# Patient Record
Sex: FEMALE | Race: WHITE | NOT HISPANIC OR LATINO | Employment: OTHER | URBAN - METROPOLITAN AREA
[De-identification: names, ages, dates, MRNs, and addresses within clinical notes are randomized per-mention and may not be internally consistent; named-entity substitution may affect disease eponyms.]

---

## 2017-04-10 ENCOUNTER — OFFICE VISIT (OUTPATIENT)
Dept: INTERNAL MEDICINE | Facility: CLINIC | Age: 72
End: 2017-04-10

## 2017-04-10 VITALS
SYSTOLIC BLOOD PRESSURE: 136 MMHG | BODY MASS INDEX: 27.66 KG/M2 | HEIGHT: 65 IN | WEIGHT: 166 LBS | DIASTOLIC BLOOD PRESSURE: 76 MMHG

## 2017-04-10 DIAGNOSIS — R19.5 DARK STOOLS: ICD-10-CM

## 2017-04-10 DIAGNOSIS — R10.13 EPIGASTRIC PAIN: Primary | ICD-10-CM

## 2017-04-10 LAB
ALBUMIN SERPL-MCNC: 4 G/DL (ref 3.2–4.8)
ALBUMIN/GLOB SERPL: 1.4 G/DL (ref 1.5–2.5)
ALP SERPL-CCNC: 117 U/L (ref 25–100)
ALT SERPL W P-5'-P-CCNC: 20 U/L (ref 7–40)
ANION GAP SERPL CALCULATED.3IONS-SCNC: 11 MMOL/L (ref 3–11)
AST SERPL-CCNC: 29 U/L (ref 0–33)
BILIRUB SERPL-MCNC: 0.4 MG/DL (ref 0.3–1.2)
BUN BLD-MCNC: 19 MG/DL (ref 9–23)
BUN/CREAT SERPL: 23.8 (ref 7–25)
CALCIUM SPEC-SCNC: 10.3 MG/DL (ref 8.7–10.4)
CHLORIDE SERPL-SCNC: 108 MMOL/L (ref 99–109)
CO2 SERPL-SCNC: 19 MMOL/L (ref 20–31)
CREAT BLD-MCNC: 0.8 MG/DL (ref 0.6–1.3)
DEPRECATED RDW RBC AUTO: 51.5 FL (ref 37–54)
ERYTHROCYTE [DISTWIDTH] IN BLOOD BY AUTOMATED COUNT: 14.3 % (ref 11.3–14.5)
GFR SERPL CREATININE-BSD FRML MDRD: 71 ML/MIN/1.73
GLOBULIN UR ELPH-MCNC: 2.8 GM/DL
GLUCOSE BLD-MCNC: 107 MG/DL (ref 70–100)
HCT VFR BLD AUTO: 44.9 % (ref 34.5–44)
HGB BLD-MCNC: 14.1 G/DL (ref 11.5–15.5)
MCH RBC QN AUTO: 30.9 PG (ref 27–31)
MCHC RBC AUTO-ENTMCNC: 31.4 G/DL (ref 32–36)
MCV RBC AUTO: 98.5 FL (ref 80–99)
PLATELET # BLD AUTO: 288 10*3/MM3 (ref 150–450)
PMV BLD AUTO: 10.8 FL (ref 6–12)
POTASSIUM BLD-SCNC: 4.7 MMOL/L (ref 3.5–5.5)
PROT SERPL-MCNC: 6.8 G/DL (ref 5.7–8.2)
RBC # BLD AUTO: 4.56 10*6/MM3 (ref 3.89–5.14)
SODIUM BLD-SCNC: 138 MMOL/L (ref 132–146)
WBC NRBC COR # BLD: 8.51 10*3/MM3 (ref 3.5–10.8)

## 2017-04-10 PROCEDURE — 85027 COMPLETE CBC AUTOMATED: CPT | Performed by: PHYSICIAN ASSISTANT

## 2017-04-10 PROCEDURE — 99213 OFFICE O/P EST LOW 20 MIN: CPT | Performed by: PHYSICIAN ASSISTANT

## 2017-04-10 PROCEDURE — 80053 COMPREHEN METABOLIC PANEL: CPT | Performed by: PHYSICIAN ASSISTANT

## 2017-04-10 NOTE — PROGRESS NOTES
Chief Complaint   Patient presents with   • Abdominal pain, nausea, dark stool x3 days       Subjective   Rebecca Sharma is a 71 y.o. female.       History of Present Illness     Pt has been having intermittent nausea and epigastric pain for the past 3 days. Initially had 2 completely dark stools, not tarry or black- dark brown. Has had 2 normal stools with a few darks spots since then. Notes that she had been taking her medication on an empty stomach prior to onset of symptoms, switched to taking with food. Since her symptoms started, she increased nexium to BID. Changed her diet to almond mild, cooked vegetables. Has had significant improvement in her symptoms. No history of EGD that she is aware of. Last colonoscopy was summer 2016, small polyp removed. Sees Dr Gold.       Current Outpatient Prescriptions:   •  celecoxib (CeleBREX) 200 MG capsule, Take 1 capsule by mouth daily as needed., Disp: , Rfl:   •  esomeprazole (NEXIUM 24HR) 20 MG capsule, Take 2 capsules by mouth daily., Disp: 180 capsule, Rfl: 1  •  fluticasone-salmeterol (ADVAIR DISKUS) 100-50 MCG/DOSE DISKUS, Inhale 1 puff 2 (two) times a day., Disp: 180 each, Rfl: 3  •  levothyroxine (SYNTHROID, LEVOTHROID) 50 MCG tablet, Take 1 tablet by mouth every morning., Disp: 90 tablet, Rfl: 3  •  solifenacin (VESICARE) 5 MG tablet, Take 1 tablet by mouth daily., Disp: 90 tablet, Rfl: 3  •  traMADol (ULTRAM) 50 MG tablet, 50 mg daily as needed., Disp: , Rfl:   •  Triamcinolone Acetonide (NASACORT ALLERGY 24HR) 55 MCG/ACT nasal inhaler, 2 sprays into each nostril every morning., Disp: 16.5 g, Rfl: 1     PMFSH  The following portions of the patient's history were reviewed and updated as appropriate: allergies, current medications, past family history, past medical history, past social history, past surgical history and problem list.    Review of Systems   Constitutional: Negative for activity change, appetite change, diaphoresis and fatigue.   HENT: Negative.  "   Respiratory: Negative for chest tightness, shortness of breath and wheezing.    Cardiovascular: Negative for chest pain.   Gastrointestinal: Negative.    Genitourinary: Negative.    Musculoskeletal: Negative for arthralgias and myalgias.   Skin: Negative for pallor.   Neurological: Negative for dizziness, syncope, weakness and light-headedness.   Psychiatric/Behavioral: Negative for sleep disturbance. The patient is not nervous/anxious.        Objective   /76  Ht 65\" (165.1 cm)  Wt 166 lb (75.3 kg)  BMI 27.62 kg/m2    Physical Exam   Constitutional: She appears well-developed and well-nourished.   HENT:   Head: Normocephalic.   Right Ear: Hearing, tympanic membrane, external ear and ear canal normal.   Left Ear: Hearing, tympanic membrane, external ear and ear canal normal.   Nose: Nose normal.   Mouth/Throat: Oropharynx is clear and moist.   Eyes: Conjunctivae are normal. Pupils are equal, round, and reactive to light.   Neck: Normal range of motion.   Cardiovascular: Normal rate, regular rhythm and normal heart sounds.    Pulmonary/Chest: Effort normal and breath sounds normal. She has no decreased breath sounds. She has no wheezes. She has no rhonchi. She has no rales.   Abdominal: Soft. Bowel sounds are normal. There is no tenderness.   Musculoskeletal: Normal range of motion.   Neurological: She is alert.   Skin: Skin is warm and dry.   Psychiatric: She has a normal mood and affect. Her behavior is normal.   Nursing note and vitals reviewed.      Results for orders placed or performed in visit on 04/10/17   CBC (No Diff)   Result Value Ref Range    WBC 8.51 3.50 - 10.80 10*3/mm3    RBC 4.56 3.89 - 5.14 10*6/mm3    Hemoglobin 14.1 11.5 - 15.5 g/dL    Hematocrit 44.9 (H) 34.5 - 44.0 %    MCV 98.5 80.0 - 99.0 fL    MCH 30.9 27.0 - 31.0 pg    MCHC 31.4 (L) 32.0 - 36.0 g/dL    RDW 14.3 11.3 - 14.5 %    RDW-SD 51.5 37.0 - 54.0 fl    MPV 10.8 6.0 - 12.0 fL    Platelets 288 150 - 450 10*3/mm3 "   Comprehensive Metabolic Panel   Result Value Ref Range    Glucose 107 (H) 70 - 100 mg/dL    BUN 19 9 - 23 mg/dL    Creatinine 0.80 0.60 - 1.30 mg/dL    Sodium 138 132 - 146 mmol/L    Potassium 4.7 3.5 - 5.5 mmol/L    Chloride 108 99 - 109 mmol/L    CO2 19.0 (L) 20.0 - 31.0 mmol/L    Calcium 10.3 8.7 - 10.4 mg/dL    Total Protein 6.8 5.7 - 8.2 g/dL    Albumin 4.00 3.20 - 4.80 g/dL    ALT (SGPT) 20 7 - 40 U/L    AST (SGOT) 29 0 - 33 U/L    Alkaline Phosphatase 117 (H) 25 - 100 U/L    Total Bilirubin 0.4 0.3 - 1.2 mg/dL    eGFR Non African Amer 71 >60 mL/min/1.73    Globulin 2.8 gm/dL    A/G Ratio 1.4 (L) 1.5 - 2.5 g/dL    BUN/Creatinine Ratio 23.8 7.0 - 25.0    Anion Gap 11.0 3.0 - 11.0 mmol/L        ASSESSMENT/PLAN    Problem List Items Addressed This Visit     None      Visit Diagnoses     Epigastric pain    -  Primary    Likely gastritis, now resolving with increase of nexium to 20 mg BID and taking meds with food. Continue lifestyle changes, rtc with worsening symptoms.    Relevant Orders    CBC (No Diff) (Completed)    Comprehensive Metabolic Panel (Completed)    Dark stools        Relevant Orders    CBC (No Diff) (Completed)    Comprehensive Metabolic Panel (Completed)               Return for Next scheduled follow up.

## 2017-04-12 NOTE — PROGRESS NOTES
Your labs are stable. I discussed further evaluation of your recent symptoms with Dr Isaac and we agreed that none is needed now that your symptoms have resolved. If you should have a recurrence of the epigastric pain, nausea and dark stools, please come back in to the office. Hope you are still feeling well!

## 2017-04-18 ENCOUNTER — TELEPHONE (OUTPATIENT)
Dept: INTERNAL MEDICINE | Facility: CLINIC | Age: 72
End: 2017-04-18

## 2017-04-18 DIAGNOSIS — R10.13 DYSPEPSIA: ICD-10-CM

## 2017-04-18 DIAGNOSIS — K21.9 GASTROESOPHAGEAL REFLUX DISEASE, ESOPHAGITIS PRESENCE NOT SPECIFIED: Primary | ICD-10-CM

## 2017-04-18 NOTE — TELEPHONE ENCOUNTER
I sent her a note last week with her labs on MyChart after I talked to Dr Isaac. It said that becuase her symptoms had resolved, there was no need for further testing. However, since she is having some esophageal discomfort, I will put in the referral for an EGD.

## 2017-04-18 NOTE — TELEPHONE ENCOUNTER
----- Message from Kisha De La Rosa MA sent at 4/18/2017  1:02 PM EDT -----  Contact: PATIENT  Please advise.   ----- Message -----     From: Rossy Cook     Sent: 4/18/2017  12:26 PM       To: Pete Rogers [facility]    PATIENT IS CALLING TO DISCUSS RECENT LAB RESULTS. JUAN WAS GOING TO TALK TO DR. MERDANO TO SEE IF PATIENT NEEDS TO BE REFERRED TO GASTROENTEROLOGY. PATIENT IS STILL EXPERIENCING  PAIN AROUND HER ESOPHAGUS. PATIENT HAS A CRUISE COMING UP AND WOULD LIKE TO GET THIS ISSUE RESOLVED. YOU CAN REACH PATIENT BACK [phone]

## 2017-05-08 ENCOUNTER — TRANSCRIBE ORDERS (OUTPATIENT)
Dept: INTERNAL MEDICINE | Facility: CLINIC | Age: 72
End: 2017-05-08

## 2017-05-08 ENCOUNTER — HOSPITAL ENCOUNTER (OUTPATIENT)
Dept: MAMMOGRAPHY | Facility: HOSPITAL | Age: 72
Discharge: HOME | End: 2017-05-08
Attending: INTERNAL MEDICINE | Admitting: INTERNAL MEDICINE

## 2017-05-08 DIAGNOSIS — R92.8 ABNORMAL MAMMOGRAM: ICD-10-CM

## 2017-05-08 DIAGNOSIS — R92.8 ABNORMAL MAMMOGRAM: Primary | ICD-10-CM

## 2017-05-08 PROCEDURE — G0206 DX MAMMO INCL CAD UNI: HCPCS

## 2017-05-08 PROCEDURE — G0206 DX MAMMO INCL CAD UNI: HCPCS | Performed by: RADIOLOGY

## 2017-07-07 ENCOUNTER — LAB (OUTPATIENT)
Dept: INTERNAL MEDICINE | Facility: CLINIC | Age: 72
End: 2017-07-07

## 2017-07-07 DIAGNOSIS — R03.0 ELEVATED BLOOD-PRESSURE READING WITHOUT DIAGNOSIS OF HYPERTENSION: ICD-10-CM

## 2017-07-07 DIAGNOSIS — E03.8 OTHER SPECIFIED HYPOTHYROIDISM: ICD-10-CM

## 2017-07-07 DIAGNOSIS — Z00.00 ANNUAL PHYSICAL EXAM: ICD-10-CM

## 2017-07-07 DIAGNOSIS — R10.13 DYSPEPSIA: ICD-10-CM

## 2017-07-07 DIAGNOSIS — E78.5 DYSLIPIDEMIA: Primary | ICD-10-CM

## 2017-07-07 LAB
ALBUMIN SERPL-MCNC: 4 G/DL (ref 3.2–4.8)
ALBUMIN/GLOB SERPL: 1.4 G/DL (ref 1.5–2.5)
ALP SERPL-CCNC: 141 U/L (ref 25–100)
ALT SERPL W P-5'-P-CCNC: 21 U/L (ref 7–40)
ANION GAP SERPL CALCULATED.3IONS-SCNC: 10 MMOL/L (ref 3–11)
ARTICHOKE IGE QN: 118 MG/DL (ref 0–130)
AST SERPL-CCNC: 29 U/L (ref 0–33)
BACTERIA UR QL AUTO: ABNORMAL /HPF
BASOPHILS # BLD AUTO: 0.09 10*3/MM3 (ref 0–0.2)
BASOPHILS NFR BLD AUTO: 1.3 % (ref 0–1)
BILIRUB SERPL-MCNC: 0.4 MG/DL (ref 0.3–1.2)
BILIRUB UR QL STRIP: NEGATIVE
BUN BLD-MCNC: 15 MG/DL (ref 9–23)
BUN/CREAT SERPL: 18.8 (ref 7–25)
CALCIUM SPEC-SCNC: 10.2 MG/DL (ref 8.7–10.4)
CHLORIDE SERPL-SCNC: 108 MMOL/L (ref 99–109)
CHOLEST SERPL-MCNC: 172 MG/DL (ref 0–200)
CLARITY UR: CLEAR
CO2 SERPL-SCNC: 21 MMOL/L (ref 20–31)
COLOR UR: YELLOW
CREAT BLD-MCNC: 0.8 MG/DL (ref 0.6–1.3)
DEPRECATED RDW RBC AUTO: 53.5 FL (ref 37–54)
EOSINOPHIL # BLD AUTO: 0.21 10*3/MM3 (ref 0–0.3)
EOSINOPHIL NFR BLD AUTO: 3 % (ref 0–3)
ERYTHROCYTE [DISTWIDTH] IN BLOOD BY AUTOMATED COUNT: 14.4 % (ref 11.3–14.5)
GFR SERPL CREATININE-BSD FRML MDRD: 70 ML/MIN/1.73
GLOBULIN UR ELPH-MCNC: 2.8 GM/DL
GLUCOSE BLD-MCNC: 79 MG/DL (ref 70–100)
GLUCOSE UR STRIP-MCNC: NEGATIVE MG/DL
HCT VFR BLD AUTO: 44.5 % (ref 34.5–44)
HDLC SERPL-MCNC: 43 MG/DL (ref 40–60)
HGB BLD-MCNC: 13.9 G/DL (ref 11.5–15.5)
HGB UR QL STRIP.AUTO: NEGATIVE
HYALINE CASTS UR QL AUTO: ABNORMAL /LPF
IMM GRANULOCYTES # BLD: 0.01 10*3/MM3 (ref 0–0.03)
IMM GRANULOCYTES NFR BLD: 0.1 % (ref 0–0.6)
KETONES UR QL STRIP: NEGATIVE
LEUKOCYTE ESTERASE UR QL STRIP.AUTO: ABNORMAL
LYMPHOCYTES # BLD AUTO: 1.37 10*3/MM3 (ref 0.6–4.8)
LYMPHOCYTES NFR BLD AUTO: 19.3 % (ref 24–44)
MCH RBC QN AUTO: 31.8 PG (ref 27–31)
MCHC RBC AUTO-ENTMCNC: 31.2 G/DL (ref 32–36)
MCV RBC AUTO: 101.8 FL (ref 80–99)
MONOCYTES # BLD AUTO: 0.65 10*3/MM3 (ref 0–1)
MONOCYTES NFR BLD AUTO: 9.1 % (ref 0–12)
NEUTROPHILS # BLD AUTO: 4.78 10*3/MM3 (ref 1.5–8.3)
NEUTROPHILS NFR BLD AUTO: 67.2 % (ref 41–71)
NITRITE UR QL STRIP: NEGATIVE
PH UR STRIP.AUTO: 6 [PH] (ref 5–8)
PLAT MORPH BLD: NORMAL
PLATELET # BLD AUTO: 277 10*3/MM3 (ref 150–450)
PMV BLD AUTO: 10.3 FL (ref 6–12)
POTASSIUM BLD-SCNC: 4.6 MMOL/L (ref 3.5–5.5)
PROT SERPL-MCNC: 6.8 G/DL (ref 5.7–8.2)
PROT UR QL STRIP: NEGATIVE
RBC # BLD AUTO: 4.37 10*6/MM3 (ref 3.89–5.14)
RBC # UR: ABNORMAL /HPF
RBC MORPH BLD: NORMAL
REF LAB TEST METHOD: ABNORMAL
SODIUM BLD-SCNC: 139 MMOL/L (ref 132–146)
SP GR UR STRIP: 1.01 (ref 1–1.03)
SQUAMOUS #/AREA URNS HPF: ABNORMAL /HPF
T4 FREE SERPL-MCNC: 1.24 NG/DL (ref 0.89–1.76)
TRIGL SERPL-MCNC: 90 MG/DL (ref 0–150)
TSH SERPL DL<=0.05 MIU/L-ACNC: 1.46 MIU/ML (ref 0.35–5.35)
UROBILINOGEN UR QL STRIP: ABNORMAL
WBC MORPH BLD: NORMAL
WBC NRBC COR # BLD: 7.11 10*3/MM3 (ref 3.5–10.8)
WBC UR QL AUTO: ABNORMAL /HPF

## 2017-07-07 PROCEDURE — 81001 URINALYSIS AUTO W/SCOPE: CPT | Performed by: INTERNAL MEDICINE

## 2017-07-07 PROCEDURE — 80053 COMPREHEN METABOLIC PANEL: CPT | Performed by: INTERNAL MEDICINE

## 2017-07-07 PROCEDURE — 84439 ASSAY OF FREE THYROXINE: CPT | Performed by: INTERNAL MEDICINE

## 2017-07-07 PROCEDURE — 85007 BL SMEAR W/DIFF WBC COUNT: CPT | Performed by: INTERNAL MEDICINE

## 2017-07-07 PROCEDURE — 80061 LIPID PANEL: CPT | Performed by: INTERNAL MEDICINE

## 2017-07-07 PROCEDURE — 85025 COMPLETE CBC W/AUTO DIFF WBC: CPT | Performed by: INTERNAL MEDICINE

## 2017-07-07 PROCEDURE — 84443 ASSAY THYROID STIM HORMONE: CPT | Performed by: INTERNAL MEDICINE

## 2017-07-14 ENCOUNTER — OFFICE VISIT (OUTPATIENT)
Dept: INTERNAL MEDICINE | Facility: CLINIC | Age: 72
End: 2017-07-14

## 2017-07-14 VITALS
HEIGHT: 65 IN | DIASTOLIC BLOOD PRESSURE: 78 MMHG | HEART RATE: 80 BPM | OXYGEN SATURATION: 99 % | WEIGHT: 167 LBS | BODY MASS INDEX: 27.82 KG/M2 | SYSTOLIC BLOOD PRESSURE: 124 MMHG

## 2017-07-14 DIAGNOSIS — K21.9 GASTROESOPHAGEAL REFLUX DISEASE, ESOPHAGITIS PRESENCE NOT SPECIFIED: ICD-10-CM

## 2017-07-14 DIAGNOSIS — Z00.00 MEDICARE ANNUAL WELLNESS VISIT, SUBSEQUENT: Primary | ICD-10-CM

## 2017-07-14 DIAGNOSIS — J45.20 MILD INTERMITTENT ASTHMA WITHOUT COMPLICATION: ICD-10-CM

## 2017-07-14 DIAGNOSIS — E78.5 DYSLIPIDEMIA: ICD-10-CM

## 2017-07-14 DIAGNOSIS — R49.0 HOARSENESS: ICD-10-CM

## 2017-07-14 DIAGNOSIS — R32 URINARY INCONTINENCE, UNSPECIFIED TYPE: ICD-10-CM

## 2017-07-14 DIAGNOSIS — E03.9 ACQUIRED HYPOTHYROIDISM: ICD-10-CM

## 2017-07-14 DIAGNOSIS — F09 MILD COGNITIVE DISORDER: ICD-10-CM

## 2017-07-14 PROCEDURE — G0444 DEPRESSION SCREEN ANNUAL: HCPCS | Performed by: INTERNAL MEDICINE

## 2017-07-14 PROCEDURE — 99214 OFFICE O/P EST MOD 30 MIN: CPT | Performed by: INTERNAL MEDICINE

## 2017-07-14 PROCEDURE — 93000 ELECTROCARDIOGRAM COMPLETE: CPT | Performed by: INTERNAL MEDICINE

## 2017-07-14 PROCEDURE — G0439 PPPS, SUBSEQ VISIT: HCPCS | Performed by: INTERNAL MEDICINE

## 2017-07-14 RX ORDER — MONTELUKAST SODIUM 10 MG/1
1 TABLET ORAL DAILY
COMMUNITY
Start: 2017-06-22 | End: 2018-07-16 | Stop reason: SDUPTHER

## 2017-07-14 RX ORDER — SOLIFENACIN SUCCINATE 5 MG/1
5 TABLET, FILM COATED ORAL DAILY
Qty: 90 TABLET | Refills: 3 | Status: SHIPPED | OUTPATIENT
Start: 2017-07-14 | End: 2018-07-16 | Stop reason: SDUPTHER

## 2017-07-14 RX ORDER — LEVOTHYROXINE SODIUM 0.05 MG/1
50 TABLET ORAL EVERY MORNING
Qty: 90 TABLET | Refills: 3 | Status: SHIPPED | OUTPATIENT
Start: 2017-07-14 | End: 2018-07-16 | Stop reason: SDUPTHER

## 2017-07-14 RX ORDER — GUAIFENESIN 600 MG/1
1200 TABLET, EXTENDED RELEASE ORAL NIGHTLY PRN
COMMUNITY
End: 2018-07-16

## 2017-07-14 NOTE — ASSESSMENT & PLAN NOTE
Health maintenance - flu vacc 10/16; Prevnar 5/15, PVX 4/11, Tdap/Zostavax 2/08; Td next year; mammo due after 11/19/19; no further Paps unless indicated; GYN care per Dr. Bauer s/p BSO 6/17; DEXA 7/16, repeat 2018-19; colonosc 12/15 with Dr. Kartik Gold, repeat 3 yrs; eye exam this week Dr. Hollis, will be transferring care to Dr. Tate; dental exam updated this week, done every 6 mos    Consultants:  Patient Care Team:  Lorene Isaac MD as PCP - General  Lorene Isaac MD as PCP - Internal Medicine  Lorene Isaac MD as PCP - Claims Attributed  Brittanie Bauer MD as Consulting Physician (Obstetrics and Gynecology)  Gigi Esparza MD as Consulting Physician (Gastroenterology)

## 2017-07-14 NOTE — ASSESSMENT & PLAN NOTE
"MMSE 30/30; note previous nightmares with donepezil 10mg dose but able to tolerate the 5mg dose; encouraged \"brain exercises\" with reading, puzzles, memorization, social interactions  "

## 2017-07-14 NOTE — ASSESSMENT & PLAN NOTE
Ongoing, worsened over the last 6-8 months; ddx includes PND, reflux, other; with worsening, referral to ENT for further eval

## 2017-07-14 NOTE — PROGRESS NOTES
ANNUAL WELLNESS VISIT    DRUG AND ALCOHOL USE      no tobacco use, alcohol intake:social drinker and caffeine intake: 2 cups of decaffeinated coffee per day    DIET AND PHYSICAL ACTIVITY     Diet: general    Exercise: frequently   Exercise Details: walking    MOOD DISORDER AND COGNITIVE SCREENING   Depression Screening Tool Used yes - see PHQ-9   Anxiety Screening Tool Used yes     Mini-Cog Performed   Yes    1. Tell Patient 3 Words table, chair, book    2. Administer Clock Test normal    3. Recall 3 words  table, chair, book    4. Number Correct Items 3    FUNCTIONAL ABILITY AND LEVEL OF SAFETY   Hearing no hearing loss     Wears Hearing Aids No       Current Activities Independent        - see Funct/Cog Status Intake     Fall Risk Assessment       Has difficulty with walking or balance  No         Timed Up and Go (TUG) Test  4 sec.       If >12 sec, normal    ADVANCED DIRECTIVE has an advance directive - a copy HAS NOT been provided. Have asked the patient to send this to us to add to record.    PAIN SCREENING Do you have pain right now? no     Do you have pain every day? No      Probable chronic pain: No     Recent Hospitalizations:  No hospitalization(s) within the last year..     MEDICATION REVIEW   - updated and reviewed (see Medication List).   - reviewed for potentially harmful drug-disease interactions in the elderly.   - reviewed for high risk medications in the elderly.   - aspirin use: No      __________________________________________  Chief Complaint   Patient presents with   • Annual Exam     AWV        History of Present Illness  72 y.o.  woman presents for updated wellness visit.  Feels well overall.  Reports back pain and needed to go on [place] cruise; reports successful epidural injection 5/17 per Dr. Law and had a good trip.  Dr. Law also prescribes the tramadol.    Review of Systems  Denies headaches, visual changes, CP, palpitations, SOB, cough, abd pain, n/v/d, vaginal  "discharge/bleeding, numbness/tingling, falls, mood changes, lightheadedness, hearing changes, rashes.    Complains of chronic hoarseness, jo-ann worsened over the last 6-8 months.  Does not feel any congestion in the head but has chest congestion and wonders if it is ok to take mucinex every night.  Denies pain or difficulty with swallowing.   Remains on advair and singulair.    ROS (+) for leg swelling bilaterally with plane flights, which used to not happen.  Notes both legs are swollen equally.     All other ROS reviewed and negative.    PMSFH  The following portions of the patient's history were reviewed and updated as appropriate: allergies, current medications, past family history, past medical history, past social history, past surgical history and problem list.      Current Outpatient Prescriptions:   •  celecoxib (CeleBREX) 200 MG capsule, QD  •  OTC esomeprazole (NEXIUM 24HR) 20 MG capsule, 2 QD  •  fluticasone-salmeterol (ADVAIR DISKUS) 100-50 MCG/DOSE DISKUS 1p BID  •  levothyroxine (SYNTHROID, LEVOTHROID) 50 MCG tablet, QD  •  montelukast (SINGULAIR) 10 MG tablet, QD  •  solifenacin (VESICARE) 5 MG tablet, qhs  •  traMADol (ULTRAM) 50 MG tablet, 50 mg daily as needed.    VITALS:  /78  Pulse 80  Ht 65\" (165.1 cm)  Wt 167 lb (75.8 kg)  SpO2 99%  BMI 27.79 kg/m2    Physical Exam   Constitutional: She is oriented to person, place, and time. She appears well-developed and well-nourished.   HENT:   Head: Normocephalic.   Right Ear: External ear normal.   Left Ear: External ear normal.   Nose: Nose normal.   Mouth/Throat: Oropharynx is clear and moist and mucous membranes are normal. No oropharyngeal exudate.   Eyes: Conjunctivae and EOM are normal. Pupils are equal, round, and reactive to light.   Neck: Normal range of motion. Neck supple. Carotid bruit is not present (bilaterally). No thyromegaly present.   Cardiovascular: Normal rate, regular rhythm and normal heart sounds.    Pulmonary/Chest: " Effort normal and breath sounds normal. No respiratory distress. She has no wheezes. She has no rales.   Abdominal: Soft. Bowel sounds are normal. She exhibits no distension and no mass. There is no hepatosplenomegaly. There is no tenderness.   Genitourinary:   Genitourinary Comments: Breast/pelvic exams deferred to GYN       Musculoskeletal: She exhibits edema (tr-1+ pitting ankle edema bilaterally; diffuse spider/varicose veins BLE, L>R).   Lymphadenopathy:     She has no cervical adenopathy.   Neurological: She is alert and oriented to person, place, and time. She has normal reflexes. She displays normal reflexes. No cranial nerve deficit.   Skin: Skin is warm and dry. No rash noted.   Psychiatric: She has a normal mood and affect. Her behavior is normal.   Nursing note and vitals reviewed.      LABS  Results for orders placed or performed in visit on 07/07/17   Comprehensive Metabolic Panel   Result Value Ref Range    Glucose 79 70 - 100 mg/dL    BUN 15 9 - 23 mg/dL    Creatinine 0.80 0.60 - 1.30 mg/dL    Sodium 139 132 - 146 mmol/L    Potassium 4.6 3.5 - 5.5 mmol/L    Chloride 108 99 - 109 mmol/L    CO2 21.0 20.0 - 31.0 mmol/L    Calcium 10.2 8.7 - 10.4 mg/dL    Total Protein 6.8 5.7 - 8.2 g/dL    Albumin 4.00 3.20 - 4.80 g/dL    ALT (SGPT) 21 7 - 40 U/L    AST (SGOT) 29 0 - 33 U/L    Alkaline Phosphatase 141 (H) 25 - 100 U/L    Total Bilirubin 0.4 0.3 - 1.2 mg/dL    eGFR Non African Amer 70 >60 mL/min/1.73    Globulin 2.8 gm/dL    A/G Ratio 1.4 (L) 1.5 - 2.5 g/dL    BUN/Creatinine Ratio 18.8 7.0 - 25.0    Anion Gap 10.0 3.0 - 11.0 mmol/L   TSH   Result Value Ref Range    TSH 1.458 0.350 - 5.350 mIU/mL   Lipid Panel   Result Value Ref Range    Total Cholesterol 172 0 - 200 mg/dL    Triglycerides 90 0 - 150 mg/dL    HDL Cholesterol 43 40 - 60 mg/dL    LDL Cholesterol  118 0 - 130 mg/dL   Urinalysis With / Microscopic If Indicated   Result Value Ref Range    Color, UA Yellow Yellow, Straw    Appearance, UA Clear  Clear    pH, UA 6.0 5.0 - 8.0    Specific Gravity, UA 1.010 1.001 - 1.030    Glucose, UA Negative Negative    Ketones, UA Negative Negative    Bilirubin, UA Negative Negative    Blood, UA Negative Negative    Protein, UA Negative Negative    Leuk Esterase, UA Small (1+) (A) Negative    Nitrite, UA Negative Negative    Urobilinogen, UA 0.2 E.U./dL 0.2 - 1.0 E.U./dL   T4, Free   Result Value Ref Range    Free T4 1.24 0.89 - 1.76 ng/dL   CBC Auto Differential   Result Value Ref Range    WBC 7.11 3.50 - 10.80 10*3/mm3    RBC 4.37 3.89 - 5.14 10*6/mm3    Hemoglobin 13.9 11.5 - 15.5 g/dL    Hematocrit 44.5 (H) 34.5 - 44.0 %    .8 (H) 80.0 - 99.0 fL    MCH 31.8 (H) 27.0 - 31.0 pg    MCHC 31.2 (L) 32.0 - 36.0 g/dL    RDW 14.4 11.3 - 14.5 %    RDW-SD 53.5 37.0 - 54.0 fl    MPV 10.3 6.0 - 12.0 fL    Platelets 277 150 - 450 10*3/mm3    Neutrophil % 67.2 41.0 - 71.0 %    Lymphocyte % 19.3 (L) 24.0 - 44.0 %    Monocyte % 9.1 0.0 - 12.0 %    Eosinophil % 3.0 0.0 - 3.0 %    Basophil % 1.3 (H) 0.0 - 1.0 %    Immature Grans % 0.1 0.0 - 0.6 %    Neutrophils, Absolute 4.78 1.50 - 8.30 10*3/mm3    Lymphocytes, Absolute 1.37 0.60 - 4.80 10*3/mm3    Monocytes, Absolute 0.65 0.00 - 1.00 10*3/mm3    Eosinophils, Absolute 0.21 0.00 - 0.30 10*3/mm3    Basophils, Absolute 0.09 0.00 - 0.20 10*3/mm3    Immature Grans, Absolute 0.01 0.00 - 0.03 10*3/mm3   Scan Slide   Result Value Ref Range    RBC Morphology Normal Normal    WBC Morphology Normal Normal    Platelet Morphology Normal Normal   Urinalysis, Microscopic Only   Result Value Ref Range    RBC, UA 0-2 None Seen, 0-2 /HPF    WBC, UA 6-12 (A) None Seen /HPF    Bacteria, UA Trace None Seen, Trace /HPF    Squamous Epithelial Cells, UA 3-6 (A) None Seen, 0-2 /HPF    Hyaline Casts, UA None Seen 0 - 6 /LPF    Methodology Manual Light Microscopy    6/16 normal B12 level    ECG 12 Lead  Date/Time: 7/14/2017 1:19 PM  Performed by: ELBERT MEDRANO  Authorized by: ELBERT MEDRANO   Comparison:  "compared with previous ECG from 6/14/2016  Similar to previous ECG  Rhythm: sinus rhythm  Rate: normal  BPM: 73  Conduction: conduction normal  ST Segments: ST segments normal  QRS axis: normal  Other findings comments: inverted T waves V1-2, unchanged  Clinical impression comment: stable EKG            ASSESSMENT/PLAN  Problem List Items Addressed This Visit     Asthma    Relevant Medications    montelukast (SINGULAIR) 10 MG tablet    fluticasone-salmeterol (ADVAIR DISKUS) 100-50 MCG/DOSE DISKUS    Dyslipidemia     Chol profile stable; no meds         Relevant Orders    ECG 12 Lead    Gastroesophageal reflux disease     Stable on OTC Nexium 2 QD but discussed hoarseness could be a sx from reflux; GI - Dr. Esparza         Hoarseness     Ongoing, worsened over the last 6-8 months; ddx includes PND, reflux, other; with worsening, referral to ENT for further eval         Relevant Orders    Ambulatory Referral to ENT (Otolaryngology)    Hypothyroidism     Euthyroid on levothyroxine 50mcg QAM #90, 3RF         Relevant Medications    levothyroxine (SYNTHROID, LEVOTHROID) 50 MCG tablet    Mild cognitive disorder     MMSE 30/30; note previous nightmares with donepezil 10mg dose but able to tolerate the 5mg dose; encouraged \"brain exercises\" with reading, puzzles, memorization, social interactions         Urinary incontinence    Relevant Medications    solifenacin (VESICARE) 5 MG tablet    Medicare annual wellness visit, subsequent - Primary     Health maintenance - flu vacc 10/16; Prevnar 5/15, PVX 4/11, Tdap/Zostavax 2/08; Td next year; mammo due after 11/19/19; no further Paps unless indicated; GYN care per Dr. Bauer s/p BSO 6/17; DEXA 7/16, repeat 2018-19; colonosc 12/15 with Dr. Kartik Gold, repeat 3 yrs; eye exam this week Dr. Hollis, will be transferring care to Dr. Tate; dental exam updated this week, done every 6 mos    Consultants:  Patient Care Team:  Lorene Isaac MD as PCP - General  Lorene Isaac MD as " PCP - Internal Medicine  Lorene Isaac MD as PCP - Claims Attributed  Brittanie Bauer MD as Consulting Physician (Obstetrics and Gynecology)  Gigi Esparza MD as Consulting Physician (Gastroenterology)                   FOLLOW-UP  RTC 1yr for annual wellness    Electronically signed by:    Lorene Isaac MD  07/14/2017

## 2017-09-11 PROBLEM — G47.33 OSA (OBSTRUCTIVE SLEEP APNEA): Status: ACTIVE | Noted: 2017-09-11

## 2017-11-09 ENCOUNTER — HOSPITAL ENCOUNTER (OUTPATIENT)
Dept: MAMMOGRAPHY | Facility: HOSPITAL | Age: 72
Discharge: HOME | End: 2017-11-09
Attending: INTERNAL MEDICINE | Admitting: INTERNAL MEDICINE

## 2017-11-09 DIAGNOSIS — R92.8 ABNORMAL MAMMOGRAM: ICD-10-CM

## 2017-11-09 PROCEDURE — G0204 DX MAMMO INCL CAD BI: HCPCS

## 2017-11-09 PROCEDURE — G0279 TOMOSYNTHESIS, MAMMO: HCPCS | Performed by: RADIOLOGY

## 2017-11-09 PROCEDURE — G0204 DX MAMMO INCL CAD BI: HCPCS | Performed by: RADIOLOGY

## 2017-11-09 PROCEDURE — G0279 TOMOSYNTHESIS, MAMMO: HCPCS

## 2017-11-13 ENCOUNTER — TRANSCRIBE ORDERS (OUTPATIENT)
Dept: INTERNAL MEDICINE | Facility: CLINIC | Age: 72
End: 2017-11-13

## 2017-11-13 DIAGNOSIS — Z12.31 VISIT FOR SCREENING MAMMOGRAM: Primary | ICD-10-CM

## 2018-04-03 ENCOUNTER — OFFICE VISIT (OUTPATIENT)
Dept: INTERNAL MEDICINE | Facility: CLINIC | Age: 73
End: 2018-04-03

## 2018-04-03 ENCOUNTER — HOSPITAL ENCOUNTER (OUTPATIENT)
Dept: GENERAL RADIOLOGY | Facility: HOSPITAL | Age: 73
Discharge: HOME | End: 2018-04-03
Admitting: NURSE PRACTITIONER

## 2018-04-03 VITALS
HEIGHT: 65 IN | SYSTOLIC BLOOD PRESSURE: 128 MMHG | WEIGHT: 176 LBS | DIASTOLIC BLOOD PRESSURE: 74 MMHG | HEART RATE: 77 BPM | BODY MASS INDEX: 29.32 KG/M2 | RESPIRATION RATE: 16 BRPM | OXYGEN SATURATION: 98 %

## 2018-04-03 DIAGNOSIS — M25.561 ACUTE PAIN OF RIGHT KNEE: Primary | ICD-10-CM

## 2018-04-03 PROCEDURE — 73560 X-RAY EXAM OF KNEE 1 OR 2: CPT

## 2018-04-03 PROCEDURE — 99213 OFFICE O/P EST LOW 20 MIN: CPT | Performed by: NURSE PRACTITIONER

## 2018-04-03 RX ORDER — CELECOXIB 200 MG/1
200 CAPSULE ORAL DAILY PRN
Qty: 30 CAPSULE | Refills: 1 | Status: SHIPPED | OUTPATIENT
Start: 2018-04-03 | End: 2018-06-29 | Stop reason: SDUPTHER

## 2018-04-03 NOTE — PATIENT INSTRUCTIONS
Knee Pain, Adult  Knee pain in adults is common. It can be caused by many things, including:  · Arthritis.  · A fluid-filled sac (cyst) or growth in your knee.  · An infection in your knee.  · An injury that will not heal.  · Damage, swelling, or irritation of the tissues that support your knee.  Knee pain is usually not a sign of a serious problem. The pain may go away on its own with time and rest. If it does not, a health care provider may order tests to find the cause of the pain. These may include:  · Imaging tests, such as an X-ray, MRI, or ultrasound.  · Joint aspiration. In this test, fluid is removed from the knee.  · Arthroscopy. In this test, a lighted tube is inserted into knee and an image is projected onto a TV screen.  · A biopsy. In this test, a sample of tissue is removed from the body and studied under a microscope.  Follow these instructions at home:  Pay attention to any changes in your symptoms. Take these actions to relieve your pain.  Activity   · Rest your knee.  · Do not do things that cause pain or make pain worse.  · Avoid high-impact activities or exercises, such as running, jumping rope, or doing jumping jacks.  General instructions   · Take over-the-counter and prescription medicines only as told by your health care provider.  · Raise (elevate) your knee above the level of your heart when you are sitting or lying down.  · Sleep with a pillow under your knee.  · If directed, apply ice to the knee:  ¨ Put ice in a plastic bag.  ¨ Place a towel between your skin and the bag.  ¨ Leave the ice on for 20 minutes, 2-3 times a day.  · Ask your health care provider if you should wear an elastic knee support.  · Lose weight if you are overweight. Extra weight can put pressure on your knee.  · Do not use any products that contain nicotine or tobacco, such as cigarettes and e-cigarettes. Smoking may slow the healing of any bone and joint problems that you may have. If you need help quitting, ask  your health care provider.  Contact a health care provider if:  · Your knee pain continues, changes, or gets worse.  · You have a fever along with knee pain.  · Your knee adalberto or locks up.  · Your knee swells, and the swelling becomes worse.  Get help right away if:  · Your knee feels warm to the touch.  · You cannot move your knee.  · You have severe pain in your knee.  · You have chest pain.  · You have trouble breathing.  Summary  · Knee pain in adults is common. It can be caused by many things, including, arthritis, infection, cysts, or injury.  · Knee pain is usually not a sign of a serious problem, but if it does not go away, a health care provider may perform tests to know the cause of the pain.  · Pay attention to any changes in your symptoms. Relieve your pain with rest, medicines, light activity, and use of ice.  · Get help if your pain continues or becomes very severe, or if your knee adalberto or locks up, or if you have chest pain or trouble breathing.  This information is not intended to replace advice given to you by your health care provider. Make sure you discuss any questions you have with your health care provider.  Document Released: 10/14/2008 Document Revised: 12/08/2017 Document Reviewed: 12/08/2017  [employer] Patient Education © 2017 [employer]      X-ray right knee.  Referral to orthopedics.  I have refilled her Celebrex.  Have fun on your cruise

## 2018-04-10 ENCOUNTER — OFFICE VISIT (OUTPATIENT)
Dept: ORTHOPEDIC SURGERY | Facility: CLINIC | Age: 73
End: 2018-04-10

## 2018-04-10 VITALS
DIASTOLIC BLOOD PRESSURE: 93 MMHG | HEIGHT: 65 IN | SYSTOLIC BLOOD PRESSURE: 160 MMHG | WEIGHT: 169.97 LBS | BODY MASS INDEX: 28.32 KG/M2 | HEART RATE: 93 BPM

## 2018-04-10 DIAGNOSIS — M25.561 MEDIAL KNEE PAIN, RIGHT: ICD-10-CM

## 2018-04-10 DIAGNOSIS — M25.561 RIGHT KNEE PAIN, UNSPECIFIED CHRONICITY: Primary | ICD-10-CM

## 2018-04-10 PROCEDURE — 99204 OFFICE O/P NEW MOD 45 MIN: CPT | Performed by: ORTHOPAEDIC SURGERY

## 2018-04-10 NOTE — PROGRESS NOTES
Orthopaedic Clinic Note: Knee New Patient    Chief Complaint   Patient presents with   • Right Knee - Pain        HPI    Rebecca Sharma is a 72 y.o. female who presents with right knee pain for 4 week(s). Onset Began when she was sitting in a restaurant and slid down the boot seat and felt a pop in her knee.  She had a sharp pain which immediately resolved.  When she went to get up from the boot, she had significant pain with weightbearing.  She localizes the pain to the medial aspect of her knee as well as the patellofemoral compartment and occasionally on the lateral aspect of the knee.  She rates it a 4/10 on the pain scale.  She states her pain is worse with standing, walking.  Sitting and resting improve her pain.  Previous treatments have included Celebrex, Tylenol, tramadol, ice, and a compression knee sleeve.  Despite these interventions, her pain is persisting and limiting her daily activities.  She is also used a cane to assist with ambulation.  She is here today to discuss treatment options for her ongoing knee pain as she is scheduled to undergo a cruise here in 3 weeks and wishes to get her symptoms better before the cruise.  Past Medical History:   Diagnosis Date   • Fasciculations    • Hx of bone density study 07/2013    DEXA (7/13) H -1.3   • Hx of bone density study 07/14/2016    DEXA (7/14/16) L 0.4, H -1.6 repeat 2-3 yrs   • Hx of esophagogastroduodenoscopy 05/05/2017    EGD (5/5/17): mod chronic gastritis, (+)H.pylori, no hiatal hernia; GI - Dr. Esparza   • Pap smear for cervical cancer screening     Pap done 8/16/16 per Dr. Brittanie Bauer      Past Surgical History:   Procedure Laterality Date   • BILATERAL SALPINGO OOPHORECTOMY Bilateral 06/14/2017    GYN - Dr. Bauer; for benign L. ovarian cyst   • BREAST BIOPSY      RIGHT BREAST EXCISION-WITHIN NIPPLE   • CATARACT EXTRACTION, BILATERAL      L. 4/09 (complicated by nerve injury; R. 6/10; L. corrected 6/15; ophtho - Dr. Hollis, Dr. Lange;  "neuro-ophtho Dr. Ferris   • CHOLECYSTECTOMY  2002    secondary to \"crystalized GB\" (''02); surg - Dr. Kartik Gold      Family History   Problem Relation Age of Onset   • Heart failure Mother    • Lung cancer Mother    • Breast cancer Mother 86   • Cancer Mother    • Lung cancer Father      tob use   • Cancer Father    • Depression Sister    • OCD Sister    • Stroke Maternal Grandmother    • Heart disease Maternal Grandfather    • Diabetes Paternal Grandmother    • Heart attack Paternal Grandfather    • Heart attack Paternal Uncle      2 pat uncles     Social History     Social History   • Marital status: Single     Spouse name: N/A   • Number of children: N/A   • Years of education: N/A     Occupational History   • retired [employer] manager     Social History Main Topics   • Smoking status: Never Smoker   • Smokeless tobacco: Never Used   • Alcohol use Yes      Comment: social   • Drug use: Unknown   • Sexual activity: Not on file     Other Topics Concern   • Not on file     Social History Narrative     sister's  (after sister passed away)    Lives in [facility]      Current Outpatient Prescriptions on File Prior to Visit   Medication Sig Dispense Refill   • celecoxib (CeleBREX) 200 MG capsule Take 1 capsule by mouth Daily As Needed for Moderate Pain . 30 capsule 1   • esomeprazole (NEXIUM 24HR) 20 MG capsule Take 2 capsules by mouth daily. 180 capsule 1   • fluticasone-salmeterol (ADVAIR DISKUS) 100-50 MCG/DOSE DISKUS Inhale 1 puff 2 (Two) Times a Day. Gargle and spit after puffs 3 each 3   • guaiFENesin (MUCINEX) 600 MG 12 hr tablet Take 1,200 mg by mouth At Night As Needed.     • levothyroxine (SYNTHROID, LEVOTHROID) 50 MCG tablet Take 1 tablet by mouth Every Morning. 90 tablet 3   • montelukast (SINGULAIR) 10 MG tablet Take 1 tablet by mouth Daily.     • solifenacin (VESICARE) 5 MG tablet Take 1 tablet by mouth Daily. 90 tablet 3   • traMADol (ULTRAM) 50 MG tablet 50 mg daily as needed.   " "    No current facility-administered medications on file prior to visit.       Allergies   Allergen Reactions   • Aspirin Swelling     Lip and hand swelling   • Donepezil      Nightmares at the 10mg dose   • Fluticasone      epistaxis   • Naproxen      ulcer   • Codeine Rash        Review of Systems   Constitutional: Positive for activity change.   HENT: Positive for hearing loss.    Eyes: Negative.    Respiratory: Positive for apnea, shortness of breath and wheezing.    Cardiovascular: Positive for leg swelling.   Gastrointestinal: Negative.    Endocrine: Negative.    Genitourinary: Positive for frequency and urgency.   Musculoskeletal: Positive for arthralgias (knee pain) and back pain.   Skin: Negative.    Allergic/Immunologic: Positive for environmental allergies.   Neurological: Negative.    Hematological: Negative.    Psychiatric/Behavioral: Negative.         The following portions of the patient's history were reviewed and updated as appropriate: allergies, current medications, past family history, past medical history, past social history, past surgical history and problem list.    Physical Exam  Blood pressure 160/93, pulse 93, height 165.1 cm (65\"), weight 77.1 kg (169 lb 15.6 oz).    Body mass index is 28.29 kg/m².    GENERAL APPEARANCE: awake, alert & oriented x 3, in no acute distress and well developed, well nourished  PSYCH: normal affect  LUNGS:  breathing nonlabored  EYES: sclera anicteric  CARDIOVASCULAR: palpable dorsalis pedis, palpable posterior tibial bilaterally. Capillary refill less than 2 seconds  EXTREMITIES: no clubbing, cyanosis  GAIT:  Antalgic            Right Lower Extremity Exam:   ----------  Hip Exam  ----------  FLEXION CONTRACTURE: None  FLEXION: 110 degrees  INTERNAL ROTATION: 20 degrees at 90 degrees of flexion   EXTERNAL ROTATION: 40 degrees at 90 degrees of flexion    PAIN WITH HIP MOTION: no  ----------  Knee Exam  ----------  ALIGNMENT: neutral    RANGE OF MOTION:  Normal " (0-130 degrees) with no extensor lag or flexion contracture  LIGAMENTOUS STABILITY:   stable to varus and valgus stress at terminal extension and 30 degrees without any evidence of laxity     STRENGTH:  4/5 knee flexion, extension. 5/5 ankle dorsiflexion and plantarflexion.     PAIN WITH PALPATION: medial joint line, pes bursa and anterior knee  KNEE EFFUSION: yes, mild effusion  PAIN WITH KNEE ROM: yes, medially and anteriorly   PATELLAR CREPITUS: yes, painful and symptomatic  SPECIAL EXAM FINDINGS:  Painful Sarthak's medially, negative Sarthak's laterally    REFLEXES:  PATELLAR 2+/4  ACHILLES 2+/4    CLONUS: no  STRAIGHT LEG TEST:   negative    SENSATION TO LIGHT TOUCH:  DEEP PERONEAL/SUPERFICIAL PERONEAL/SURAL/SAPHENOUS/TIBIAL:   intact    EDEMA:  no  ERYTHEMA:  no  WOUNDS/INCISIONS: no        Left Lower Extremity Exam:   ----------  Hip Exam  ----------  FLEXION CONTRACTURE: None  FLEXION: 110 degrees  INTERNAL ROTATION: 20 degrees at 90 degrees of flexion   EXTERNAL ROTATION: 40 degrees at 90 degrees of flexion    PAIN WITH HIP MOTION: no  ----------  Knee Exam  ----------  ALIGNMENT: neutral, no varus or valgus deformity     RANGE OF MOTION:  Normal (0-130 degrees) with no extensor lag or flexion contracture  LIGAMENTOUS STABILITY:   stable to varus and valgus stress at 0 and 30 degrees without any evidence of laxity     STRENGTH:  5/5 knee flexion, extension. 5/5 ankle dorsiflexion and plantarflexion.     PAIN WITH PALPATION: denies tenderness to palpation about the knee, denies medial or lateral joint line pain  KNEE EFFUSION: no  PAIN WITH KNEE ROM: no  PATELLAR CREPITUS: yes, asymptomatic  SPECIAL EXAM FINDINGS:  Negative patellar compression    REFLEXES:  PATELLAR 2+/4  ACHILLES 2+/4    CLONUS: negative  STRAIGHT LEG TEST:   negative    SENSATION TO LIGHT TOUCH:  DEEP PERONEAL/SUPERFICIAL PERONEAL/SURAL/SAPHENOUS/TIBIAL:   intact    EDEMA:  no  ERYTHEMA:  no  WOUNDS/INCISIONS: none, no overlying skin  problems.      ______________________________________________________________________  ______________________________________________________________________    RADIOGRAPHIC FINDINGS:   Indication: Right knee pain    Comparison: Todays xrays were compared to previous xrays from 4/3/18     Right knee(s) 4 views: Radiographs [facility] [place] moderate tricompartment osteoarthritic changes and no significant joint space narrowing or osteophyte formation.  There is no evidence of acute bony injury, fracture, or osseous lesion.      Assessment/Plan:   Diagnosis Plan   1. Right knee pain, unspecified chronicity  XR Knee 4+ View Right     Patient's symptoms appear to be consistent with potential medial meniscal tear.  She is having ongoing medial-based knee pain as well as anterior knee pain.  I discussed potential cortisone injection versus obtaining an MRI to evaluate for internal derangement of the knee.  Patient elects to proceed with the MRI.  I'll see her back in 1 week to discuss the results of the MRI and how to proceed.    Melody Diaz V  04/10/18  11:25 AM

## 2018-04-16 ENCOUNTER — HOSPITAL ENCOUNTER (OUTPATIENT)
Dept: MRI IMAGING | Facility: HOSPITAL | Age: 73
Discharge: HOME | End: 2018-04-16
Attending: ORTHOPAEDIC SURGERY | Admitting: ORTHOPAEDIC SURGERY

## 2018-04-16 DIAGNOSIS — M25.561 MEDIAL KNEE PAIN, RIGHT: ICD-10-CM

## 2018-04-16 PROCEDURE — 73721 MRI JNT OF LWR EXTRE W/O DYE: CPT

## 2018-04-19 ENCOUNTER — OFFICE VISIT (OUTPATIENT)
Dept: ORTHOPEDIC SURGERY | Facility: CLINIC | Age: 73
End: 2018-04-19

## 2018-04-19 VITALS
WEIGHT: 169.97 LBS | DIASTOLIC BLOOD PRESSURE: 87 MMHG | HEIGHT: 65 IN | HEART RATE: 86 BPM | BODY MASS INDEX: 28.32 KG/M2 | SYSTOLIC BLOOD PRESSURE: 158 MMHG

## 2018-04-19 DIAGNOSIS — M25.561 RIGHT KNEE PAIN, UNSPECIFIED CHRONICITY: ICD-10-CM

## 2018-04-19 DIAGNOSIS — M17.11 PRIMARY OSTEOARTHRITIS OF RIGHT KNEE: Primary | ICD-10-CM

## 2018-04-19 DIAGNOSIS — M23.261 OLD BUCKET HANDLE TEAR OF LATERAL MENISCUS OF RIGHT KNEE: ICD-10-CM

## 2018-04-19 PROCEDURE — 99213 OFFICE O/P EST LOW 20 MIN: CPT | Performed by: ORTHOPAEDIC SURGERY

## 2018-04-19 NOTE — PROGRESS NOTES
"Orthopaedic Clinic Note: Knee Established Patient    Chief Complaint   Patient presents with   • Right Knee - Follow-up     1 WEEK POST MRI        HPI    It has been 1  week(s) since Ms. Sharma's last visit. She returns to clinic today for Follow-up of MRI of right knee to evaluate for intra-articular pathology.  In the interval since her last visit, her pain has significantly improved.  She only rates it a 3/10 on the pain scale today.  She continues localize her pain along the medial joint line and occasional patellofemoral region.  She denies any lateral joint line pain.  She denies any mechanical symptoms of clicking, catching, or locking.  She is ambulating with the assistance of a cane but states she does not need this regularly.    Past Medical History:   Diagnosis Date   • Fasciculations    • Hx of bone density study 07/2013    DEXA (7/13) H -1.3   • Hx of bone density study 07/14/2016    DEXA (7/14/16) L 0.4, H -1.6 repeat 2-3 yrs   • Hx of esophagogastroduodenoscopy 05/05/2017    EGD (5/5/17): mod chronic gastritis, (+)H.pylori, no hiatal hernia; GI - Dr. Esparza   • Pap smear for cervical cancer screening     Pap done 8/16/16 per Dr. Brittanie Bauer      Past Surgical History:   Procedure Laterality Date   • BILATERAL SALPINGO OOPHORECTOMY Bilateral 06/14/2017    GYN - Dr. Bauer; for benign L. ovarian cyst   • BREAST BIOPSY      RIGHT BREAST EXCISION-WITHIN NIPPLE   • CATARACT EXTRACTION, BILATERAL      L. 4/09 (complicated by nerve injury; R. 6/10; L. corrected 6/15; ophtho - Dr. Hollis, Dr. Lange; neuro-ophtho Dr. Ferris   • CHOLECYSTECTOMY  2002    secondary to \"crystalized GB\" (''02); surg - Dr. Kartik Gold      Family History   Problem Relation Age of Onset   • Heart failure Mother    • Lung cancer Mother    • Breast cancer Mother 86   • Cancer Mother    • Lung cancer Father      tob use   • Cancer Father    • Depression Sister    • OCD Sister    • Stroke Maternal Grandmother    • Heart disease " Maternal Grandfather    • Diabetes Paternal Grandmother    • Heart attack Paternal Grandfather    • Heart attack Paternal Uncle      2 pat uncles     Social History     Social History   • Marital status: Single     Spouse name: N/A   • Number of children: N/A   • Years of education: N/A     Occupational History   • retired [employer] manager     Social History Main Topics   • Smoking status: Never Smoker   • Smokeless tobacco: Never Used   • Alcohol use Yes      Comment: social   • Drug use: No   • Sexual activity: Defer     Other Topics Concern   • Not on file     Social History Narrative     sister's  (after sister passed away)    Lives in [facility]      Current Outpatient Prescriptions on File Prior to Visit   Medication Sig Dispense Refill   • celecoxib (CeleBREX) 200 MG capsule Take 1 capsule by mouth Daily As Needed for Moderate Pain . 30 capsule 1   • esomeprazole (NEXIUM 24HR) 20 MG capsule Take 2 capsules by mouth daily. 180 capsule 1   • fluticasone-salmeterol (ADVAIR DISKUS) 100-50 MCG/DOSE DISKUS Inhale 1 puff 2 (Two) Times a Day. Gargle and spit after puffs 3 each 3   • guaiFENesin (MUCINEX) 600 MG 12 hr tablet Take 1,200 mg by mouth At Night As Needed.     • levothyroxine (SYNTHROID, LEVOTHROID) 50 MCG tablet Take 1 tablet by mouth Every Morning. 90 tablet 3   • montelukast (SINGULAIR) 10 MG tablet Take 1 tablet by mouth Daily.     • solifenacin (VESICARE) 5 MG tablet Take 1 tablet by mouth Daily. 90 tablet 3   • traMADol (ULTRAM) 50 MG tablet 50 mg daily as needed.       No current facility-administered medications on file prior to visit.       Allergies   Allergen Reactions   • Aspirin Swelling     Lip and hand swelling   • Donepezil      Nightmares at the 10mg dose   • Fluticasone      epistaxis   • Naproxen      ulcer   • Codeine Rash        Review of Systems   Constitutional: Negative.    HENT: Negative.    Eyes: Negative.    Respiratory: Negative.    Cardiovascular:  "Negative.    Gastrointestinal: Negative.    Endocrine: Negative.    Genitourinary: Negative.    Musculoskeletal: Positive for arthralgias.   Skin: Negative.    Allergic/Immunologic: Negative.    Neurological: Negative.    Hematological: Negative.    Psychiatric/Behavioral: Negative.         Physical Exam  Blood pressure 158/87, pulse 86, height 165.1 cm (65\"), weight 77.1 kg (169 lb 15.6 oz).    Body mass index is 28.29 kg/m².    GENERAL APPEARANCE: awake, alert, oriented, in no acute distress  LUNGS:  breathing nonlabored  EXTREMITIES: no clubbing, cyanosis  PERIPHERAL PULSES: palpable dorsalis pedis and posterior tibial pulses bilaterally.    GAIT:  Normal        ----------  Right Knee Exam:  ----------  ALIGNMENT: neutral  ----------  RANGE OF MOTION:  Normal (0-130 degrees) with no extensor lag or flexion contracture  LIGAMENTOUS STABILITY:   stable to varus and valgus stress at terminal extension and 30 degrees without any evidence of laxity  ----------  STRENGTH:  KNEE FLEXION 5/5  KNEE EXTENSION  5/5  ANKLE DORSIFLEXION  5/5  ANKLE PLANTARFLEXION  5/5  ----------  PAIN WITH PALPATION:medial joint line  KNEE EFFUSION: yes, trace effusion  PAIN WITH KNEE ROM: no  PATELLAR CREPITUS:  no  ----------  SENSATION TO LIGHT TOUCH:  DEEP PERONEAL/SUPERFICIAL PERONEAL/SURAL/SAPHENOUS/TIBIAL:    intact  ----------  EDEMA:  no  ERYTHEMA:    no  WOUNDS/INCISIONS:  no  _____________________________________________________________________  _____________________________________________________________________    RADIOGRAPHIC FINDINGS:   MRI from 4/16/18 was personally reviewed.  MRI shows evidence of medial compartment arthritis with slight meniscal extrusion.  No evidence of definitive medial meniscal tear.  Lateral compartment demonstrates a lateral meniscal tear with a pattern consistent with likely bucket-handle tear of lateral meniscus.  Patellofemoral compartment demonstrates cystic changes within the patella consistent " with arthritis.    Assessment/Plan:   Diagnosis Plan   1. Primary osteoarthritis of right knee     2. Right knee pain, unspecified chronicity     3. Old bucket handle tear of lateral meniscus of right knee       I discussed with the patient extensively her MRI findings.  While she does have findings of arthritic changes in both the medial patellofemoral compartment, her only pathology amenable to arthroscopic treatment is localized in the lateral compartment of the knee.  The meniscal tear appears to be asymptomatic at this time however.  She has full knee range of motion with no mechanical symptoms or locking.  I explained that while it is not likely, this meniscal pathology could be contributing to her overall knee pain.  I discussed conservative versus surgical treatment options.  Surgical treatment would entail a partial lateral meniscectomy.  I explained that this may alleviate some of her symptoms but cannot reliably alleviate her medial-based knee pain or patellofemoral pain which is likely due to arthritis.  Alternatively, we could perform a cortisone injection to see if this helps alleviate her symptoms.  Patient is scheduled to go on a cruise in 2 weeks and therefore does not wish to pursue surgical intervention at this time.  She is agreeable to the cortisone injection.  However she wants the injection performed just prior to the cruise.  She will schedule follow-up in 1 week for the cortisone injection.      Mario Valderrama MD  04/19/18  1:49 PM

## 2018-05-01 ENCOUNTER — OFFICE VISIT (OUTPATIENT)
Dept: ORTHOPEDIC SURGERY | Facility: CLINIC | Age: 73
End: 2018-05-01

## 2018-05-01 VITALS
BODY MASS INDEX: 28.8 KG/M2 | WEIGHT: 172.84 LBS | HEART RATE: 90 BPM | HEIGHT: 65 IN | SYSTOLIC BLOOD PRESSURE: 150 MMHG | DIASTOLIC BLOOD PRESSURE: 80 MMHG

## 2018-05-01 DIAGNOSIS — M17.11 PRIMARY OSTEOARTHRITIS OF RIGHT KNEE: Primary | ICD-10-CM

## 2018-05-01 DIAGNOSIS — M70.50 PES ANSERINE BURSITIS: ICD-10-CM

## 2018-05-01 PROCEDURE — 20610 DRAIN/INJ JOINT/BURSA W/O US: CPT | Performed by: ORTHOPAEDIC SURGERY

## 2018-05-01 RX ORDER — LIDOCAINE HYDROCHLORIDE 10 MG/ML
3 INJECTION, SOLUTION INFILTRATION; PERINEURAL
Status: COMPLETED | OUTPATIENT
Start: 2018-05-01 | End: 2018-05-01

## 2018-05-01 RX ORDER — BUPIVACAINE HYDROCHLORIDE 2.5 MG/ML
3 INJECTION, SOLUTION INFILTRATION; PERINEURAL
Status: COMPLETED | OUTPATIENT
Start: 2018-05-01 | End: 2018-05-01

## 2018-05-01 RX ORDER — TRIAMCINOLONE ACETONIDE 40 MG/ML
80 INJECTION, SUSPENSION INTRA-ARTICULAR; INTRAMUSCULAR
Status: COMPLETED | OUTPATIENT
Start: 2018-05-01 | End: 2018-05-01

## 2018-05-01 RX ADMIN — BUPIVACAINE HYDROCHLORIDE 3 ML: 2.5 INJECTION, SOLUTION INFILTRATION; PERINEURAL at 10:18

## 2018-05-01 RX ADMIN — LIDOCAINE HYDROCHLORIDE 3 ML: 10 INJECTION, SOLUTION INFILTRATION; PERINEURAL at 10:18

## 2018-05-01 RX ADMIN — TRIAMCINOLONE ACETONIDE 80 MG: 40 INJECTION, SUSPENSION INTRA-ARTICULAR; INTRAMUSCULAR at 10:18

## 2018-05-01 NOTE — PROGRESS NOTES
"Orthopaedic Clinic Note: Knee Established Patient    Chief Complaint   Patient presents with   • Right Knee - Follow-up     1 week        HPI    It has been 1  week(s) since Ms. Sharma's last visit. She returns to clinic today for Follow-up of right knee pain.  She rates her pain a 2/10 on the pain scale.  Standing walking increases her pain.  Sitting and resting improve her pain.  She is here today for follow-up evaluation as she is scheduled to go on a cruise next week and his wishing to have her knee issues improved symptomatically so that she can enjoy her cruise.    Past Medical History:   Diagnosis Date   • Fasciculations    • Hx of bone density study 07/2013    DEXA (7/13) H -1.3   • Hx of bone density study 07/14/2016    DEXA (7/14/16) L 0.4, H -1.6 repeat 2-3 yrs   • Hx of esophagogastroduodenoscopy 05/05/2017    EGD (5/5/17): mod chronic gastritis, (+)H.pylori, no hiatal hernia; GI - Dr. Esparza   • Pap smear for cervical cancer screening     Pap done 8/16/16 per Dr. Brittanie Bauer      Past Surgical History:   Procedure Laterality Date   • BILATERAL SALPINGO OOPHORECTOMY Bilateral 06/14/2017    GYN - Dr. Bauer; for benign L. ovarian cyst   • BREAST BIOPSY      RIGHT BREAST EXCISION-WITHIN NIPPLE   • CATARACT EXTRACTION, BILATERAL      L. 4/09 (complicated by nerve injury; R. 6/10; L. corrected 6/15; ophtho - Dr. Hollis, Dr. Lange; neuro-ophtho Dr. Ferris   • CHOLECYSTECTOMY  2002    secondary to \"crystalized GB\" (''02); surg - Dr. Kartik Gold      Family History   Problem Relation Age of Onset   • Heart failure Mother    • Lung cancer Mother    • Breast cancer Mother 86   • Cancer Mother    • Lung cancer Father      tob use   • Cancer Father    • Depression Sister    • OCD Sister    • Stroke Maternal Grandmother    • Heart disease Maternal Grandfather    • Diabetes Paternal Grandmother    • Heart attack Paternal Grandfather    • Heart attack Paternal Uncle      2 pat uncles     Social History "     Social History   • Marital status: Single     Spouse name: N/A   • Number of children: N/A   • Years of education: N/A     Occupational History   • retired [employer] manager     Social History Main Topics   • Smoking status: Never Smoker   • Smokeless tobacco: Never Used   • Alcohol use Yes      Comment: social   • Drug use: No   • Sexual activity: Defer     Other Topics Concern   • Not on file     Social History Narrative     sister's  (after sister passed away)    Lives in [facility]      Current Outpatient Prescriptions on File Prior to Visit   Medication Sig Dispense Refill   • celecoxib (CeleBREX) 200 MG capsule Take 1 capsule by mouth Daily As Needed for Moderate Pain . 30 capsule 1   • esomeprazole (NEXIUM 24HR) 20 MG capsule Take 2 capsules by mouth daily. 180 capsule 1   • fluticasone-salmeterol (ADVAIR DISKUS) 100-50 MCG/DOSE DISKUS Inhale 1 puff 2 (Two) Times a Day. Gargle and spit after puffs 3 each 3   • guaiFENesin (MUCINEX) 600 MG 12 hr tablet Take 1,200 mg by mouth At Night As Needed.     • levothyroxine (SYNTHROID, LEVOTHROID) 50 MCG tablet Take 1 tablet by mouth Every Morning. 90 tablet 3   • montelukast (SINGULAIR) 10 MG tablet Take 1 tablet by mouth Daily.     • solifenacin (VESICARE) 5 MG tablet Take 1 tablet by mouth Daily. 90 tablet 3   • traMADol (ULTRAM) 50 MG tablet 50 mg daily as needed.       No current facility-administered medications on file prior to visit.       Allergies   Allergen Reactions   • Aspirin Swelling     Lip and hand swelling   • Donepezil      Nightmares at the 10mg dose   • Fluticasone      epistaxis   • Naproxen      ulcer   • Codeine Rash        Review of Systems   Constitutional: Negative.    HENT: Negative.    Eyes: Negative.    Respiratory: Negative.    Cardiovascular: Negative.    Gastrointestinal: Negative.    Endocrine: Negative.    Genitourinary: Negative.    Musculoskeletal: Positive for arthralgias.   Skin: Negative.   "  Allergic/Immunologic: Negative.    Neurological: Negative.    Hematological: Negative.    Psychiatric/Behavioral: Negative.         Physical Exam  Blood pressure 150/80, pulse 90, height 165.1 cm (65\"), weight 78.4 kg (172 lb 13.5 oz).    Body mass index is 28.76 kg/m².    GENERAL APPEARANCE: awake, alert, oriented, in no acute distress  LUNGS:  breathing nonlabored  EXTREMITIES: no clubbing, cyanosis  PERIPHERAL PULSES: palpable dorsalis pedis and posterior tibial pulses bilaterally.    GAIT:  Antalgic        ----------  Right Knee Exam:  ----------  ALIGNMENT: neutral  ----------  RANGE OF MOTION:  Normal (0-130 degrees) with no extensor lag or flexion contracture  LIGAMENTOUS STABILITY:   stable to varus and valgus stress at terminal extension and 30 degrees without any evidence of laxity  ----------  STRENGTH:  KNEE FLEXION 5/5  KNEE EXTENSION  5/5  ANKLE DORSIFLEXION  5/5  ANKLE PLANTARFLEXION  5/5  ----------  PAIN WITH PALPATION:medial joint line, pes bursa  KNEE EFFUSION: yes, trace effusion  PAIN WITH KNEE ROM: no  PATELLAR CREPITUS:  no  ----------  SENSATION TO LIGHT TOUCH:  DEEP PERONEAL/SUPERFICIAL PERONEAL/SURAL/SAPHENOUS/TIBIAL:    intact  ----------  EDEMA:  no  ERYTHEMA:    no  WOUNDS/INCISIONS:  no  _____________________________________________________________________  _____________________________________________________________________    RADIOGRAPHIC FINDINGS:   No new imaging today    Assessment/Plan:   Diagnosis Plan   1. Primary osteoarthritis of right knee     2. Pes anserine bursitis       I discussed with patient that her knee pain appears to be arthritic in nature along with has bursitis.  I offered her cortisone injection today and recommended oral anti-inflammatories as tolerated.  She is in agreement with this.  We will proceed with injection today and have her follow up on an as-needed basis.    Procedure Note:  I discussed with the patient the potential benefits of performing a " therapeutic injection of the right knee as well as potential risks including but not limited to infection, swelling, pain, bleeding, bruising, nerve/vessel damage, skin color changes, transient elevation in blood glucose levels, and fat atrophy. After informed consent and after the area was prepped with alcohol, ethyl chloride was used to numb the skin. Via the superolateral approach, 3cc of 1% lidocaine, 3cc of 0.25% marcaine and 2 cc of 40mg/ml of Kenalog were injected into the right knee. The patient tolerated the procedure well. There were no complications. A sterile dressing was placed over the injection site.        Mario Valderrama MD  05/01/18  10:51 AM

## 2018-05-01 NOTE — PROGRESS NOTES
Procedure   Large Joint Arthrocentesis  Date/Time: 5/1/2018 10:18 AM  Consent given by: patient  Site marked: site marked  Timeout: Immediately prior to procedure a time out was called to verify the correct patient, procedure, equipment, support staff and site/side marked as required   Supporting Documentation  Indications: pain   Procedure Details  Location: knee - R knee  Preparation: Patient was prepped and draped in the usual sterile fashion  Needle size: 22 G  Approach: anterolateral  Medications administered: 3 mL lidocaine 1 %; 80 mg triamcinolone acetonide 40 MG/ML; 3 mL bupivacaine 0.25 %  Patient tolerance: patient tolerated the procedure well with no immediate complications

## 2018-06-25 ENCOUNTER — TELEPHONE (OUTPATIENT)
Dept: INTERNAL MEDICINE | Facility: CLINIC | Age: 73
End: 2018-06-25

## 2018-06-25 DIAGNOSIS — E03.9 ACQUIRED HYPOTHYROIDISM: Primary | ICD-10-CM

## 2018-06-25 DIAGNOSIS — I77.9 PERIPHERAL ARTERIAL OCCLUSIVE DISEASE (HCC): ICD-10-CM

## 2018-06-25 DIAGNOSIS — R03.0 ELEVATED BLOOD-PRESSURE READING WITHOUT DIAGNOSIS OF HYPERTENSION: ICD-10-CM

## 2018-06-29 ENCOUNTER — OFFICE VISIT (OUTPATIENT)
Dept: ORTHOPEDIC SURGERY | Facility: CLINIC | Age: 73
End: 2018-06-29

## 2018-06-29 VITALS — HEART RATE: 92 BPM | OXYGEN SATURATION: 97 % | BODY MASS INDEX: 28.58 KG/M2 | WEIGHT: 171.52 LBS | HEIGHT: 65 IN

## 2018-06-29 DIAGNOSIS — M70.50 PES ANSERINE BURSITIS: ICD-10-CM

## 2018-06-29 DIAGNOSIS — M17.11 PRIMARY OSTEOARTHRITIS OF RIGHT KNEE: Primary | ICD-10-CM

## 2018-06-29 PROCEDURE — 99213 OFFICE O/P EST LOW 20 MIN: CPT | Performed by: ORTHOPAEDIC SURGERY

## 2018-06-29 RX ORDER — CELECOXIB 200 MG/1
200 CAPSULE ORAL DAILY PRN
Qty: 30 CAPSULE | Refills: 1 | Status: SHIPPED | OUTPATIENT
Start: 2018-06-29 | End: 2018-08-28 | Stop reason: SDUPTHER

## 2018-06-29 NOTE — PROGRESS NOTES
"Orthopaedic Clinic Note: Knee Established Patient    Chief Complaint   Patient presents with   • Right Knee - Follow-up     Primary Osteoarthritis of Right Knee; Cortisone Injection given 5/1/18         HPI    It has been 2  month(s) since Ms. Sharma's last visit. She returns to clinic today for follow-up of right knee pain.  She received cortisone injection 2 months ago which provided approximately 6-7 weeks of relief.  Her pain has gradually returned.  She rates it a 4/10 on the pain scale.  She is still is doing better than she was prior to the injection but her pain is starting to come back and limiting her daily activities.  She is ambulate with no assistive device today.  She is here today to discuss alternative treatment options for her ongoing knee pain.    Past Medical History:   Diagnosis Date   • Fasciculations    • Hx of bone density study 07/2013    DEXA (7/13) H -1.3   • Hx of bone density study 07/14/2016    DEXA (7/14/16) L 0.4, H -1.6 repeat 2-3 yrs   • Hx of esophagogastroduodenoscopy 05/05/2017    EGD (5/5/17): mod chronic gastritis, (+)H.pylori, no hiatal hernia; GI - Dr. Esparza   • Pap smear for cervical cancer screening     Pap done 8/16/16 per Dr. Brittanie Bauer      Past Surgical History:   Procedure Laterality Date   • BILATERAL SALPINGO OOPHORECTOMY Bilateral 06/14/2017    GYN - Dr. Bauer; for benign L. ovarian cyst   • BREAST BIOPSY      RIGHT BREAST EXCISION-WITHIN NIPPLE   • CATARACT EXTRACTION, BILATERAL      L. 4/09 (complicated by nerve injury; R. 6/10; L. corrected 6/15; ophtho - Dr. Hollis, Dr. Lange; neuro-ophtho Dr. Ferris   • CHOLECYSTECTOMY  2002    secondary to \"crystalized GB\" (''02); surg - Dr. Kartik Gold      Family History   Problem Relation Age of Onset   • Heart failure Mother    • Lung cancer Mother    • Breast cancer Mother 86   • Cancer Mother    • Lung cancer Father         tob use   • Cancer Father    • Depression Sister    • OCD Sister    • Stroke Maternal " Grandmother    • Heart disease Maternal Grandfather    • Diabetes Paternal Grandmother    • Heart attack Paternal Grandfather    • Heart attack Paternal Uncle         2 pat uncles     Social History     Social History   • Marital status: Single     Spouse name: N/A   • Number of children: N/A   • Years of education: N/A     Occupational History   • retired [employer] manager     Social History Main Topics   • Smoking status: Never Smoker   • Smokeless tobacco: Never Used   • Alcohol use Yes      Comment: social   • Drug use: No   • Sexual activity: Defer     Other Topics Concern   • Not on file     Social History Narrative     sister's  (after sister passed away)    Lives in [facility]      Current Outpatient Prescriptions on File Prior to Visit   Medication Sig Dispense Refill   • esomeprazole (NEXIUM 24HR) 20 MG capsule Take 2 capsules by mouth daily. 180 capsule 1   • fluticasone-salmeterol (ADVAIR DISKUS) 100-50 MCG/DOSE DISKUS Inhale 1 puff 2 (Two) Times a Day. Gargle and spit after puffs 3 each 3   • guaiFENesin (MUCINEX) 600 MG 12 hr tablet Take 1,200 mg by mouth At Night As Needed.     • levothyroxine (SYNTHROID, LEVOTHROID) 50 MCG tablet Take 1 tablet by mouth Every Morning. 90 tablet 3   • montelukast (SINGULAIR) 10 MG tablet Take 1 tablet by mouth Daily.     • solifenacin (VESICARE) 5 MG tablet Take 1 tablet by mouth Daily. 90 tablet 3   • traMADol (ULTRAM) 50 MG tablet 50 mg daily as needed.     • [DISCONTINUED] celecoxib (CeleBREX) 200 MG capsule Take 1 capsule by mouth Daily As Needed for Moderate Pain . 30 capsule 1     No current facility-administered medications on file prior to visit.       Allergies   Allergen Reactions   • Aspirin Swelling     Lip and hand swelling   • Donepezil      Nightmares at the 10mg dose   • Fluticasone      epistaxis   • Naproxen      ulcer   • Codeine Rash   • Hydrocodone Rash        Review of Systems   Constitutional: Negative.    HENT: Negative.   "  Eyes: Negative.    Respiratory: Negative.    Cardiovascular: Negative.    Gastrointestinal: Negative.    Endocrine: Negative.    Genitourinary: Negative.    Musculoskeletal: Positive for joint swelling.        JOINT PAIN   Skin: Negative.    Allergic/Immunologic: Negative.    Neurological: Negative.    Hematological: Negative.    Psychiatric/Behavioral: Negative.         Physical Exam  Pulse 92, height 165.1 cm (65\"), weight 77.8 kg (171 lb 8.3 oz), SpO2 97 %.    Body mass index is 28.54 kg/m².    GENERAL APPEARANCE: awake, alert, oriented, in no acute distress  LUNGS:  breathing nonlabored  EXTREMITIES: no clubbing, cyanosis  PERIPHERAL PULSES: palpable dorsalis pedis and posterior tibial pulses bilaterally.    GAIT:  Normal        ----------  Right Knee Exam:  ----------  ALIGNMENT: neutral  ----------  RANGE OF MOTION:  Normal (0-130 degrees) with no extensor lag or flexion contracture  LIGAMENTOUS STABILITY:   stable to varus and valgus stress at terminal extension and 30 degrees without any evidence of laxity  ----------  STRENGTH:  KNEE FLEXION 5/5  KNEE EXTENSION  5/5  ANKLE DORSIFLEXION  5/5  ANKLE PLANTARFLEXION  5/5  ----------  PAIN WITH PALPATION:medial joint line, pes bursa  KNEE EFFUSION: no  PAIN WITH KNEE ROM: no  PATELLAR CREPITUS:  no  ----------  SENSATION TO LIGHT TOUCH:  DEEP PERONEAL/SUPERFICIAL PERONEAL/SURAL/SAPHENOUS/TIBIAL:    intact  ----------  EDEMA:  no  ERYTHEMA:    no  WOUNDS/INCISIONS:  no  _____________________________________________________________________  _____________________________________________________________________    RADIOGRAPHIC FINDINGS:   No new imaging today    Assessment/Plan:   Diagnosis Plan   1. Primary osteoarthritis of right knee  celecoxib (CeleBREX) 200 MG capsule    Large Joint Arthrocentesis   2. Pes anserine bursitis       Patient is failed conservative treatment and is candidate for total joint arthroplasty.  She is not interested in proceeding with " surgical intervention at this time.  Alternative treatment options including physical therapy, anti-inflammatories, bracing, and Visco injections were discussed.  I explained to her the AAOS clinical practice guidelines regarding Visco supplementation.  She expressed understanding but wishes to proceed with these injections.  We will obtain insurance preauthorization and see her back after authorization has been approved to initiate the right knee Visco injection series.  In addition this I'll prescribe her Celebrex 200 mg daily as needed.       Mario Valderrama MD  06/29/18  11:42 AM

## 2018-07-09 ENCOUNTER — LAB (OUTPATIENT)
Dept: INTERNAL MEDICINE | Facility: CLINIC | Age: 73
End: 2018-07-09

## 2018-07-09 DIAGNOSIS — E03.9 ACQUIRED HYPOTHYROIDISM: ICD-10-CM

## 2018-07-09 DIAGNOSIS — I77.9 PERIPHERAL ARTERIAL OCCLUSIVE DISEASE (HCC): ICD-10-CM

## 2018-07-09 DIAGNOSIS — R03.0 ELEVATED BLOOD-PRESSURE READING WITHOUT DIAGNOSIS OF HYPERTENSION: ICD-10-CM

## 2018-07-09 LAB
ALBUMIN SERPL-MCNC: 3.92 G/DL (ref 3.2–4.8)
ALBUMIN/GLOB SERPL: 1.6 G/DL (ref 1.5–2.5)
ALP SERPL-CCNC: 139 U/L (ref 25–100)
ALT SERPL W P-5'-P-CCNC: 24 U/L (ref 7–40)
ANION GAP SERPL CALCULATED.3IONS-SCNC: 8 MMOL/L (ref 3–11)
ARTICHOKE IGE QN: 112 MG/DL (ref 0–130)
AST SERPL-CCNC: 29 U/L (ref 0–33)
BACTERIA UR QL AUTO: ABNORMAL /HPF
BASOPHILS # BLD AUTO: 0.05 10*3/MM3 (ref 0–0.2)
BASOPHILS NFR BLD AUTO: 0.8 % (ref 0–1)
BILIRUB SERPL-MCNC: 0.5 MG/DL (ref 0.3–1.2)
BILIRUB UR QL STRIP: NEGATIVE
BUN BLD-MCNC: 21 MG/DL (ref 9–23)
BUN/CREAT SERPL: 25.6 (ref 7–25)
CALCIUM SPEC-SCNC: 9.4 MG/DL (ref 8.7–10.4)
CHLORIDE SERPL-SCNC: 107 MMOL/L (ref 99–109)
CHOLEST SERPL-MCNC: 167 MG/DL (ref 0–200)
CLARITY UR: CLEAR
CO2 SERPL-SCNC: 24 MMOL/L (ref 20–31)
COLOR UR: YELLOW
CREAT BLD-MCNC: 0.82 MG/DL (ref 0.6–1.3)
DEPRECATED RDW RBC AUTO: 53.1 FL (ref 37–54)
EOSINOPHIL # BLD AUTO: 0.14 10*3/MM3 (ref 0–0.3)
EOSINOPHIL NFR BLD AUTO: 2.2 % (ref 0–3)
ERYTHROCYTE [DISTWIDTH] IN BLOOD BY AUTOMATED COUNT: 15 % (ref 11.3–14.5)
GFR SERPL CREATININE-BSD FRML MDRD: 68 ML/MIN/1.73
GLOBULIN UR ELPH-MCNC: 2.5 GM/DL
GLUCOSE BLD-MCNC: 74 MG/DL (ref 70–100)
GLUCOSE UR STRIP-MCNC: NEGATIVE MG/DL
HCT VFR BLD AUTO: 43 % (ref 34.5–44)
HDLC SERPL-MCNC: 48 MG/DL (ref 40–60)
HGB BLD-MCNC: 13.6 G/DL (ref 11.5–15.5)
HGB UR QL STRIP.AUTO: NEGATIVE
HYALINE CASTS UR QL AUTO: ABNORMAL /LPF
IMM GRANULOCYTES # BLD: 0.01 10*3/MM3 (ref 0–0.03)
IMM GRANULOCYTES NFR BLD: 0.2 % (ref 0–0.6)
KETONES UR QL STRIP: NEGATIVE
LEUKOCYTE ESTERASE UR QL STRIP.AUTO: ABNORMAL
LYMPHOCYTES # BLD AUTO: 1.62 10*3/MM3 (ref 0.6–4.8)
LYMPHOCYTES NFR BLD AUTO: 25.6 % (ref 24–44)
MCH RBC QN AUTO: 30.7 PG (ref 27–31)
MCHC RBC AUTO-ENTMCNC: 31.6 G/DL (ref 32–36)
MCV RBC AUTO: 97.1 FL (ref 80–99)
MONOCYTES # BLD AUTO: 0.53 10*3/MM3 (ref 0–1)
MONOCYTES NFR BLD AUTO: 8.4 % (ref 0–12)
NEUTROPHILS # BLD AUTO: 4 10*3/MM3 (ref 1.5–8.3)
NEUTROPHILS NFR BLD AUTO: 63 % (ref 41–71)
NITRITE UR QL STRIP: NEGATIVE
PH UR STRIP.AUTO: 7 [PH] (ref 5–8)
PLATELET # BLD AUTO: 210 10*3/MM3 (ref 150–450)
PMV BLD AUTO: 11.1 FL (ref 6–12)
POTASSIUM BLD-SCNC: 4.7 MMOL/L (ref 3.5–5.5)
PROT SERPL-MCNC: 6.4 G/DL (ref 5.7–8.2)
PROT UR QL STRIP: NEGATIVE
RBC # BLD AUTO: 4.43 10*6/MM3 (ref 3.89–5.14)
RBC # UR: ABNORMAL /HPF
REF LAB TEST METHOD: ABNORMAL
SODIUM BLD-SCNC: 139 MMOL/L (ref 132–146)
SP GR UR STRIP: <=1.005 (ref 1–1.03)
SQUAMOUS #/AREA URNS HPF: ABNORMAL /HPF
TRIGL SERPL-MCNC: 73 MG/DL (ref 0–150)
TSH SERPL DL<=0.05 MIU/L-ACNC: 1.82 MIU/ML (ref 0.35–5.35)
UROBILINOGEN UR QL STRIP: ABNORMAL
WBC NRBC COR # BLD: 6.34 10*3/MM3 (ref 3.5–10.8)
WBC UR QL AUTO: ABNORMAL /HPF

## 2018-07-09 PROCEDURE — 81001 URINALYSIS AUTO W/SCOPE: CPT | Performed by: INTERNAL MEDICINE

## 2018-07-09 PROCEDURE — 80061 LIPID PANEL: CPT | Performed by: INTERNAL MEDICINE

## 2018-07-09 PROCEDURE — 85025 COMPLETE CBC W/AUTO DIFF WBC: CPT | Performed by: INTERNAL MEDICINE

## 2018-07-09 PROCEDURE — 80053 COMPREHEN METABOLIC PANEL: CPT | Performed by: INTERNAL MEDICINE

## 2018-07-09 PROCEDURE — 84443 ASSAY THYROID STIM HORMONE: CPT | Performed by: INTERNAL MEDICINE

## 2018-07-16 ENCOUNTER — OFFICE VISIT (OUTPATIENT)
Dept: INTERNAL MEDICINE | Facility: CLINIC | Age: 73
End: 2018-07-16

## 2018-07-16 VITALS
SYSTOLIC BLOOD PRESSURE: 130 MMHG | HEART RATE: 76 BPM | BODY MASS INDEX: 27.99 KG/M2 | WEIGHT: 168.2 LBS | OXYGEN SATURATION: 97 % | DIASTOLIC BLOOD PRESSURE: 80 MMHG

## 2018-07-16 DIAGNOSIS — R32 URINARY INCONTINENCE, UNSPECIFIED TYPE: ICD-10-CM

## 2018-07-16 DIAGNOSIS — F09 MILD COGNITIVE DISORDER: ICD-10-CM

## 2018-07-16 DIAGNOSIS — E03.9 ACQUIRED HYPOTHYROIDISM: ICD-10-CM

## 2018-07-16 DIAGNOSIS — J45.20 MILD INTERMITTENT ASTHMA WITHOUT COMPLICATION: ICD-10-CM

## 2018-07-16 DIAGNOSIS — E78.5 DYSLIPIDEMIA: ICD-10-CM

## 2018-07-16 DIAGNOSIS — M85.89 OSTEOPENIA OF MULTIPLE SITES: ICD-10-CM

## 2018-07-16 DIAGNOSIS — H90.3 BILATERAL SENSORINEURAL HEARING LOSS: ICD-10-CM

## 2018-07-16 DIAGNOSIS — Z00.00 MEDICARE ANNUAL WELLNESS VISIT, SUBSEQUENT: Primary | ICD-10-CM

## 2018-07-16 DIAGNOSIS — R03.0 ELEVATED BLOOD-PRESSURE READING WITHOUT DIAGNOSIS OF HYPERTENSION: ICD-10-CM

## 2018-07-16 PROCEDURE — 99214 OFFICE O/P EST MOD 30 MIN: CPT | Performed by: INTERNAL MEDICINE

## 2018-07-16 PROCEDURE — G0439 PPPS, SUBSEQ VISIT: HCPCS | Performed by: INTERNAL MEDICINE

## 2018-07-16 PROCEDURE — 93000 ELECTROCARDIOGRAM COMPLETE: CPT | Performed by: INTERNAL MEDICINE

## 2018-07-16 PROCEDURE — G0444 DEPRESSION SCREEN ANNUAL: HCPCS | Performed by: INTERNAL MEDICINE

## 2018-07-16 RX ORDER — MONTELUKAST SODIUM 10 MG/1
10 TABLET ORAL DAILY
Qty: 90 TABLET | Refills: 3 | Status: SHIPPED | OUTPATIENT
Start: 2018-07-16 | End: 2019-02-07 | Stop reason: SDUPTHER

## 2018-07-16 RX ORDER — SOLIFENACIN SUCCINATE 5 MG/1
5 TABLET, FILM COATED ORAL DAILY
Qty: 90 TABLET | Refills: 3 | Status: SHIPPED | OUTPATIENT
Start: 2018-07-16 | End: 2019-03-26 | Stop reason: ALTCHOICE

## 2018-07-16 RX ORDER — LEVOTHYROXINE SODIUM 0.05 MG/1
50 TABLET ORAL EVERY MORNING
Qty: 90 TABLET | Refills: 3 | Status: SHIPPED | OUTPATIENT
Start: 2018-07-16 | End: 2019-07-17 | Stop reason: SDUPTHER

## 2018-07-16 NOTE — PROGRESS NOTES
ANNUAL WELLNESS VISIT    DRUG AND ALCOHOL USE      no tobacco use and no caffeine use, 2 glasses of red wine a week    DIET AND PHYSICAL ACTIVITY     Diet: general    Exercise: never   Exercise Details: does not exercise due to bad back, knee    MOOD DISORDER AND COGNITIVE SCREENING   Depression Screening Tool Used yes - see PHQ-9   Anxiety Screening Tool Used no     Mini-Cog Performed   Yes    1. Tell Patient 3 Words apple table carmine    2. Administer Clock Test normal    3. Recall 3 words  apple table carmine    4. Number Correct Items 3    FUNCTIONAL ABILITY AND LEVEL OF SAFETY   Hearing mild hearing loss     Wears Hearing Aids No       Current Activities Independent      difficulty with walking  - see Funct/Cog Status Intake     Fall Risk Assessment       Has difficulty with walking or balance  Yes         Timed Up and Go (TUG) Test  12 sec.       If >12 sec, normal    ADVANCED DIRECTIVE has an advance directive - a copy has been provided and is in file    PAIN SCREENING Do you have pain right now? no    Recent Hospitalizations:  No hospitalization(s) within the last year..     MEDICATION REVIEW   - updated and reviewed (see Medication List).   - reviewed for potentially harmful drug-disease interactions in the elderly.   - reviewed for high risk medications in the elderly.   - aspirin use: No    BMI  Body mass index is 27.99 kg/m².    Patient's Body mass index is 27.99 kg/m². BMI is above normal parameters. Recommendations include: exercise counseling and nutrition counseling.    _________________________________________________________    Chief Complaint   Patient presents with   • Subsequent Medicare Wellness       History of Present Illness  73 y.o.  woman presents for updated wellness visit.    Notes GYN visit with Dr. Bauer last week; abd pain has resolved.    Notes previous celebrex helped with knee pain but will be getting weekly rooster comb injections x3 starting next week with   [facility].    Review of Systems  Denies headaches, visual changes, CP, palpitations, SOB, cough, abd pain, n/v/d, difficulty with urination (stable mild urin incontinence), numbness/tingling, falls, mood changes, lightheadedness, rashes.    Reports mild decreased hearing, jo-ann in crowds; is ok 1:1.  ROS (+) for dry mouth with CPAP, even with humidifier.  ROS (+) for knee arthritis pains, injections pending.       All other ROS reviewed and negative.    [facility]  The following portions of the patient's history were reviewed and updated as appropriate: allergies, current medications, past family history, past medical history, past social history, past surgical history and problem list.    Current Outpatient Prescriptions:   •  celecoxib (CeleBREX) 200 MG capsule, QD prn  •  esomeprazole (NEXIUM 24HR) 20 MG capsule, 2 QD  •  fluticasone-salmeterol (ADVAIR DISKUS) 100-50 MCG/DOSE 1 puff BID  •  levothyroxine (SYNTHROID, LEVOTHROID) 50 MCG tablet, QD  •  montelukast (SINGULAIR) 10 MG tablet, QD  •  solifenacin (VESICARE) 5 MG tablet, QD  •  traMADol (ULTRAM) 50 MG tablet, prn    VITALS:  /80   Pulse 76   Wt 76.3 kg (168 lb 3.2 oz)   SpO2 97%   BMI 27.99 kg/m²     Physical Exam   Constitutional: She is oriented to person, place, and time. She appears well-developed and well-nourished.   HENT:   Head: Normocephalic.   Right Ear: External ear normal.   Left Ear: External ear normal.   Nose: Nose normal.   Mouth/Throat: Oropharynx is clear and moist and mucous membranes are normal. No oropharyngeal exudate.   intact hearing to finger rubbing bilaterally   Eyes: Pupils are equal, round, and reactive to light. Conjunctivae and EOM are normal.   Neck: Normal range of motion. Neck supple. Carotid bruit is not present (bilaterally). No thyromegaly present.   Cardiovascular: Normal rate, regular rhythm and normal heart sounds.    Pulmonary/Chest: Effort normal and breath sounds normal. No respiratory distress.   Abdominal:  Soft. Bowel sounds are normal. She exhibits no distension and no mass. There is no hepatosplenomegaly. There is no tenderness.   Genitourinary:   Genitourinary Comments: Breast/pelvic exams deferred to GYN     Musculoskeletal: Normal range of motion. She exhibits no edema.   Lymphadenopathy:     She has no cervical adenopathy.   Neurological: She is alert and oriented to person, place, and time. She has normal reflexes. She displays normal reflexes. No cranial nerve deficit.   Skin: Skin is warm and dry. No rash noted.   Psychiatric: She has a normal mood and affect. Her behavior is normal.   Nursing note and vitals reviewed.      LABS  Results for orders placed or performed in visit on 07/09/18   Lipid Panel   Result Value Ref Range    Total Cholesterol 167 0 - 200 mg/dL    Triglycerides 73 0 - 150 mg/dL    HDL Cholesterol 48 40 - 60 mg/dL    LDL Cholesterol  112 0 - 130 mg/dL   Comprehensive Metabolic Panel   Result Value Ref Range    Glucose 74 70 - 100 mg/dL    BUN 21 9 - 23 mg/dL    Creatinine 0.82 0.60 - 1.30 mg/dL    Sodium 139 132 - 146 mmol/L    Potassium 4.7 3.5 - 5.5 mmol/L    Chloride 107 99 - 109 mmol/L    CO2 24.0 20.0 - 31.0 mmol/L    Calcium 9.4 8.7 - 10.4 mg/dL    Total Protein 6.4 5.7 - 8.2 g/dL    Albumin 3.92 3.20 - 4.80 g/dL    ALT (SGPT) 24 7 - 40 U/L    AST (SGOT) 29 0 - 33 U/L    Alkaline Phosphatase 139 (H) 25 - 100 U/L    Total Bilirubin 0.5 0.3 - 1.2 mg/dL    eGFR Non African Amer 68 >60 mL/min/1.73    Globulin 2.5 gm/dL    A/G Ratio 1.6 1.5 - 2.5 g/dL    BUN/Creatinine Ratio 25.6 (H) 7.0 - 25.0    Anion Gap 8.0 3.0 - 11.0 mmol/L   TSH   Result Value Ref Range    TSH 1.816 0.350 - 5.350 mIU/mL   CBC Auto Differential   Result Value Ref Range    WBC 6.34 3.50 - 10.80 10*3/mm3    RBC 4.43 3.89 - 5.14 10*6/mm3    Hemoglobin 13.6 11.5 - 15.5 g/dL    Hematocrit 43.0 34.5 - 44.0 %    MCV 97.1 80.0 - 99.0 fL    MCH 30.7 27.0 - 31.0 pg    MCHC 31.6 (L) 32.0 - 36.0 g/dL    RDW 15.0 (H) 11.3 - 14.5  %    RDW-SD 53.1 37.0 - 54.0 fl    MPV 11.1 6.0 - 12.0 fL    Platelets 210 150 - 450 10*3/mm3    Neutrophil % 63.0 41.0 - 71.0 %    Lymphocyte % 25.6 24.0 - 44.0 %    Monocyte % 8.4 0.0 - 12.0 %    Eosinophil % 2.2 0.0 - 3.0 %    Basophil % 0.8 0.0 - 1.0 %    Immature Grans % 0.2 0.0 - 0.6 %    Neutrophils, Absolute 4.00 1.50 - 8.30 10*3/mm3    Lymphocytes, Absolute 1.62 0.60 - 4.80 10*3/mm3    Monocytes, Absolute 0.53 0.00 - 1.00 10*3/mm3    Eosinophils, Absolute 0.14 0.00 - 0.30 10*3/mm3    Basophils, Absolute 0.05 0.00 - 0.20 10*3/mm3    Immature Grans, Absolute 0.01 0.00 - 0.03 10*3/mm3   Urinalysis without microscopic (no culture) - Urine, Clean Catch   Result Value Ref Range    Color, UA Yellow Yellow, Straw    Appearance, UA Clear Clear    pH, UA 7.0 5.0 - 8.0    Specific Gravity, UA <=1.005 1.001 - 1.030    Glucose, UA Negative Negative    Ketones, UA Negative Negative    Bilirubin, UA Negative Negative    Blood, UA Negative Negative    Protein, UA Negative Negative    Leuk Esterase, UA Moderate (2+) (A) Negative    Nitrite, UA Negative Negative    Urobilinogen, UA 0.2 E.U./dL 0.2 - 1.0 E.U./dL   Urinalysis, Microscopic Only - Urine, Clean Catch   Result Value Ref Range    RBC, UA None Seen None Seen, 0-2 /HPF    WBC, UA 6-12 (A) None Seen, 0-2 /HPF    Bacteria, UA None Seen None Seen, Trace /HPF    Squamous Epithelial Cells, UA 0-2 None Seen, 0-2 /HPF    Hyaline Casts, UA 0-6 0 - 6 /LPF    Methodology Manual Light Microscopy        ECG 12 Lead  Date/Time: 7/16/2018 11:35 AM  Performed by: ELBERT MEDRANO  Authorized by: ELBERT MEDRANO   Comparison: compared with previous ECG from 7/14/2017  Similar to previous ECG  Rhythm: sinus rhythm  Rate: normal  BPM: 69  Conduction: conduction normal  ST Segments: ST segments normal  QRS axis: normal  Other findings comments: inverted T wave III, unchanged  Clinical impression comment: stable EKG              ASSESSMENT/PLAN  Problem List Items Addressed This Visit      Asthma    Relevant Medications    fluticasone-salmeterol (ADVAIR DISKUS) 100-50 MCG/DOSE DISKUS    montelukast (SINGULAIR) 10 MG tablet    Dyslipidemia     Lipids remain stable; no meds         Relevant Orders    ECG 12 Lead    Elevated blood-pressure reading without diagnosis of hypertension     BP bord/stable; rec low Na diet, increased aerobic exercise; home BP monitoring with goal BP < 130/80           Hypothyroidism     Euthyroid on levothyroxine 50mcg QD #90, 3RF         Relevant Medications    levothyroxine (SYNTHROID, LEVOTHROID) 50 MCG tablet    Mild cognitive disorder     MMSE stable at 30/30         Osteopenia     Calcium and vitamin D supplementation with weight-bearing exercise; DEXA 7/16, repeat 2019            Bilateral sensorineural hearing loss     Intact finger rubbing hearing today; follow clinically but discussed re-eval with audiology if continues to worsen, jo-ann in smaller group or 1-on-1 conversations         Urinary incontinence    Relevant Medications    solifenacin (VESICARE) 5 MG tablet    Medicare annual wellness visit, subsequent - Primary     Health maintenance - flu vacc 11/17; Prevnar 5/15; PVX 4/14; due for Td (Tdap 2/08); Zostavax 2/08 - rec Shingrix; bilat dx mammo 11/9/17; GYN with Dr. Bauer 1/9/18; DEXA 7/16, repeat 2019; colonosc 12/15, repeat later this yr per Dr. Kartik Gold; eye exam with Dr. Tate pending 9/18; dental exam last wk, done q6 mos; (+) seat belt use               FOLLOW-UP  RTC 1yr for annual wellness visit; fasting labs the week prior to appt (CBC, CMP, TSH, lipids, UA/micro, FT4)      Electronically signed by:    Lorene Isaac MD  07/16/2018

## 2018-07-16 NOTE — ASSESSMENT & PLAN NOTE
BP bord/stable; rec low Na diet, increased aerobic exercise; home BP monitoring with goal BP < 130/80

## 2018-07-16 NOTE — ASSESSMENT & PLAN NOTE
Health maintenance - flu vacc 11/17; Prevnar 5/15; PVX 4/14; due for Td (Tdap 2/08); Zostavax 2/08 - rec Shingrix; bilat dx mammo 11/9/17; GYN with Dr. Bauer 1/9/18; DEXA 7/16, repeat 2019; colonosc 12/15, repeat later this yr per Dr. Kartik Gold; eye exam with Dr. Tate pending 9/18; dental exam last wk, done q6 mos; (+) seat belt use

## 2018-07-17 ENCOUNTER — CLINICAL SUPPORT (OUTPATIENT)
Dept: ORTHOPEDIC SURGERY | Facility: CLINIC | Age: 73
End: 2018-07-17

## 2018-07-17 DIAGNOSIS — M17.11 PRIMARY OSTEOARTHRITIS OF RIGHT KNEE: Primary | ICD-10-CM

## 2018-07-17 PROCEDURE — 20610 DRAIN/INJ JOINT/BURSA W/O US: CPT | Performed by: ORTHOPAEDIC SURGERY

## 2018-07-17 RX ORDER — HYALURONATE SODIUM 10 MG/ML
20 SYRINGE (ML) INTRAARTICULAR
Status: COMPLETED | OUTPATIENT
Start: 2018-07-17 | End: 2018-07-17

## 2018-07-17 RX ADMIN — Medication 20 MG: at 11:30

## 2018-07-17 NOTE — PROGRESS NOTES
Patient returns for initiation Euflexxa series to the right knee.  She denies any interval changes since her last visit.    Procedure Note:  I discussed with the patient the potential benefits of performing a therapeutic injection of the right knee as well as potential risks including but not limited to infection, swelling, pain, bleeding, bruising, nerve/vessel damage, pseudoseptic reaction, and worsening joint pain. After informed consent and after the area was prepped with alcohol, ethyl chloride was used to numb the skin. Via the superolateral approach, 2 cc of Euflexxa viscosupplementation were injected into the right knee. The patient tolerated the procedure well. There were no complications. A sterile dressing was placed over the injection site.

## 2018-07-17 NOTE — PROGRESS NOTES
Procedure   Large Joint Arthrocentesis  Date/Time: 7/17/2018 11:30 AM  Consent given by: patient  Site marked: site marked  Timeout: Immediately prior to procedure a time out was called to verify the correct patient, procedure, equipment, support staff and site/side marked as required   Supporting Documentation  Indications: pain   Procedure Details  Location: knee - R knee  Preparation: Patient was prepped and draped in the usual sterile fashion  Needle size: 22 G  Approach: anterolateral  Medications administered: 20 mg Sodium Hyaluronate 20 MG/2ML  Patient tolerance: patient tolerated the procedure well with no immediate complications

## 2018-07-19 NOTE — ASSESSMENT & PLAN NOTE
Intact finger rubbing hearing today; follow clinically but discussed re-eval with audiology if continues to worsen, jo-ann in smaller group or 1-on-1 conversations

## 2018-07-24 ENCOUNTER — CLINICAL SUPPORT (OUTPATIENT)
Dept: ORTHOPEDIC SURGERY | Facility: CLINIC | Age: 73
End: 2018-07-24

## 2018-07-24 DIAGNOSIS — M17.11 PRIMARY OSTEOARTHRITIS OF RIGHT KNEE: Primary | ICD-10-CM

## 2018-07-24 PROCEDURE — 20610 DRAIN/INJ JOINT/BURSA W/O US: CPT | Performed by: ORTHOPAEDIC SURGERY

## 2018-07-24 RX ORDER — HYALURONATE SODIUM 10 MG/ML
20 SYRINGE (ML) INTRAARTICULAR
Status: COMPLETED | OUTPATIENT
Start: 2018-07-24 | End: 2018-07-24

## 2018-07-24 RX ADMIN — Medication 20 MG: at 11:29

## 2018-07-24 NOTE — PROGRESS NOTES
Patient returns for second Euflexxa injection into the right knee.  She denies any complications with the initial injection.  Overall her pain remains the same.    Procedure Note:  I discussed with the patient the potential benefits of performing a therapeutic injection of the right knee as well as potential risks including but not limited to infection, swelling, pain, bleeding, bruising, nerve/vessel damage, pseudoseptic reaction, and worsening joint pain. After informed consent and after the area was prepped with alcohol, ethyl chloride was used to numb the skin. Via the superolateral approach, 2 cc of Euflexxa viscosupplementation were injected into the right knee. The patient tolerated the procedure well. There were no complications. A sterile dressing was placed over the injection site.

## 2018-07-24 NOTE — PROGRESS NOTES
Procedure   Large Joint Arthrocentesis  Date/Time: 7/24/2018 11:29 AM  Consent given by: patient  Site marked: site marked  Timeout: Immediately prior to procedure a time out was called to verify the correct patient, procedure, equipment, support staff and site/side marked as required   Supporting Documentation  Indications: pain   Procedure Details  Location: knee - R knee  Preparation: Patient was prepped and draped in the usual sterile fashion  Needle size: 22 G  Medications administered: 20 mg Sodium Hyaluronate 20 MG/2ML  Patient tolerance: patient tolerated the procedure well with no immediate complications

## 2018-07-31 ENCOUNTER — CLINICAL SUPPORT (OUTPATIENT)
Dept: ORTHOPEDIC SURGERY | Facility: CLINIC | Age: 73
End: 2018-07-31

## 2018-07-31 DIAGNOSIS — M17.11 PRIMARY OSTEOARTHRITIS OF RIGHT KNEE: Primary | ICD-10-CM

## 2018-07-31 PROCEDURE — 20610 DRAIN/INJ JOINT/BURSA W/O US: CPT | Performed by: ORTHOPAEDIC SURGERY

## 2018-07-31 RX ORDER — HYALURONATE SODIUM 10 MG/ML
20 SYRINGE (ML) INTRAARTICULAR
Status: COMPLETED | OUTPATIENT
Start: 2018-07-31 | End: 2018-07-31

## 2018-07-31 RX ADMIN — Medication 20 MG: at 10:58

## 2018-07-31 NOTE — PROGRESS NOTES
Procedure   Large Joint Arthrocentesis  Date/Time: 7/31/2018 10:58 AM  Consent given by: patient  Site marked: site marked  Timeout: Immediately prior to procedure a time out was called to verify the correct patient, procedure, equipment, support staff and site/side marked as required   Supporting Documentation  Indications: pain   Procedure Details  Location: knee - R knee  Preparation: Patient was prepped and draped in the usual sterile fashion  Needle size: 22 G  Approach: anterolateral  Medications administered: 20 mg Sodium Hyaluronate 20 MG/2ML  Patient tolerance: patient tolerated the procedure well with no immediate complications

## 2018-07-31 NOTE — PROGRESS NOTES
Patient returns for third Euflexxa injection in the right knee.  She denies any interval complications with last injection.  Overall her pain remains the same.    Procedure Note:  I discussed with the patient the potential benefits of performing a therapeutic injection of the right knee as well as potential risks including but not limited to infection, swelling, pain, bleeding, bruising, nerve/vessel damage, pseudoseptic reaction, and worsening joint pain. After informed consent and after the area was prepped with alcohol, ethyl chloride was used to numb the skin. Via the superolateral approach, 2 cc of Euflexxa viscosupplementation were injected into the right knee. The patient tolerated the procedure well. There were no complications. A sterile dressing was placed over the injection site.

## 2018-08-28 DIAGNOSIS — M17.11 PRIMARY OSTEOARTHRITIS OF RIGHT KNEE: ICD-10-CM

## 2018-08-29 RX ORDER — CELECOXIB 200 MG/1
200 CAPSULE ORAL DAILY PRN
Qty: 30 CAPSULE | Refills: 1 | Status: SHIPPED | OUTPATIENT
Start: 2018-08-29 | End: 2021-07-25

## 2018-11-01 ENCOUNTER — OFFICE VISIT (OUTPATIENT)
Dept: ORTHOPEDIC SURGERY | Facility: CLINIC | Age: 73
End: 2018-11-01

## 2018-11-01 VITALS — HEIGHT: 65 IN | BODY MASS INDEX: 27.92 KG/M2 | OXYGEN SATURATION: 98 % | HEART RATE: 94 BPM | WEIGHT: 167.55 LBS

## 2018-11-01 DIAGNOSIS — M48.062 LUMBAR STENOSIS WITH NEUROGENIC CLAUDICATION: Primary | ICD-10-CM

## 2018-11-01 DIAGNOSIS — M25.551 HIP PAIN, RIGHT: ICD-10-CM

## 2018-11-01 DIAGNOSIS — M23.261 OLD BUCKET HANDLE TEAR OF LATERAL MENISCUS OF RIGHT KNEE: ICD-10-CM

## 2018-11-01 DIAGNOSIS — M70.50 PES ANSERINE BURSITIS: ICD-10-CM

## 2018-11-01 DIAGNOSIS — M17.11 PRIMARY OSTEOARTHRITIS OF RIGHT KNEE: ICD-10-CM

## 2018-11-01 PROCEDURE — 99213 OFFICE O/P EST LOW 20 MIN: CPT | Performed by: ORTHOPAEDIC SURGERY

## 2018-11-01 NOTE — PROGRESS NOTES
Orthopaedic Clinic Note: Knee Established Patient    Chief Complaint   Patient presents with   • Right Knee - Follow-up     3 month   • Right Hip - Pain        HPI    It has been 3  month(s) since Ms. Sharma's last visit. She returns to clinic today for follow-up of right knee pain.  She has completed a Visco supplementation series 3 months ago for her right knee.  Overall her right knee pain is much improved.  She is now complaining of a new onset right hip pain.  This is a problem I have not seen her for in the past.  She is localizing her pain to the gluteal region and lateral aspect of the hip.  She states that when she walks for short periods of time, her leg feels heavy and she has difficulty ambulating.  This is improved when leaning over a shopping cart.  She currently rates the pain a 0/10 on the pain scale.she states her pain is not really a pain but more of a heaviness an achy sensation that worsens with ambulation and disease with leaning over a shopping cart or sitting.  Overall, her right knee is doing much better, her right hip symptoms are much worse.  She does have a known history of lumbar spine problems and has seen Dr. Mario Ding in the past.      Past Medical History:   Diagnosis Date   • Fasciculations    • Hx of bone density study 07/2013    DEXA (7/13) H -1.3   • Hx of bone density study 07/14/2016    DEXA (7/14/16) L 0.4, H -1.6 repeat 2-3 yrs   • Hx of esophagogastroduodenoscopy 05/05/2017    EGD (5/5/17): mod chronic gastritis, (+)H.pylori, no hiatal hernia; GI - Dr. Esparza   • Pap smear for cervical cancer screening     Pap done 8/16/16 per Dr. Brittanie Bauer      Past Surgical History:   Procedure Laterality Date   • BILATERAL SALPINGO OOPHORECTOMY Bilateral 06/14/2017    GYN - Dr. Bauer; for benign L. ovarian cyst   • BREAST BIOPSY      RIGHT BREAST EXCISION-WITHIN NIPPLE   • CATARACT EXTRACTION, BILATERAL      L. 4/09 (complicated by nerve injury; R. 6/10; L. corrected 6/15;  "ophtho - Dr. Hollis, Dr. Lange; neuro-ophtho Dr. Ferris   • CHOLECYSTECTOMY  2002    secondary to \"crystalized GB\" (''02); surg - Dr. Kartik Gold      Family History   Problem Relation Age of Onset   • Heart failure Mother    • Lung cancer Mother    • Breast cancer Mother 86   • Cancer Mother    • Lung cancer Father         tob use   • Cancer Father    • Depression Sister    • OCD Sister    • Hyperlipidemia Sister    • Stroke Maternal Grandmother    • Heart disease Maternal Grandfather    • Diabetes Paternal Grandmother    • Heart attack Paternal Grandfather    • Heart attack Paternal Uncle         2 pat uncles     Social History     Social History   • Marital status: Single     Spouse name: N/A   • Number of children: N/A   • Years of education: N/A     Occupational History   • retired "[employer] manager     Social History Main Topics   • Smoking status: Never Smoker   • Smokeless tobacco: Never Used   • Alcohol use Yes      Comment: social   • Drug use: No   • Sexual activity: Defer     Other Topics Concern   • Not on file     Social History Narrative     sister's  (after sister passed away)    Lives in [facility]      Current Outpatient Prescriptions on File Prior to Visit   Medication Sig Dispense Refill   • celecoxib (CeleBREX) 200 MG capsule TAKE 1 CAPSULE BY MOUTH DAILY AS NEEDED FOR MODERATE PAIN . 30 capsule 1   • esomeprazole (NEXIUM 24HR) 20 MG capsule Take 2 capsules by mouth daily. 180 capsule 1   • fluticasone-salmeterol (ADVAIR DISKUS) 100-50 MCG/DOSE DISKUS Inhale 1 puff 2 (Two) Times a Day. Gargle and spit after puffs 3 each 3   • levothyroxine (SYNTHROID, LEVOTHROID) 50 MCG tablet Take 1 tablet by mouth Every Morning. 90 tablet 3   • montelukast (SINGULAIR) 10 MG tablet Take 1 tablet by mouth Daily. 90 tablet 3   • solifenacin (VESICARE) 5 MG tablet Take 1 tablet by mouth Daily. 90 tablet 3   • traMADol (ULTRAM) 50 MG tablet 50 mg daily as needed.       No current " "facility-administered medications on file prior to visit.       Allergies   Allergen Reactions   • Aspirin Swelling     Lip and hand swelling   • Donepezil      Nightmares at the 10mg dose   • Fluticasone      epistaxis   • Naproxen      ulcer   • Codeine Rash   • Hydrocodone Rash        Review of Systems   Constitutional: Negative.    HENT: Positive for hearing loss.    Eyes: Negative.    Respiratory: Positive for apnea and wheezing.    Cardiovascular: Negative.    Gastrointestinal: Negative.    Endocrine: Negative.    Genitourinary: Negative.    Musculoskeletal: Positive for arthralgias, back pain and gait problem.   Skin: Negative.    Allergic/Immunologic: Negative.    Hematological: Negative.    Psychiatric/Behavioral: Positive for decreased concentration.        Physical Exam  Pulse 94, height 165.1 cm (65\"), weight 76 kg (167 lb 8.8 oz), SpO2 98 %.    Body mass index is 27.88 kg/m².    GENERAL APPEARANCE: awake, alert, oriented, in no acute distress  LUNGS:  breathing nonlabored  EXTREMITIES: no clubbing, cyanosis  PERIPHERAL PULSES: palpable dorsalis pedis and posterior tibial pulses bilaterally.    GAIT:  Normal        ----------  Right hip Exam:  RANGE OF MOTION:   FLEXION CONTRACTURE: None   FLEXION: 110 degrees   INTERNAL ROTATION: 20 degrees at 90 degrees of flexion   EXTERNAL ROTATION: 40 degrees at 90 degrees of flexion    PAIN WITH HIP MOTION: no  PAIN WITH LOGROLL: no  STINCHFIELD TEST: negative    STRENGTH:  5/5 hip adduction, abduction, flexion. 5/5 strength knee flexion, extension. 5/5 strength ankle dorsiflexion and plantarflexion.     GREATER TROCHANTER BURSAL PAIN:  no    Right Knee Exam:  ----------  ALIGNMENT: neutral  ----------  RANGE OF MOTION:  Normal (0-130 degrees) with no extensor lag or flexion contracture  LIGAMENTOUS STABILITY:   stable to varus and valgus stress at terminal extension and 30 degrees without any evidence of laxity  ----------  STRENGTH:  KNEE FLEXION 5/5  KNEE " EXTENSION  5/5  ANKLE DORSIFLEXION  5/5  ANKLE PLANTARFLEXION  5/5  ----------  PAIN WITH PALPATION: Denies significant tenderness palpation apart from some mild anterior pain with palpation just medial to the patella  KNEE EFFUSION: no  PAIN WITH KNEE ROM: no  PATELLAR CREPITUS:  no  ----------  SENSATION TO LIGHT TOUCH:  DEEP PERONEAL/SUPERFICIAL PERONEAL/SURAL/SAPHENOUS/TIBIAL:    intact  ----------  EDEMA:  no  ERYTHEMA:    no  WOUNDS/INCISIONS:  no  _____________________________________________________________________  _____________________________________________________________________    RADIOGRAPHIC FINDINGS:   Indication: Right hip pain     Comparison: No prior xrays are available for comparison    AP pelvis, right hip:  mild joint space narrowing, minimal osteophyte formation    Assessment/Plan:   Diagnosis Plan   1. Lumbar stenosis with neurogenic claudication     2. Hip pain, right  XR Hip With or Without Pelvis 1 View Right   3. Primary osteoarthritis of right knee  Ambulatory Referral to Spine Surgery   4. Pes anserine bursitis     5. Old bucket handle tear of lateral meniscus of right knee       Patient's symptoms appear to be related to lumbar stenosis with neurogenic claudication.  Based on the fact that she has no pain with passive range of motion of the hip joint and her hip motion is symmetric to contralateral side, I do not believe the source of her hip pain is related to the hip joint.  Furthermore, since her symptoms improved with flexion of the lumbar spine, I recommended the patient be seen by a spine surgeon for further evaluation potential discussion for surgery.  She is previously seen Dr. Mario Ding in the past and is requesting a referral there.  That referral will be placed.  In regards to her knee pain, her symptoms have significantly improved.  She is experiencing no mechanical symptoms from her lateral meniscus tear and her has bursitis remains minimally symptomatic at this time.   She will follow up with me on an as-needed basis.      Mario Valderrama MD  11/01/18  12:11 PM

## 2018-11-06 ENCOUNTER — TELEPHONE (OUTPATIENT)
Dept: INTERNAL MEDICINE | Facility: CLINIC | Age: 73
End: 2018-11-06

## 2018-11-06 NOTE — TELEPHONE ENCOUNTER
PT STATED THAT SHE RECEIVED A LETTER FROM INSURANCE COMPANY THAT STARTING IN 2019, VESICARE WILL NOT BE COVERED BY INSURANCE COMPANY; PT WANTS TO KNOW IF DR. MEDRANO CAN WRITE A LETTER TO INSURANCE COMPANY STATING THAT IT'S MEDICALLY NECESSARY FOR PT TO RECEIVE VESICARE; PLEASE CALL PT [phone]

## 2018-11-06 NOTE — TELEPHONE ENCOUNTER
Unfortunately, the insurance is not going to approve anything without her trying whatever medications they have on formulary.  She will need to find out what are the preferred medications in place of vesicare, and pick one of them to try.  They may work just as well as the vesicare.    I don't have anything in the records about failing previous alternative options to vesicare.    Other options are ditropan XL, detrol LA, sanctura, myrbetriq, just to name a few - she will need to find out which ones are covered/affordable

## 2018-11-07 NOTE — TELEPHONE ENCOUNTER
Does she want us to send new RX now or wait?    If she wants to wait, she needs to call back whenever she needs the RX.  Will plan on changing to myrbetriq 25mg QD    If she wants to proceed now, ok to change vesicare to myrbetriq 25mg QD #90, 0RF

## 2018-11-13 ENCOUNTER — HOSPITAL ENCOUNTER (OUTPATIENT)
Dept: MAMMOGRAPHY | Facility: HOSPITAL | Age: 73
Discharge: HOME | End: 2018-11-13
Attending: INTERNAL MEDICINE | Admitting: INTERNAL MEDICINE

## 2018-11-13 DIAGNOSIS — Z12.31 VISIT FOR SCREENING MAMMOGRAM: ICD-10-CM

## 2018-11-13 PROCEDURE — 77067 SCR MAMMO BI INCL CAD: CPT | Performed by: RADIOLOGY

## 2018-11-13 PROCEDURE — 77067 SCR MAMMO BI INCL CAD: CPT

## 2018-11-13 PROCEDURE — 77063 BREAST TOMOSYNTHESIS BI: CPT

## 2018-11-13 PROCEDURE — 77063 BREAST TOMOSYNTHESIS BI: CPT | Performed by: RADIOLOGY

## 2018-11-16 NOTE — PROGRESS NOTES
Just to confirm; recent mammogram overall normal but abnormality seen in the left breast and needs additional imaging.  You should be contacted directly by the breast center to have this setup.

## 2018-11-28 ENCOUNTER — HOSPITAL ENCOUNTER (OUTPATIENT)
Dept: ULTRASOUND IMAGING | Facility: HOSPITAL | Age: 73
Discharge: HOME | End: 2018-11-28

## 2018-11-28 ENCOUNTER — HOSPITAL ENCOUNTER (OUTPATIENT)
Dept: MAMMOGRAPHY | Facility: HOSPITAL | Age: 73
Discharge: HOME | End: 2018-11-28
Admitting: RADIOLOGY

## 2018-11-28 DIAGNOSIS — R92.8 ABNORMAL MAMMOGRAM: ICD-10-CM

## 2018-11-28 PROCEDURE — G0279 TOMOSYNTHESIS, MAMMO: HCPCS

## 2018-11-28 PROCEDURE — 76642 ULTRASOUND BREAST LIMITED: CPT | Performed by: RADIOLOGY

## 2018-11-28 PROCEDURE — 77065 DX MAMMO INCL CAD UNI: CPT

## 2018-11-28 PROCEDURE — G0279 TOMOSYNTHESIS, MAMMO: HCPCS | Performed by: RADIOLOGY

## 2018-11-28 PROCEDURE — 77065 DX MAMMO INCL CAD UNI: CPT | Performed by: RADIOLOGY

## 2018-11-28 PROCEDURE — 76642 ULTRASOUND BREAST LIMITED: CPT

## 2018-11-29 ENCOUNTER — TRANSCRIBE ORDERS (OUTPATIENT)
Dept: INTERNAL MEDICINE | Facility: CLINIC | Age: 73
End: 2018-11-29

## 2018-11-29 DIAGNOSIS — Z12.31 VISIT FOR SCREENING MAMMOGRAM: Primary | ICD-10-CM

## 2018-11-30 PROBLEM — N60.12 FIBROCYSTIC DISEASE OF LEFT BREAST: Status: ACTIVE | Noted: 2018-11-30

## 2019-01-25 ENCOUNTER — OFFICE VISIT (OUTPATIENT)
Dept: ORTHOPEDIC SURGERY | Facility: CLINIC | Age: 74
End: 2019-01-25

## 2019-01-25 VITALS — HEART RATE: 95 BPM | BODY MASS INDEX: 29.05 KG/M2 | WEIGHT: 174.38 LBS | OXYGEN SATURATION: 98 % | HEIGHT: 65 IN

## 2019-01-25 DIAGNOSIS — I89.0 LYMPHEDEMA OF BOTH LOWER EXTREMITIES: ICD-10-CM

## 2019-01-25 DIAGNOSIS — M17.11 PRIMARY OSTEOARTHRITIS OF RIGHT KNEE: Primary | ICD-10-CM

## 2019-01-25 PROCEDURE — 99213 OFFICE O/P EST LOW 20 MIN: CPT | Performed by: ORTHOPAEDIC SURGERY

## 2019-01-25 PROCEDURE — 20610 DRAIN/INJ JOINT/BURSA W/O US: CPT | Performed by: ORTHOPAEDIC SURGERY

## 2019-01-25 RX ORDER — LIDOCAINE HYDROCHLORIDE 10 MG/ML
4 INJECTION, SOLUTION INFILTRATION; PERINEURAL
Status: COMPLETED | OUTPATIENT
Start: 2019-01-25 | End: 2019-01-25

## 2019-01-25 RX ORDER — TRIAMCINOLONE ACETONIDE 40 MG/ML
40 INJECTION, SUSPENSION INTRA-ARTICULAR; INTRAMUSCULAR
Status: COMPLETED | OUTPATIENT
Start: 2019-01-25 | End: 2019-01-25

## 2019-01-25 RX ADMIN — TRIAMCINOLONE ACETONIDE 40 MG: 40 INJECTION, SUSPENSION INTRA-ARTICULAR; INTRAMUSCULAR at 10:46

## 2019-01-25 RX ADMIN — LIDOCAINE HYDROCHLORIDE 4 ML: 10 INJECTION, SOLUTION INFILTRATION; PERINEURAL at 10:46

## 2019-01-25 NOTE — PROGRESS NOTES
"Orthopaedic Clinic Note:  Knee Established Patient    Chief Complaint   Patient presents with   • Follow-up     6 months after injections in right knee - 07/31/18        HPI    It has been 6  month(s) since Ms. Sharma's last visit. She returns to clinic today for follow-up right knee pain.  She received Visco injections 6 months ago that provided excellent relief.  Her knee pain has gradually started to return.  She rates it a 3/10 on the pain scale with activity.  Currently is a 1/10 on the pain scale.  She is ambulate with no assistive device.  She is taken Celebrex for pain control.  She is here today due to recurrence of knee pain and she is scheduled to go on vacation to [place] next week but was concerned she would not be able to enjoy her vacation due to her pain.    Past Medical History:   Diagnosis Date   • Fasciculations    • Fibrocystic disease of left breast 11/30/2018   • Hx of bone density study 07/2013    DEXA (7/13) H -1.3   • Hx of bone density study 07/14/2016    DEXA (7/14/16) L 0.4, H -1.6 repeat 2-3 yrs   • Hx of esophagogastroduodenoscopy 05/05/2017    EGD (5/5/17): mod chronic gastritis, (+)H.pylori, no hiatal hernia; GI - Dr. Esparza   • Pap smear for cervical cancer screening     Pap done 8/16/16 per Dr. Brittanie Bauer      Past Surgical History:   Procedure Laterality Date   • BILATERAL SALPINGO OOPHORECTOMY Bilateral 06/14/2017    GYN - Dr. Bauer; for benign L. ovarian cyst   • BREAST BIOPSY      RIGHT BREAST EXCISION-WITHIN NIPPLE   • CATARACT EXTRACTION, BILATERAL      L. 4/09 (complicated by nerve injury; R. 6/10; L. corrected 6/15; ophtho - Dr. Hollis, Dr. Lange; neuro-ophtho Dr. Ferris   • CHOLECYSTECTOMY  2002    secondary to \"crystalized GB\" (''02); surg - Dr. Kartik Gold   • OOPHORECTOMY        Family History   Problem Relation Age of Onset   • Heart failure Mother    • Lung cancer Mother    • Breast cancer Mother 86   • Cancer Mother    • Lung cancer Father         tob use   • " Cancer Father    • Depression Sister    • OCD Sister    • Hyperlipidemia Sister    • Stroke Maternal Grandmother    • Heart disease Maternal Grandfather    • Diabetes Paternal Grandmother    • Heart attack Paternal Grandfather    • Heart attack Paternal Uncle         2 pat uncles     Social History     Socioeconomic History   • Marital status: Single     Spouse name: Not on file   • Number of children: Not on file   • Years of education: Not on file   • Highest education level: Not on file   Social Needs   • Financial resource strain: Not on file   • Food insecurity - worry: Not on file   • Food insecurity - inability: Not on file   • Transportation needs - medical: Not on file   • Transportation needs - non-medical: Not on file   Occupational History   • Occupation: retired     Employer: [employer]     Comment:    Tobacco Use   • Smoking status: Never Smoker   • Smokeless tobacco: Never Used   Substance and Sexual Activity   • Alcohol use: Yes     Comment: social   • Drug use: No   • Sexual activity: Defer   Other Topics Concern   • Not on file   Social History Narrative     sister's  (after sister passed away)    L[facility] in [facility]      Current Outpatient Medications on File Prior to Visit   Medication Sig Dispense Refill   • celecoxib (CeleBREX) 200 MG capsule TAKE 1 CAPSULE BY MOUTH DAILY AS NEEDED FOR MODERATE PAIN . 30 capsule 1   • esomeprazole (NEXIUM 24HR) 20 MG capsule Take 2 capsules by mouth daily. 180 capsule 1   • fluticasone-salmeterol (ADVAIR DISKUS) 100-50 MCG/DOSE DISKUS Inhale 1 puff 2 (Two) Times a Day. Gargle and spit after puffs 3 each 3   • levothyroxine (SYNTHROID, LEVOTHROID) 50 MCG tablet Take 1 tablet by mouth Every Morning. 90 tablet 3   • Mirabegron ER (MYRBETRIQ) 25 MG tablet sustained-release 24 hour 24 hr tablet Take 1 tablet by mouth Daily. 90 tablet 0   • montelukast (SINGULAIR) 10 MG tablet Take 1 tablet by mouth Daily. 90 tablet 3   • solifenacin  "(VESICARE) 5 MG tablet Take 1 tablet by mouth Daily. 90 tablet 3   • traMADol (ULTRAM) 50 MG tablet 50 mg daily as needed.       No current facility-administered medications on file prior to visit.       Allergies   Allergen Reactions   • Aspirin Swelling     Lip and hand swelling   • Donepezil      Nightmares at the 10mg dose   • Fluticasone      epistaxis   • Naproxen      ulcer   • Codeine Rash   • Hydrocodone Rash        Review of Systems   Constitutional: Negative.    HENT: Negative.    Eyes: Negative.    Respiratory: Negative.    Cardiovascular: Negative.    Gastrointestinal: Negative.    Endocrine: Negative.    Genitourinary: Negative.    Musculoskeletal: Positive for back pain.        Lumbar stenosis with neurogenic claudication    Skin: Negative.    Allergic/Immunologic: Negative.    Neurological: Negative.    Hematological: Negative.    Psychiatric/Behavioral: Negative.         Physical Exam  Pulse 95, height 165.1 cm (65\"), weight 79.1 kg (174 lb 6.1 oz), SpO2 98 %.    Body mass index is 29.02 kg/m².    GENERAL APPEARANCE: awake, alert, oriented, in no acute distress and well developed, well nourished  LUNGS:  breathing nonlabored  EXTREMITIES: no clubbing, cyanosis  PERIPHERAL PULSES: palpable dorsalis pedis and posterior tibial pulses bilaterally.    GAIT:  Antalgic            Right Knee Exam:  ----------  ALIGNMENT: neutral  ----------  RANGE OF MOTION:  Normal (0-120 degrees) with no extensor lag or flexion contracture  LIGAMENTOUS STABILITY:   stable to varus and valgus stress at terminal extension and 30 degrees without any evidence of laxity  ----------  STRENGTH:  KNEE FLEXION 5/5  KNEE EXTENSION  5/5  ANKLE DORSIFLEXION  5/5  ANKLE PLANTARFLEXION  5/5  ----------  PAIN WITH PALPATION:medial joint line, anterior knee  KNEE EFFUSION:  trace  PAIN WITH KNEE ROM: Yes   PATELLAR CREPITUS:  no  ----------  SENSATION TO LIGHT TOUCH:  DEEP PERONEAL/SUPERFICIAL PERONEAL/SURAL/SAPHENOUS/TIBIAL: "    intact  ----------  EDEMA:  no  ERYTHEMA:    no  WOUNDS/INCISIONS:  no  _________________________________________________________________  _________________________________________________________________    RADIOGRAPHIC FINDINGS:   Indication: Right knee pain    Comparison: Todays xrays were compared to previous xrays from 4/3/18      right knee 4 views: mild to moderate varus osteoarthritis with slight medial compartment joint space narrowing  and No significant changes compared to prior radiographs.    Assessment/Plan:   Diagnosis Plan   1. Primary osteoarthritis of right knee  XR Knee 4+ View Right    Large Joint Arthrocentesis: R knee   2. Lymphedema of both lower extremities       Patient is lymphedema of both legs which could be contributing to some of her pain.  I discussed compression socks which will be beneficial to decrease the swelling in her legs and hopefully help with her lower extremity pain.  A list of local retail stores where she can purchase these socks were provided.  In regards to her knees, I discussed treatment options for her knees including physical therapy, repeat Visco supplementation injections, and anti-inflammatories.  Patient declines these interventions today.  We'll proceed cortisone injection into the right knee with the goal of alleviating her pain prior to vacation.  She'll follow-up as needed.    Procedure Note:  I discussed with the patient the potential benefits of performing a therapeutic injection of the right knee as well as potential risks including but not limited to infection, swelling, pain, bleeding, bruising, nerve/vessel damage, skin color changes, transient elevation in blood glucose levels, and fat atrophy. After informed consent and after the area was prepped with alcohol, ethyl chloride was used to numb the skin. Via the superolateral approach, 3cc of 1% lidocaine, 3cc of 0.25% marcaine and 2 cc of 40mg/ml of Kenalog were injected into the right knee. The  patient tolerated the procedure well. There were no complications. A sterile dressing was placed over the injection site.      Mario Valderrama MD  01/25/19  10:49 AM

## 2019-01-25 NOTE — PROGRESS NOTES
Procedure   Large Joint Arthrocentesis: R knee  Date/Time: 1/25/2019 10:46 AM  Consent given by: patient  Site marked: site marked  Timeout: Immediately prior to procedure a time out was called to verify the correct patient, procedure, equipment, support staff and site/side marked as required   Supporting Documentation  Indications: pain   Procedure Details  Location: knee - R knee  Preparation: Patient was prepped and draped in the usual sterile fashion  Needle size: 22 G  Medications administered: 4 mL lidocaine 1 %; 40 mg triamcinolone acetonide 40 MG/ML (4cc 0.25% bupivacaine ndc: [id number])  Patient tolerance: patient tolerated the procedure well with no immediate complications

## 2019-02-07 ENCOUNTER — TELEPHONE (OUTPATIENT)
Dept: INTERNAL MEDICINE | Facility: CLINIC | Age: 74
End: 2019-02-07

## 2019-02-07 RX ORDER — MONTELUKAST SODIUM 10 MG/1
10 TABLET ORAL DAILY
Qty: 90 TABLET | Refills: 0 | Status: SHIPPED | OUTPATIENT
Start: 2019-02-07 | End: 2019-07-18 | Stop reason: SDUPTHER

## 2019-02-07 NOTE — TELEPHONE ENCOUNTER
SAM CALLED ON BEHALF OF THE PATIENT TO SEE IF A 6 WEEK SUPPLY OF MONTELUKAST CAN BE SENT TO A LOCAL PHARMACY IN [place]. [facility] [address] [facility] PHONE [phone]. THE PATIENT LEFT FOR [place] AND LEFT THIS MEDICATION BEHIND. SAM INFORMED THE PATIENT THAT THEY WOULD NOT BE ABLE TO ORDER THE MED FOR HER, THAT THIS REQUEST WOULD HAVE TO GO TO HER PCP.

## 2019-03-25 ENCOUNTER — TELEPHONE (OUTPATIENT)
Dept: INTERNAL MEDICINE | Facility: CLINIC | Age: 74
End: 2019-03-25

## 2019-03-25 DIAGNOSIS — R32 URINARY INCONTINENCE, UNSPECIFIED TYPE: Primary | ICD-10-CM

## 2019-03-25 NOTE — TELEPHONE ENCOUNTER
Pt calling to get a medication changed  Currently on myrbetriq 25 mg and she feels like it's just not working as well.  She would like to try something new that will keep the cost low but will have better results.    Please reach out to her at [phone]

## 2019-03-26 RX ORDER — TOLTERODINE 4 MG/1
4 CAPSULE, EXTENDED RELEASE ORAL DAILY
Qty: 30 CAPSULE | Refills: 1 | Status: SHIPPED | OUTPATIENT
Start: 2019-03-26 | End: 2019-03-28 | Stop reason: ALTCHOICE

## 2019-03-26 NOTE — TELEPHONE ENCOUNTER
Pt states that her insurance will cover anything except Vesicare and that she wants to change the myrbetriq as it just isn't working well.. She states that she heard there may be a Vesicare generic being approved? I told her I wasn't aware of that yet.Please advise.

## 2019-03-26 NOTE — TELEPHONE ENCOUNTER
I am unaware if Vesicare is going generic    D/C Myrbetriq per patient    Start tolterodine LA 4mg QD #30, 1RF (escribed) -recommend 30-day trial to determine efficacy    No pharmacy indicated in message; therefore, Rx sent to [facility] in [place]

## 2019-03-28 RX ORDER — FESOTERODINE FUMARATE 4 MG/1
4 TABLET, EXTENDED RELEASE ORAL
Qty: 30 TABLET | Refills: 1 | Status: SHIPPED | OUTPATIENT
Start: 2019-03-28 | End: 2019-05-23 | Stop reason: ALTCHOICE

## 2019-03-28 NOTE — TELEPHONE ENCOUNTER
Despite patient's claims that all other medications besides Vesicare are covered, [facility] fax indicates oxybutynin and Toviaz are preferred    D/c tolterodine ER 4 mg    RX Toviaz ER 4mg QD #30, 1RF   - again 30d trial to determine efficacy; same side effects of dry mouth and constipation

## 2019-05-17 ENCOUNTER — TELEPHONE (OUTPATIENT)
Dept: INTERNAL MEDICINE | Facility: CLINIC | Age: 74
End: 2019-05-17

## 2019-05-17 NOTE — TELEPHONE ENCOUNTER
Yes toviaz was prescribed 3/28/19 due to insurance formulary.  RX was for #30, 1RF and she was supposed to call back in 1 month (which was 1 month ago) to give feedback re: effectiveness.    Why is she asking about myrbetriq? Is this what she has been taking instead of the toviaz?

## 2019-05-17 NOTE — TELEPHONE ENCOUNTER
Please advise. You had actually changed this to Toviaz per her request that Myrbetriq was not wokring well enough and that was to only be for a 30 day trial.

## 2019-05-17 NOTE — TELEPHONE ENCOUNTER
PATIENT STATES THAT SHE IS NEEDING A REFILL ON HER MYRBETRIQ 25 OR SOMETHING IN IT'S PLACE TO GO TO THE [facility] AS SOON AS POSSIBLE. THE PATIENT CAN BE REACHED -[phone] IF THERE IS ANY QUESTION OR CONCERNS.

## 2019-05-20 NOTE — TELEPHONE ENCOUNTER
Pt states that she did not have any better results with the Toviaz. Her insurance no longer covers Vesicare. She would like to just go back to the mybetriq. since it is covered by her insurance and it worked better than toviaz, unless there is another medication you think she can try that may be better than the myrbetriq.

## 2019-05-22 NOTE — TELEPHONE ENCOUNTER
None of the bladder meds work great, they are used to improved sxs but not expected to resolve sxs.    The options she has not tried are enablex and sanctura.    Her other option is to increase the myrbetric to the 50mg dose; if she wants to proceed with that, ok for myrbetriq 50mg QD #30, 1RF    And again, needs to call back after 30d for feedback to get further refills

## 2019-05-23 NOTE — TELEPHONE ENCOUNTER
Pt would like to try Myrbetriq 50mg. Will send in # 30 x1 refill and she will call back in 1 month to let us know how that is working.

## 2019-06-10 ENCOUNTER — TELEPHONE (OUTPATIENT)
Dept: INTERNAL MEDICINE | Facility: CLINIC | Age: 74
End: 2019-06-10

## 2019-06-10 DIAGNOSIS — E78.5 DYSLIPIDEMIA: ICD-10-CM

## 2019-06-10 DIAGNOSIS — E03.9 ACQUIRED HYPOTHYROIDISM: ICD-10-CM

## 2019-06-10 DIAGNOSIS — Z00.00 MEDICARE ANNUAL WELLNESS VISIT, SUBSEQUENT: Primary | ICD-10-CM

## 2019-07-11 ENCOUNTER — LAB (OUTPATIENT)
Dept: INTERNAL MEDICINE | Facility: CLINIC | Age: 74
End: 2019-07-11

## 2019-07-11 DIAGNOSIS — E78.5 DYSLIPIDEMIA: ICD-10-CM

## 2019-07-11 DIAGNOSIS — E03.9 ACQUIRED HYPOTHYROIDISM: ICD-10-CM

## 2019-07-11 DIAGNOSIS — Z00.00 MEDICARE ANNUAL WELLNESS VISIT, SUBSEQUENT: ICD-10-CM

## 2019-07-11 LAB
ALBUMIN SERPL-MCNC: 3.9 G/DL (ref 3.5–5.2)
ALBUMIN/GLOB SERPL: 1.2 G/DL
ALP SERPL-CCNC: 164 U/L (ref 39–117)
ALT SERPL W P-5'-P-CCNC: 25 U/L (ref 1–33)
ANION GAP SERPL CALCULATED.3IONS-SCNC: 16.2 MMOL/L (ref 5–15)
AST SERPL-CCNC: 31 U/L (ref 1–32)
BASOPHILS # BLD AUTO: 0.03 10*3/MM3 (ref 0–0.2)
BASOPHILS NFR BLD AUTO: 0.4 % (ref 0–1.5)
BILIRUB SERPL-MCNC: 0.4 MG/DL (ref 0.2–1.2)
BUN BLD-MCNC: 19 MG/DL (ref 8–23)
BUN/CREAT SERPL: 22.4 (ref 7–25)
CALCIUM SPEC-SCNC: 10.4 MG/DL (ref 8.6–10.5)
CHLORIDE SERPL-SCNC: 107 MMOL/L (ref 98–107)
CHOLEST SERPL-MCNC: 142 MG/DL (ref 0–200)
CO2 SERPL-SCNC: 17.8 MMOL/L (ref 22–29)
CREAT BLD-MCNC: 0.85 MG/DL (ref 0.57–1)
DEPRECATED RDW RBC AUTO: 54.1 FL (ref 37–54)
EOSINOPHIL # BLD AUTO: 0.17 10*3/MM3 (ref 0–0.4)
EOSINOPHIL NFR BLD AUTO: 2 % (ref 0.3–6.2)
ERYTHROCYTE [DISTWIDTH] IN BLOOD BY AUTOMATED COUNT: 14.6 % (ref 12.3–15.4)
GFR SERPL CREATININE-BSD FRML MDRD: 65 ML/MIN/1.73
GLOBULIN UR ELPH-MCNC: 3.3 GM/DL
GLUCOSE BLD-MCNC: 73 MG/DL (ref 65–99)
HCT VFR BLD AUTO: 47.1 % (ref 34–46.6)
HDLC SERPL-MCNC: 42 MG/DL (ref 40–60)
HGB BLD-MCNC: 13.8 G/DL (ref 12–15.9)
IMM GRANULOCYTES # BLD AUTO: 0.02 10*3/MM3 (ref 0–0.05)
IMM GRANULOCYTES NFR BLD AUTO: 0.2 % (ref 0–0.5)
LDLC SERPL CALC-MCNC: 87 MG/DL (ref 0–100)
LDLC/HDLC SERPL: 2.07 {RATIO}
LYMPHOCYTES # BLD AUTO: 1.4 10*3/MM3 (ref 0.7–3.1)
LYMPHOCYTES NFR BLD AUTO: 16.4 % (ref 19.6–45.3)
MCH RBC QN AUTO: 29.4 PG (ref 26.6–33)
MCHC RBC AUTO-ENTMCNC: 29.3 G/DL (ref 31.5–35.7)
MCV RBC AUTO: 100.4 FL (ref 79–97)
MONOCYTES # BLD AUTO: 0.57 10*3/MM3 (ref 0.1–0.9)
MONOCYTES NFR BLD AUTO: 6.7 % (ref 5–12)
NEUTROPHILS # BLD AUTO: 6.36 10*3/MM3 (ref 1.7–7)
NEUTROPHILS NFR BLD AUTO: 74.3 % (ref 42.7–76)
NRBC BLD AUTO-RTO: 0 /100 WBC (ref 0–0.2)
PLATELET # BLD AUTO: 310 10*3/MM3 (ref 140–450)
PMV BLD AUTO: 10.8 FL (ref 6–12)
POTASSIUM BLD-SCNC: 5 MMOL/L (ref 3.5–5.2)
PROT SERPL-MCNC: 7.2 G/DL (ref 6–8.5)
RBC # BLD AUTO: 4.69 10*6/MM3 (ref 3.77–5.28)
SODIUM BLD-SCNC: 141 MMOL/L (ref 136–145)
T4 FREE SERPL-MCNC: 1.28 NG/DL (ref 0.93–1.7)
TRIGL SERPL-MCNC: 66 MG/DL (ref 0–150)
TSH SERPL DL<=0.05 MIU/L-ACNC: 1.77 MIU/ML (ref 0.27–4.2)
VLDLC SERPL-MCNC: 13.2 MG/DL (ref 5–40)
WBC NRBC COR # BLD: 8.55 10*3/MM3 (ref 3.4–10.8)

## 2019-07-11 PROCEDURE — 84439 ASSAY OF FREE THYROXINE: CPT | Performed by: INTERNAL MEDICINE

## 2019-07-11 PROCEDURE — 84443 ASSAY THYROID STIM HORMONE: CPT | Performed by: INTERNAL MEDICINE

## 2019-07-11 PROCEDURE — 80061 LIPID PANEL: CPT | Performed by: INTERNAL MEDICINE

## 2019-07-11 PROCEDURE — 80053 COMPREHEN METABOLIC PANEL: CPT | Performed by: INTERNAL MEDICINE

## 2019-07-11 PROCEDURE — 81001 URINALYSIS AUTO W/SCOPE: CPT | Performed by: INTERNAL MEDICINE

## 2019-07-11 PROCEDURE — 85025 COMPLETE CBC W/AUTO DIFF WBC: CPT | Performed by: INTERNAL MEDICINE

## 2019-07-12 LAB
BACTERIA UR QL AUTO: ABNORMAL /HPF
BILIRUB UR QL STRIP: NEGATIVE
CLARITY UR: CLEAR
COLOR UR: YELLOW
GLUCOSE UR STRIP-MCNC: NEGATIVE MG/DL
HGB UR QL STRIP.AUTO: NEGATIVE
HYALINE CASTS UR QL AUTO: ABNORMAL /LPF
KETONES UR QL STRIP: NEGATIVE
LEUKOCYTE ESTERASE UR QL STRIP.AUTO: ABNORMAL
NITRITE UR QL STRIP: NEGATIVE
PH UR STRIP.AUTO: 6.5 [PH] (ref 5–8)
PROT UR QL STRIP: NEGATIVE
RBC # UR: ABNORMAL /HPF
REF LAB TEST METHOD: ABNORMAL
SP GR UR STRIP: 1.01 (ref 1–1.03)
SQUAMOUS #/AREA URNS HPF: ABNORMAL /HPF
UROBILINOGEN UR QL STRIP: ABNORMAL
WBC UR QL AUTO: ABNORMAL /HPF

## 2019-07-17 NOTE — ASSESSMENT & PLAN NOTE
Health maintenance - flu vacc 8/18 (rec annually in the fall); Prevnar 5/15; PVX 4/14; Td 8/18, next one due 2028 (Tdap 2/08); Zostavax 2/08; HAV and Shingrix done; mammo 11/18; GYN with Dr. Bauer pending 8/2019; DEXA ordered (last 7/16); overdue for colonosc per Dr. Kartik Gold (last 12/15); eye exam with Dr. Tate pending 11/19; dental exam q6 mos, just done earlier this wk; (+) seat belt use    Consultants:  Patient Care Team:  Lorene Isaac MD as PCP - General  Lorene Isaac MD as PCP - Internal Medicine  Mario Valderrama MD as PCP - Claims Attributed  Juancarlos, Brittanie Suarez MD as Consulting Physician (Obstetrics and Gynecology)  Gigi Esparza MD as Consulting Physician (Gastroenterology)  Kartik Gold MD as Consulting Physician (General Surgery)  Tyrone Tate MD as Consulting Physician (Ophthalmology)  Arik Keane MD as Consulting Physician (Otolaryngology)  Anastacio Dickinson MD as Consulting Physician (Otolaryngology)  Mario Ding MD as Consulting Physician (Neurosurgery)

## 2019-07-17 NOTE — ASSESSMENT & PLAN NOTE
BP bord 132/72; rec low Na diet, increased aerobic exercise; home BP monitoring with goal BP < 130/80

## 2019-07-18 ENCOUNTER — OFFICE VISIT (OUTPATIENT)
Dept: INTERNAL MEDICINE | Facility: CLINIC | Age: 74
End: 2019-07-18

## 2019-07-18 VITALS
OXYGEN SATURATION: 98 % | BODY MASS INDEX: 28.82 KG/M2 | HEIGHT: 65 IN | HEART RATE: 82 BPM | WEIGHT: 173 LBS | DIASTOLIC BLOOD PRESSURE: 72 MMHG | SYSTOLIC BLOOD PRESSURE: 132 MMHG

## 2019-07-18 DIAGNOSIS — E03.9 ACQUIRED HYPOTHYROIDISM: ICD-10-CM

## 2019-07-18 DIAGNOSIS — K21.9 GASTROESOPHAGEAL REFLUX DISEASE WITHOUT ESOPHAGITIS: ICD-10-CM

## 2019-07-18 DIAGNOSIS — J45.20 MILD INTERMITTENT ASTHMA WITHOUT COMPLICATION: ICD-10-CM

## 2019-07-18 DIAGNOSIS — Z12.11 ENCOUNTER FOR SCREENING COLONOSCOPY: ICD-10-CM

## 2019-07-18 DIAGNOSIS — M25.551 CHRONIC RIGHT HIP PAIN: ICD-10-CM

## 2019-07-18 DIAGNOSIS — R03.0 ELEVATED BLOOD-PRESSURE READING WITHOUT DIAGNOSIS OF HYPERTENSION: ICD-10-CM

## 2019-07-18 DIAGNOSIS — Z00.00 MEDICARE ANNUAL WELLNESS VISIT, SUBSEQUENT: Primary | ICD-10-CM

## 2019-07-18 DIAGNOSIS — E78.5 DYSLIPIDEMIA: ICD-10-CM

## 2019-07-18 DIAGNOSIS — N39.46 MIXED STRESS AND URGE URINARY INCONTINENCE: ICD-10-CM

## 2019-07-18 DIAGNOSIS — M85.89 OSTEOPENIA OF MULTIPLE SITES: ICD-10-CM

## 2019-07-18 DIAGNOSIS — G89.29 CHRONIC RIGHT HIP PAIN: ICD-10-CM

## 2019-07-18 DIAGNOSIS — E87.20 METABOLIC ACIDOSIS: ICD-10-CM

## 2019-07-18 DIAGNOSIS — D75.89 MACROCYTOSIS WITHOUT ANEMIA: ICD-10-CM

## 2019-07-18 DIAGNOSIS — Z78.0 MENOPAUSE: ICD-10-CM

## 2019-07-18 LAB
ANION GAP SERPL CALCULATED.3IONS-SCNC: 8.5 MMOL/L (ref 5–15)
BASOPHILS # BLD AUTO: 0.07 10*3/MM3 (ref 0–0.2)
BASOPHILS NFR BLD AUTO: 0.9 % (ref 0–1.5)
BUN BLD-MCNC: 23 MG/DL (ref 8–23)
BUN/CREAT SERPL: 25.8 (ref 7–25)
CALCIUM SPEC-SCNC: 10.6 MG/DL (ref 8.6–10.5)
CHLORIDE SERPL-SCNC: 103 MMOL/L (ref 98–107)
CO2 SERPL-SCNC: 25.5 MMOL/L (ref 22–29)
CREAT BLD-MCNC: 0.89 MG/DL (ref 0.57–1)
DEPRECATED RDW RBC AUTO: 51.5 FL (ref 37–54)
EOSINOPHIL # BLD AUTO: 0.2 10*3/MM3 (ref 0–0.4)
EOSINOPHIL NFR BLD AUTO: 2.5 % (ref 0.3–6.2)
ERYTHROCYTE [DISTWIDTH] IN BLOOD BY AUTOMATED COUNT: 14.5 % (ref 12.3–15.4)
FOLATE SERPL-MCNC: 19.2 NG/ML (ref 4.78–24.2)
GFR SERPL CREATININE-BSD FRML MDRD: 62 ML/MIN/1.73
GLUCOSE BLD-MCNC: 77 MG/DL (ref 65–99)
HCT VFR BLD AUTO: 45.4 % (ref 34–46.6)
HGB BLD-MCNC: 13.8 G/DL (ref 12–15.9)
IMM GRANULOCYTES # BLD AUTO: 0.01 10*3/MM3 (ref 0–0.05)
IMM GRANULOCYTES NFR BLD AUTO: 0.1 % (ref 0–0.5)
LYMPHOCYTES # BLD AUTO: 1.64 10*3/MM3 (ref 0.7–3.1)
LYMPHOCYTES NFR BLD AUTO: 20.3 % (ref 19.6–45.3)
MCH RBC QN AUTO: 29.4 PG (ref 26.6–33)
MCHC RBC AUTO-ENTMCNC: 30.4 G/DL (ref 31.5–35.7)
MCV RBC AUTO: 96.8 FL (ref 79–97)
MONOCYTES # BLD AUTO: 0.84 10*3/MM3 (ref 0.1–0.9)
MONOCYTES NFR BLD AUTO: 10.4 % (ref 5–12)
NEUTROPHILS # BLD AUTO: 5.32 10*3/MM3 (ref 1.7–7)
NEUTROPHILS NFR BLD AUTO: 65.8 % (ref 42.7–76)
NRBC BLD AUTO-RTO: 0.1 /100 WBC (ref 0–0.2)
PLATELET # BLD AUTO: 341 10*3/MM3 (ref 140–450)
PMV BLD AUTO: 11.2 FL (ref 6–12)
POTASSIUM BLD-SCNC: 5.7 MMOL/L (ref 3.5–5.2)
RBC # BLD AUTO: 4.69 10*6/MM3 (ref 3.77–5.28)
SODIUM BLD-SCNC: 137 MMOL/L (ref 136–145)
VIT B12 BLD-MCNC: 638 PG/ML (ref 211–946)
WBC NRBC COR # BLD: 8.08 10*3/MM3 (ref 3.4–10.8)

## 2019-07-18 PROCEDURE — 82607 VITAMIN B-12: CPT | Performed by: INTERNAL MEDICINE

## 2019-07-18 PROCEDURE — 93000 ELECTROCARDIOGRAM COMPLETE: CPT | Performed by: INTERNAL MEDICINE

## 2019-07-18 PROCEDURE — G0439 PPPS, SUBSEQ VISIT: HCPCS | Performed by: INTERNAL MEDICINE

## 2019-07-18 PROCEDURE — 83921 ORGANIC ACID SINGLE QUANT: CPT | Performed by: INTERNAL MEDICINE

## 2019-07-18 PROCEDURE — 80048 BASIC METABOLIC PNL TOTAL CA: CPT | Performed by: INTERNAL MEDICINE

## 2019-07-18 PROCEDURE — 85025 COMPLETE CBC W/AUTO DIFF WBC: CPT | Performed by: INTERNAL MEDICINE

## 2019-07-18 PROCEDURE — 99214 OFFICE O/P EST MOD 30 MIN: CPT | Performed by: INTERNAL MEDICINE

## 2019-07-18 PROCEDURE — 82746 ASSAY OF FOLIC ACID SERUM: CPT | Performed by: INTERNAL MEDICINE

## 2019-07-18 RX ORDER — MONTELUKAST SODIUM 10 MG/1
10 TABLET ORAL DAILY
Qty: 90 TABLET | Refills: 3 | Status: SHIPPED | OUTPATIENT
Start: 2019-07-18 | End: 2020-07-20

## 2019-07-18 RX ORDER — LEVOTHYROXINE SODIUM 0.05 MG/1
50 TABLET ORAL EVERY MORNING
Qty: 90 TABLET | Refills: 3 | Status: SHIPPED | OUTPATIENT
Start: 2019-07-18 | End: 2020-07-20 | Stop reason: SDUPTHER

## 2019-07-18 NOTE — ASSESSMENT & PLAN NOTE
Has components of both stress and urge incontinence; minimal relief with Myrbetriq but patient states Myrbetriq helps with spasm; she wants to continue Myrbetriq 50 mg QD #90, 3RF; discussed consideration to go to urology to explore options and/or trial of PT; patient will call back when she wants to proceed

## 2019-07-18 NOTE — ASSESSMENT & PLAN NOTE
Progressively worsening right hip pain s/p evaluation with both Dr. Valderrama and Dr. Ding without any answers; request MRI L-spine results for our chart; recommend course of PT prior to going back to Ortho for further evaluation; may benefit from MRI of the right hip should PT not help her symptoms; order given for PT so she can do in [place]

## 2019-07-18 NOTE — PROGRESS NOTES
ANNUAL WELLNESS VISIT    DRUG AND ALCOHOL USE      no tobacco use and no caffeine use; drinks about 3 glasses of wine per week    DIET AND PHYSICAL ACTIVITY     Diet: general    Exercise: daily   Exercise Details: yoga, stretching    MOOD DISORDER AND COGNITIVE SCREENING   Depression Screening Tool Used yes - see PHQ-9; components reviewed with patient; 15-min counseling done; questioner items reviewed with patient, with no complaints of feeling down and depressed nor not having pleasure in doing things she likes to do.  Has some sleeping issues.  Overall states energy is stable.  Spends times with family and friends   Anxiety Screening Tool Used yes     Mini-Cog Performed   Yes    1. Tell Patient 3 Words apple table carmine    2. Administer Clock Test normal    3. Recall 3 words  apple table carmine    4. Number Correct Items 3    FUNCTIONAL ABILITY AND LEVEL OF SAFETY   Hearing no hearing loss     Wears Hearing Aids No       Current Activities Independent      difficulty with walking  - see Funct/Cog Status Intake     Fall Risk Assessment       Has difficulty with walking or balance  No         Timed Up and Go (TUG) Test  8 sec.       If >12 sec, normal    ADVANCED DIRECTIVE Patient has an advance directive - a copy has been provided and is visible in patient header    PAIN SCREENING Do you have pain right now? no      If so, 1-10 scale: 0     Intermittent     Do you have pain every day? Yes      Probable chronic pain: Yes     Recent Hospitalizations:  No recent hospitalization(s)..     MEDICATION REVIEW   - updated and reviewed (see Medication List).   - reviewed for potentially harmful drug-disease interactions in the elderly.   - reviewed for high risk medications in the elderly.   - aspirin use: No    BMI  Body mass index is 28.79 kg/m².    Patient's Body mass index is 28.79 kg/m². BMI is above normal parameters. Recommendations include: exercise counseling and nutrition  counseling.    _________________________________________________________    Chief Complaint   Patient presents with   • Medicare Wellness-subsequent   • Hypothyroidism   • right hip pain       History of Present Illness  74 y.o.  woman presents for updated wellness visit.  Complains of persistent/worsening right hip pain.  Notes that she is being followed by Dr. Valderrama for the right knee pain.  Subsequently developed right hip pain which she thought could have been secondary to the right knee pain.  Was referred to Dr. Ding who did a nerve test.  Evidently this was fairly unremarkable.  MRI of the spine was done in December 2018 and showed mildly worsened scoliosis.  Dr. Ding told her to go back to see Dr. Valderrama about the hip.  Evidently hip x-rays only showed mild degeneration.  Patient feels unsteady while walking and has worries that the right hip will give out on her.  Has associated pain as well.  Denies radiation down to the foot.  Left hip is asymptomatic.    Reports history of having ovaries removed.  We diagnosed her with cysts and she was referred to Dr. Bauer.  Dr. Bauer watched the cysts and then gave some concern that these were precancerous lesions and so patient opted to have the ovaries removed.  Has not had any difficulty since then.    Review of Systems  Denies headaches, visual changes, CP, palpitations, SOB, cough, abd pain, n/v/d,  numbness/tingling, falls, mood changes, lightheadedness, hearing changes, rashes.      ROS (+) for stress and urge incontinence.  Currently wears 2 pads.  Notes that the Myrbetriq helps mostly with the bladder spasms.  Notes remote history of trying to go see pelvic PT but the therapist was pregnant and was not present for many of their sessions.  Currently denies any pain with urination.    Denies vaginal discharge or bleeding (no periods) or breast concerns.    ROS (+) for progressively worsening right hip pain as above.   All other ROS reviewed and  "negative.    [facility]  The following portions of the patient's history were reviewed and updated as appropriate: allergies, current medications, past family history, past medical history, past social history, past surgical history and problem list.      Current Outpatient Medications:   •  celecoxib (CeleBREX) 200 MG capsule, QD prn  •  esomeprazole (NEXIUM 24HR) 20 MG QD  •  Mirabegron ER (MYRBETRIQ) 50 MG tablet QD  •  montelukast (SINGULAIR) 10 MG tablet, QD  •  traMADol (ULTRAM) 50 MG tablet, 50 mg daily prn per Dr. Law  •  fluticasone-salmeterol (ADVAIR DISKUS) 100-50 MCG/DOSE DISKUS 1 puff BID  •  levothyroxine (SYNTHROID, LEVOTHROID) 50 MCG QD    VITALS:  /72   Pulse 82   Ht 165.1 cm (65\")   Wt 78.5 kg (173 lb)   SpO2 98%   BMI 28.79 kg/m²     Physical Exam   Constitutional: She is oriented to person, place, and time. She appears well-developed and well-nourished.   HENT:   Head: Normocephalic.   Right Ear: External ear normal.   Left Ear: External ear normal.   Nose: Nose normal.   Mouth/Throat: Oropharynx is clear and moist and mucous membranes are normal. No oropharyngeal exudate.   Eyes: Conjunctivae and EOM are normal. Pupils are equal, round, and reactive to light.   Neck: Normal range of motion. Neck supple. Carotid bruit is not present (bilaterally). No thyromegaly present.   Cardiovascular: Normal rate, regular rhythm and normal heart sounds.   Pulmonary/Chest: Effort normal and breath sounds normal. No respiratory distress. She has no wheezes. She has no rales.   Abdominal: Soft. Bowel sounds are normal. She exhibits no distension and no mass. There is no hepatosplenomegaly. There is no tenderness.   Genitourinary:   Genitourinary Comments: Breast/pelvic exams deferred to GYN     Musculoskeletal: Normal range of motion. She exhibits tenderness (Minimal tenderness with palpation of the right greater trochanter; minimal decreased range of motion bilateral hips). She exhibits no edema. "   stable gait   Lymphadenopathy:     She has no cervical adenopathy.   Neurological: She is alert and oriented to person, place, and time. She displays normal reflexes. No cranial nerve deficit.   Skin: Skin is warm and dry. No rash noted.   Psychiatric: She has a normal mood and affect. Her behavior is normal.   Nursing note and vitals reviewed.      LABS  Results for orders placed or performed in visit on 07/11/19   Comprehensive Metabolic Panel   Result Value Ref Range    Glucose 73 65 - 99 mg/dL    BUN 19 8 - 23 mg/dL    Creatinine 0.85 0.57 - 1.00 mg/dL    Sodium 141 136 - 145 mmol/L    Potassium 5.0 3.5 - 5.2 mmol/L    Chloride 107 98 - 107 mmol/L    CO2 17.8 (L) 22.0 - 29.0 mmol/L    Calcium 10.4 8.6 - 10.5 mg/dL    Total Protein 7.2 6.0 - 8.5 g/dL    Albumin 3.90 3.50 - 5.20 g/dL    ALT (SGPT) 25 1 - 33 U/L    AST (SGOT) 31 1 - 32 U/L    Alkaline Phosphatase 164 (H) 39 - 117 U/L    Total Bilirubin 0.4 0.2 - 1.2 mg/dL    eGFR Non African Amer 65 >60 mL/min/1.73    Globulin 3.3 gm/dL    A/G Ratio 1.2 g/dL    BUN/Creatinine Ratio 22.4 7.0 - 25.0    Anion Gap 16.2 (H) 5.0 - 15.0 mmol/L   Lipid Panel   Result Value Ref Range    Total Cholesterol 142 0 - 200 mg/dL    Triglycerides 66 0 - 150 mg/dL    HDL Cholesterol 42 40 - 60 mg/dL    LDL Cholesterol  87 0 - 100 mg/dL    VLDL Cholesterol 13.2 5 - 40 mg/dL    LDL/HDL Ratio 2.07    TSH   Result Value Ref Range    TSH 1.770 0.270 - 4.200 mIU/mL   T4, Free   Result Value Ref Range    Free T4 1.28 0.93 - 1.70 ng/dL   CBC Auto Differential   Result Value Ref Range    WBC 8.55 3.40 - 10.80 10*3/mm3    RBC 4.69 3.77 - 5.28 10*6/mm3    Hemoglobin 13.8 12.0 - 15.9 g/dL    Hematocrit 47.1 (H) 34.0 - 46.6 %    .4 (H) 79.0 - 97.0 fL    MCH 29.4 26.6 - 33.0 pg    MCHC 29.3 (L) 31.5 - 35.7 g/dL    RDW 14.6 12.3 - 15.4 %    RDW-SD 54.1 (H) 37.0 - 54.0 fl    MPV 10.8 6.0 - 12.0 fL    Platelets 310 140 - 450 10*3/mm3    Neutrophil % 74.3 42.7 - 76.0 %    Lymphocyte % 16.4  (L) 19.6 - 45.3 %    Monocyte % 6.7 5.0 - 12.0 %    Eosinophil % 2.0 0.3 - 6.2 %    Basophil % 0.4 0.0 - 1.5 %    Immature Grans % 0.2 0.0 - 0.5 %    Neutrophils, Absolute 6.36 1.70 - 7.00 10*3/mm3    Lymphocytes, Absolute 1.40 0.70 - 3.10 10*3/mm3    Monocytes, Absolute 0.57 0.10 - 0.90 10*3/mm3    Eosinophils, Absolute 0.17 0.00 - 0.40 10*3/mm3    Basophils, Absolute 0.03 0.00 - 0.20 10*3/mm3    Immature Grans, Absolute 0.02 0.00 - 0.05 10*3/mm3    nRBC 0.0 0.0 - 0.2 /100 WBC   Urinalysis without microscopic (no culture) - Urine, Clean Catch   Result Value Ref Range    Color, UA Yellow Yellow, Straw    Appearance, UA Clear Clear    pH, UA 6.5 5.0 - 8.0    Specific Gravity, UA 1.015 1.005 - 1.030    Glucose, UA Negative Negative    Ketones, UA Negative Negative    Bilirubin, UA Negative Negative    Blood, UA Negative Negative    Protein, UA Negative Negative    Leuk Esterase, UA Moderate (2+) (A) Negative    Nitrite, UA Negative Negative    Urobilinogen, UA 0.2 E.U./dL 0.2 - 1.0 E.U./dL   Urinalysis, Microscopic Only - Urine, Clean Catch   Result Value Ref Range    RBC, UA 0-2 None Seen, 0-2 /HPF    WBC, UA 3-5 (A) None Seen, 0-2 /HPF    Bacteria, UA None Seen None Seen /HPF    Squamous Epithelial Cells, UA 0-2 None Seen, 0-2 /HPF    Hyaline Casts, UA 0-2 None Seen /LPF    Methodology Automated Microscopy        ECG 12 Lead  Date/Time: 7/18/2019 11:28 AM  Performed by: Lorene Isaac MD  Authorized by: Lorene Isaac MD   Comparison: compared with previous ECG from 7/16/2018  Similar to previous ECG  Rhythm: sinus rhythm  Rate: normal  BPM: 83  Conduction: conduction normal  ST Segments: ST segments normal  T Waves: T waves normal  T inversion: III (old)  QRS axis: normal  Clinical impression comment: stable EKG            ASSESSMENT/PLAN  Problem List Items Addressed This Visit     Asthma     Stable on advair 100/50 1 puff BID #3, 3RF; patient aware to gargle and spit after puffs           Relevant Medications     fluticasone-salmeterol (ADVAIR DISKUS) 100-50 MCG/DOSE DISKUS    montelukast (SINGULAIR) 10 MG tablet    Dyslipidemia     Stable, at goal with LDL 91; no meds         Elevated blood-pressure reading without diagnosis of hypertension     BP bord 132/72; rec low Na diet, increased aerobic exercise; home BP monitoring with goal BP < 130/80           Gastroesophageal reflux disease      Stable OTC Nexium 20 mg QD         Relevant Medications    esomeprazole (NEXIUM 24HR) 20 MG capsule    Hypothyroidism     Euthyroid on levothyroxine 50 mcg QD #90, 3RF         Relevant Medications    levothyroxine (SYNTHROID, LEVOTHROID) 50 MCG tablet    Osteopenia     Calcium and vitamin D supplementation with weight-bearing exercise; DEXA ordered (last 3/16)            Urinary incontinence     Has components of both stress and urge incontinence; minimal relief with Myrbetriq but patient states Myrbetriq helps with spasm; she wants to continue Myrbetriq 50 mg QD #90, 3RF; discussed consideration to go to urology to explore options and/or trial of PT; patient will call back when she wants to proceed         Relevant Medications    Mirabegron ER (MYRBETRIQ) 50 MG tablet sustained-release 24 hour 24 hr tablet    Medicare annual wellness visit, subsequent - Primary     Health maintenance - flu vacc 8/18 (rec annually in the fall); Prevnar 5/15; PVX 4/14; Td 8/18, next one due 2028 (Tdap 2/08); Zostavax 2/08; HAV and Shingrix done; mammo 11/18; GYN with Dr. Bauer pending 8/2019; DEXA ordered (last 7/16); overdue for colonosc per Dr. Kartik Gold (last 12/15); eye exam with Dr. Tate pending 11/19; dental exam q6 mos, just done earlier this wk; (+) seat belt use    Consultants:  Patient Care Team:  Lorene Isaac MD as PCP - General  Lorene Isaac MD as PCP - Internal Medicine  Mario Valderrama MD as PCP - Claims Attributed  Juancarlos, Brittanie Suarez MD as Consulting Physician (Obstetrics and Gynecology)  Gigi Esparza MD as  Consulting Physician (Gastroenterology)  Kartik Gold MD as Consulting Physician (General Surgery)  Tyrone Tate MD as Consulting Physician (Ophthalmology)  Arik Keane MD as Consulting Physician (Otolaryngology)  Anastacio Dickinson MD as Consulting Physician (Otolaryngology)  Mario Ding MD as Consulting Physician (Neurosurgery)             Chronic right hip pain     Progressively worsening right hip pain s/p evaluation with both Dr. Valderrama and Dr. Ding without any answers; request MRI L-spine results for our chart; recommend course of PT prior to going back to Ortho for further evaluation; may benefit from MRI of the right hip should PT not help her symptoms; order given for PT so she can do in [place]         Relevant Orders    Ambulatory Referral to Physical Therapy Evaluate and treat (and HEP)    Menopause    Relevant Orders    DEXA Bone Density Axial      Other Visit Diagnoses     Metabolic acidosis        etiol unclear; repeat BMP    Relevant Orders    Basic Metabolic Panel    Macrocytosis without anemia        f/u B12, folate, MMA; denies significant alcohol intake    Relevant Orders    Vitamin B12    Folate    Methylmalonic Acid, Serum    CBC & Differential    CBC Auto Differential    Encounter for screening colonoscopy        Relevant Orders    Ambulatory Referral to General Surgery          FOLLOW-UP  RTC 1yr for annual wellness visit; fasting labs the week prior to appt (CBC, CMP, TSH, lipids, UA/micro, FT4)      Electronically signed by:    Lorene Isaac MD  07/18/2019

## 2019-07-19 NOTE — PROGRESS NOTES
Blood counts back to normal - no further enlarged RBCs; also normal B12 and folic acid levels; no further eval needed at this time

## 2019-07-19 NOTE — PROGRESS NOTES
The bicarbonate level is back to normal, but now potassium is at upper end of normal; pls make sure not taking OTC potassium or taking any potassium in supplements

## 2019-07-20 LAB
Lab: NORMAL
METHYLMALONATE SERPL-SCNC: 221 NMOL/L (ref 0–378)

## 2019-10-08 ENCOUNTER — HOSPITAL ENCOUNTER (OUTPATIENT)
Dept: BONE DENSITY | Facility: HOSPITAL | Age: 74
Discharge: HOME | End: 2019-10-08
Admitting: INTERNAL MEDICINE

## 2019-10-08 DIAGNOSIS — Z78.0 MENOPAUSE: ICD-10-CM

## 2019-10-08 PROCEDURE — 77080 DXA BONE DENSITY AXIAL: CPT

## 2019-10-09 NOTE — PROGRESS NOTES
DEXA shows significantly worsened bone density, now osteoporosis; rec office visit to review treatment plan

## 2019-10-14 ENCOUNTER — OFFICE VISIT (OUTPATIENT)
Dept: INTERNAL MEDICINE | Facility: CLINIC | Age: 74
End: 2019-10-14

## 2019-10-14 VITALS
HEART RATE: 82 BPM | BODY MASS INDEX: 28.49 KG/M2 | WEIGHT: 171 LBS | OXYGEN SATURATION: 98 % | SYSTOLIC BLOOD PRESSURE: 138 MMHG | HEIGHT: 65 IN | DIASTOLIC BLOOD PRESSURE: 76 MMHG

## 2019-10-14 DIAGNOSIS — M25.551 CHRONIC RIGHT HIP PAIN: ICD-10-CM

## 2019-10-14 DIAGNOSIS — M81.0 AGE-RELATED OSTEOPOROSIS WITHOUT CURRENT PATHOLOGICAL FRACTURE: Primary | ICD-10-CM

## 2019-10-14 DIAGNOSIS — G89.29 CHRONIC RIGHT HIP PAIN: ICD-10-CM

## 2019-10-14 DIAGNOSIS — L30.9 NIPPLE DERMATITIS: ICD-10-CM

## 2019-10-14 PROCEDURE — 99214 OFFICE O/P EST MOD 30 MIN: CPT | Performed by: INTERNAL MEDICINE

## 2019-10-14 NOTE — PROGRESS NOTES
Chief Complaint   Patient presents with   • Osteoporosis       History of Present Illness  74 y.o.  woman presents for follow-up regarding abnormal DEXA scan.  Drinks some milk.  Does not take any calcium/vitamin D medication at this time.  Has not had recent falls.  Has no known fractures.  Wants to know with addition of marine algae would be helpful.    Reports known scoliosis.  Feels like this is causing persistent right hip pain.  She is just finished 12 weeks of PT for the right hip and notes some improvement but no resolution of pain.  She cannot stand for prolonged phase of time even with cooking.  She continues to do her exercises on a daily basis.  She has read been recommended by Dr. Ding, Dr. Valdes, plus another orthopedic surgeon not to pursue surgery on her back/hip.  She already as prn tramadol as well as celebrex.     She also complains of right nipple soreness, tenderness, irritation.  She had similar symptoms 3 to 4 years ago and had extensive work-up at that time to include ultrasound, mammogram, as well as mammogram per Dr. Beckham.  Ports the biopsy was sent to [place].  It was diagnosed as benign, no malignancy.  It eventually got better with some cortisone cream.    She has had recurrence of the red sore right nipple as of last week.  It is getting a little bit better with cortisone cream again.  She has no double discharge.  She has noted no breast masses.  She currently has a screening mammogram pending 11/14/2019 and wonders whether this would be sufficient for evaluation.  Left nipple is unaffected.    Review of Systems  ROS (+) for red soreness at the right nipple, getting better with hydrocortisone cream.  Notes previous episode as above.  Left side is asymptomatic. ROS (+)for right hip pain which she attributes to slightly tilted pelvis due to her scoliosis.  Has just finished 12 weeks of PT and although with moderate improvement, pain still persists.  Denies any numbness  "and tingling.  All other ROS reviewed and negative.    [facility]  The following portions of the patient's history were reviewed and updated as appropriate: allergies, current medications, past family history, past medical history, past social history, past surgical history and problem list.      Current Outpatient Medications:   •  celecoxib (CeleBREX) 200 MG capsule QD prn  •  esomeprazole (NEXIUM 24HR) 20 MG QD  •  fluticasone-salmeterol (ADVAIR DISKUS) 100-50 MCG/DOSE DISKUS, 1 puff BID  •  levothyroxine (SYNTHROID, LEVOTHROID) 50 MCG QD  •  Mirabegron ER (MYRBETRIQ) 50 MG tablet sustained-release QD  •  montelukast (SINGULAIR) 10 MG tablet QD  •  traMADol (ULTRAM) 50 MG tablet, 50 mg daily [rn      VITALS:  /76   Pulse 82   Ht 165.1 cm (65\")   Wt 77.6 kg (171 lb)   SpO2 98%   BMI 28.46 kg/m²     Physical Exam   Constitutional: She is oriented to person, place, and time. She appears well-developed and well-nourished.   Eyes: Conjunctivae and EOM are normal.   Cardiovascular: Normal rate, regular rhythm and normal heart sounds.   Pulmonary/Chest: Effort normal and breath sounds normal. No respiratory distress. She has no wheezes. She has no rales.   Abdominal: Soft. Bowel sounds are normal.   Genitourinary:   Genitourinary Comments: Right breast unremarkable except for mild erythema and swelling at the right nipple, most prominent at the 10:-00-11:00 aspect of the right nipple; bilat nipples with center minute inversion; no nipple discharge   Musculoskeletal: She exhibits no edema (BLE).   Creased right hip range of motion   Neurological: She is alert and oriented to person, place, and time.   Psychiatric: She has a normal mood and affect. Her behavior is normal.   Nursing note and vitals reviewed.      LABS  Results for orders placed or performed in visit on 07/18/19   Basic Metabolic Panel   Result Value Ref Range    Glucose 77 65 - 99 mg/dL    BUN 23 8 - 23 mg/dL    Creatinine 0.89 0.57 - 1.00 mg/dL    " Sodium 137 136 - 145 mmol/L    Potassium 5.7 (H) 3.5 - 5.2 mmol/L    Chloride 103 98 - 107 mmol/L    CO2 25.5 22.0 - 29.0 mmol/L    Calcium 10.6 (H) 8.6 - 10.5 mg/dL    eGFR Non African Amer 62 >60 mL/min/1.73    BUN/Creatinine Ratio 25.8 (H) 7.0 - 25.0    Anion Gap 8.5 5.0 - 15.0 mmol/L   Vitamin B12   Result Value Ref Range    Vitamin B-12 638 211 - 946 pg/mL   Folate   Result Value Ref Range    Folate 19.20 4.78 - 24.20 ng/mL   Methylmalonic Acid, Serum   Result Value Ref Range    Methylmalonic Acid 221 0 - 378 nmol/L    Disclaimer: Comment    CBC Auto Differential   Result Value Ref Range    WBC 8.08 3.40 - 10.80 10*3/mm3    RBC 4.69 3.77 - 5.28 10*6/mm3    Hemoglobin 13.8 12.0 - 15.9 g/dL    Hematocrit 45.4 34.0 - 46.6 %    MCV 96.8 79.0 - 97.0 fL    MCH 29.4 26.6 - 33.0 pg    MCHC 30.4 (L) 31.5 - 35.7 g/dL    RDW 14.5 12.3 - 15.4 %    RDW-SD 51.5 37.0 - 54.0 fl    MPV 11.2 6.0 - 12.0 fL    Platelets 341 140 - 450 10*3/mm3    Neutrophil % 65.8 42.7 - 76.0 %    Lymphocyte % 20.3 19.6 - 45.3 %    Monocyte % 10.4 5.0 - 12.0 %    Eosinophil % 2.5 0.3 - 6.2 %    Basophil % 0.9 0.0 - 1.5 %    Immature Grans % 0.1 0.0 - 0.5 %    Neutrophils, Absolute 5.32 1.70 - 7.00 10*3/mm3    Lymphocytes, Absolute 1.64 0.70 - 3.10 10*3/mm3    Monocytes, Absolute 0.84 0.10 - 0.90 10*3/mm3    Eosinophils, Absolute 0.20 0.00 - 0.40 10*3/mm3    Basophils, Absolute 0.07 0.00 - 0.20 10*3/mm3    Immature Grans, Absolute 0.01 0.00 - 0.05 10*3/mm3    nRBC 0.1 0.0 - 0.2 /100 WBC     10/8/2019 DEXA shows lumbar 1.3, hip -1.8, arm -2.8 with 10-year fracture risk 18%     ASSESSMENT/PLAN  Problem List Items Addressed This Visit     Osteoporosis - Primary     NEW osteoporosis with worst T-score R. radius -2.8; Calcium and vitamin D supplementation with weight-bearing exercise; rec Ca 1000mg/day between diet and supplements; reviewed med options, goals, side effects, risks; patient interesting in proceeding with [id number] SQ q6 mos (but amenable  to taking bisphosphonate if insurance refuses Prolia); repeat DEXA 10/2021            Relevant Medications    denosumab (PROLIA) 60 MG/ML solution prefilled syringe syringe    Chronic right hip pain     Nightly improved but symptomatic after 12 weeks of PT; reports that Dr. Valderrama, Dr. Ding, and Dr. Valdes have all advised against surgery; cont daily exercises but also consider strengthening for stability exercises such as with Pilates and/or yoga           Other Visit Diagnoses     Nipple dermatitis        erythematous irritated R. nipple, getting better with HC cream; if does not resolve by next wk, rec f/u eval and dx mammo for further eval          FOLLOW-UP  1.  Health maintenance flu vaccine done at health department 10/4/9; screening mammogram pending 11/14/2019 but discussed with patient should nipple tenderness/irritation persist, she will need a diagnostic mammogram; repeat DEXA in 2 yrs (2021)  2.  RTC prn; next wellness scheduled 7/21/20; fasting labs prior to appt (CBC, CMP, TSH, lipids, UA/micr    Electronically signed by:    Lorene Isaac MD  10/14/2019    EMR Dragon/Transcription disclaimer:  Much of this encounter note is an electronic transcription/translation of spoken language to printed text. Electronic translation of spoken language may permit erroneous, or at times, nonsensical words or phrases, to be inadvertently transcribed. Although I have reviewed the note for such errors, some may still exist.

## 2019-10-16 ENCOUNTER — TELEPHONE (OUTPATIENT)
Dept: INTERNAL MEDICINE | Facility: CLINIC | Age: 74
End: 2019-10-16

## 2019-10-16 DIAGNOSIS — M81.0 AGE-RELATED OSTEOPOROSIS WITHOUT CURRENT PATHOLOGICAL FRACTURE: Primary | ICD-10-CM

## 2019-10-16 NOTE — TELEPHONE ENCOUNTER
THE SHOT PROLIA IS GOING TO COST $650  YEAR AND SHE CANNOT AFFORD IT. SHE WOULD PREFER THE GENERIC PILL FORM. PLEASE CONTACT PATIENT -[phone]

## 2019-10-17 RX ORDER — ALENDRONATE SODIUM 70 MG/1
70 TABLET ORAL
Qty: 13 TABLET | Refills: 3 | Status: SHIPPED | OUTPATIENT
Start: 2019-10-17 | End: 2020-07-20 | Stop reason: SDUPTHER

## 2019-10-17 NOTE — TELEPHONE ENCOUNTER
escribed alendronate 70mg weekly #13, 3RF    Not sure where she wants it sent so I escribed it to [facility] mail order

## 2019-11-01 ENCOUNTER — TELEPHONE (OUTPATIENT)
Dept: INTERNAL MEDICINE | Facility: CLINIC | Age: 74
End: 2019-11-01

## 2019-11-01 NOTE — TELEPHONE ENCOUNTER
They will not do the screening mammogram if she has any breast abnlities.  Needs to make an office visit so we can order a diagnostic mammogram

## 2019-11-01 NOTE — TELEPHONE ENCOUNTER
PATIENT STATES THAT THE DISCOLORED AREA ON HER BREAST HAS NOT CLEARED USING THE CREAM DR MEDRANO PRESCRIBED. SHE WAS ADVISED TO CALL THE OFFICE IF THIS IS THE CASE SO THAT DR MEDRANO CAN ORDER A DIAGNOSTIC MAMMO FOR HER. SHE IS ALREADY SCHEDULED TO HAVE A ROUTINE MAMMO ON 11/14.     CALL BACK [phone]

## 2019-11-04 ENCOUNTER — OFFICE VISIT (OUTPATIENT)
Dept: INTERNAL MEDICINE | Facility: CLINIC | Age: 74
End: 2019-11-04

## 2019-11-04 VITALS
SYSTOLIC BLOOD PRESSURE: 128 MMHG | WEIGHT: 174 LBS | OXYGEN SATURATION: 98 % | HEART RATE: 75 BPM | BODY MASS INDEX: 28.99 KG/M2 | HEIGHT: 65 IN | DIASTOLIC BLOOD PRESSURE: 74 MMHG

## 2019-11-04 DIAGNOSIS — L30.9 NIPPLE DERMATITIS: ICD-10-CM

## 2019-11-04 DIAGNOSIS — N64.4 BREAST TENDERNESS: Primary | ICD-10-CM

## 2019-11-04 PROCEDURE — 99213 OFFICE O/P EST LOW 20 MIN: CPT | Performed by: INTERNAL MEDICINE

## 2019-11-04 NOTE — PROGRESS NOTES
"Chief Complaint   Patient presents with   • Breast Problem       History of Present Illness  74 y.o.  woman presents for follow-up re: \"breast pressure.\"  States that she was previously symptomatic in the right breast but now symptomatic in the left breast.  The right nipple flakiness and redness with soreness has resolved with baseline residual redness.  No nipple discharge bilaterally.  Previously noted a pressure underneath the right nipple at the upper outer quadrant.  This has resolved but now she has similar symptoms underneath the left nipple at the left upper outer quadrant.  Again no masses palpated and no nipple discharge.  No rash of the left breast or the left nipple.  Notes similar sensations when she had to have biopsy previously.    Review of Systems  ROS (+) for left breast pressure under the left nipple without nipple discharge, masses, rash.  Similar symptoms underneath the right nipple resolved.  Right nipple soreness and rash also resolved, back to baseline mild redness.  No nodules or lumps in the armpits bilaterally.  Notes previous history of breast biopsy.  All other ROS reviewed and negative.    [facility]  The following portions of the patient's history were reviewed and updated as appropriate: allergies, current medications, past family history, past medical history, past social history, past surgical history and problem list.    Current Outpatient Medications:   •  alendronate (FOSAMAX) 70 MG weekly  •  celecoxib (CeleBREX) 200 MG QD  •  esomeprazole (NEXIUM 24HR) 20 MG capsule, QD  •  fluticasone-salmeterol (ADVAIR DISKUS) 100-50 MCG/DOSE DISKUS 1 puff BID  •  levothyroxine (SYNTHROID, LEVOTHROID) 50 MCG QD  •  Mirabegron ER (MYRBETRIQ) 50 MG tablet QD  •  montelukast (SINGULAIR) 10 MG QD  •  traMADol (ULTRAM) 50 MG tablet, prn    VITALS:  /74   Pulse 75   Ht 165.1 cm (65\")   Wt 78.9 kg (174 lb)   SpO2 98%   BMI 28.96 kg/m²     Physical Exam   Constitutional: She is " oriented to person, place, and time. She appears well-developed and well-nourished. No distress.   Eyes: Conjunctivae and EOM are normal.   Pulmonary/Chest: Effort normal and breath sounds normal.   Genitourinary:   Genitourinary Comments: Chaperone declined by patient.  Mild tend with palpation of half-dollar sized area left breast 1:00 left upper quadrant of left nipple extending to some nl breast tissue; previously symmetric tend at the right breast, nontender today with palpation; right nipple minimal shiny erythema - baseline per patient, no flakiness, nontender; no nipple discharge bilaterally; no axillary adenopathy bilaterally   Musculoskeletal:   Normal gait   Neurological: She is alert and oriented to person, place, and time.   Skin: Skin is warm and dry. No rash noted.   Nursing note and vitals reviewed.      LABS  None    ASSESSMENT/PLAN  Problem List Items Addressed This Visit     None      Visit Diagnoses     Breast tenderness    -  Primary    LEFT upper outer quadrant of left nipple; previously on the right nipple (upper outer quad) but now resolved; proceed with dx mammo for further eval    Relevant Orders    Mammo Diagnostic Digital Tomosynthesis Bilateral With CAD    Nipple dermatitis        RIGHT, resolved per patient; has baseline erythema only R. nipple, unchanged per pt          FOLLOW-UP  1.  Health maintenance - flu vaccine 10/19; dx mamm -ordered today  2.  RTC for next wellness 7/21/20; fasting labs prior to appt (CBC, CMP, TSH, lipids, UA/micr, FT4)    Electronically signed by:    Lorene Isaac MD  11/04/2019

## 2019-11-14 ENCOUNTER — APPOINTMENT (OUTPATIENT)
Dept: MAMMOGRAPHY | Facility: HOSPITAL | Age: 74
End: 2019-11-14
Attending: INTERNAL MEDICINE

## 2019-11-16 PROBLEM — K57.30 DIVERTICULOSIS OF COLON: Status: ACTIVE | Noted: 2019-11-16

## 2019-12-04 ENCOUNTER — HOSPITAL ENCOUNTER (OUTPATIENT)
Dept: MAMMOGRAPHY | Facility: HOSPITAL | Age: 74
Discharge: HOME | End: 2019-12-04
Admitting: INTERNAL MEDICINE

## 2019-12-04 ENCOUNTER — HOSPITAL ENCOUNTER (OUTPATIENT)
Dept: ULTRASOUND IMAGING | Facility: HOSPITAL | Age: 74
Discharge: HOME | End: 2019-12-04

## 2019-12-04 DIAGNOSIS — N64.4 BREAST TENDERNESS: ICD-10-CM

## 2019-12-04 PROCEDURE — G0279 TOMOSYNTHESIS, MAMMO: HCPCS | Performed by: RADIOLOGY

## 2019-12-04 PROCEDURE — 76642 ULTRASOUND BREAST LIMITED: CPT | Performed by: RADIOLOGY

## 2019-12-04 PROCEDURE — 77066 DX MAMMO INCL CAD BI: CPT

## 2019-12-04 PROCEDURE — G0279 TOMOSYNTHESIS, MAMMO: HCPCS

## 2019-12-04 PROCEDURE — 76642 ULTRASOUND BREAST LIMITED: CPT

## 2019-12-04 PROCEDURE — 77066 DX MAMMO INCL CAD BI: CPT | Performed by: RADIOLOGY

## 2019-12-12 ENCOUNTER — TELEPHONE (OUTPATIENT)
Dept: INTERNAL MEDICINE | Facility: CLINIC | Age: 74
End: 2019-12-12

## 2019-12-12 DIAGNOSIS — N63.10 BREAST MASS, RIGHT: Primary | ICD-10-CM

## 2019-12-12 DIAGNOSIS — R92.2 INCONCLUSIVE MAMMOGRAM: ICD-10-CM

## 2019-12-12 NOTE — TELEPHONE ENCOUNTER
Patient called to check on the status of her MRI that she requested to be scheduled on 12/4/19. The patient said that she had a diagnostic mammogram performed on 12/4/19 and she was told that she would need an MRI. The patient said that she received a call from Dr. Isaac's office on 12/4/19 with the diagnostic mammogram results and she said that she would need an MRI and the person she spoke with said that she would check with Dr. Isaac, but she never received another call or any other information about this. Please call the patient back at [phone] when this message has been received and advise.

## 2019-12-13 NOTE — TELEPHONE ENCOUNTER
I spoke to the breast center this am and they said the radiologist is recommending the MRI, but they wont order the MRI. IS there anyway you can order the MRI for her so she can have this done?

## 2019-12-19 ENCOUNTER — TELEPHONE (OUTPATIENT)
Dept: INTERNAL MEDICINE | Facility: CLINIC | Age: 74
End: 2019-12-19

## 2019-12-19 NOTE — TELEPHONE ENCOUNTER
PATIENT CALLED REGARDING SCHEDULING AN MRI. SHE STATED SHE'S BEEN TRYING FOR QUITE A WHILE. YOU CAN REACH HER [phone].

## 2019-12-19 NOTE — TELEPHONE ENCOUNTER
Pt notified that the MRI is approved and she can go ahead and have that done. Pt given phone number and address for Diagnostic center on [facility].

## 2020-01-03 ENCOUNTER — HOSPITAL ENCOUNTER (OUTPATIENT)
Dept: MRI IMAGING | Facility: HOSPITAL | Age: 75
Discharge: HOME | End: 2020-01-03
Admitting: INTERNAL MEDICINE

## 2020-01-03 DIAGNOSIS — N63.10 BREAST MASS, RIGHT: ICD-10-CM

## 2020-01-03 DIAGNOSIS — R92.2 INCONCLUSIVE MAMMOGRAM: ICD-10-CM

## 2020-01-03 LAB — CREAT BLDA-MCNC: 1 MG/DL (ref 0.6–1.3)

## 2020-01-03 PROCEDURE — 77049 MRI BREAST C-+ W/CAD BI: CPT | Performed by: RADIOLOGY

## 2020-01-03 PROCEDURE — 0 GADOBENATE DIMEGLUMINE 529 MG/ML SOLUTION: Performed by: INTERNAL MEDICINE

## 2020-01-03 PROCEDURE — A9577 INJ MULTIHANCE: HCPCS | Performed by: INTERNAL MEDICINE

## 2020-01-03 PROCEDURE — 82565 ASSAY OF CREATININE: CPT

## 2020-01-03 PROCEDURE — C8908 MRI W/O FOL W/CONT, BREAST,: HCPCS

## 2020-01-03 RX ADMIN — GADOBENATE DIMEGLUMINE 15 ML: 529 INJECTION, SOLUTION INTRAVENOUS at 14:41

## 2020-01-06 ENCOUNTER — TELEPHONE (OUTPATIENT)
Dept: INTERNAL MEDICINE | Facility: CLINIC | Age: 75
End: 2020-01-06

## 2020-01-06 DIAGNOSIS — N64.9 NIPPLE LESION: ICD-10-CM

## 2020-01-06 DIAGNOSIS — N63.20 LEFT BREAST MASS: ICD-10-CM

## 2020-01-06 DIAGNOSIS — R92.8 OTHER ABNORMAL AND INCONCLUSIVE FINDINGS ON DIAGNOSTIC IMAGING OF BREAST: ICD-10-CM

## 2020-01-06 DIAGNOSIS — Q83.9 NIPPLE ANOMALY: Primary | ICD-10-CM

## 2020-01-06 NOTE — TELEPHONE ENCOUNTER
Pt notified of MRI results. She is ok with the referral back to Dr. DOYLE. And she will have that left breast US done when it gets approved and can be scheduled.

## 2020-01-06 NOTE — PROGRESS NOTES
Breast MRI shows likely benign adenoma at the right nipple but recommended to have surgical biopsy to confirm - ok to refer back to Dr. DOYLE?    Incidental finding of abnormality in left breast and recommended to another left breast ultrasound for further eval to determine whether bx is recommended for this abnormality. Will escribe left breast diagnostic u/s

## 2020-01-06 NOTE — TELEPHONE ENCOUNTER
----- Message from Lorene Isaac MD sent at 1/6/2020 12:00 AM EST -----  Breast MRI shows likely benign adenoma at the right nipple but recommended to have surgical biopsy to confirm - ok to refer back to Dr. DOYLE?    Incidental finding of abnormality in left breast and recommended to another left breast ultrasound for further eval to determine whether bx is recommended for this abnormality. Will escribe left breast diagnostic u/s

## 2020-01-08 ENCOUNTER — TELEPHONE (OUTPATIENT)
Dept: MRI IMAGING | Facility: HOSPITAL | Age: 75
End: 2020-01-08

## 2020-01-08 NOTE — TELEPHONE ENCOUNTER
Called pt with Breast MRI results. Recommended 2nd look US scheduled for Jan. 17 at 10:00. Pt is on tumeric and Dr. Rubi said she would not need to come off of it. Pt expressed understanding and was encouraged to call with any further questions or concerns.

## 2020-01-17 ENCOUNTER — HOSPITAL ENCOUNTER (OUTPATIENT)
Dept: MAMMOGRAPHY | Facility: HOSPITAL | Age: 75
Discharge: HOME | End: 2020-01-17

## 2020-01-17 ENCOUNTER — APPOINTMENT (OUTPATIENT)
Dept: ULTRASOUND IMAGING | Facility: HOSPITAL | Age: 75
End: 2020-01-17

## 2020-01-17 ENCOUNTER — HOSPITAL ENCOUNTER (OUTPATIENT)
Dept: ULTRASOUND IMAGING | Facility: HOSPITAL | Age: 75
Discharge: HOME | End: 2020-01-17
Admitting: INTERNAL MEDICINE

## 2020-01-17 DIAGNOSIS — R92.8 ABNORMAL MAGNETIC RESONANCE IMAGING OF BREAST: ICD-10-CM

## 2020-01-17 DIAGNOSIS — R92.8 ABNORMAL MAMMOGRAM: ICD-10-CM

## 2020-01-17 PROCEDURE — A4648 IMPLANTABLE TISSUE MARKER: HCPCS

## 2020-01-17 PROCEDURE — 19083 BX BREAST 1ST LESION US IMAG: CPT | Performed by: RADIOLOGY

## 2020-01-17 PROCEDURE — 77065 DX MAMMO INCL CAD UNI: CPT | Performed by: RADIOLOGY

## 2020-01-17 PROCEDURE — 88305 TISSUE EXAM BY PATHOLOGIST: CPT | Performed by: INTERNAL MEDICINE

## 2020-01-17 RX ORDER — LIDOCAINE HYDROCHLORIDE AND EPINEPHRINE 10; 10 MG/ML; UG/ML
10 INJECTION, SOLUTION INFILTRATION; PERINEURAL ONCE
Status: DISCONTINUED | OUTPATIENT
Start: 2020-01-17 | End: 2020-07-21

## 2020-01-17 RX ORDER — LIDOCAINE HYDROCHLORIDE 10 MG/ML
5 INJECTION, SOLUTION INFILTRATION; PERINEURAL ONCE
Status: DISCONTINUED | OUTPATIENT
Start: 2020-01-17 | End: 2020-07-21

## 2020-01-17 NOTE — PROGRESS NOTES
Alert and orientated. Denies discomfort. No active bleeding. Steri-strips intact, gauze dressing applied. Ice packs given. Verbalizes and demonstrates understanding of post-care instructions and written copy given.

## 2020-01-20 ENCOUNTER — TELEPHONE (OUTPATIENT)
Dept: MAMMOGRAPHY | Facility: HOSPITAL | Age: 75
End: 2020-01-20

## 2020-01-20 LAB
CYTO UR: NORMAL
LAB AP CASE REPORT: NORMAL
LAB AP CLINICAL INFORMATION: NORMAL
LAB AP DIAGNOSIS COMMENT: NORMAL
PATH REPORT.FINAL DX SPEC: NORMAL
PATH REPORT.GROSS SPEC: NORMAL

## 2020-01-21 ENCOUNTER — TELEPHONE (OUTPATIENT)
Dept: MAMMOGRAPHY | Facility: HOSPITAL | Age: 75
End: 2020-01-21

## 2020-01-21 NOTE — TELEPHONE ENCOUNTER
On 1.20.20 @ 1824: Pt called in & was notified of pathology results and recommendation. Verbalizes understanding. Denies discomfort. Denies signs and symptoms of infection. Questions answered, verbalized understanding.

## 2020-02-23 PROBLEM — N63.10 BREAST MASS, RIGHT: Status: ACTIVE | Noted: 2020-02-23

## 2020-03-30 ENCOUNTER — APPOINTMENT (OUTPATIENT)
Dept: PREADMISSION TESTING | Facility: HOSPITAL | Age: 75
End: 2020-03-30

## 2020-05-10 ENCOUNTER — HOSPITAL ENCOUNTER (OUTPATIENT)
Dept: GENERAL RADIOLOGY | Facility: HOSPITAL | Age: 75
Discharge: HOME | End: 2020-05-10
Admitting: SURGERY

## 2020-05-10 ENCOUNTER — APPOINTMENT (OUTPATIENT)
Dept: PREADMISSION TESTING | Facility: HOSPITAL | Age: 75
End: 2020-05-10

## 2020-05-10 ENCOUNTER — OFFICE VISIT (OUTPATIENT)
Dept: PREADMISSION TESTING | Facility: HOSPITAL | Age: 75
End: 2020-05-10

## 2020-05-10 VITALS — WEIGHT: 181 LBS | HEIGHT: 65 IN | BODY MASS INDEX: 30.16 KG/M2

## 2020-05-10 LAB
ANION GAP SERPL CALCULATED.3IONS-SCNC: 11 MMOL/L (ref 5–15)
BUN BLD-MCNC: 19 MG/DL (ref 8–23)
BUN/CREAT SERPL: 24.1 (ref 7–25)
CALCIUM SPEC-SCNC: 9.6 MG/DL (ref 8.6–10.5)
CHLORIDE SERPL-SCNC: 107 MMOL/L (ref 98–107)
CO2 SERPL-SCNC: 23 MMOL/L (ref 22–29)
CREAT BLD-MCNC: 0.79 MG/DL (ref 0.57–1)
DEPRECATED RDW RBC AUTO: 50.8 FL (ref 37–54)
ERYTHROCYTE [DISTWIDTH] IN BLOOD BY AUTOMATED COUNT: 14.5 % (ref 12.3–15.4)
GFR SERPL CREATININE-BSD FRML MDRD: 71 ML/MIN/1.73
GLUCOSE BLD-MCNC: 77 MG/DL (ref 65–99)
HCT VFR BLD AUTO: 42.1 % (ref 34–46.6)
HGB BLD-MCNC: 13.6 G/DL (ref 12–15.9)
MCH RBC QN AUTO: 30.7 PG (ref 26.6–33)
MCHC RBC AUTO-ENTMCNC: 32.3 G/DL (ref 31.5–35.7)
MCV RBC AUTO: 95 FL (ref 79–97)
PLATELET # BLD AUTO: 345 10*3/MM3 (ref 140–450)
PMV BLD AUTO: 9.9 FL (ref 6–12)
POTASSIUM BLD-SCNC: 5.1 MMOL/L (ref 3.5–5.2)
RBC # BLD AUTO: 4.43 10*6/MM3 (ref 3.77–5.28)
SODIUM BLD-SCNC: 141 MMOL/L (ref 136–145)
WBC NRBC COR # BLD: 9.73 10*3/MM3 (ref 3.4–10.8)

## 2020-05-10 PROCEDURE — 93005 ELECTROCARDIOGRAM TRACING: CPT

## 2020-05-10 PROCEDURE — U0002 COVID-19 LAB TEST NON-CDC: HCPCS | Performed by: INTERNAL MEDICINE

## 2020-05-10 PROCEDURE — 71046 X-RAY EXAM CHEST 2 VIEWS: CPT

## 2020-05-10 PROCEDURE — 36415 COLL VENOUS BLD VENIPUNCTURE: CPT

## 2020-05-10 PROCEDURE — U0004 COV-19 TEST NON-CDC HGH THRU: HCPCS | Performed by: INTERNAL MEDICINE

## 2020-05-10 PROCEDURE — 80048 BASIC METABOLIC PNL TOTAL CA: CPT | Performed by: SURGERY

## 2020-05-10 PROCEDURE — 93010 ELECTROCARDIOGRAM REPORT: CPT | Performed by: INTERNAL MEDICINE

## 2020-05-10 PROCEDURE — 85027 COMPLETE CBC AUTOMATED: CPT | Performed by: SURGERY

## 2020-05-11 ENCOUNTER — ANESTHESIA EVENT (OUTPATIENT)
Dept: PERIOP | Facility: HOSPITAL | Age: 75
End: 2020-05-11

## 2020-05-11 LAB
REF LAB TEST METHOD: NORMAL
SARS-COV-2 RNA RESP QL NAA+PROBE: NOT DETECTED

## 2020-05-11 RX ORDER — FAMOTIDINE 10 MG/ML
20 INJECTION, SOLUTION INTRAVENOUS ONCE
Status: CANCELLED | OUTPATIENT
Start: 2020-05-11 | End: 2020-05-11

## 2020-05-12 ENCOUNTER — ANESTHESIA (OUTPATIENT)
Dept: PERIOP | Facility: HOSPITAL | Age: 75
End: 2020-05-12

## 2020-05-12 ENCOUNTER — HOSPITAL ENCOUNTER (OUTPATIENT)
Facility: HOSPITAL | Age: 75
Setting detail: HOSPITAL OUTPATIENT SURGERY
Discharge: HOME | End: 2020-05-12
Attending: SURGERY | Admitting: SURGERY

## 2020-05-12 VITALS
HEART RATE: 70 BPM | TEMPERATURE: 97.9 F | OXYGEN SATURATION: 97 % | RESPIRATION RATE: 16 BRPM | HEIGHT: 65 IN | SYSTOLIC BLOOD PRESSURE: 150 MMHG | WEIGHT: 181 LBS | DIASTOLIC BLOOD PRESSURE: 91 MMHG | BODY MASS INDEX: 30.16 KG/M2

## 2020-05-12 DIAGNOSIS — N63.41 UNSPECIFIED LUMP IN RIGHT BREAST, SUBAREOLAR: ICD-10-CM

## 2020-05-12 PROCEDURE — 88305 TISSUE EXAM BY PATHOLOGIST: CPT | Performed by: SURGERY

## 2020-05-12 PROCEDURE — 25010000002 DEXAMETHASONE PER 1 MG: Performed by: NURSE ANESTHETIST, CERTIFIED REGISTERED

## 2020-05-12 PROCEDURE — 25010000002 FENTANYL CITRATE (PF) 100 MCG/2ML SOLUTION: Performed by: NURSE ANESTHETIST, CERTIFIED REGISTERED

## 2020-05-12 PROCEDURE — 25010000002 PROPOFOL 10 MG/ML EMULSION: Performed by: NURSE ANESTHETIST, CERTIFIED REGISTERED

## 2020-05-12 RX ORDER — BUPIVACAINE HYDROCHLORIDE 5 MG/ML
INJECTION, SOLUTION EPIDURAL; INTRACAUDAL AS NEEDED
Status: DISCONTINUED | OUTPATIENT
Start: 2020-05-12 | End: 2020-05-12 | Stop reason: HOSPADM

## 2020-05-12 RX ORDER — MAGNESIUM HYDROXIDE 1200 MG/15ML
LIQUID ORAL AS NEEDED
Status: DISCONTINUED | OUTPATIENT
Start: 2020-05-12 | End: 2020-05-12 | Stop reason: HOSPADM

## 2020-05-12 RX ORDER — PROMETHAZINE HYDROCHLORIDE 25 MG/1
25 TABLET ORAL ONCE AS NEEDED
Status: CANCELLED | OUTPATIENT
Start: 2020-05-12

## 2020-05-12 RX ORDER — LIDOCAINE HYDROCHLORIDE 10 MG/ML
INJECTION, SOLUTION EPIDURAL; INFILTRATION; INTRACAUDAL; PERINEURAL AS NEEDED
Status: DISCONTINUED | OUTPATIENT
Start: 2020-05-12 | End: 2020-05-12 | Stop reason: SURG

## 2020-05-12 RX ORDER — PROMETHAZINE HYDROCHLORIDE 25 MG/ML
6.25 INJECTION, SOLUTION INTRAMUSCULAR; INTRAVENOUS ONCE AS NEEDED
Status: CANCELLED | OUTPATIENT
Start: 2020-05-12

## 2020-05-12 RX ORDER — SODIUM CHLORIDE 0.9 % (FLUSH) 0.9 %
10 SYRINGE (ML) INJECTION EVERY 12 HOURS SCHEDULED
Status: DISCONTINUED | OUTPATIENT
Start: 2020-05-12 | End: 2020-05-12 | Stop reason: HOSPADM

## 2020-05-12 RX ORDER — SODIUM CHLORIDE 0.9 % (FLUSH) 0.9 %
10 SYRINGE (ML) INJECTION AS NEEDED
Status: DISCONTINUED | OUTPATIENT
Start: 2020-05-12 | End: 2020-05-12 | Stop reason: HOSPADM

## 2020-05-12 RX ORDER — SODIUM CHLORIDE, SODIUM LACTATE, POTASSIUM CHLORIDE, CALCIUM CHLORIDE 600; 310; 30; 20 MG/100ML; MG/100ML; MG/100ML; MG/100ML
9 INJECTION, SOLUTION INTRAVENOUS CONTINUOUS
Status: DISCONTINUED | OUTPATIENT
Start: 2020-05-12 | End: 2020-05-12 | Stop reason: HOSPADM

## 2020-05-12 RX ORDER — DEXAMETHASONE SODIUM PHOSPHATE 4 MG/ML
INJECTION, SOLUTION INTRA-ARTICULAR; INTRALESIONAL; INTRAMUSCULAR; INTRAVENOUS; SOFT TISSUE AS NEEDED
Status: DISCONTINUED | OUTPATIENT
Start: 2020-05-12 | End: 2020-05-12 | Stop reason: SURG

## 2020-05-12 RX ORDER — FAMOTIDINE 20 MG/1
20 TABLET, FILM COATED ORAL ONCE
Status: COMPLETED | OUTPATIENT
Start: 2020-05-12 | End: 2020-05-12

## 2020-05-12 RX ORDER — PROMETHAZINE HYDROCHLORIDE 25 MG/1
25 SUPPOSITORY RECTAL ONCE AS NEEDED
Status: CANCELLED | OUTPATIENT
Start: 2020-05-12

## 2020-05-12 RX ORDER — LIDOCAINE HYDROCHLORIDE 10 MG/ML
0.5 INJECTION, SOLUTION EPIDURAL; INFILTRATION; INTRACAUDAL; PERINEURAL ONCE AS NEEDED
Status: COMPLETED | OUTPATIENT
Start: 2020-05-12 | End: 2020-05-12

## 2020-05-12 RX ORDER — FENTANYL CITRATE 50 UG/ML
50 INJECTION, SOLUTION INTRAMUSCULAR; INTRAVENOUS
Status: CANCELLED | OUTPATIENT
Start: 2020-05-12

## 2020-05-12 RX ORDER — FENTANYL CITRATE 50 UG/ML
INJECTION, SOLUTION INTRAMUSCULAR; INTRAVENOUS AS NEEDED
Status: DISCONTINUED | OUTPATIENT
Start: 2020-05-12 | End: 2020-05-12 | Stop reason: SURG

## 2020-05-12 RX ORDER — MEPERIDINE HYDROCHLORIDE 25 MG/ML
12.5 INJECTION INTRAMUSCULAR; INTRAVENOUS; SUBCUTANEOUS
Status: CANCELLED | OUTPATIENT
Start: 2020-05-12 | End: 2020-05-13

## 2020-05-12 RX ORDER — PROPOFOL 10 MG/ML
VIAL (ML) INTRAVENOUS AS NEEDED
Status: DISCONTINUED | OUTPATIENT
Start: 2020-05-12 | End: 2020-05-12 | Stop reason: SURG

## 2020-05-12 RX ADMIN — FAMOTIDINE 20 MG: 20 TABLET, FILM COATED ORAL at 12:06

## 2020-05-12 RX ADMIN — PROPOFOL 150 MG: 10 INJECTION, EMULSION INTRAVENOUS at 12:34

## 2020-05-12 RX ADMIN — LIDOCAINE HYDROCHLORIDE 0.5 ML: 10 INJECTION, SOLUTION EPIDURAL; INFILTRATION; INTRACAUDAL; PERINEURAL at 12:06

## 2020-05-12 RX ADMIN — SODIUM CHLORIDE, POTASSIUM CHLORIDE, SODIUM LACTATE AND CALCIUM CHLORIDE 9 ML/HR: 600; 310; 30; 20 INJECTION, SOLUTION INTRAVENOUS at 12:06

## 2020-05-12 RX ADMIN — LIDOCAINE HYDROCHLORIDE 50 MG: 10 INJECTION, SOLUTION EPIDURAL; INFILTRATION; INTRACAUDAL; PERINEURAL at 12:34

## 2020-05-12 RX ADMIN — DEXAMETHASONE SODIUM PHOSPHATE 8 MG: 4 INJECTION, SOLUTION INTRAMUSCULAR; INTRAVENOUS at 12:39

## 2020-05-12 RX ADMIN — FENTANYL CITRATE 100 MCG: 50 INJECTION, SOLUTION INTRAMUSCULAR; INTRAVENOUS at 12:34

## 2020-05-12 NOTE — ANESTHESIA PROCEDURE NOTES
Airway  Urgency: elective    Date/Time: 5/12/2020 12:35 PM  Airway not difficult    General Information and Staff    Patient location during procedure: OR  CRNA: Renetta Moody CRNA    Indications and Patient Condition  Indications for airway management: airway protection    Preoxygenated: yes  Mask difficulty assessment: 1 - vent by mask    Final Airway Details  Final airway type: supraglottic airway      Successful airway: classic  Size 4    Number of attempts at approach: 1    Additional Comments  LMA placed without difficulty, ventilation with assist, equal breath sounds and symmetric chest rise and fall

## 2020-05-12 NOTE — ANESTHESIA POSTPROCEDURE EVALUATION
Patient: Rebecca Sharma    Procedure Summary     Date:  05/12/20 Room / Location:   [facility] /  [facility]    Anesthesia Start:  1230 Anesthesia Stop:  1312    Procedure:  EXCISIONAL AND BIOPSY RIGHT NIPPLE MASS (Right Breast) Diagnosis:  Breast mass, right    Surgeon:  Travis Beckham MD Provider:  Marques Rosenthal MD    Anesthesia Type:  general ASA Status:  2          Anesthesia Type: general    Vitals  No vitals data found for the desired time range.          Post Anesthesia Care and Evaluation    Patient location during evaluation: PACU  Patient participation: complete - patient participated  Level of consciousness: awake and alert  Pain score: 0  Pain management: adequate  Airway patency: patent  Anesthetic complications: No anesthetic complications  PONV Status: none  Cardiovascular status: hemodynamically stable and acceptable  Respiratory status: nonlabored ventilation, acceptable and nasal cannula  Hydration status: acceptable

## 2020-05-12 NOTE — ANESTHESIA PREPROCEDURE EVALUATION
Anesthesia Evaluation                  Airway   Mallampati: II  Dental      Pulmonary    (+) asthma,  Cardiovascular         Neuro/Psych  (+) headaches,     GI/Hepatic/Renal/Endo      Musculoskeletal     Abdominal    Substance History      OB/GYN          Other                        Anesthesia Plan    ASA 2     general     intravenous induction     Anesthetic plan, all risks, benefits, and alternatives have been provided, discussed and informed consent has been obtained with: patient.

## 2020-05-12 NOTE — BRIEF OP NOTE
BREAST BIOPSY  Progress Note    Rebecca Sharma  5/12/2020    Pre-op Diagnosis:   Right nipple mass       Post-Op Diagnosis Codes:     * Breast mass, right [N63.10]    Procedure/CPT® Codes:      Procedure(s):  EXCISIONAL BIOPSY RIGHT NIPPLE MASS    Surgeon(s):  Travis Beckham MD    Anesthesia: General    Staff:   Circulator: Ramandeep Lazaro RN  Scrub Person: Esther Meier; Balwinder Cespedes  Assistant: Cheri Kang PA    Estimated Blood Loss: minimal    Urine Voided: * No values recorded between 5/12/2020 12:30 PM and 5/12/2020  1:01 PM *    Specimens:                Specimens     ID Source Type Tests Collected By Collected At Frozen?      A Breast, Right Central Tissue · TISSUE PATHOLOGY EXAM   Travis Beckham MD 5/12/20 1254 No     Description: right nipple mass                Drains: * No LDAs found *      Complications: none      Travis Beckham MD     Date: 5/12/2020  Time: 13:03

## 2020-05-12 NOTE — H&P
Pre-Op H&P  Rebecca Sharma  [id number]  1945    Chief complaint: Right subareolar mass    HPI:    Patient is a 74 y.o.female who presents with right breast subareolar mass.  PH of adenoma on punch biopsy in 2016.  Here today to undergo excisional biopsy right nipple mass     Review of Systems:  General ROS: negative for chills, fever or skin lesions;  No changes since last office visit.  Neg for recent sick exposure  Cardiovascular ROS: no chest pain or dyspnea on exertion  Respiratory ROS: no cough, Occ shortness of breath, or wheezing.  +asthma.  Has used inhaler recently with seasonal allergies    Allergies:   Allergies   Allergen Reactions   • Aspirin Swelling     Lip and hand swelling   • Donepezil Other (See Comments)     Nightmares at the 10mg dose   • Naproxen Other (See Comments)     ulcer   • Codeine Rash   • Hydrocodone Rash       Home Meds:    No current facility-administered medications on file prior to encounter.      Current Outpatient Medications on File Prior to Encounter   Medication Sig Dispense Refill   • ALBUTEROL IN Inhale 2 puffs Every 6 (Six) Hours As Needed (DYSPNEA).     • celecoxib (CeleBREX) 200 MG capsule TAKE 1 CAPSULE BY MOUTH DAILY AS NEEDED FOR MODERATE PAIN . 30 capsule 1   • esomeprazole (NEXIUM 24HR) 20 MG capsule Take 1 capsule by mouth Daily.  capsule 1   • fluticasone-salmeterol (ADVAIR DISKUS) 100-50 MCG/DOSE DISKUS Inhale 1 puff 2 (Two) Times a Day. Gargle and spit after puffs 3 each 3   • levothyroxine (SYNTHROID, LEVOTHROID) 50 MCG tablet Take 1 tablet by mouth Every Morning. 90 tablet 3   • Mirabegron ER (MYRBETRIQ) 50 MG tablet sustained-release 24 hour 24 hr tablet Take 50 mg by mouth Daily. 90 tablet 3   • montelukast (SINGULAIR) 10 MG tablet Take 1 tablet by mouth Daily. 90 tablet 3   • traMADol (ULTRAM) 50 MG tablet Take 50 mg by mouth Daily As Needed for Severe Pain .     • alendronate (FOSAMAX) 70 MG tablet Take 1 tablet by mouth Every 7 (Seven) Days.  13 tablet 3       PMH:   Past Medical History:   Diagnosis Date   • Abnormal MRI, breast 01/03/2020    breast MRI (1/3/20): 1.1cm R. nipple mass sugg of nipple adenoma, indeterminate 0.5cm L. breast mass 12:00, rec surg excision for punch bx R. nipple mass and u/s for further eval L. breast mass   • Adverse reaction to drug 5/10/2016    Impression: 06/09/2014 - GI side effects to aricept; plan as above to change timing and dose of med;    • Anesthesia     SOB upon awakening from surgery.  one occurrence.   • Anxiety 5/10/2016   • Arthritis    • Asthma    • Breast pain, right 06/14/2016 6/14/16 Notes f/u with Dr. Beckham with neg bx (evaluated at [facility]); bx site healing per patient; 5/10/16 Pain resolved but has residual R. nipple abnlity with erythema and nodular change, therefore dx mammo ordered for further eval; last reg mammo done 7/15; R. nipple mass - s/p bx to r/o Paget's disease (6/16); surg - Dr. Beckham   • Contact dermatitis due to poison ivy 07/30/2014    Impression: 07/30/2014 - Depo-medrol 40 mg IM given in office. Pt will start prednisone taper as ordered. Call or RTC if rash is not improving.;    • Discomfort of left eye 10/27/2016    Impression: 10/27/16 No abnlities on exam and notes updated eye exam 9/16; if workup negative and sxs persist, needs to f/u with Dr. Hollis; 04/13/2014 - check labs to include ESR, CRP but proceed with ophtho consult with Dr. Hollis ASAP; Description: LEFT; nl eye exam (4/16/14), per ophtho - Dr. Hollis   • Disease of thyroid gland    • Dizziness 10/27/2016    10/27/16 Imbalanced sensation and not vertigo per patient; no focal neuro deficits on exam; will rec f/u eye exam and head imaging if sxs persist and ESR/CRP are normal; ddx also includes eust tube dysfunction   • Hx of bone density study 07/2013    DEXA (7/13) H -1.3   • Hx of bone density study 07/14/2016    DEXA (7/14/16) L 0.4, H -1.6 repeat 2-3 yrs   • Hx of bone density study 10/08/2019     "DEXA (10/8/19): L 1.3, H -1.8, A -2.8, worsened, NEW osteoporosis, repeat 2 yrs   • Hx of colonoscopy 12/2015    surg - Dr. Kartik Gold   • Hx of colonoscopy 11/08/2019    colonosc (11/8/19): nl except diverticulosis, repeat 5 yrs due to h/o polyps; surg - Dr. Kartik Gold   • Hx of esophagogastroduodenoscopy 05/05/2017    EGD (5/5/17): mod chronic gastritis, (+)H.pylori, no hiatal hernia; GI - Dr. Esparza   • Hypothyroid    • Incontinence     occas.   • Lightheadedness 04/28/2014    Impression: 04/28/2014 - resolved; will let her know official carotid u/s results when available  Impression: 04/13/2014 - discussed adequate hydration; check CBC;    • Lip swelling 08/05/2015    Impression: 08/05/2015 - L. lower lip lesion significantly improved; complete course of abx as prescribed; if area does not resolve completely, call to make earlier derm f/u appt  Impression: 07/28/2015 - L. lower lip swelling already improving but concern for residual ongoing infection; ddx includes infected cold sore, infection from cryotherapy for AK, and less likely shingles; finishing course    • Osteoarthritis    • Overactive bladder    • Pap smear for cervical cancer screening 08/16/2016    Pap done 8/16/16 per Dr. Brittanie Bauer   • Peripheral arterial occlusive disease (CMS/HCC) 5/10/2016    Description: abnl ALLEGRA at [facility] with nl arterial duplex (10/08)   • Wears glasses      PSH:    Past Surgical History:   Procedure Laterality Date   • BILATERAL SALPINGO OOPHORECTOMY Bilateral 06/14/2017    GYN - Dr. Bauer; for benign L. ovarian cyst   • BREAST BIOPSY      RIGHT BREAST EXCISION-WITHIN NIPPLE   • CATARACT EXTRACTION, BILATERAL      L. 4/09 (complicated by nerve injury; R. 6/10; L. corrected 6/15; ophtho - Dr. Hollis, Dr. Lange; neuro-ophtho Dr. Ferris   • CHOLECYSTECTOMY  2002    secondary to \"crystalized GB\" (''02); surg - Dr. Kartik Gold   • COLONOSCOPY     • ENDOSCOPY     • MAMMO CORE NEEDLE BIOPSY UNILATERAL Left 01/17/2020    " "L. breast u/s core needle bx (1/17/20): path - Focal proliferative fibrocystic changes including sclerosing adenosis   • OOPHORECTOMY       Social History:   Tobacco:   Social History     Tobacco Use   Smoking Status Never Smoker   Smokeless Tobacco Never Used      Alcohol:     Social History     Substance and Sexual Activity   Alcohol Use Yes    Comment: social       Vitals:           /92 (BP Location: Right arm, Patient Position: Lying)   Pulse 86   Temp 97.9 °F (36.6 °C) (Temporal)   Resp 18   Ht 165.1 cm (65\")   Wt 82.1 kg (181 lb)   SpO2 97%   BMI 30.12 kg/m²     Physical Exam:  General Appearance:    Alert, cooperative, no distress, appears stated age   Head:    Normocephalic, without obvious abnormality, atraumatic   Lungs:     Clear to auscultation bilaterally, respirations unlabored    Heart:   Regular rate and rhythm, S1 and S2 normal, no murmur, rub    or gallop    Abdomen:    Soft, nontender.  +bowel sounds   Breast Exam:    deferred   Genitalia:    deferred   Extremities:   Extremities normal, atraumatic, no cyanosis or edema   Skin:   Skin color, texture, turgor normal, no rashes or lesions   Neurologic:   Grossly intact   Results Review  LABS:  Lab Results   Component Value Date    WBC 9.73 05/10/2020    HGB 13.6 05/10/2020    HCT 42.1 05/10/2020    MCV 95.0 05/10/2020     05/10/2020    NEUTROABS 5.32 07/18/2019    GLUCOSE 77 05/10/2020    BUN 19 05/10/2020    CREATININE 0.79 05/10/2020    EGFRIFNONA 71 05/10/2020     05/10/2020    K 5.1 05/10/2020     05/10/2020    CO2 23.0 05/10/2020    CALCIUM 9.6 05/10/2020    ALBUMIN 3.90 07/11/2019    AST 31 07/11/2019    ALT 25 07/11/2019    BILITOT 0.4 07/11/2019       RADIOLOGY:  Imaging Results (Last 72 Hours)     ** No results found for the last 72 hours. **          I reviewed the patient's new clinical results.    Cancer Staging (if applicable)  Cancer Patient: __ yes __no _x_unknown; If yes, clinical stage T:__ N:__M:__, " stage group or __N/A    Impression: Right subareolar mass    Plan: Excisional biopsy right subareolar mass    Maite Laguna, APRN   5/12/2020   12:24

## 2020-05-12 NOTE — OP NOTE
Operative Note    Date of Surgery:  5/12/2020    Pre-Operative Diagnosis: Right nipple mass    Post-Operative Diagnosis: Right nipple    Procedure: Wide excision of right nipple mass    Anesthesia: General    Surgeon:  Travis Beckham MD    Assistant: LISA Helton    Estimated Blood Loss: Very minimal    Complications: None      Indication for Procedure: Ms. Sharma is a pleasant 74 years old lady who presented with a mass in the right nipple that has been gradually getting larger in size with work-up remarkable for a nipple adenoma.  The mass measured at least 1 cm in diameter.  She presents today for wide excision of the site.    Procedure: Patient was taken operating by anesthesia and placed supine on the table.  Following induction of general anesthesia using LMA, SCDs were placed.  The right breast and chest were then prepped and draped in a sterile fashion.  Timeout was observed.  Marcaine with 5% plain was administered in the retroareolar region.  The mass appears to encompass most of the nipple.  I tried excising the mass while preserving part of the nipple but since it encompassed most of the nipple I elected to proceed with wide excision of the nipple to ensure adequate margin.  A transverse elliptical incision was made around and the nipple along with the mass was excised and sent to pathology for permanent section.  Following adequate hemostasis the wound was irrigated with water.  The subcutaneous tissue was approximated in layers with interrupted 3-0 Vicryl sutures.  The skin incisions approximated with 4-0 Monocryl subcuticular suture.  Steri-Strips and sterile dressing was applied.  The patient tolerated the procedure well with no complications.  She was extubated and taken to the recovery room in a stable condition.  Sponge count and needle count were correct at the end of the procedure.    Travis Beckham MD  05/12/20  13:37

## 2020-05-13 DIAGNOSIS — J45.20 MILD INTERMITTENT ASTHMA WITHOUT COMPLICATION: ICD-10-CM

## 2020-05-13 DIAGNOSIS — E03.9 ACQUIRED HYPOTHYROIDISM: ICD-10-CM

## 2020-05-13 PROBLEM — D24.1: Status: ACTIVE | Noted: 2020-02-23

## 2020-05-14 RX ORDER — LEVOTHYROXINE SODIUM 0.05 MG/1
TABLET ORAL
Qty: 90 TABLET | Refills: 3 | OUTPATIENT
Start: 2020-05-14

## 2020-05-29 ENCOUNTER — TRANSCRIBE ORDERS (OUTPATIENT)
Dept: MAMMOGRAPHY | Facility: HOSPITAL | Age: 75
End: 2020-05-29

## 2020-05-29 DIAGNOSIS — N63.41 UNSPECIFIED LUMP IN RIGHT BREAST, SUBAREOLAR: Primary | ICD-10-CM

## 2020-06-01 ENCOUNTER — TELEPHONE (OUTPATIENT)
Dept: INTERNAL MEDICINE | Facility: CLINIC | Age: 75
End: 2020-06-01

## 2020-06-01 NOTE — TELEPHONE ENCOUNTER
PATIENT HAD BREAST EXAM IN JANUARY, SAID THAT THEY WANTED HER TO FOLLOW UP WITH PCP AND SCHEDULE A BREAST MRI IN July. CAN THAT BE SCHEDULED FOR HER

## 2020-06-01 NOTE — TELEPHONE ENCOUNTER
I talked to Rebecca, she states that Dr. DOYLE has been seeing her fora right breast problem and has not seen her for her left breast where she had the biopsy on that side. She states that Dr. Rubi is the one who did the breast biopsy on the left side and recommended the MRI follow up in 6 months ( I was able to see that in Dr. Rubi's note from the imaging tab). She said basically it's 2 different breasts and 2 different Dr's followin the different problems. Should Dr. Rubi order the MRI? I feel like we had this issue with the first MRI that you had to order that Dr. Rubi would not order it and her PCP had to. Please advise.

## 2020-06-01 NOTE — TELEPHONE ENCOUNTER
Would defer to Dr. Beckham. She had breast MRI in 1/2020 and then he did the breast bx in 5/2020.  Looks like he has ordered a diagnostic mammogram.  Since he is directing the care, would ask him about whether she need breast MRI and if so, when.    I have not received any notes about needing to order breast MRI.

## 2020-06-02 NOTE — TELEPHONE ENCOUNTER
Pt given information of who to call to schedule. She will call me back if she has problems and needs an order from PCP.

## 2020-06-02 NOTE — TELEPHONE ENCOUNTER
Looks like Dr. Rubi already ordered the MRI breast. See bottom of 1/17/20 Mammo Post Clip radiology note.    Treating Physician and Radiologist Order:     Breast MRI in 6 months time     Diagnosis: Probably benign finding     Treating Physician Signature:_____________________  Date/Time:_______________     If it is her insurance that is requiring that PCP order, please let me know.

## 2020-06-10 ENCOUNTER — APPOINTMENT (OUTPATIENT)
Dept: MRI IMAGING | Facility: HOSPITAL | Age: 75
End: 2020-06-10

## 2020-06-24 ENCOUNTER — TELEPHONE (OUTPATIENT)
Dept: INTERNAL MEDICINE | Facility: CLINIC | Age: 75
End: 2020-06-24

## 2020-06-24 DIAGNOSIS — E03.9 ACQUIRED HYPOTHYROIDISM: Primary | ICD-10-CM

## 2020-06-24 DIAGNOSIS — E78.5 DYSLIPIDEMIA: ICD-10-CM

## 2020-06-24 DIAGNOSIS — Z00.00 MEDICARE ANNUAL WELLNESS VISIT, SUBSEQUENT: ICD-10-CM

## 2020-06-24 NOTE — TELEPHONE ENCOUNTER
PT IS REQUESTING A HEPATITIS SHOT AND A FLU SHOT ON HER APPT 7/21.  PT IS REQUESTING TO HAVE LABS DONE A WEEK BEFORE APPT.    PLEASE ADVISE

## 2020-06-24 NOTE — TELEPHONE ENCOUNTER
Lab orders placed for upcoming apt pt notified that we will be able to give her the Hepatitis shot but its to early for flu.

## 2020-07-10 ENCOUNTER — HOSPITAL ENCOUNTER (OUTPATIENT)
Dept: MRI IMAGING | Facility: HOSPITAL | Age: 75
Discharge: HOME | End: 2020-07-10
Admitting: INTERNAL MEDICINE

## 2020-07-10 DIAGNOSIS — R92.8 ABNORMAL MAGNETIC RESONANCE IMAGING OF BREAST: ICD-10-CM

## 2020-07-10 LAB — CREAT BLDA-MCNC: 0.9 MG/DL (ref 0.6–1.3)

## 2020-07-10 PROCEDURE — 0 GADOBENATE DIMEGLUMINE 529 MG/ML SOLUTION: Performed by: INTERNAL MEDICINE

## 2020-07-10 PROCEDURE — C8908 MRI W/O FOL W/CONT, BREAST,: HCPCS

## 2020-07-10 PROCEDURE — C8937 CAD BREAST MRI: HCPCS

## 2020-07-10 PROCEDURE — 77049 MRI BREAST C-+ W/CAD BI: CPT | Performed by: RADIOLOGY

## 2020-07-10 PROCEDURE — A9577 INJ MULTIHANCE: HCPCS | Performed by: INTERNAL MEDICINE

## 2020-07-10 PROCEDURE — 82565 ASSAY OF CREATININE: CPT

## 2020-07-10 RX ADMIN — GADOBENATE DIMEGLUMINE 16 ML: 529 INJECTION, SOLUTION INTRAVENOUS at 12:38

## 2020-07-13 ENCOUNTER — LAB (OUTPATIENT)
Dept: LAB | Facility: HOSPITAL | Age: 75
End: 2020-07-13

## 2020-07-13 DIAGNOSIS — E78.5 DYSLIPIDEMIA: ICD-10-CM

## 2020-07-13 DIAGNOSIS — Z00.00 MEDICARE ANNUAL WELLNESS VISIT, SUBSEQUENT: ICD-10-CM

## 2020-07-13 DIAGNOSIS — E03.9 ACQUIRED HYPOTHYROIDISM: ICD-10-CM

## 2020-07-13 LAB
ALBUMIN SERPL-MCNC: 3.9 G/DL (ref 3.5–5.2)
ALBUMIN/GLOB SERPL: 1.2 G/DL
ALP SERPL-CCNC: 116 U/L (ref 39–117)
ALT SERPL W P-5'-P-CCNC: 23 U/L (ref 1–33)
ANION GAP SERPL CALCULATED.3IONS-SCNC: 8.9 MMOL/L (ref 5–15)
AST SERPL-CCNC: 24 U/L (ref 1–32)
BACTERIA UR QL AUTO: ABNORMAL /HPF
BASOPHILS # BLD AUTO: 0.07 10*3/MM3 (ref 0–0.2)
BASOPHILS NFR BLD AUTO: 0.9 % (ref 0–1.5)
BILIRUB SERPL-MCNC: 0.4 MG/DL (ref 0–1.2)
BILIRUB UR QL STRIP: NEGATIVE
BUN SERPL-MCNC: 18 MG/DL (ref 8–23)
BUN/CREAT SERPL: 20.7 (ref 7–25)
CALCIUM SPEC-SCNC: 10 MG/DL (ref 8.6–10.5)
CHLORIDE SERPL-SCNC: 107 MMOL/L (ref 98–107)
CHOLEST SERPL-MCNC: 166 MG/DL (ref 0–200)
CLARITY UR: ABNORMAL
CO2 SERPL-SCNC: 24.1 MMOL/L (ref 22–29)
COLOR UR: YELLOW
CREAT SERPL-MCNC: 0.87 MG/DL (ref 0.57–1)
DEPRECATED RDW RBC AUTO: 42.6 FL (ref 37–54)
EOSINOPHIL # BLD AUTO: 0.14 10*3/MM3 (ref 0–0.4)
EOSINOPHIL NFR BLD AUTO: 1.8 % (ref 0.3–6.2)
ERYTHROCYTE [DISTWIDTH] IN BLOOD BY AUTOMATED COUNT: 12.9 % (ref 12.3–15.4)
GFR SERPL CREATININE-BSD FRML MDRD: 63 ML/MIN/1.73
GLOBULIN UR ELPH-MCNC: 3.3 GM/DL
GLUCOSE SERPL-MCNC: 84 MG/DL (ref 65–99)
GLUCOSE UR STRIP-MCNC: NEGATIVE MG/DL
HCT VFR BLD AUTO: 43.4 % (ref 34–46.6)
HDLC SERPL-MCNC: 41 MG/DL (ref 40–60)
HGB BLD-MCNC: 14.5 G/DL (ref 12–15.9)
HGB UR QL STRIP.AUTO: NEGATIVE
HYALINE CASTS UR QL AUTO: ABNORMAL /LPF
IMM GRANULOCYTES # BLD AUTO: 0.03 10*3/MM3 (ref 0–0.05)
IMM GRANULOCYTES NFR BLD AUTO: 0.4 % (ref 0–0.5)
KETONES UR QL STRIP: NEGATIVE
LDLC SERPL CALC-MCNC: 105 MG/DL (ref 0–100)
LDLC/HDLC SERPL: 2.56 {RATIO}
LEUKOCYTE ESTERASE UR QL STRIP.AUTO: ABNORMAL
LYMPHOCYTES # BLD AUTO: 1.5 10*3/MM3 (ref 0.7–3.1)
LYMPHOCYTES NFR BLD AUTO: 19.7 % (ref 19.6–45.3)
MCH RBC QN AUTO: 30.1 PG (ref 26.6–33)
MCHC RBC AUTO-ENTMCNC: 33.4 G/DL (ref 31.5–35.7)
MCV RBC AUTO: 90.2 FL (ref 79–97)
MONOCYTES # BLD AUTO: 0.67 10*3/MM3 (ref 0.1–0.9)
MONOCYTES NFR BLD AUTO: 8.8 % (ref 5–12)
NEUTROPHILS NFR BLD AUTO: 5.21 10*3/MM3 (ref 1.7–7)
NEUTROPHILS NFR BLD AUTO: 68.4 % (ref 42.7–76)
NITRITE UR QL STRIP: NEGATIVE
NRBC BLD AUTO-RTO: 0 /100 WBC (ref 0–0.2)
PH UR STRIP.AUTO: 5.5 [PH] (ref 5–8)
PLATELET # BLD AUTO: 328 10*3/MM3 (ref 140–450)
PMV BLD AUTO: 11.1 FL (ref 6–12)
POTASSIUM SERPL-SCNC: 5 MMOL/L (ref 3.5–5.2)
PROT SERPL-MCNC: 7.2 G/DL (ref 6–8.5)
PROT UR QL STRIP: NEGATIVE
RBC # BLD AUTO: 4.81 10*6/MM3 (ref 3.77–5.28)
RBC # UR: ABNORMAL /HPF
REF LAB TEST METHOD: ABNORMAL
SODIUM SERPL-SCNC: 140 MMOL/L (ref 136–145)
SP GR UR STRIP: 1.02 (ref 1–1.03)
SQUAMOUS #/AREA URNS HPF: ABNORMAL /HPF
TRIGL SERPL-MCNC: 100 MG/DL (ref 0–150)
UROBILINOGEN UR QL STRIP: ABNORMAL
VLDLC SERPL-MCNC: 20 MG/DL (ref 5–40)
WBC # BLD AUTO: 7.62 10*3/MM3 (ref 3.4–10.8)
WBC UR QL AUTO: ABNORMAL /HPF

## 2020-07-13 PROCEDURE — 80053 COMPREHEN METABOLIC PANEL: CPT | Performed by: INTERNAL MEDICINE

## 2020-07-13 PROCEDURE — 84439 ASSAY OF FREE THYROXINE: CPT | Performed by: INTERNAL MEDICINE

## 2020-07-13 PROCEDURE — 85025 COMPLETE CBC W/AUTO DIFF WBC: CPT | Performed by: INTERNAL MEDICINE

## 2020-07-13 PROCEDURE — 84443 ASSAY THYROID STIM HORMONE: CPT | Performed by: INTERNAL MEDICINE

## 2020-07-13 PROCEDURE — 80061 LIPID PANEL: CPT | Performed by: INTERNAL MEDICINE

## 2020-07-13 PROCEDURE — 81001 URINALYSIS AUTO W/SCOPE: CPT | Performed by: INTERNAL MEDICINE

## 2020-07-14 LAB
T4 FREE SERPL-MCNC: 1.33 NG/DL (ref 0.93–1.7)
TSH SERPL DL<=0.05 MIU/L-ACNC: 2.3 UIU/ML (ref 0.27–4.2)

## 2020-07-14 NOTE — PROGRESS NOTES
Breast MRI shows postsurgical changes at the right nipple and stable bx clip changes in left breast.    Regular screening mammogram due 12/2020  L. Breast MRI recommended in 1 yr for surveillance f/u

## 2020-07-15 ENCOUNTER — TELEPHONE (OUTPATIENT)
Dept: MRI IMAGING | Facility: HOSPITAL | Age: 75
End: 2020-07-15

## 2020-07-15 NOTE — TELEPHONE ENCOUNTER
Called patient with MRI Breast results. Recommended a one year MRI Breast F/U. Pt expressed understanding and was encouraged to call with any further questions or concerns.
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
## 2020-07-20 NOTE — ASSESSMENT & PLAN NOTE
BP bord 132/76; no meds; rec low Na diet, increased aerobic exercise; home BP monitoring with goal BP < 130/80

## 2020-07-20 NOTE — ASSESSMENT & PLAN NOTE
Stable without flare-ups on advair 100/50 BID #3, 3RF, gargle/spit after puffs; needs updated PFTs but unable to perform due to COVID19 restrictions; also requesting RX for alb #1, 0RF - states she only uses it rarely

## 2020-07-20 NOTE — ASSESSMENT & PLAN NOTE
Calcium and vitamin D supplementation with weight-bearing exercise; also on alendronate 70mg weekly #13, 3RF; DEXA 10/19, repeat 2021

## 2020-07-21 ENCOUNTER — OFFICE VISIT (OUTPATIENT)
Dept: INTERNAL MEDICINE | Facility: CLINIC | Age: 75
End: 2020-07-21

## 2020-07-21 VITALS
DIASTOLIC BLOOD PRESSURE: 76 MMHG | HEIGHT: 65 IN | HEART RATE: 72 BPM | OXYGEN SATURATION: 100 % | SYSTOLIC BLOOD PRESSURE: 132 MMHG | BODY MASS INDEX: 29.82 KG/M2 | WEIGHT: 179 LBS

## 2020-07-21 DIAGNOSIS — J30.1 SEASONAL ALLERGIC RHINITIS DUE TO POLLEN: ICD-10-CM

## 2020-07-21 DIAGNOSIS — E03.9 ACQUIRED HYPOTHYROIDISM: ICD-10-CM

## 2020-07-21 DIAGNOSIS — F09 MILD COGNITIVE DISORDER: ICD-10-CM

## 2020-07-21 DIAGNOSIS — M81.0 AGE-RELATED OSTEOPOROSIS WITHOUT CURRENT PATHOLOGICAL FRACTURE: ICD-10-CM

## 2020-07-21 DIAGNOSIS — E78.2 MODERATE MIXED HYPERLIPIDEMIA NOT REQUIRING STATIN THERAPY: ICD-10-CM

## 2020-07-21 DIAGNOSIS — N39.46 MIXED STRESS AND URGE URINARY INCONTINENCE: ICD-10-CM

## 2020-07-21 DIAGNOSIS — K21.9 GASTROESOPHAGEAL REFLUX DISEASE WITHOUT ESOPHAGITIS: ICD-10-CM

## 2020-07-21 DIAGNOSIS — Z00.00 MEDICARE ANNUAL WELLNESS VISIT, SUBSEQUENT: Primary | ICD-10-CM

## 2020-07-21 DIAGNOSIS — J45.20 MILD INTERMITTENT ASTHMA WITHOUT COMPLICATION: ICD-10-CM

## 2020-07-21 DIAGNOSIS — R03.0 ELEVATED BLOOD-PRESSURE READING WITHOUT DIAGNOSIS OF HYPERTENSION: ICD-10-CM

## 2020-07-21 PROCEDURE — G0444 DEPRESSION SCREEN ANNUAL: HCPCS | Performed by: INTERNAL MEDICINE

## 2020-07-21 PROCEDURE — G0439 PPPS, SUBSEQ VISIT: HCPCS | Performed by: INTERNAL MEDICINE

## 2020-07-21 PROCEDURE — 99497 ADVNCD CARE PLAN 30 MIN: CPT | Performed by: INTERNAL MEDICINE

## 2020-07-21 PROCEDURE — 99214 OFFICE O/P EST MOD 30 MIN: CPT | Performed by: INTERNAL MEDICINE

## 2020-07-21 RX ORDER — ALENDRONATE SODIUM 70 MG/1
70 TABLET ORAL
Qty: 13 TABLET | Refills: 3 | Status: SHIPPED | OUTPATIENT
Start: 2020-07-21 | End: 2020-09-24 | Stop reason: SINTOL

## 2020-07-21 RX ORDER — LEVOTHYROXINE SODIUM 0.05 MG/1
50 TABLET ORAL EVERY MORNING
Qty: 90 TABLET | Refills: 3 | Status: SHIPPED | OUTPATIENT
Start: 2020-07-21 | End: 2021-07-25 | Stop reason: SDUPTHER

## 2020-07-21 RX ORDER — ALBUTEROL SULFATE 90 UG/1
2 AEROSOL, METERED RESPIRATORY (INHALATION) EVERY 6 HOURS PRN
Qty: 1 INHALER | Refills: 1 | Status: SHIPPED | OUTPATIENT
Start: 2020-07-21 | End: 2022-09-24 | Stop reason: SDUPTHER

## 2020-07-21 RX ORDER — OMEPRAZOLE 40 MG/1
40 CAPSULE, DELAYED RELEASE ORAL DAILY
Qty: 90 CAPSULE | Refills: 3 | Status: SHIPPED | OUTPATIENT
Start: 2020-07-21 | End: 2021-08-13 | Stop reason: SDUPTHER

## 2020-07-21 NOTE — PROGRESS NOTES
ANNUAL WELLNESS VISIT    DRUG AND ALCOHOL USE      no tobacco use, no caffeine use and alcohol intake:1 beers per day    DIET AND PHYSICAL ACTIVITY     Diet: general    Exercise: frequently   Exercise Details: yoga    MOOD DISORDER AND COGNITIVE SCREENING   Depression Screening Tool Used yes - see PHQ-9; components reviewed with patient; 15-min counseling done; questionnaire items reviewed with patient, in which she denies feeling down, depressed, hopeless, or not having pleasure/interest in doing things.  Of insomnia but does note that she sleeps 5 to 6 hours per night.  Denies having any concerns with fatigue, decreased energy, decreased appetite, overeating, feeling badly about herself, moving slowly, or having negative thoughts.  Screening is negative for depression.   Anxiety Screening Tool Used yes     Mini-Cog Performed   Yes    1. Tell Patient 3 Words apple table carmine    2. Administer Clock Test normal    3. Recall 3 words  apple table carmine    4. Number Correct Items 3    FUNCTIONAL ABILITY AND LEVEL OF SAFETY   Hearing no hearing loss     Wears Hearing Aids No       Current Activities Independent       None  - see Funct/Cog Status Intake     Fall Risk Assessment       Has difficulty with walking or balance  No         Timed Up and Go (TUG) Test  10 sec.       If >12 sec, normal    ADVANCED DIRECTIVE  Advance Care Planning   ACP discussion was held with the patient during this visit. Patient has an advance directive in EMR which is still valid.      Advance Care Planning Discussion:  15 min counseling done; patient has advanced directive and living will.  Reviewed desires for end of life care, which is to have comfort care.  Patient does not want extraordinary life-sustaining measures, including no chest compression, shocks, intubation/ventilator use, feeding tube, or prolonged artificial life support.  Reviewed code status options, meanings, desires. Patient 's code status is Full Code.   Encouraged  patient to ensure family is aware of desires/preferences. She states nephew and brother-in-law are her health care surrogates.       PAIN SCREENING Do you have pain right now? no      If so, 1-10 scale: 4     Intermittent     Do you have pain every day? Yes      Probable chronic pain: Yes     Recent Hospitalizations:  No recent hospitalization(s)..     MEDICATION REVIEW   - updated and reviewed (see Medication List).   - reviewed for potentially harmful drug-disease interactions in the elderly.   - reviewed for high risk medications in the elderly.   - aspirin use: No    BMI  Body mass index is 29.79 kg/m².    Patient's Body mass index is 29.79 kg/m². BMI is above normal parameters. Recommendations include: exercise counseling and nutrition counseling.    __________________________________________________  Chief Complaint   Patient presents with   • Medicare Wellness-subsequent   • Hyperlipidemia       History of Present Illness  75 y.o.  woman presents for updated wellness visit. Reports taking OTC nexium but (+) breakthrough sxs. Took friend's omeprazole 40mg, which was effective; therefore, requesting RX.     Reports chronic allergy symptoms with chronic mucus and chronic hoarseness as well as increased sneezing.  Has increased her Nasacort use to 2-3-times per day.  Is not taking an antihistamine.  Discontinued Singulair due to drowsiness in the morning, even with nighttime dosing.    Reports chronic low back pain that is slowly progressively worsened, deemed currently not to be surgical per the neurosurgeon.  Has had injections in the past.  Wonders whether orthopedic consultation would be advised.  She chronically drags the right leg.  Has not fallen.  Denies any injuries.  States that [place]    Review of Systems  Denies headaches, visual changes, CP, palpitations, SOB, cough, n/v/d, numbness/tingling, falls, mood changes, lightheadedness, hearing changes,  "rashes. Reports stable chronic urinary incontinence.    ROS (+) for reflux sxs, improved with omeprazole 40mg per patient.     ROS (+) for chronic hoarseness, now with increased sneezing and drainage.    Denies vaginal discharge or bleeding (no periods) or breast concerns.  Reports nl Pap 8/19; has h/o ovaries removed.    ROS (+) for chronic LBP with RLE radiculopathy; denies falls. Has had injections and PT previously.     All other ROS reviewed and negative.    [facility]  The following portions of the patient's history were reviewed and updated as appropriate: allergies, current medications, past family history, past medical history, past social history, past surgical history and problem list.      Current Outpatient Medications:   •  celecoxib (CeleBREX) 200 MG QD prn  •   Pro cap bond and body QD  •  Probiotic Product (PROBIOTIC ADVANCED PO) QD  •  traMADol (ULTRAM) 50 MG prn  •  TURMERIC CURCUMIN PO QD  •  albuterol sulfate HFA (Ventolin HFA) 108 (90 Base) MCG/ACT inhaler prn  •  alendronate (FOSAMAX) 70 MG weekly  •  fluticasone-salmeterol (Wixela) 100-50 MCG/DOSE DISKUS 1 puff BID  •  levothyroxine (SYNTHROID, LEVOTHROID) 50 MCG QD  •  Mirabegron ER (Myrbetriq) 50 MG QD  •  omeprazole (priLOSEC) 40 MG QD    VITALS:  /76   Pulse 72   Ht 165.1 cm (65\")   Wt 81.2 kg (179 lb)   SpO2 100%   BMI 29.79 kg/m²     Physical Exam   Constitutional: She is oriented to person, place, and time. She appears well-developed and well-nourished.   HENT:   Head: Normocephalic.   Right Ear: External ear normal.   Left Ear: External ear normal.   Nose: Nose normal.   Mouth/Throat: Oropharynx is clear and moist and mucous membranes are normal. No oropharyngeal exudate.   Eyes: Pupils are equal, round, and reactive to light. Conjunctivae and EOM are normal.   Wears glasses   Neck: Normal range of motion. Neck supple. Carotid bruit is not present (bilaterally). No thyromegaly present.   Cardiovascular: Normal rate, regular " rhythm and normal heart sounds.   Pulmonary/Chest: Effort normal and breath sounds normal. No respiratory distress. She has no wheezes. She has no rales. She exhibits no tenderness.   Abdominal: Soft. Bowel sounds are normal. She exhibits no distension and no mass. There is no hepatosplenomegaly. There is no tenderness.   Musculoskeletal: She exhibits no edema (BLE).   Lymphadenopathy:     She has no cervical adenopathy.   Neurological: She is alert and oriented to person, place, and time. She displays normal reflexes. No cranial nerve deficit.   Skin: Skin is warm and dry. No rash noted.   Psychiatric: She has a normal mood and affect. Her behavior is normal.   Nursing note and vitals reviewed.      LABS  Results for orders placed or performed in visit on 07/13/20   Comprehensive Metabolic Panel   Result Value Ref Range    Glucose 84 65 - 99 mg/dL    BUN 18 8 - 23 mg/dL    Creatinine 0.87 0.57 - 1.00 mg/dL    Sodium 140 136 - 145 mmol/L    Potassium 5.0 3.5 - 5.2 mmol/L    Chloride 107 98 - 107 mmol/L    CO2 24.1 22.0 - 29.0 mmol/L    Calcium 10.0 8.6 - 10.5 mg/dL    Total Protein 7.2 6.0 - 8.5 g/dL    Albumin 3.90 3.50 - 5.20 g/dL    ALT (SGPT) 23 1 - 33 U/L    AST (SGOT) 24 1 - 32 U/L    Alkaline Phosphatase 116 39 - 117 U/L    Total Bilirubin 0.4 0.0 - 1.2 mg/dL    eGFR Non African Amer 63 >60 mL/min/1.73    Globulin 3.3 gm/dL    A/G Ratio 1.2 g/dL    BUN/Creatinine Ratio 20.7 7.0 - 25.0    Anion Gap 8.9 5.0 - 15.0 mmol/L   TSH   Result Value Ref Range    TSH 2.300 0.270 - 4.200 uIU/mL   Lipid Panel   Result Value Ref Range    Total Cholesterol 166 0 - 200 mg/dL    Triglycerides 100 0 - 150 mg/dL    HDL Cholesterol 41 40 - 60 mg/dL    LDL Cholesterol  105 (H) 0 - 100 mg/dL    VLDL Cholesterol 20 5 - 40 mg/dL    LDL/HDL Ratio 2.56    T4, free   Result Value Ref Range    Free T4 1.33 0.93 - 1.70 ng/dL   CBC Auto Differential   Result Value Ref Range    WBC 7.62 3.40 - 10.80 10*3/mm3    RBC 4.81 3.77 - 5.28  10*6/mm3    Hemoglobin 14.5 12.0 - 15.9 g/dL    Hematocrit 43.4 34.0 - 46.6 %    MCV 90.2 79.0 - 97.0 fL    MCH 30.1 26.6 - 33.0 pg    MCHC 33.4 31.5 - 35.7 g/dL    RDW 12.9 12.3 - 15.4 %    RDW-SD 42.6 37.0 - 54.0 fl    MPV 11.1 6.0 - 12.0 fL    Platelets 328 140 - 450 10*3/mm3    Neutrophil % 68.4 42.7 - 76.0 %    Lymphocyte % 19.7 19.6 - 45.3 %    Monocyte % 8.8 5.0 - 12.0 %    Eosinophil % 1.8 0.3 - 6.2 %    Basophil % 0.9 0.0 - 1.5 %    Immature Grans % 0.4 0.0 - 0.5 %    Neutrophils, Absolute 5.21 1.70 - 7.00 10*3/mm3    Lymphocytes, Absolute 1.50 0.70 - 3.10 10*3/mm3    Monocytes, Absolute 0.67 0.10 - 0.90 10*3/mm3    Eosinophils, Absolute 0.14 0.00 - 0.40 10*3/mm3    Basophils, Absolute 0.07 0.00 - 0.20 10*3/mm3    Immature Grans, Absolute 0.03 0.00 - 0.05 10*3/mm3    nRBC 0.0 0.0 - 0.2 /100 WBC   Urinalysis without microscopic (no culture) - Urine, Clean Catch   Result Value Ref Range    Color, UA Yellow Yellow, Straw    Appearance, UA Cloudy (A) Clear    pH, UA 5.5 5.0 - 8.0    Specific Gravity, UA 1.017 1.005 - 1.030    Glucose, UA Negative Negative    Ketones, UA Negative Negative    Bilirubin, UA Negative Negative    Blood, UA Negative Negative    Protein, UA Negative Negative    Leuk Esterase, UA Moderate (2+) (A) Negative    Nitrite, UA Negative Negative    Urobilinogen, UA 0.2 E.U./dL 0.2 - 1.0 E.U./dL   Urinalysis, Microscopic Only - Urine, Clean Catch   Result Value Ref Range    RBC, UA 0-2 None Seen, 0-2 /HPF    WBC, UA Too Numerous to Count (A) None Seen, 0-2 /HPF    Bacteria, UA 1+ (A) None Seen /HPF    Squamous Epithelial Cells, UA 0-2 None Seen, 0-2 /HPF    Hyaline Casts, UA 0-2 None Seen /LPF    Methodology Automated Microscopy      5/10/20 EKG - NSR    ASSESSMENT/PLAN  Problem List Items Addressed This Visit        Cardiovascular and Mediastinum    Hyperlipidemia     Lipids stable with ; goal LDL < 130; no meds            Respiratory    Asthma     Stable without flare-ups on advair  100/50 BID #3, 3RF, gargle/spit after puffs; needs updated PFTs but unable to perform due to COVID19 restrictions; also requesting RX for alb #1, 0RF - states she only uses it rarely         Relevant Medications    fluticasone-salmeterol (Advair Diskus) 100-50 MCG/DOSE DISKUS    albuterol sulfate HFA (Ventolin HFA) 108 (90 Base) MCG/ACT inhaler    Allergic rhinitis     Reports drowsiness on montelukast, even with nighttime dosing; with increased sxs of sneezing/drainage and persistent hoarseness, reviewed how to use nasacort correctly but recommend going back to 1x/day (2 sprays/nostril); rec addition of daily antihistamine and addition of nasal saline (or netipot) 2-3x/day; f/u prn            Digestive    Gastroesophageal reflux disease      Change OTC nexium to omeprazole 40mg QD #90, 3RF; reviewed when to take omeprazole correctly; reminded patient to minimize food triggers and stay upright 2-3 hrs after eating         Relevant Medications    omeprazole (priLOSEC) 40 MG capsule       Endocrine    Hypothyroidism     Euthyroid on levothyroxine 50mcg QD #90, 3RF         Relevant Medications    levothyroxine (SYNTHROID, LEVOTHROID) 50 MCG tablet       Nervous and Auditory    Mild cognitive disorder     Stable MMSE 30/30            Musculoskeletal and Integument    Osteoporosis     Calcium and vitamin D supplementation with weight-bearing exercise; also on alendronate 70mg weekly #13, 3RF; DEXA 10/19, repeat 2021            Relevant Medications    alendronate (FOSAMAX) 70 MG tablet       Genitourinary    Mixed stress and urge urinary incontinence     Wants to cont myrbetriq ER 50mg QD #90, 3RF         Relevant Medications    Mirabegron ER (Myrbetriq) 50 MG tablet sustained-release 24 hour 24 hr tablet       Other    Medicare annual wellness visit, subsequent - Primary     Health maintenance - flu vacc 10/19 (rec annually in the fall); Prevnar 5/15; PVX 4/14; Td 8/18 (Tdap 2/08 and next Td due 2028); Zostavax 2/08; HAV  and Shingrix done; 7/20 breast MRI, bilat screening mammo due 12/20, rec L. breast MRI 12 mos; GYN/Pap with Dr. Bauer 8/19; DEXA 10/19, repeat 2021; colonosc 11/19, repeat 2024 per Dr. Kartik Gold; eye exam w/ Dr. Tate pending 9/20 (glasses); dental exam q6 mos, done last week; (+) seat belt use    Consultants:  Patient Care Team:  Lorene Isaac MD as PCP - General  Lorene Isaac MD as PCP - Internal Medicine  Lorene Isaac MD as PCP - Claims Attributed  Juancarlos, Brittanie Suarez MD as Consulting Physician (Obstetrics and Gynecology)  Gigi Esparza MD as Consulting Physician (Gastroenterology)  Kartik Gold MD as Consulting Physician (General Surgery)  Tyrone Tate MD as Consulting Physician (Ophthalmology)  Arik Keane MD as Consulting Physician (Otolaryngology)  Anastacio Dickinson MD as Consulting Physician (Otolaryngology)  Mario Ding MD as Consulting Physician (Neurosurgery)  Travis Beckham MD as Consulting Physician (General Surgery)             Elevated blood-pressure reading without diagnosis of hypertension     BP bord 132/76; no meds; rec low Na diet, increased aerobic exercise; home BP monitoring with goal BP < 130/80                 FOLLOW-UP  RTC 1yr for annual wellness vist; fasting labs the week prior to appt (CBC, CMP, TSH, lipids, UA/micro, HCV) +PFTs if allowed      Electronically signed by:    Lorene Isaac MD, FACP  07/21/2020        EMR Dragon/Transcription disclaimer:  Parts of this encounter note is an electronic transcription/translation of spoken language to printed text. Electronic translation of spoken language may permit erroneous, or at times, nonsensical words or phrases, to be inadvertently transcribed. Although I have reviewed the note for such errors, some may still exist.

## 2020-07-22 NOTE — ASSESSMENT & PLAN NOTE
Reports drowsiness on montelukast, even with nighttime dosing; with increased sxs of sneezing/drainage and persistent hoarseness, reviewed how to use nasacort correctly but recommend going back to 1x/day (2 sprays/nostril); rec addition of daily antihistamine and addition of nasal saline (or netipot) 2-3x/day; f/u prn

## 2020-07-22 NOTE — ASSESSMENT & PLAN NOTE
Acute Care - Physical Therapy Initial Evaluation  [facility]     Patient Name: Leila Smith  : 1944  MRN: [id number]  Today's Date: 2018   Onset of Illness/Injury or Date of Surgery: 18  Date of Referral to PT: 18  Referring Physician: SAUL Oscar MD      Admit Date: 2018    Visit Dx:     ICD-10-CM ICD-9-CM   1. Impaired functional mobility, balance, gait, and endurance Z74.09 V49.89     Patient Active Problem List   Diagnosis   • Diabetes mellitus, type 2 (CMS/[facility])   • Hypertension   • Hyperlipidemia   • Hypothyroidism   • Chronic obstructive pulmonary disease (CMS/[facility])   • CAD (coronary artery disease)   • History of edema   • History of obesity   • Venous insufficiency   • Open wound of lower extremity   • Urinary tract infection   • T2DM (type 2 diabetes mellitus) (CMS/[facility])   • Dyspnea on exertion   • Peripheral vascular disease (CMS/[facility])   • Obstructive sleep apnea syndrome   • Ulcer of lower extremity (CMS/[facility])   • Hypoxemia   • Hematuria   • Diabetic peripheral neuropathy (CMS/[facility])   • Edema   • Chronic obstructive pulmonary disease with acute exacerbation (CMS/[facility])   • Congestive heart failure (CMS/[facility])   • Arthritis   • Neutrophilic leukocytosis   • Cystitis   • Acute renal failure superimposed on stage 3 chronic kidney disease (CMS/[facility])   • Hyperkalemia   • Leukocytosis   • Elevated troponin   • Altered mental status   • Ventricular tachyarrhythmia (CMS/[facility])   • Sepsis due to methicillin resistant Staphylococcus aureus (MRSA) (CMS/[facility])   • Acute parotitis     Past Medical History:   Diagnosis Date   • Atrial fibrillation (CMS/[facility])    • Chronic ulcer of right leg (CMS/[facility])    • Cognitive communication deficit    • COPD (chronic obstructive pulmonary disease) (CMS/[facility])    • Diabetes mellitus (CMS/[facility])    • Difficulty in walking    • Encephalopathy    • Generalized muscle weakness    • Hematuria    • Hyperlipidemia    • Hypertension    • Hypothyroidism    • Urinary tract  Change OTC nexium to omeprazole 40mg QD #90, 3RF; reviewed when to take omeprazole correctly; reminded patient to minimize food triggers and stay upright 2-3 hrs after eating   infection      Past Surgical History:   Procedure Laterality Date   • CATARACT EXTRACTION     • CHOLECYSTECTOMY     • COLOSTOMY     • FOOT SURGERY Right    • HERNIA REPAIR     • HYSTERECTOMY     • TOTAL KNEE ARTHROPLASTY Right         PT ASSESSMENT (last 12 hours)      Physical Therapy Evaluation     Row Name 12/04/18 1120          PT Evaluation Time/Intention    Subjective Information  complains of;pain  -MB     Document Type  evaluation  -MB     Mode of Treatment  physical therapy  -MB     Patient Effort  poor  -MB     Symptoms Noted During/After Treatment  increased pain  -MB     Comment  Pt. easily agitated w/ mobility.  -MB     Row Name 12/04/18 1120          General Information    Patient Profile Reviewed?  yes  -MB     Onset of Illness/Injury or Date of Surgery  11/28/18  -MB     Referring Physician  SAUL Oscar MD  -MB     Patient Observations  poorly cooperative;lethargic  -MB     Patient/Family Observations  No family present at bedside.  -MB     General Observations of Patient  Pt. supine, NG tube, colostomy, supplemental oxygen, IV intact, B waffle boots, exit alarm.  RN consent for PT.  -MB     Prior Level of Function  -- TBD, no family present. Pt. unable to provide.   -MB     Equipment Currently Used at Home  ramp;walker, rolling;wheelchair  -MB     Pertinent History of Current Functional Problem  Pt. is a 75 yo female who presented to ED from NH when staff was unable to obtain vitals.  Pt. adm to ICU in Vt[place] (s/p cardioversion), w/ hyperkalemia, A/C renal failure, UTI, MRSA bacteremia, parotid sialadentitis, RLE hematoma (s/p evacuation 11/29).   -MB     Existing Precautions/Restrictions  fall NG tube, decreased skin integrity  -MB     Limitations/Impairments  safety/cognitive;swallowing  -MB     Risks Reviewed  patient:;LOB;nausea/vomiting;dizziness;increased discomfort;change in vital signs  -MB     Benefits Reviewed  patient:;improve function;increase independence;increase strength;improve  skin integrity;decrease risk of DVT;decrease pain;increase balance  -MB     Barriers to Rehab  medically complex;previous functional deficit;cognitive status  -MB     Row Name 12/04/18 1120          Relationship/Environment    Lives With  facility resident  -MB     Row Name 12/04/18 1120          Resource/Environmental Concerns    Current Living Arrangements  extended care facility  -MB     Row Name 12/04/18 1120          Cognitive Assessment/Intervention- PT/OT    Affect/Mental Status (Cognitive)  low arousal/lethargic;agitated  -MB     Orientation Status (Cognition)  oriented to;person  -MB     Follows Commands (Cognition)  follows one step commands;0-24% accuracy;physical/tactile prompts required;repetition of directions required;verbal cues/prompting required  -MB     Safety Deficit (Cognitive)  severe deficit;ability to follow commands;insight into deficits/self awareness;problem solving;safety precautions awareness  -MB     Personal Safety Interventions  fall prevention program maintained  -MB     Row Name 12/04/18 1120          Safety Issues, Functional Mobility    Safety Issues Affecting Function (Mobility)  ability to follow commands;awareness of need for assistance;insight into deficits/self awareness;problem solving;safety precaution awareness  -MB     Impairments Affecting Function (Mobility)  cognition;pain;range of motion (ROM);shortness of breath  -MB     Row Name 12/04/18 1120          Bed Mobility Assessment/Treatment    Bed Mobility Assessment/Treatment  rolling left;rolling right  -MB     Rolling Left [place] (Bed Mobility)  dependent (less than 25% patient effort);verbal cues;nonverbal cues (demo/gesture)  -MB     Rolling Right [place] (Bed Mobility)  dependent (less than 25% patient effort);verbal cues;nonverbal cues (demo/gesture)  -MB     Bed Mobility, Safety Issues  decreased use of arms for pushing/pulling;cognitive deficits limit understanding;decreased use of legs for  "bridging/pushing;impaired trunk control for bed mobility  -MB     Assistive Device (Bed Mobility)  draw sheet  -MB     Comment (Bed Mobility)  Pt. rolled L/R for dependent hygiene and complete linen change.  Pt. required dep A and demo increased agitation w/ rolling.  Pt. unable to assist.  -MB     Row Name 12/04/18 1120          Transfer Assessment/Treatment    Comment (Transfers)  Unable to assess.  Pt. became agitated w/ bed mobility and required dep A.   -MB     Row Name 12/04/18 1120          Gait/Stairs Assessment/Training    [place] Level (Gait)  unable to assess  -MB     Row Name 12/04/18 1120          General ROM    GENERAL ROM COMMENTS  Difficult to formally assess; pt. resisting gentle BLE PROM.  -MB     Row Name 12/04/18 1120          MMT (Manual Muscle Testing)    General MMT Comments  Unable to assess.  Pt. not participating.  -MB     Row Name 12/04/18 1120          Pain Assessment    Additional Documentation  Pain Scale: FACES Pre/Post-Treatment (Group)  -MB     Row Name 12/04/18 1120          Pain Scale: Numbers Pre/Post-Treatment    Pain Location - Orientation  generalized  -MB     Pain Intervention(s)  Repositioned  -MB     Row Name 12/04/18 1120          Pain Scale: FACES Pre/Post-Treatment    Pain: FACES Scale, Pretreatment  0-->no hurt  -MB     Pain: FACES Scale, Post-Treatment  4-->hurts little more  -MB     Pre/Post Treatment Pain Comment  Pt. shook head \"NO\" when asked if she has pain; however, became frustrated w/ BLE ROM and rolling.  -MB     Row Name             Wound 11/29/18 0915 Right medial gluteal other (see comments)    Wound - Properties Group Date first assessed: 11/29/18  -CP Time first assessed: 0915  -CP Present On Admission : yes;picture taken  -CP Side: Right  -CP Orientation: medial  -CP Location: gluteal  -CP Type: other (see comments)  -CP Additional Comments: shearing  -CP    Row Name             Wound 11/29/18 1730 Right anterior;lower leg incision    Wound - " Properties Group Date first assessed: 11/29/18  -AB Time first assessed: 1730  -AB Present On Admission : other (see comments)  -AB, wound present, excised by Dr. Gimenez  Side: Right  -AB Orientation: anterior;lower  -AB Location: leg  -AB Type: incision  -AB Stage, Pressure Injury: other (see comments)  -AB    Row Name             Wound 12/01/18 1400 Left lower leg skin tear    Wound - Properties Group Date first assessed: 12/01/18  -AB Time first assessed: 1400  -AB Present On Admission : no  -AB Side: Left  -AB Orientation: lower  -AB Location: leg  -AB Type: skin tear  -AB    Row Name 12/04/18 1120          Plan of Care Review    Plan of Care Reviewed With  patient  -MB     Row Name 12/04/18 1120          Physical Therapy Clinical Impression    Date of Referral to PT  12/03/18  -MB     PT Diagnosis (PT Clinical Impression)  Impaired mobility  -MB     Functional Level at Time of Evaluation (PT Clinical Impression)  dependent A for bed mob  -MB     Patient/Family Goals Statement (PT Clinical Impression)  Pt. unable to state.  -MB     Criteria for Skilled Interventions Met (PT Clinical Impression)  yes;treatment indicated  -MB     Rehab Potential (PT Clinical Summary)  fair, will monitor progress closely  -MB     Care Plan Review (PT)  evaluation/treatment results reviewed;care plan/treatment goals reviewed;risks/benefits reviewed;current/potential barriers reviewed;patient/other agree to care plan  -MB     Row Name 12/04/18 1120          Vital Signs    Pre Systolic BP Rehab  142  -MB     Pre Treatment Diastolic BP  68  -MB     Pre Patient Position  Supine  -MB     Intra Patient Position  Side Lying  -MB     Post Patient Position  Supine  -MB     Row Name 12/04/18 1120          Physical Therapy Goals    Bed Mobility Goal Selection (PT)  bed mobility, PT goal 1  -MB     Transfer Goal Selection (PT)  transfer, PT goal 1  -MB     Row Name 12/04/18 1120          Bed Mobility Goal 1 (PT)    Activity/Assistive Device (Bed  Mobility Goal 1, PT)  supine to sit;sit to supine  -MB     [place] Level/Cues Needed (Bed Mobility Goal 1, PT)  moderate assist (50-74% patient effort)  -MB     Time Frame (Bed Mobility Goal 1, PT)  2 weeks  -MB     Progress/Outcomes (Bed Mobility Goal 1, PT)  goal ongoing  -MB     Row Name 12/04/18 1120          Transfer Goal 1 (PT)    Activity/Assistive Device (Transfer Goal 1, PT)  sit-to-stand/stand-to-sit;walker, rolling  -MB     [place] Level/Cues Needed (Transfer Goal 1, PT)  maximum assist (25-49% patient effort)  -MB     Time Frame (Transfer Goal 1, PT)  2 weeks  -MB     Progress/Outcome (Transfer Goal 1, PT)  goal ongoing  -MB     Row Name 12/04/18 1120          Positioning and Restraints    Pre-Treatment Position  in bed  -MB     Post Treatment Position  bed  -MB     In Bed  notified nsg;supine;call light within reach;encouraged to call for assist;exit alarm on;legs elevated;heels elevated;waffle boots/both HOB > 30 degrees  -MB     Row Name 12/04/18 1120          Living Environment    Home Accessibility  wheelchair accessible  -MB       User Key  (r) = Recorded By, (t) = Taken By, (c) = Cosigned By    Initials Name Provider Type    CP Angélica Gavin, APRN Registered Nurse    Pamela Villarreal, PT Physical Therapist    Jag Gould, RN Registered Nurse          PT Recommendation and Plan  Anticipated Discharge Disposition (PT): extended care facility(Return to [facility])  Planned Therapy Interventions (PT Eval): balance training, bed mobility training, gait training, home exercise program, patient/family education, strengthening, transfer training, ROM (range of motion), postural re-education  Therapy Frequency (PT Clinical Impression): 3 times/wk  Outcome Summary/Treatment Plan (PT)  Anticipated Equipment Needs at Discharge (PT): (TBD)  Anticipated Discharge Disposition (PT): extended care facility(Return to [facility])  Plan of Care Reviewed With: patient  Progress:  no change  Outcome Summary: PT eval completed.  Patient limited by confusion, lethargy, and decreased participation.  Pt. required dep A for bed mobility and became agitated w/ BLE ROM and rolling.  Transfer to chair not safe to attempt.  Recommend IPPT 3x/week, pending progress, and return to NH at D/C.   Outcome Measures     Row Name 12/04/18 1120 12/04/18 0935          How much help from another person do you currently need...    Turning from your back to your side while in flat bed without using bedrails?  1  -MB  --     Moving from lying on back to sitting on the side of a flat bed without bedrails?  1  -MB  --     Moving to and from a bed to a chair (including a wheelchair)?  1  -MB  --     Standing up from a chair using your arms (e.g., wheelchair, bedside chair)?  1  -MB  --     Climbing 3-5 steps with a railing?  1  -MB  --     To walk in hospital room?  1  -MB  --     AM-PAC 6 Clicks Score  6  -MB  --        How much help from another is currently needed...    Putting on and taking off regular lower body clothing?  1  -MB  1  -TB     Bathing (including washing, rinsing, and drying)  --  2  -TB     Toileting (which includes using toilet bed pan or urinal)  --  1  -TB     Putting on and taking off regular upper body clothing  --  2  -TB     Taking care of personal grooming (such as brushing teeth)  --  2  -TB     Eating meals  --  1  -TB     Score  --  9  -TB        Functional Assessment    Outcome Measure Options  AM-PAC 6 Clicks Basic Mobility (PT)  -MB  AM-PAC 6 Clicks Daily Activity (OT)  -TB       User Key  (r) = Recorded By, (t) = Taken By, (c) = Cosigned By    Initials Name Provider Type    TB Brianne Yeung, OT Occupational Therapist    Pamela Villarreal, PT Physical Therapist         Time Calculation:   PT Charges     Row Name 12/04/18 1350             Time Calculation    Start Time  1120  -MB      PT Received On  12/04/18  -MB      PT Goal Re-Cert Due Date  12/14/18  -MB        User  Key  (r) = Recorded By, (t) = Taken By, (c) = Cosigned By    Initials Name Provider Type    Pamela Villarreal, PT Physical Therapist        Therapy Suggested Charges     Code   Minutes Charges    None           Therapy Charges for Today     Code Description Service Date Service Provider Modifiers Qty    [id number]  PT MOBILITY CURRENT 12/4/2018 Pamela Hernandez, PT GP, CN 1    [id number] HC PT MOBILITY PROJECTED 12/4/2018 Pamela Hernandez, PT GP, CM 1    [id number]  PT EVAL MOD COMPLEXITY 4 12/4/2018 Pamela Hernandez, PT GP 1          PT G-Codes  Outcome Measure Options: AM-PAC 6 Clicks Basic Mobility (PT)  AM-PAC 6 Clicks Score: 6  Score: 9  Functional Limitation: Mobility: Walking and moving around  Mobility: Walking and Moving Around Current Status (): 100 percent impaired, limited or restricted  Mobility: Walking and Moving Around Goal Status (): At least 80 percent but less than 100 percent impaired, limited or restricted      Pamela Hernandez PT  12/4/2018

## 2020-07-27 ENCOUNTER — TELEPHONE (OUTPATIENT)
Dept: INTERNAL MEDICINE | Facility: CLINIC | Age: 75
End: 2020-07-27

## 2020-07-27 RX ORDER — MONTELUKAST SODIUM 10 MG/1
10 TABLET ORAL NIGHTLY
Qty: 90 TABLET | Refills: 3 | Status: SHIPPED | OUTPATIENT
Start: 2020-07-27 | End: 2021-09-19 | Stop reason: SDUPTHER

## 2020-07-27 NOTE — TELEPHONE ENCOUNTER
Pt notified that we did get the fax to clarify directions and I just need Dr. Isaac to sign and I will fax over to the pharmacy. She also states that she wants Montelukast sent in. She tried OTC allergy meds but wants to go back to the Singulair.

## 2020-07-27 NOTE — TELEPHONE ENCOUNTER
Pt called because she was told by her insurance company a fax about  her     omeprazole (priLOSEC) 40 MG capsule     Was sent to us and they are waiting on a response     Please call pt to confirm that it has pierre received     [phone]

## 2020-10-23 ENCOUNTER — TELEPHONE (OUTPATIENT)
Dept: INTERNAL MEDICINE | Facility: CLINIC | Age: 75
End: 2020-10-23

## 2020-10-23 NOTE — TELEPHONE ENCOUNTER
PATIENT WAS TAKING alendronate (FOSAMAX) 70 MG tablet FOR OSTEOPOROSIS BUT WAS HAVING CONSIDERABLE ESOPHAGUS ISSUES.  SHE STOPPED THE MEDICATION APPROXIMATELY 4 WEEKS AGO AND THE CONDITION IMPROVED.  WHAT OTHER MEDICATION CAN WE TAKE FOR OSTEOPOROSIS?    PHARMACY:  [facility]    BEST CALL NUMBER [phone]

## 2020-10-23 NOTE — TELEPHONE ENCOUNTER
Yes - OV to discuss options, risks, side effects, goals. There are limited options and previously she said Prolia was too expensive.

## 2020-10-25 NOTE — ASSESSMENT & PLAN NOTE
Off alendronate due to esophageal sxs; discussed options to include Reclast, Prolia, raloxifene; reviewed goals, side effects, risks; patient would like to proceed with Prolia 60mg q6 mos; DEXA 10/19, repeat 2021

## 2020-10-26 ENCOUNTER — OFFICE VISIT (OUTPATIENT)
Dept: INTERNAL MEDICINE | Facility: CLINIC | Age: 75
End: 2020-10-26

## 2020-10-26 VITALS
WEIGHT: 185.8 LBS | BODY MASS INDEX: 30.92 KG/M2 | HEART RATE: 76 BPM | DIASTOLIC BLOOD PRESSURE: 90 MMHG | OXYGEN SATURATION: 98 % | SYSTOLIC BLOOD PRESSURE: 150 MMHG

## 2020-10-26 DIAGNOSIS — I10 ESSENTIAL HYPERTENSION: ICD-10-CM

## 2020-10-26 DIAGNOSIS — R29.898 RIGHT LEG WEAKNESS: ICD-10-CM

## 2020-10-26 DIAGNOSIS — M48.061 SPINAL STENOSIS OF LUMBAR REGION, UNSPECIFIED WHETHER NEUROGENIC CLAUDICATION PRESENT: ICD-10-CM

## 2020-10-26 DIAGNOSIS — N39.46 MIXED STRESS AND URGE URINARY INCONTINENCE: ICD-10-CM

## 2020-10-26 DIAGNOSIS — M81.0 AGE-RELATED OSTEOPOROSIS WITHOUT CURRENT PATHOLOGICAL FRACTURE: Primary | ICD-10-CM

## 2020-10-26 PROCEDURE — 99214 OFFICE O/P EST MOD 30 MIN: CPT | Performed by: INTERNAL MEDICINE

## 2020-10-28 RX ORDER — DENOSUMAB 60 MG/ML
60 INJECTION SUBCUTANEOUS ONCE
Qty: 1 ML | Refills: 0 | Status: SHIPPED | OUTPATIENT
Start: 2020-10-28 | End: 2021-04-19

## 2020-10-28 NOTE — ASSESSMENT & PLAN NOTE
"Stable but incomplete efficacy on Myrbetriq ER 50 mg QD; recommend second opinion from urology regarding the \"bladder stimulator\"  "

## 2020-10-28 NOTE — ASSESSMENT & PLAN NOTE
BP recurrently borderline elevated; abnormal today at 150/90; advised low Na diet, increased aerobic exercise, and home BP monitoring with goal BP < 130/80; f/u in 1-2 mos

## 2020-10-28 NOTE — PROGRESS NOTES
"Chief Complaint   Patient presents with   • Osteoporosis     Discuss meds   • Back Pain   • Urinary Incontinence       History of Present Illness  75 y.o.  woman presents for follow-up regarding osteoporosis the discussed medication side effects and options, also with complaints of back pain with associated leg weakness, and complaints of incontinence.    States that she was having some chest discomfort, attributed to esophageal issues.  She stopped Fosamax 4 weeks ago and notes those symptoms have resolved.    Reports known scoliosis as well as prior diagnosis of spinal stenosis.  Is concerned because she is dragging her right leg on and off, associate with weakness, associated back pain.  Symptoms worsening over the last 6 months.  Occasionally has some pain radiating down the right outer thigh but not all the way down to the right foot.  Has not fallen or had any acute injuries.  Reports previous evaluation with Dr. Ding 1-2 years ago.  Had collagen injections at the pain clinic.    Complains of urinary incontinence and notes that gynecology is recommending some kind of bladder stimulator.  She is uncertain whether to proceed.    Review of Systems  ROS (+) for hot flashes. ROS (+) for right leg weakness with chronic LBP. ROS (+) for unchanged urinary incontinence, not lack of efficacy with myrbetriq. ROS (+) for \"esophageal pain\" with fosamax, resolved with discontinuation. Denies falls/injuries. Denies fevers/chills.  All other ROS reviewed and negative.    [facility]  The following portions of the patient's history were reviewed and updated as appropriate: allergies, current medications, past family history, past medical history, past social history, past surgical history and problem list.    Current Outpatient Medications:   •  albuterol sulfate HFA (Ventolin HFA) 108 (90 Base) MCG/ACT inhaler prn  •  celecoxib (CeleBREX) 200 MG QD prn  •  fluticasone-salmeterol (Advair Diskus) 100-50 MCG/DOSE DISKUS 1 " puff BID  •  levothyroxine 50 MCG QD  •  Mirabegron ER (Myrbetriq) 50 MG QD  •  montelukast (Singulair) 10 MG QD  •  Pro cap bond and body factor QD  •  omeprazole (priLOSEC) 40 MG QD  •  traMADol (ULTRAM) 50 MG tablet prn  •  TURMERIC CURCUMIN PO QD  •  (PROBIOTIC ADVANCED PO QD      VITALS:  /90   Pulse 76   Wt 84.3 kg (185 lb 12.8 oz)   SpO2 98%   BMI 30.92 kg/m²     Physical Exam  Vitals signs and nursing note reviewed.   Constitutional:       General: She is not in acute distress.     Appearance: Normal appearance.   Eyes:      Extraocular Movements: Extraocular movements intact.      Conjunctiva/sclera: Conjunctivae normal.   Cardiovascular:      Rate and Rhythm: Normal rate and regular rhythm.      Heart sounds: Normal heart sounds.   Pulmonary:      Effort: Pulmonary effort is normal. No respiratory distress.      Breath sounds: Normal breath sounds. No rales.   Abdominal:      General: Bowel sounds are normal. There is no distension.      Palpations: Abdomen is soft.      Tenderness: There is no abdominal tenderness. There is no guarding.   Musculoskeletal:         General: No tenderness.      Right lower leg: No edema.      Left lower leg: No edema.      Comments: bilat hips FROM   Neurological:      Mental Status: She is alert and oriented to person, place, and time. Mental status is at baseline.      Gait: Gait normal.   Psychiatric:         Mood and Affect: Mood normal.         Behavior: Behavior normal.         LABS  Results for orders placed or performed in visit on 07/13/20   Comprehensive Metabolic Panel    Specimen: Blood   Result Value Ref Range    Glucose 84 65 - 99 mg/dL    BUN 18 8 - 23 mg/dL    Creatinine 0.87 0.57 - 1.00 mg/dL    Sodium 140 136 - 145 mmol/L    Potassium 5.0 3.5 - 5.2 mmol/L    Chloride 107 98 - 107 mmol/L    CO2 24.1 22.0 - 29.0 mmol/L    Calcium 10.0 8.6 - 10.5 mg/dL    Total Protein 7.2 6.0 - 8.5 g/dL    Albumin 3.90 3.50 - 5.20 g/dL    ALT (SGPT) 23 1 - 33 U/L     AST (SGOT) 24 1 - 32 U/L    Alkaline Phosphatase 116 39 - 117 U/L    Total Bilirubin 0.4 0.0 - 1.2 mg/dL    eGFR Non African Amer 63 >60 mL/min/1.73    Globulin 3.3 gm/dL    A/G Ratio 1.2 g/dL    BUN/Creatinine Ratio 20.7 7.0 - 25.0    Anion Gap 8.9 5.0 - 15.0 mmol/L   TSH    Specimen: Blood   Result Value Ref Range    TSH 2.300 0.270 - 4.200 uIU/mL   Lipid Panel    Specimen: Blood   Result Value Ref Range    Total Cholesterol 166 0 - 200 mg/dL    Triglycerides 100 0 - 150 mg/dL    HDL Cholesterol 41 40 - 60 mg/dL    LDL Cholesterol  105 (H) 0 - 100 mg/dL    VLDL Cholesterol 20 5 - 40 mg/dL    LDL/HDL Ratio 2.56    T4, free    Specimen: Blood   Result Value Ref Range    Free T4 1.33 0.93 - 1.70 ng/dL   CBC Auto Differential    Specimen: Blood   Result Value Ref Range    WBC 7.62 3.40 - 10.80 10*3/mm3    RBC 4.81 3.77 - 5.28 10*6/mm3    Hemoglobin 14.5 12.0 - 15.9 g/dL    Hematocrit 43.4 34.0 - 46.6 %    MCV 90.2 79.0 - 97.0 fL    MCH 30.1 26.6 - 33.0 pg    MCHC 33.4 31.5 - 35.7 g/dL    RDW 12.9 12.3 - 15.4 %    RDW-SD 42.6 37.0 - 54.0 fl    MPV 11.1 6.0 - 12.0 fL    Platelets 328 140 - 450 10*3/mm3    Neutrophil % 68.4 42.7 - 76.0 %    Lymphocyte % 19.7 19.6 - 45.3 %    Monocyte % 8.8 5.0 - 12.0 %    Eosinophil % 1.8 0.3 - 6.2 %    Basophil % 0.9 0.0 - 1.5 %    Immature Grans % 0.4 0.0 - 0.5 %    Neutrophils, Absolute 5.21 1.70 - 7.00 10*3/mm3    Lymphocytes, Absolute 1.50 0.70 - 3.10 10*3/mm3    Monocytes, Absolute 0.67 0.10 - 0.90 10*3/mm3    Eosinophils, Absolute 0.14 0.00 - 0.40 10*3/mm3    Basophils, Absolute 0.07 0.00 - 0.20 10*3/mm3    Immature Grans, Absolute 0.03 0.00 - 0.05 10*3/mm3    nRBC 0.0 0.0 - 0.2 /100 WBC   Urinalysis without microscopic (no culture) - Urine, Clean Catch    Specimen: Urine, Clean Catch   Result Value Ref Range    Color, UA Yellow Yellow, Straw    Appearance, UA Cloudy (A) Clear    pH, UA 5.5 5.0 - 8.0    Specific Gravity, UA 1.017 1.005 - 1.030    Glucose, UA Negative Negative     Ketones, UA Negative Negative    Bilirubin, UA Negative Negative    Blood, UA Negative Negative    Protein, UA Negative Negative    Leuk Esterase, UA Moderate (2+) (A) Negative    Nitrite, UA Negative Negative    Urobilinogen, UA 0.2 E.U./dL 0.2 - 1.0 E.U./dL   Urinalysis, Microscopic Only - Urine, Clean Catch    Specimen: Urine, Clean Catch   Result Value Ref Range    RBC, UA 0-2 None Seen, 0-2 /HPF    WBC, UA Too Numerous to Count (A) None Seen, 0-2 /HPF    Bacteria, UA 1+ (A) None Seen /HPF    Squamous Epithelial Cells, UA 0-2 None Seen, 0-2 /HPF    Hyaline Casts, UA 0-2 None Seen /LPF    Methodology Automated Microscopy          ASSESSMENT/PLAN    Diagnoses and all orders for this visit:    1. Osteoporosis (Primary)  Assessment & Plan:  Off alendronate due to esophageal sxs; discussed options to include Reclast, Prolia, raloxifene; reviewed goals, side effects, risks; patient would like to proceed with Prolia 60mg q6 mos; DEXA 10/19, repeat 2021    Orders:  -     Ambulatory Referral to [facility] For Infusion Treatment  -     denosumab (Prolia) 60 MG/ML solution prefilled syringe syringe; Inject 1 mL under the skin into the appropriate area as directed 1 (One) Time for 1 dose.  Dispense: 1 mL; Refill: 0    2. Spinal stenosis of lumbar region, unspecified whether neurogenic claudication present  Assessment & Plan:  Reports progressively worsening right leg weakness for the last 6 months; also has known scoliosis and multilevel neural foraminal stenosis; recommend physical therapy (referral given to do in [place]) for further evaluation and if no better, recommend follow-up MRI L-spine and follow-up with Dr. Ding    Orders:  -     Ambulatory Referral to Physical Therapy Evaluate and treat (HEP); ROM, Strengthening, Stretching    3. Right leg weakness  Comments:  progressive x 6 mos; known lumbar stenosis and scoliosis; referral to PT for strengtheninr and ROM; if no better, then repeat MRI L-sp and f/u  "neurosurg  Orders:  -     Ambulatory Referral to Physical Therapy Evaluate and treat (HEP); ROM, Strengthening, Stretching    4. Mixed stress and urge urinary incontinence  Assessment & Plan:  Stable but incomplete efficacy on Myrbetriq ER 50 mg QD; recommend second opinion from urology regarding the \"bladder stimulator\"      5. Hypertension  Assessment & Plan:  BP recurrently borderline elevated; abnormal today at 150/90; advised low Na diet, increased aerobic exercise, and home BP monitoring with goal BP < 130/80; f/u in 1-2 mos        FOLLOW-UP  1. Health maintenance - flu vacc done at pharmacy  2. RTC 1-2 mos for BP check    Electronically signed by:    Lorene Isaac MD, FACP  10/26/2020    EMR Dragon/Transcription disclaimer:  Parts of this encounter note is an electronic transcription/translation of spoken language to printed text. Electronic translation of spoken language may permit erroneous, or at times, nonsensical words or phrases, to be inadvertently transcribed. Although I have reviewed the note for such errors, some may still exist.    "

## 2020-10-28 NOTE — ASSESSMENT & PLAN NOTE
Reports progressively worsening right leg weakness for the last 6 months; also has known scoliosis and multilevel neural foraminal stenosis; recommend physical therapy (referral given to do in [place]) for further evaluation and if no better, recommend follow-up MRI L-spine and follow-up with Dr. Ding

## 2020-10-29 ENCOUNTER — TELEPHONE (OUTPATIENT)
Dept: INTERNAL MEDICINE | Facility: CLINIC | Age: 75
End: 2020-10-29

## 2020-10-29 NOTE — TELEPHONE ENCOUNTER
Pt notified and verbalized understanding. States she will call us in a couple of weeks to give the readings and make appt then.

## 2020-10-29 NOTE — TELEPHONE ENCOUNTER
----- Message from Lorene Isaac MD sent at 10/28/2020  5:44 AM EDT -----  Pls advise patient that her BP was fairly elevated at recent OV; rec starting to check BPs regularly at home 1-2x/week and f/u in 1-2 months

## 2020-11-04 ENCOUNTER — TELEPHONE (OUTPATIENT)
Dept: ORTHOPEDIC SURGERY | Facility: CLINIC | Age: 75
End: 2020-11-04

## 2020-11-11 ENCOUNTER — INFUSION (OUTPATIENT)
Dept: ONCOLOGY | Facility: HOSPITAL | Age: 75
End: 2020-11-11

## 2020-11-11 VITALS
SYSTOLIC BLOOD PRESSURE: 138 MMHG | RESPIRATION RATE: 16 BRPM | HEART RATE: 79 BPM | DIASTOLIC BLOOD PRESSURE: 71 MMHG | TEMPERATURE: 97.8 F

## 2020-11-11 DIAGNOSIS — M81.0 AGE-RELATED OSTEOPOROSIS WITHOUT CURRENT PATHOLOGICAL FRACTURE: Primary | ICD-10-CM

## 2020-11-11 PROCEDURE — 25010000002 DENOSUMAB 60 MG/ML SOLUTION PREFILLED SYRINGE: Performed by: INTERNAL MEDICINE

## 2020-11-11 PROCEDURE — 96372 THER/PROPH/DIAG INJ SC/IM: CPT

## 2020-11-11 RX ADMIN — DENOSUMAB 60 MG: 60 INJECTION SUBCUTANEOUS at 13:27

## 2020-11-13 ENCOUNTER — OFFICE VISIT (OUTPATIENT)
Dept: INTERNAL MEDICINE | Facility: CLINIC | Age: 75
End: 2020-11-13

## 2020-11-13 ENCOUNTER — TRANSCRIBE ORDERS (OUTPATIENT)
Dept: INTERNAL MEDICINE | Facility: CLINIC | Age: 75
End: 2020-11-13

## 2020-11-13 VITALS
WEIGHT: 184 LBS | HEIGHT: 65 IN | DIASTOLIC BLOOD PRESSURE: 76 MMHG | SYSTOLIC BLOOD PRESSURE: 124 MMHG | OXYGEN SATURATION: 98 % | BODY MASS INDEX: 30.66 KG/M2 | HEART RATE: 86 BPM

## 2020-11-13 DIAGNOSIS — R60.0 EDEMA OF LEFT FOOT: Primary | ICD-10-CM

## 2020-11-13 DIAGNOSIS — I10 ESSENTIAL HYPERTENSION: ICD-10-CM

## 2020-11-13 DIAGNOSIS — N39.46 MIXED STRESS AND URGE URINARY INCONTINENCE: ICD-10-CM

## 2020-11-13 PROCEDURE — 99214 OFFICE O/P EST MOD 30 MIN: CPT | Performed by: INTERNAL MEDICINE

## 2020-11-13 NOTE — ASSESSMENT & PLAN NOTE
Normotensive today with /76, and home BPs bord 130-140s/80s, avg 132/871 per patient; no meds currently; continue working on low-sodium diet and regular aerobic exercise; continue home monitoring with goal <130/80; follow-up in 4 months

## 2020-11-13 NOTE — ASSESSMENT & PLAN NOTE
"Myrbetriq ER 50 mg is helpful with spasms; has consultation pending with Dr. Patel for further evaluation of incontinence (interested in \"I-stem\" procedure)  "

## 2020-11-13 NOTE — PROGRESS NOTES
Chief Complaint   Patient presents with   • Foot Swelling       History of Present Illness  75 y.o.  woman presents for further eval of left foot swelling. HPI started Wed, 2 days ago with left foot and ankle swelling.  Has had a few doctors appointments that day and wore her Birkenstocks.  She thinks the buckle irritated the outer left foot at the base of the left fifth toe.  Has had chronic purple veins on her feet bilaterally L>R and she states these look unchanged.  Has noted previously to have some leg swelling with travel and tenderness by the end of the day.  Currently with pain at the base of the left fifth toe at the ball of the foot as well as left foot swelling and left ankle swelling going up to the shin.  Denies any injuries or falls.  Is worried about her heart.    Started going to PT for right leg weakness.  They have since referred her to aquatic therapy, which she will be starting at ED from [facility] in [place] on Monday.    Also went to urology and was diagnosed with urinary urgency.  States the Myrbetriq helps with the spasms but not with the leakage.  She has an appoint with Dr. Patel upcoming as well.    Brings list of home BP readings ranging 110s-140s/70s-80s.  Reports that the average is 132/81.    Review of Systems  ROS (+) for left foot pain as well as associated swelling going up to the ankle and the lower shin area.  Right leg weakness with aquatic therapy about to be started.  Persistent bladder spasms and incontinence.  Denies any dysuria or hematuria at this time.  Denies chest pain, palpitations, shortness of breath, change in breathing.  All other ROS reviewed and negative.    [facility]  The following portions of the patient's history were reviewed and updated as appropriate: allergies, current medications, past family history, past medical history, past social history, past surgical history and problem list.      Current Outpatient Medications:   •  albuterol sulfate HFA  "(Ventolin HFA) 108 (90 Base) MCG/ACT inhaler prn  •  celecoxib (CeleBREX) 200 MG QD prn  •  fluticasone-salmeterol (Advair Diskus) 100-50 MCG/DOSE DISKUS BID  •  levothyroxine (SYNTHROID, LEVOTHROID) 50 MCG QD  •  Mirabegron ER (Myrbetriq) 50 MG QD  •  montelukast (Singulair) 10 MG QD  •  Pro cap bond and body factor QD   •  omeprazole (priLOSEC) 40 MG QD  •  traMADol (ULTRAM) 50 MG tablet prn  •  TURMERIC CURCUMIN PO QD  •  denosumab (Prolia) 60 MG/ML q6 mos      VITALS:  /76   Pulse 86   Ht 165.1 cm (65\")   Wt 83.5 kg (184 lb)   SpO2 98%   BMI 30.62 kg/m²     Physical Exam  Vitals signs and nursing note reviewed.   Constitutional:       General: She is not in acute distress.     Appearance: Normal appearance.   Eyes:      Extraocular Movements: Extraocular movements intact.      Conjunctiva/sclera: Conjunctivae normal.   Cardiovascular:      Rate and Rhythm: Normal rate and regular rhythm.      Heart sounds: Normal heart sounds.      Comments: Diffuse spider veins left lower leg, ankle and left foot, less on the right  Pulmonary:      Effort: Pulmonary effort is normal. No respiratory distress.      Breath sounds: Normal breath sounds. No wheezing.   Musculoskeletal:      Right lower leg: Edema (tr pitting edema to mid-shin, 29cm circumf, no foot/ankle edema) present.      Left lower leg: Edema (1+ edema left foot and left ankle up to the midshin, 29cm circumf) present.   Neurological:      Mental Status: She is alert and oriented to person, place, and time. Mental status is at baseline.      Gait: Gait normal.   Psychiatric:         Mood and Affect: Mood normal.         Behavior: Behavior normal.         LABS  7/20 Cr 0.87, GFR 63    ASSESSMENT/PLAN    Diagnoses and all orders for this visit:    1. Edema of left foot and ankle (Primary)  Comments:  x 2 days; likely due to inflamm and venous insuffic but check LLE venous duplex stat to r/o DVT; ice, elevate, and wear her compression " "stockings  Orders:  -     Duplex Venous Lower Extremity - Left CAR; Future    2. Hypertension  Assessment & Plan:  Normotensive today with /76, and home BPs bord 130-140s/80s, avg 132/871 per patient; no meds currently; continue working on low-sodium diet and regular aerobic exercise; continue home monitoring with goal <130/80; follow-up in 4 months      3. Mixed stress and urge urinary incontinence  Assessment & Plan:  Myrbetriq ER 50 mg is helpful with spasms; has consultation pending with Dr. Patel for further evaluation of incontinence (interested in \"I-stem\" procedure)      LLE EDEMA CONT'D -note that she likely has mild venous insufficiency bilaterally with trace edema bilateral shins but the circumference is symmetric at that point; discussed current acute LLE edema is unlikely cardiac related because it is asymmetric; follow-up depending on results of venous duplex ultrasound    FOLLOW-UP  1. Health maintenance - flu vacc done 10/20  2. RTC 4 mos with BP f/u    Electronically signed by:    Lorene Isaac MD, FACP  11/13/2020      "

## 2020-11-17 ENCOUNTER — TELEPHONE (OUTPATIENT)
Dept: INTERNAL MEDICINE | Facility: CLINIC | Age: 75
End: 2020-11-17

## 2020-11-17 PROBLEM — I87.2 VENOUS INSUFFICIENCY OF LEFT LEG: Status: ACTIVE | Noted: 2020-11-17

## 2020-11-17 NOTE — TELEPHONE ENCOUNTER
She does not need an ultrasound.    Unfortunately I was not tasked with the 11/13 6/20 ultrasound result.    Please let patient know that she does not have a blood clot.  The ultrasound shows that she has significant varicose veins causing swelling of her left leg.  Recommendations for this are leg elevation and compression stockings as discussed in the office.  Also recommend low-sodium diet, ideally less than 1500 mg/day

## 2020-11-17 NOTE — TELEPHONE ENCOUNTER
PT HAD HER ULTRASOUND ON 11/13/2020, SHE RECEIVED A PHONE CALL TODAY,11-, TO COME IN FOR THE SAME ULTRASOUND    PT WANTS TO KNOW IF THIS IS A MISTAKE, OR IF DR MEDRANO SAW SOMETHING ON THE FIRST ONE AND NEEDS HER TO REPEAT THE ULTRASOUND AGAIN    PLEASE ADVISE PT, THEY ASKED HER TO COME IN TODAY

## 2020-11-18 ENCOUNTER — HOSPITAL ENCOUNTER (OUTPATIENT)
Dept: MAMMOGRAPHY | Facility: HOSPITAL | Age: 75
Discharge: HOME | End: 2020-11-18
Admitting: SURGERY

## 2020-11-18 DIAGNOSIS — N63.41 UNSPECIFIED LUMP IN RIGHT BREAST, SUBAREOLAR: ICD-10-CM

## 2020-11-18 PROCEDURE — 77066 DX MAMMO INCL CAD BI: CPT | Performed by: RADIOLOGY

## 2020-11-18 PROCEDURE — 77066 DX MAMMO INCL CAD BI: CPT

## 2020-11-18 PROCEDURE — G0279 TOMOSYNTHESIS, MAMMO: HCPCS | Performed by: RADIOLOGY

## 2020-11-18 PROCEDURE — G0279 TOMOSYNTHESIS, MAMMO: HCPCS

## 2020-11-30 ENCOUNTER — TELEPHONE (OUTPATIENT)
Dept: INTERNAL MEDICINE | Facility: CLINIC | Age: 75
End: 2020-11-30

## 2021-02-23 ENCOUNTER — OFFICE (OUTPATIENT)
Dept: URBAN - METROPOLITAN AREA CLINIC 4 | Facility: CLINIC | Age: 76
End: 2021-02-23
Payer: COMMERCIAL

## 2021-02-23 DIAGNOSIS — R14.3 FLATULENCE: ICD-10-CM

## 2021-02-23 DIAGNOSIS — K21.9 GASTRO-ESOPHAGEAL REFLUX DISEASE WITHOUT ESOPHAGITIS: ICD-10-CM

## 2021-02-23 DIAGNOSIS — K59.00 CONSTIPATION, UNSPECIFIED: ICD-10-CM

## 2021-02-23 DIAGNOSIS — R19.4 CHANGE IN BOWEL HABIT: ICD-10-CM

## 2021-02-23 PROCEDURE — 99203 OFFICE O/P NEW LOW 30 MIN: CPT | Mod: 95 | Performed by: INTERNAL MEDICINE

## 2021-02-23 RX ORDER — RIFAXIMIN 550 MG/1
1650 TABLET ORAL
Qty: 42 | Refills: 2 | Status: ACTIVE
Start: 2021-02-23

## 2021-04-19 DIAGNOSIS — M81.0 AGE-RELATED OSTEOPOROSIS WITHOUT CURRENT PATHOLOGICAL FRACTURE: ICD-10-CM

## 2021-04-19 RX ORDER — DENOSUMAB 60 MG/ML
INJECTION SUBCUTANEOUS
Qty: 1 ML | Refills: 0 | Status: SHIPPED | OUTPATIENT
Start: 2021-04-19 | End: 2021-11-05 | Stop reason: SDUPTHER

## 2021-04-25 ENCOUNTER — APPOINTMENT (OUTPATIENT)
Dept: PREADMISSION TESTING | Facility: HOSPITAL | Age: 76
End: 2021-04-25

## 2021-04-25 PROCEDURE — U0004 COV-19 TEST NON-CDC HGH THRU: HCPCS

## 2021-04-25 PROCEDURE — C9803 HOPD COVID-19 SPEC COLLECT: HCPCS

## 2021-04-25 PROCEDURE — U0005 INFEC AGEN DETEC AMPLI PROBE: HCPCS

## 2021-04-26 LAB — SARS-COV-2 RNA NOSE QL NAA+PROBE: NOT DETECTED

## 2021-04-28 ENCOUNTER — AMBULATORY SURGICAL CENTER (OUTPATIENT)
Dept: URBAN - METROPOLITAN AREA SURGERY 10 | Facility: SURGERY | Age: 76
End: 2021-04-28
Payer: COMMERCIAL

## 2021-04-28 ENCOUNTER — OFFICE (OUTPATIENT)
Dept: URBAN - METROPOLITAN AREA PATHOLOGY 4 | Facility: PATHOLOGY | Age: 76
End: 2021-04-28
Payer: COMMERCIAL

## 2021-04-28 DIAGNOSIS — K22.4 DYSKINESIA OF ESOPHAGUS: ICD-10-CM

## 2021-04-28 DIAGNOSIS — B96.81 HELICOBACTER PYLORI [H. PYLORI] AS THE CAUSE OF DISEASES CLA: ICD-10-CM

## 2021-04-28 DIAGNOSIS — K29.70 GASTRITIS, UNSPECIFIED, WITHOUT BLEEDING: ICD-10-CM

## 2021-04-28 DIAGNOSIS — K22.2 ESOPHAGEAL OBSTRUCTION: ICD-10-CM

## 2021-04-28 DIAGNOSIS — K21.9 GASTRO-ESOPHAGEAL REFLUX DISEASE WITHOUT ESOPHAGITIS: ICD-10-CM

## 2021-04-28 DIAGNOSIS — K29.50 UNSPECIFIED CHRONIC GASTRITIS WITHOUT BLEEDING: ICD-10-CM

## 2021-04-28 PROCEDURE — 43239 EGD BIOPSY SINGLE/MULTIPLE: CPT | Mod: 59 | Performed by: INTERNAL MEDICINE

## 2021-04-28 PROCEDURE — 88305 TISSUE EXAM BY PATHOLOGIST: CPT | Performed by: INTERNAL MEDICINE

## 2021-04-28 PROCEDURE — 43249 ESOPH EGD DILATION <30 MM: CPT | Performed by: INTERNAL MEDICINE

## 2021-05-11 ENCOUNTER — INFUSION (OUTPATIENT)
Dept: ONCOLOGY | Facility: HOSPITAL | Age: 76
End: 2021-05-11

## 2021-05-11 VITALS
SYSTOLIC BLOOD PRESSURE: 144 MMHG | RESPIRATION RATE: 16 BRPM | TEMPERATURE: 97.7 F | DIASTOLIC BLOOD PRESSURE: 81 MMHG | HEART RATE: 74 BPM

## 2021-05-11 DIAGNOSIS — M81.0 AGE-RELATED OSTEOPOROSIS WITHOUT CURRENT PATHOLOGICAL FRACTURE: Primary | ICD-10-CM

## 2021-05-11 PROCEDURE — 25010000002 DENOSUMAB 60 MG/ML SOLUTION PREFILLED SYRINGE: Performed by: INTERNAL MEDICINE

## 2021-05-11 PROCEDURE — 96372 THER/PROPH/DIAG INJ SC/IM: CPT

## 2021-05-11 RX ADMIN — DENOSUMAB 60 MG: 60 INJECTION SUBCUTANEOUS at 10:59

## 2021-05-12 ENCOUNTER — TELEPHONE (OUTPATIENT)
Dept: INTERNAL MEDICINE | Facility: CLINIC | Age: 76
End: 2021-05-12

## 2021-05-12 NOTE — TELEPHONE ENCOUNTER
Caller: Rebecca Sharma JAS    Relationship: Self    Best call back number: [phone]     What medications are you currently taking:   Current Outpatient Medications on File Prior to Visit   Medication Sig Dispense Refill   • albuterol sulfate HFA (Ventolin HFA) 108 (90 Base) MCG/ACT inhaler Inhale 2 puffs Every 6 (Six) Hours As Needed for Wheezing or Shortness of Air. 1 inhaler 1   • celecoxib (CeleBREX) 200 MG capsule TAKE 1 CAPSULE BY MOUTH DAILY AS NEEDED FOR MODERATE PAIN . 30 capsule 1   • fluticasone-salmeterol (Advair Diskus) 100-50 MCG/DOSE DISKUS Inhale 1 puff 2 (Two) Times a Day. Gargle and spit after puffs 3 each 3   • levothyroxine (SYNTHROID, LEVOTHROID) 50 MCG tablet Take 1 tablet by mouth Every Morning. 90 tablet 3   • Mirabegron ER (Myrbetriq) 50 MG tablet sustained-release 24 hour 24 hr tablet Take 50 mg by mouth Daily. 90 tablet 3   • montelukast (Singulair) 10 MG tablet Take 1 tablet by mouth Every Night. 90 tablet 3   • NON FORMULARY 1 tablet Daily. Pro cap bond and body factor     • omeprazole (priLOSEC) 40 MG capsule Take 1 capsule by mouth Daily. Take 30 minutes 1st meal of the day 90 capsule 3   • Prolia 60 MG/ML solution prefilled syringe syringe INJECT 60MG SUBCUTANEOUSLY EVERY 6 MONTHS 1 mL 0   • traMADol (ULTRAM) 50 MG tablet Take 50 mg by mouth Daily As Needed for Severe Pain .     • TURMERIC CURCUMIN PO Take 1 tablet by mouth Daily.       No current facility-administered medications on file prior to visit.        When did you start taking these medications: INFUSION TREATMENT PATIENT HAS TAKEN TWO SHOTS FIRST ONE ON 10/28/2020 AND SECOND ONE ON 04/22/2021    Which medication are you concerned about: INFUSION TREATMENT    Who prescribed you this medication: ELBERT MEDRANO    What are your concerns: PATIENT STATES THAT SHE HAS BEEN GOING TO THE [facility] TO GET HER SHOTS. SHE STATED THAT THE FIRST SHOT ORDERED SHE HAD TO PAID $335.68 FOR HER COPAY AND THE ORDER WAS  SEND TO [facility] [place], [place] - [address] [place] - [phone]  - [phone] FX PATIENT STATES THEN WHEN THE SECOND SHOT ORDER WAS PLACE SHE HAD TO PAY A COPAY OF $710.98 BECAUSE  SEND IT AGAIN TO [facility]. PATIENT STATES THAT WHEN SHE GOT TO THE [facility] FOR HER SECOND SHOT THEY TOLD HER THAT THEY USED THEIR OWN SERUM AND WAS COVERED UNDER HER MEDICARE. PATIENT STATES THAT IF IT WAS NOT SEND TO THE [facility] HER MEDICARE PART D WOULD HAVE NOT BEEN TOUCHED AND SHE WOULD NOT HAD TO PAY THOSE COPAY. PATIENT IS CONCERN ABOUT THE USAGE OF THOSE ORDERS AT THE [facility].  PATIENT IS REQUESTING A CALLBACK BECAUSE SHE NEEDS TO HAVE ELBERT MEDRANO SEND THE ORDER TO THE [facility] NOT [facility], BECAUSE THE ORDERS WOULD BE COVERED UNDER HER MEDICARE PART B AT THE [facility] WITH NO COPAY. PATIENT STATES THAT ERICK AT [facility] CAN HELP REGARDING FINANCIAL ORDERS [phone].    PATIENT STATES THAT THE LAST ORDER WAS RECEIVED BY THE [facility] ON 04/29/2021 AND SIGNED BY ERNIE HOLLIDAY.         How long have you been taking these medications: 10/28/2020    How long have you had these concerns: 05/12/2021

## 2021-05-13 NOTE — TELEPHONE ENCOUNTER
That's is an epic issue.  The only way I know how to do infusions is when the provider orders the infusion & we send to  [facility].    I looked & the pt had a Prolia infusion @  on 5/11/2021 & is scheduled again in November.

## 2021-05-13 NOTE — TELEPHONE ENCOUNTER
I think Mike may have just sent an automatic refill request. Usually the infusion center calls us if they need an order from us. I will let her know.

## 2021-05-24 DIAGNOSIS — J45.20 MILD INTERMITTENT ASTHMA WITHOUT COMPLICATION: ICD-10-CM

## 2021-05-24 DIAGNOSIS — K21.9 GASTROESOPHAGEAL REFLUX DISEASE WITHOUT ESOPHAGITIS: ICD-10-CM

## 2021-05-24 DIAGNOSIS — E03.9 ACQUIRED HYPOTHYROIDISM: ICD-10-CM

## 2021-05-26 RX ORDER — LEVOTHYROXINE SODIUM 0.05 MG/1
TABLET ORAL
Qty: 90 TABLET | Refills: 3 | OUTPATIENT
Start: 2021-05-26

## 2021-05-26 RX ORDER — MONTELUKAST SODIUM 10 MG/1
10 TABLET ORAL NIGHTLY
Qty: 90 TABLET | Refills: 3 | OUTPATIENT
Start: 2021-05-26

## 2021-05-26 RX ORDER — OMEPRAZOLE 40 MG/1
CAPSULE, DELAYED RELEASE ORAL
Qty: 90 CAPSULE | Refills: 3 | OUTPATIENT
Start: 2021-05-26

## 2021-06-09 NOTE — ASSESSMENT & PLAN NOTE
[facility] Daily Progress     Patient Name: Mehreen Camara  Date: 3/27/2017  Visit #:  - [facility]   PTA visit #: 1   Referral Diagnosis: Low back pain, hip pain  Referring provider: Danni Ortiz MD Dr. Jennifer Kendall Thomas   Visit Diagnosis:     ICD-10-CM    1. Sacral pain M53.3    2. Acute pain of left hip M25.552    3. Generalized muscle weakness M62.81    4. Chronic bilateral low back pain with right-sided sciatica M54.41     G89.29          Assessment:   Patient returns to PT today with c/o continued L hip and R LE radiating pain. Pain has improved some since last visit. Pt will be pursuing a steroid injection, which will likely benefit her given her slow progress in PT. She is however overall less tender with palpation of L hip mm. Patient is appropriate for continued skilled 1:1 PT services.    Goal Status:  Pt. will be independent with home exercise program in : 2 weeks  Pt will: walk indoors/outdoors for >10 minutes with pain <3/10 on NPRS to progress towards returning to exercise in 6 visits   Pt will: roll supine<>sidelying without increased L sided pain in order to improve sleep in 6 visits   Pt will: bend over to tie shoes in sitting with pain <3/10 on NPRS to asisst with dressing in 6 visits  Pt will: tolerate stand for >10 minutes without seated rest in order to assist with independent meal preparation in 6 visits     Plan / Patient Education:     Continue with initial plan of care.  Progress with home program as tolerated.  Plan to evaluate patient's shoulder next week and initiate shoulder treatment. Hold off on back treatment for 1-2 weeks post injection.    Subjective:       Pain Ratin  Patient reports she will be having an injection, she is unsure if it will be placed in her SI joint or lumbar spine. R sided sciatic pain has increased since last visit.     Objective:     Pain with palpation of L iliopsoas, although improved since last visit.  Non tender over L  Euthyroid on levothyroxine 50mcg QD #90, 3RF   greater trochanter, TrP and pain over L glut med at iliac crest.     Treatment Today     TREATMENT MINUTES COMMENTS   Evaluation     Self-care/ Home management     Manual therapy 24 L Iliopsoas release x 45 second hold x 5 reps total, proximal and distally. Tenderness present with this release.     Pectineus release x 45 sec hold x 3 reps.    Glut med TrP release x 45 seconds x 3 reps, STM over L gluteal and iliac crest.    Neuromuscular Re-education     Therapeutic Activity     Therapeutic Exercises 5 Performed:    - TrA sets x 10 reps x 10 second hold    Gait training     Modality__________________                Total 29    Blank areas are intentional and mean the treatment did not include these items.       Jennifer Diop  3/27/2017

## 2021-06-20 PROBLEM — D75.89 MACROCYTOSIS WITHOUT ANEMIA: Chronic | Status: ACTIVE | Noted: 2019-07-18

## 2021-06-20 PROBLEM — Z78.0 MENOPAUSE: Chronic | Status: ACTIVE | Noted: 2019-07-18

## 2021-06-20 PROBLEM — M25.551 CHRONIC RIGHT HIP PAIN: Chronic | Status: ACTIVE | Noted: 2019-07-18

## 2021-06-20 PROBLEM — D24.1: Chronic | Status: ACTIVE | Noted: 2020-02-23

## 2021-06-20 PROBLEM — I87.2 VENOUS INSUFFICIENCY OF LEFT LEG: Chronic | Status: ACTIVE | Noted: 2020-11-17

## 2021-06-20 PROBLEM — N60.12 FIBROCYSTIC DISEASE OF LEFT BREAST: Chronic | Status: ACTIVE | Noted: 2018-11-30

## 2021-06-20 PROBLEM — G89.29 CHRONIC RIGHT HIP PAIN: Chronic | Status: ACTIVE | Noted: 2019-07-18

## 2021-06-20 PROBLEM — G47.33 OSA (OBSTRUCTIVE SLEEP APNEA): Chronic | Status: ACTIVE | Noted: 2017-09-11

## 2021-06-20 PROBLEM — K57.30 DIVERTICULOSIS OF COLON: Chronic | Status: ACTIVE | Noted: 2019-11-16

## 2021-06-21 NOTE — PROGRESS NOTES
Chief Complaint   Patient presents with   • Right leg swelling     Acute        History of Present Illness  76 y.o.  woman presents for further eval of right leg swelling. Notes previous left leg swelling ended up being due to 5th metatarsal fx; had to been in a boot for 1 month. HPI started 1 week ago with right leg swelling, which is now decreased. She attributes it to knee pain, particularly at the inner aspect. Reports something moving inside her knee and also has known torn meniscus per patient. Has been doing PT exercises on her own and wants to see ortho. Possibly interested in surgery if it is going to help her.    Had knee injections in [facility] patient - had to go several weeks in a row, about series of 5 shots. Does not want to have to go to clinic that many times.    Only takes celebrex on occasion; not taking anything else for the pain. Not using creams. Takes turmeric.    Wants to see back surgeon. Has know spinal stenosis. States she cannot go shopping for long periods of time as she has to lean forward on a cart. Denies any shooting pains into the legs but still wants surgery.     States Prolia very expensive thr [place] [facility] and was told by the infusion center to just have RX sent there directly. Next Prolia not due until 10/14/21.    Review of Systems  ROS (+) for right leg swelling with inner right knee pain. Denies falls or known injuries. ROS (+) for chronic LBP without radiculopathy.  All other ROS reviewed and negative.      Current Outpatient Medications:   •  albuterol sulfate HFA (Ventolin HFA) 108 (90 Base) MCG/ACT inhaler prn  •  celecoxib (CeleBREX) 200 MG QD prn  •  fluticasone-salmeterol (Advair Diskus) 100-50 MCG/DOSE 1 puff BID  •  levothyroxine 50 MCG QD  •  Mirabegron ER (Myrbetriq) 50 MG qD  •  montelukast (Singulair) 10 MG QD  •   Pro cap bond and body factor QD  •  omeprazole (priLOSEC) 40 MG QD  •  Prolia 60 MG/ML solution q6  mos  •  traMADol (ULTRAM) 50 MG prn   •  TURMERIC CURCUMIN PO QD    VITALS:  /88   Pulse 82   Wt 82.3 kg (181 lb 6.4 oz)   SpO2 97%   BMI 30.19 kg/m²     Physical Exam  Vitals and nursing note reviewed.   Constitutional:       General: She is not in acute distress.     Appearance: Normal appearance. She is not ill-appearing.   Eyes:      Extraocular Movements: Extraocular movements intact.      Conjunctiva/sclera: Conjunctivae normal.   Pulmonary:      Effort: Pulmonary effort is normal. No respiratory distress.   Musculoskeletal:         General: Tenderness (right knee with mild swelling medial > lateral without inrceased warmth nor erythema; mod crepitus right knee; no tenderness reproducible with ROM) present.      Right lower leg: Edema (minimal swelling upper aspect of right lower leg compared to left leg; no swelling at the ankle nor foot) present.      Left lower leg: No edema.      Comments: bilat knees FROM   Neurological:      Mental Status: She is alert and oriented to person, place, and time. Mental status is at baseline.      Gait: Gait normal.   Psychiatric:         Mood and Affect: Mood normal.         Behavior: Behavior normal.         LABS  Results for orders placed or performed in visit on 04/25/21   COVID-19 PCR, [facility], NP SWAB IN [facility] VIRAL TRANSPORT MEDIA 24-30 HR TAT - Swab, Nasopharynx    Specimen: Nasopharynx; Swab   Result Value Ref Range    SARS-CoV-2 TACOS Not Detected Not Detected     11/13/20 LLE venous duplex - venous valvular insufficiency with severe reflux    ASSESSMENT/PLAN    Diagnoses and all orders for this visit:    1. Right leg swelling (Primary)  Comments:  superior aspect of right lower leg, adjacent to knee; no pedal edema; unlikely DVT; has varicose/spider veins    2. Chronic pain of right knee  Comments:  acute on chr R. knee pain, h/o torn meniscus, likely component of anserine bursitis; refuses formal PT, wants ortho referral  Orders:  -     Ambulatory  Referral to Orthopedic Surgery    3. Spinal stenosis of lumbar region without neurogenic claudication  Assessment & Plan:  Also has scoliosis, symptomatic with having to lean over when walking for extended time but currently no radicular sxs; has done PT previously, also with epidural injections in the past; has had consultation with Dr. Ding in the past and discussed she could f/u with Dr. Ding or seek consultation with [facility] neurosurg; she wants to address right knee issues first and then go back to neurosurg... somewhere      4. Hypertension  Assessment & Plan:  BP elevated today 146/88, likely related to acute right knee pain; no meds; goal BP < 130/80; f/u in 1 month at next wellness      5. Osteoporosis  Assessment & Plan:  Wants Prolia RX sent to infusion center in the future, next Prolia due around 10/14/21; discussed infusion enter usually sends me RX request via epic; she should call them to see whether they will send her reminder when next Prolia is due; cont Ca/vit D supplementation      -cont'd R. Knee: also recommend celebrex 200mg QD for the rest of the week for known inflammation and then go back to prn use; also rec ice for swelling and topical creams as well, such as capsaicin, aspercreme, icy hot, or biofreeze    FOLLOW-UP  1. Health maintenance - COVID19 vacc done  2. RTC for next wellness 7/29/21; fasting labs prior to appt (CBC, CMP, TSH, lipids, UA/micr, FT4, vit D, HCV)    Electronically signed by:    Lorene Isaac MD, FACP  06/22/2021

## 2021-06-21 NOTE — ASSESSMENT & PLAN NOTE
BP elevated today 146/88, likely related to acute right knee pain; no meds; goal BP < 130/80; f/u in 1 month at next wellness

## 2021-06-22 ENCOUNTER — OFFICE VISIT (OUTPATIENT)
Dept: INTERNAL MEDICINE | Facility: CLINIC | Age: 76
End: 2021-06-22

## 2021-06-22 VITALS
WEIGHT: 181.4 LBS | SYSTOLIC BLOOD PRESSURE: 146 MMHG | HEART RATE: 82 BPM | OXYGEN SATURATION: 97 % | DIASTOLIC BLOOD PRESSURE: 88 MMHG | BODY MASS INDEX: 30.19 KG/M2

## 2021-06-22 DIAGNOSIS — M81.0 AGE-RELATED OSTEOPOROSIS WITHOUT CURRENT PATHOLOGICAL FRACTURE: Chronic | ICD-10-CM

## 2021-06-22 DIAGNOSIS — G89.29 CHRONIC PAIN OF RIGHT KNEE: ICD-10-CM

## 2021-06-22 DIAGNOSIS — I10 ESSENTIAL HYPERTENSION: Chronic | ICD-10-CM

## 2021-06-22 DIAGNOSIS — M79.89 RIGHT LEG SWELLING: Primary | ICD-10-CM

## 2021-06-22 DIAGNOSIS — M48.061 SPINAL STENOSIS OF LUMBAR REGION WITHOUT NEUROGENIC CLAUDICATION: Chronic | ICD-10-CM

## 2021-06-22 DIAGNOSIS — M25.561 CHRONIC PAIN OF RIGHT KNEE: ICD-10-CM

## 2021-06-22 PROCEDURE — 99214 OFFICE O/P EST MOD 30 MIN: CPT | Performed by: INTERNAL MEDICINE

## 2021-06-23 NOTE — ASSESSMENT & PLAN NOTE
Also has scoliosis, symptomatic with having to lean over when walking for extended time but currently no radicular sxs; has done PT previously, also with epidural injections in the past; has had consultation with Dr. Ding in the past and discussed she could f/u with Dr. Ding or seek consultation with [facility] neuro[facility]; she wants to address right knee issues first and then go back to neurosurg... somewhere

## 2021-06-23 NOTE — ASSESSMENT & PLAN NOTE
Wants Prolia RX sent to infusion center in the future, next Prolia due around 10/14/21; discussed infusion enter usually sends me RX request via epic; she should call them to see whether they will send her reminder when next Prolia is due; cont Ca/vit D supplementation

## 2021-07-11 PROBLEM — K22.2 SCHATZKI'S RING: Status: ACTIVE | Noted: 2021-07-11

## 2021-07-11 PROBLEM — J39.2 CRICOPHARYNGEAL SPASM: Status: ACTIVE | Noted: 2021-07-11

## 2021-07-13 ENCOUNTER — HOSPITAL ENCOUNTER (INPATIENT)
Facility: HOSPITAL | Age: 76
LOS: 3 days | Discharge: HOME HEALTH | End: 2021-07-16
Attending: INTERNAL MEDICINE | Admitting: INTERNAL MEDICINE

## 2021-07-13 ENCOUNTER — APPOINTMENT (OUTPATIENT)
Dept: CT IMAGING | Facility: HOSPITAL | Age: 76
End: 2021-07-13

## 2021-07-13 DIAGNOSIS — I63.9 CEREBROVASCULAR ACCIDENT (CVA), UNSPECIFIED MECHANISM (HCC): ICD-10-CM

## 2021-07-13 DIAGNOSIS — J45.20 MILD INTERMITTENT ASTHMA WITHOUT COMPLICATION: ICD-10-CM

## 2021-07-13 DIAGNOSIS — R13.10 DYSPHAGIA, UNSPECIFIED TYPE: Primary | ICD-10-CM

## 2021-07-13 PROBLEM — R09.89 SUSPECTED CEREBROVASCULAR ACCIDENT (CVA): Status: ACTIVE | Noted: 2021-07-13

## 2021-07-13 PROCEDURE — 0 IOPAMIDOL PER 1 ML: Performed by: INTERNAL MEDICINE

## 2021-07-13 PROCEDURE — 99291 CRITICAL CARE FIRST HOUR: CPT | Performed by: INTERNAL MEDICINE

## 2021-07-13 PROCEDURE — 0042T HC CT CEREBRAL PERFUSION W/WO CONTRAST: CPT

## 2021-07-13 PROCEDURE — 87636 SARSCOV2 & INF A&B AMP PRB: CPT | Performed by: INTERNAL MEDICINE

## 2021-07-13 PROCEDURE — 99223 1ST HOSP IP/OBS HIGH 75: CPT | Performed by: PSYCHIATRY & NEUROLOGY

## 2021-07-13 PROCEDURE — 4A03X5D MEASUREMENT OF ARTERIAL FLOW, INTRACRANIAL, EXTERNAL APPROACH: ICD-10-PCS | Performed by: RADIOLOGY

## 2021-07-13 PROCEDURE — 70450 CT HEAD/BRAIN W/O DYE: CPT

## 2021-07-13 PROCEDURE — 70496 CT ANGIOGRAPHY HEAD: CPT

## 2021-07-13 PROCEDURE — 70498 CT ANGIOGRAPHY NECK: CPT

## 2021-07-13 RX ORDER — SODIUM CHLORIDE 0.9 % (FLUSH) 0.9 %
10 SYRINGE (ML) INJECTION AS NEEDED
Status: DISCONTINUED | OUTPATIENT
Start: 2021-07-13 | End: 2021-07-16 | Stop reason: HOSPADM

## 2021-07-13 RX ORDER — LABETALOL HYDROCHLORIDE 5 MG/ML
20 INJECTION, SOLUTION INTRAVENOUS
Status: DISCONTINUED | OUTPATIENT
Start: 2021-07-13 | End: 2021-07-16 | Stop reason: HOSPADM

## 2021-07-13 RX ORDER — ATORVASTATIN CALCIUM 40 MG/1
80 TABLET, FILM COATED ORAL NIGHTLY
Status: DISCONTINUED | OUTPATIENT
Start: 2021-07-13 | End: 2021-07-16 | Stop reason: HOSPADM

## 2021-07-13 RX ORDER — SODIUM CHLORIDE 0.9 % (FLUSH) 0.9 %
10 SYRINGE (ML) INJECTION EVERY 12 HOURS SCHEDULED
Status: DISCONTINUED | OUTPATIENT
Start: 2021-07-13 | End: 2021-07-16 | Stop reason: HOSPADM

## 2021-07-13 RX ADMIN — SODIUM CHLORIDE, PRESERVATIVE FREE 10 ML: 5 INJECTION INTRAVENOUS at 22:30

## 2021-07-13 RX ADMIN — IOPAMIDOL 115 ML: 755 INJECTION, SOLUTION INTRAVENOUS at 22:53

## 2021-07-14 ENCOUNTER — APPOINTMENT (OUTPATIENT)
Dept: CT IMAGING | Facility: HOSPITAL | Age: 76
End: 2021-07-14

## 2021-07-14 ENCOUNTER — APPOINTMENT (OUTPATIENT)
Dept: MRI IMAGING | Facility: HOSPITAL | Age: 76
End: 2021-07-14

## 2021-07-14 ENCOUNTER — APPOINTMENT (OUTPATIENT)
Dept: CARDIOLOGY | Facility: HOSPITAL | Age: 76
End: 2021-07-14

## 2021-07-14 PROBLEM — I63.9 CVA (CEREBRAL VASCULAR ACCIDENT): Status: ACTIVE | Noted: 2021-07-14

## 2021-07-14 LAB
BH CV ECHO MEAS - AO MAX PG (FULL): 2.2 MMHG
BH CV ECHO MEAS - AO MAX PG: 4.9 MMHG
BH CV ECHO MEAS - AO MEAN PG (FULL): 2 MMHG
BH CV ECHO MEAS - AO MEAN PG: 3 MMHG
BH CV ECHO MEAS - AO ROOT AREA (BSA CORRECTED): 1.7
BH CV ECHO MEAS - AO ROOT AREA: 8 CM^2
BH CV ECHO MEAS - AO ROOT DIAM: 3.2 CM
BH CV ECHO MEAS - AO V2 MAX: 111 CM/SEC
BH CV ECHO MEAS - AO V2 MEAN: 83.3 CM/SEC
BH CV ECHO MEAS - AO V2 VTI: 25.2 CM
BH CV ECHO MEAS - AVA(I,A): 2.2 CM^2
BH CV ECHO MEAS - AVA(I,D): 2.2 CM^2
BH CV ECHO MEAS - AVA(V,A): 2.3 CM^2
BH CV ECHO MEAS - AVA(V,D): 2.3 CM^2
BH CV ECHO MEAS - BSA(HAYCOCK): 1.9 M^2
BH CV ECHO MEAS - BSA: 1.9 M^2
BH CV ECHO MEAS - BZI_BMI: 28.6 KILOGRAMS/M^2
BH CV ECHO MEAS - BZI_METRIC_HEIGHT: 165.1 CM
BH CV ECHO MEAS - BZI_METRIC_WEIGHT: 78 KG
BH CV ECHO MEAS - EDV(CUBED): 77.3 ML
BH CV ECHO MEAS - EDV(MOD-SP2): 50 ML
BH CV ECHO MEAS - EDV(MOD-SP4): 56 ML
BH CV ECHO MEAS - EDV(TEICH): 81.3 ML
BH CV ECHO MEAS - EF(CUBED): 66.5 %
BH CV ECHO MEAS - EF(MOD-BP): 57 %
BH CV ECHO MEAS - EF(MOD-SP2): 64 %
BH CV ECHO MEAS - EF(MOD-SP4): 53.6 %
BH CV ECHO MEAS - EF(TEICH): 58.3 %
BH CV ECHO MEAS - ESV(CUBED): 25.9 ML
BH CV ECHO MEAS - ESV(MOD-SP2): 18 ML
BH CV ECHO MEAS - ESV(MOD-SP4): 26 ML
BH CV ECHO MEAS - ESV(TEICH): 33.9 ML
BH CV ECHO MEAS - FS: 30.5 %
BH CV ECHO MEAS - IVS/LVPW: 1.2
BH CV ECHO MEAS - IVSD: 1.1 CM
BH CV ECHO MEAS - LA DIMENSION: 3.1 CM
BH CV ECHO MEAS - LA/AO: 0.97
BH CV ECHO MEAS - LAD MAJOR: 3.7 CM
BH CV ECHO MEAS - LAT PEAK E' VEL: 5.5 CM/SEC
BH CV ECHO MEAS - LATERAL E/E' RATIO: 12.3
BH CV ECHO MEAS - LV DIASTOLIC VOL/BSA (35-75): 30.2 ML/M^2
BH CV ECHO MEAS - LV MASS(C)D: 132.6 GRAMS
BH CV ECHO MEAS - LV MASS(C)DI: 71.4 GRAMS/M^2
BH CV ECHO MEAS - LV MAX PG: 2.7 MMHG
BH CV ECHO MEAS - LV MEAN PG: 1 MMHG
BH CV ECHO MEAS - LV SYSTOLIC VOL/BSA (12-30): 14 ML/M^2
BH CV ECHO MEAS - LV V1 MAX: 82.7 CM/SEC
BH CV ECHO MEAS - LV V1 MEAN: 50.6 CM/SEC
BH CV ECHO MEAS - LV V1 VTI: 17.5 CM
BH CV ECHO MEAS - LVIDD: 4.3 CM
BH CV ECHO MEAS - LVIDS: 3 CM
BH CV ECHO MEAS - LVLD AP2: 6.1 CM
BH CV ECHO MEAS - LVLD AP4: 6.2 CM
BH CV ECHO MEAS - LVLS AP2: 4.7 CM
BH CV ECHO MEAS - LVLS AP4: 5.5 CM
BH CV ECHO MEAS - LVOT AREA (M): 3.1 CM^2
BH CV ECHO MEAS - LVOT AREA: 3.1 CM^2
BH CV ECHO MEAS - LVOT DIAM: 2 CM
BH CV ECHO MEAS - LVPWD: 0.87 CM
BH CV ECHO MEAS - MED PEAK E' VEL: 7.5 CM/SEC
BH CV ECHO MEAS - MEDIAL E/E' RATIO: 9.1
BH CV ECHO MEAS - MV A MAX VEL: 85.9 CM/SEC
BH CV ECHO MEAS - MV DEC TIME: 0.2 SEC
BH CV ECHO MEAS - MV E MAX VEL: 68.1 CM/SEC
BH CV ECHO MEAS - MV E/A: 0.79
BH CV ECHO MEAS - PA ACC SLOPE: 502 CM/SEC^2
BH CV ECHO MEAS - PA ACC TIME: 0.13 SEC
BH CV ECHO MEAS - PA MAX PG: 2.7 MMHG
BH CV ECHO MEAS - PA PR(ACCEL): 18.7 MMHG
BH CV ECHO MEAS - PA V2 MAX: 81.4 CM/SEC
BH CV ECHO MEAS - RAP SYSTOLE: 3 MMHG
BH CV ECHO MEAS - RVSP: 33 MMHG
BH CV ECHO MEAS - SI(AO): 109.2 ML/M^2
BH CV ECHO MEAS - SI(CUBED): 27.7 ML/M^2
BH CV ECHO MEAS - SI(LVOT): 29.6 ML/M^2
BH CV ECHO MEAS - SI(MOD-SP2): 17.2 ML/M^2
BH CV ECHO MEAS - SI(MOD-SP4): 16.2 ML/M^2
BH CV ECHO MEAS - SI(TEICH): 25.5 ML/M^2
BH CV ECHO MEAS - SV(AO): 202.7 ML
BH CV ECHO MEAS - SV(CUBED): 51.4 ML
BH CV ECHO MEAS - SV(LVOT): 55 ML
BH CV ECHO MEAS - SV(MOD-SP2): 32 ML
BH CV ECHO MEAS - SV(MOD-SP4): 30 ML
BH CV ECHO MEAS - SV(TEICH): 47.4 ML
BH CV ECHO MEAS - TR MAX PG: 30 MMHG
BH CV ECHO MEAS - TR MAX VEL: 263.3 CM/SEC
BH CV ECHO MEASUREMENTS AVERAGE E/E' RATIO: 10.48
BH CV VAS BP RIGHT ARM: NORMAL MMHG
FLUAV RNA RESP QL NAA+PROBE: NOT DETECTED
FLUBV RNA RESP QL NAA+PROBE: NOT DETECTED
GLUCOSE BLDC GLUCOMTR-MCNC: 102 MG/DL (ref 70–130)
GLUCOSE BLDC GLUCOMTR-MCNC: 151 MG/DL (ref 70–130)
GLUCOSE BLDC GLUCOMTR-MCNC: 66 MG/DL (ref 70–130)
GLUCOSE BLDC GLUCOMTR-MCNC: 78 MG/DL (ref 70–130)
GLUCOSE BLDC GLUCOMTR-MCNC: 97 MG/DL (ref 70–130)
HBA1C MFR BLD: 5.8 % (ref 4.8–5.6)
LEFT ATRIUM VOLUME INDEX: 13.5 ML/M^2
LEFT ATRIUM VOLUME: 25 ML
SARS-COV-2 RNA RESP QL NAA+PROBE: NOT DETECTED

## 2021-07-14 PROCEDURE — 94640 AIRWAY INHALATION TREATMENT: CPT

## 2021-07-14 PROCEDURE — 70551 MRI BRAIN STEM W/O DYE: CPT

## 2021-07-14 PROCEDURE — 82962 GLUCOSE BLOOD TEST: CPT

## 2021-07-14 PROCEDURE — 93306 TTE W/DOPPLER COMPLETE: CPT

## 2021-07-14 PROCEDURE — 70450 CT HEAD/BRAIN W/O DYE: CPT

## 2021-07-14 PROCEDURE — 99232 SBSQ HOSP IP/OBS MODERATE 35: CPT | Performed by: INTERNAL MEDICINE

## 2021-07-14 PROCEDURE — 97161 PT EVAL LOW COMPLEX 20 MIN: CPT

## 2021-07-14 PROCEDURE — 92610 EVALUATE SWALLOWING FUNCTION: CPT

## 2021-07-14 PROCEDURE — 97530 THERAPEUTIC ACTIVITIES: CPT

## 2021-07-14 PROCEDURE — 97165 OT EVAL LOW COMPLEX 30 MIN: CPT

## 2021-07-14 PROCEDURE — 83036 HEMOGLOBIN GLYCOSYLATED A1C: CPT | Performed by: NURSE PRACTITIONER

## 2021-07-14 PROCEDURE — 94799 UNLISTED PULMONARY SVC/PX: CPT

## 2021-07-14 RX ORDER — MAGNESIUM SULFATE HEPTAHYDRATE 40 MG/ML
2 INJECTION, SOLUTION INTRAVENOUS AS NEEDED
Status: DISCONTINUED | OUTPATIENT
Start: 2021-07-14 | End: 2021-07-16 | Stop reason: HOSPADM

## 2021-07-14 RX ORDER — SODIUM CHLORIDE 9 MG/ML
75 INJECTION, SOLUTION INTRAVENOUS CONTINUOUS
Status: DISCONTINUED | OUTPATIENT
Start: 2021-07-14 | End: 2021-07-14

## 2021-07-14 RX ORDER — CLOPIDOGREL BISULFATE 75 MG/1
75 TABLET ORAL DAILY
Status: DISCONTINUED | OUTPATIENT
Start: 2021-07-14 | End: 2021-07-16 | Stop reason: HOSPADM

## 2021-07-14 RX ORDER — ACETAMINOPHEN 325 MG/1
650 TABLET ORAL EVERY 6 HOURS PRN
Status: DISCONTINUED | OUTPATIENT
Start: 2021-07-14 | End: 2021-07-16 | Stop reason: HOSPADM

## 2021-07-14 RX ORDER — LEVOTHYROXINE SODIUM 0.05 MG/1
50 TABLET ORAL
Status: DISCONTINUED | OUTPATIENT
Start: 2021-07-14 | End: 2021-07-16 | Stop reason: HOSPADM

## 2021-07-14 RX ORDER — MAGNESIUM SULFATE HEPTAHYDRATE 40 MG/ML
4 INJECTION, SOLUTION INTRAVENOUS AS NEEDED
Status: DISCONTINUED | OUTPATIENT
Start: 2021-07-14 | End: 2021-07-16 | Stop reason: HOSPADM

## 2021-07-14 RX ORDER — ALBUTEROL SULFATE 2.5 MG/3ML
2.5 SOLUTION RESPIRATORY (INHALATION) EVERY 6 HOURS PRN
Status: DISCONTINUED | OUTPATIENT
Start: 2021-07-14 | End: 2021-07-16 | Stop reason: HOSPADM

## 2021-07-14 RX ORDER — DEXTROSE AND SODIUM CHLORIDE 5; .9 G/100ML; G/100ML
75 INJECTION, SOLUTION INTRAVENOUS CONTINUOUS
Status: DISCONTINUED | OUTPATIENT
Start: 2021-07-14 | End: 2021-07-15

## 2021-07-14 RX ORDER — DEXTROSE MONOHYDRATE 25 G/50ML
INJECTION, SOLUTION INTRAVENOUS
Status: COMPLETED
Start: 2021-07-14 | End: 2021-07-14

## 2021-07-14 RX ORDER — POTASSIUM CHLORIDE 7.45 MG/ML
10 INJECTION INTRAVENOUS
Status: DISCONTINUED | OUTPATIENT
Start: 2021-07-14 | End: 2021-07-16 | Stop reason: HOSPADM

## 2021-07-14 RX ORDER — PANTOPRAZOLE SODIUM 40 MG/10ML
40 INJECTION, POWDER, LYOPHILIZED, FOR SOLUTION INTRAVENOUS
Status: DISCONTINUED | OUTPATIENT
Start: 2021-07-14 | End: 2021-07-16

## 2021-07-14 RX ORDER — BUDESONIDE AND FORMOTEROL FUMARATE DIHYDRATE 160; 4.5 UG/1; UG/1
2 AEROSOL RESPIRATORY (INHALATION)
Status: DISCONTINUED | OUTPATIENT
Start: 2021-07-14 | End: 2021-07-16 | Stop reason: HOSPADM

## 2021-07-14 RX ADMIN — SODIUM CHLORIDE 75 ML/HR: 9 INJECTION, SOLUTION INTRAVENOUS at 11:05

## 2021-07-14 RX ADMIN — LEVOTHYROXINE SODIUM 50 MCG: 50 TABLET ORAL at 10:56

## 2021-07-14 RX ADMIN — DEXTROSE AND SODIUM CHLORIDE 75 ML/HR: 5; 900 INJECTION, SOLUTION INTRAVENOUS at 19:50

## 2021-07-14 RX ADMIN — ACETAMINOPHEN 650 MG: 325 TABLET ORAL at 20:08

## 2021-07-14 RX ADMIN — SODIUM CHLORIDE, PRESERVATIVE FREE 10 ML: 5 INJECTION INTRAVENOUS at 20:00

## 2021-07-14 RX ADMIN — CLOPIDOGREL BISULFATE 75 MG: 75 TABLET ORAL at 22:58

## 2021-07-14 RX ADMIN — BUDESONIDE AND FORMOTEROL FUMARATE DIHYDRATE 2 PUFF: 160; 4.5 AEROSOL RESPIRATORY (INHALATION) at 01:52

## 2021-07-14 RX ADMIN — DEXTROSE MONOHYDRATE 50 ML: 500 INJECTION PARENTERAL at 19:50

## 2021-07-14 RX ADMIN — ATORVASTATIN CALCIUM 80 MG: 40 TABLET, FILM COATED ORAL at 20:08

## 2021-07-14 RX ADMIN — BUDESONIDE AND FORMOTEROL FUMARATE DIHYDRATE 2 PUFF: 160; 4.5 AEROSOL RESPIRATORY (INHALATION) at 20:34

## 2021-07-14 RX ADMIN — BUDESONIDE AND FORMOTEROL FUMARATE DIHYDRATE 2 PUFF: 160; 4.5 AEROSOL RESPIRATORY (INHALATION) at 07:07

## 2021-07-14 RX ADMIN — SODIUM CHLORIDE, PRESERVATIVE FREE 10 ML: 5 INJECTION INTRAVENOUS at 10:56

## 2021-07-14 RX ADMIN — PANTOPRAZOLE SODIUM 40 MG: 40 INJECTION, POWDER, FOR SOLUTION INTRAVENOUS at 10:56

## 2021-07-14 NOTE — CASE MANAGEMENT/SOCIAL WORK
Discharge Planning Assessment  [facility]     Patient Name: Rebecca Sharma  MRN: [id number]  Today's Date: 7/14/2021    Admit Date: 7/13/2021    Discharge Needs Assessment     Row Name 07/14/21 1240       Living Environment    Lives With  alone    Current Living Arrangements  home/apartment/condo Lives in [place]    Primary Care Provided by  self    Provides Primary Care For  no one    Family Caregiver if Needed  friend(s)    Family Caregiver Names  Moncho Hicksne-friend    Quality of Family Relationships  helpful;involved;supportive    Able to Return to Prior Arrangements  yes       Resource/Environmental Concerns    Resource/Environmental Concerns  none    Transportation Concerns  car, none       Transition Planning    Patient/Family Anticipates Transition to  inpatient rehabilitation facility    Patient/Family Anticipated Services at Transition  skilled nursing    Transportation Anticipated  family or friend will provide       Discharge Needs Assessment    Equipment Currently Used at Home  cane, straight; cpap with Pt. Aides Doesn't use cane all the time.    Concerns to be Addressed  discharge planning    Anticipated Changes Related to Illness  inability to care for self    Equipment Needed After Discharge  none    Outpatient/Agency/Support Group Needs  skilled nursing facility    Provided Post Acute Provider List?  Yes    Post Acute Provider List  Inpatient Rehab    Delivered To  Patient    Method of Delivery  In person    Patient's Choice of Community Agency(s)  [facility]    Current Discharge Risk  lives alone        Discharge Plan     Row Name 07/14/21 1240       Plan    Plan  Rehab    Plan Comments  Pt was transferred from [facility] for stroke. Met with her at . She lives alone in [place]. She has a friend Luis Angel and a sister who live in [place] and can assist if needed. She is independent at baseline but will use a cane prn. She has a cpap with Pt. Aides. Discussed that  PT recommended inpatient rehab at d/c, pt walked 10 feet. She is agreeable to rehab but would like to stay close to home and would like a referral to [facility] [place]. Called referral to Maria Luz at the [facility] and she will follow. Marlene ext. [phone]        Continued Care and Services - Admitted Since 7/13/2021     Destination     Service Provider Request Status Selected Services Address Phone Fax Patient Preferred    [facility] [place] [facility] [place]  Pending - No [facility] Sent N/A [address] [facility] [address] [phone] --              Expected Discharge Date and Time     Expected Discharge Date Expected Discharge Time    Jul 19, 2021         Demographic Summary     Row Name 07/14/21 1236       General Information    Referral Source  admission list    Preferred Language  English     Used During This Interaction  no    General Information Comments  PCP is Lorene Isaac.       Contact Information    Permission Granted to Share Info With  ;family/designee Friend-Charlie Mayne, sister Adriana Sharma    Contact Information Comments  POA is Charles Mayne-friend/Adriana Sharma-sister/Dennis Mayne-nephew.        Functional Status     Row Name 07/14/21 1238       Functional Status    Usual Activity Tolerance  good    Current Activity Tolerance  -- See PT notes       Functional Status, IADL    Medications  independent    Meal Preparation  independent    Housekeeping  independent    Laundry  independent    Shopping  independent       Employment/    Employment Status  retired    Employment/ Comments  Has Medicare [employer]B and [employer] secondary. A couple meds are expensive but she is able to afford. Has [employer] script coverage.        Psychosocial    No documentation.       Abuse/Neglect    No documentation.       Legal    No documentation.       Substance Abuse    No documentation.       Patient Forms    No documentation.           Marlene Wall, RN

## 2021-07-14 NOTE — PLAN OF CARE
Goal Outcome Evaluation:  Plan of Care Reviewed With: patient        Progress: improving    NIH 4. Facial droop, slurred speech, dysarthic, decrease sensation on the right. Up to chair. Vss, nsr, RA. Failed swallow eval.  Fees scheduled for am.  Adequate UOP.

## 2021-07-14 NOTE — PLAN OF CARE
Goal Outcome Evaluation:  Plan of Care Reviewed With: patient           Outcome Summary: OT eval complete. Pt is Min A for bed mobility, Dep for toileting ADLs, and Supervision for LBD tasks. Pt limited d/t decreased balance and endurance from baseline. Recommend cont skilled IPOT POC. Recommend pt DC to IP rehab based on current level of performance.

## 2021-07-14 NOTE — PROGRESS NOTES
Critical Care Note     LOS: 1 day   Patient Care Team:  Lorene Isaac MD as PCP - General  Lorene Isaac MD as PCP - Internal Medicine  Bechema, Brittanie Suarez MD as Consulting Physician (Obstetrics and Gynecology)  Gigi Esparza MD as Consulting Physician (Gastroenterology)  Kartik Gold MD as Consulting Physician (General Surgery)  Tyrone Tate MD as Consulting Physician (Ophthalmology)  Arik Keane MD as Consulting Physician (Otolaryngology)  Anastacio Dickinson MD as Consulting Physician (Otolaryngology)  Mario Ding MD as Consulting Physician (Neurosurgery)  Travis Beckham MD as Consulting Physician (General Surgery)  Mingo Cerrato MD as Consulting Physician (Gastroenterology)    Chief Complaint/Reason for visit:    Ischemic CVA    Subjective     76-year-old woman with sleep apnea, hypertension, dyslipidemia, GERD transferred from [facility] with onset of right-sided weakness expressive aphasia facial droop and numbness around 1930 on July 13, 2021.  She was given TPA.  On arrival to [facility] her NIH stroke score was a 2.  She reports a history of asthma diagnosed when she was in her 50s.  She currently uses Advair 100, 1 puff twice daily.    Interval History:     She remains afebrile.  Systolic blood pressure is 137-176.  Heart rate is 97 in sinus rhythm.  Room air saturation is 97%.  NIH stroke score is a 4.  She did participate with occupational therapy.  She needs minimal assist for bed mobility.  She was noted to have some decreased balance and endurance.  She did ambulate 10 feet with physical therapy and was noted to have a mild instability on her balance test.  Speech therapy recommended continued n.p.o. with plans for a fees tomorrow    Review of Systems:    All systems were reviewed and negative except as noted in subjective.    Medical history, surgical history, social history, family history reviewed    Objective  "    Intake/Output:    Intake/Output Summary (Last 24 hours) at 7/14/2021 1545  Last data filed at 7/14/2021 0800  Gross per 24 hour   Intake --   Output 1000 ml   Net -1000 ml       Nutrition:  NPO Diet    Infusions:  niCARdipine, 5-15 mg/hr  sodium chloride, 75 mL/hr, Last Rate: 75 mL/hr (07/14/21 1105)        Telemetry: Sinus rhythm             Vital Signs  Blood pressure 134/74, pulse 64, temperature 98 °F (36.7 °C), temperature source Oral, resp. rate 18, height 165.1 cm (65\"), weight 78.1 kg (172 lb 2.9 oz), SpO2 95 %.    Physical Exam:  General Appearance:   Pleasant older woman sitting in the chair in no acute distress   Head:   Normocephalic, atraumatic.   Eyes:          Pupils are equal and reactive to light.  Extraocular movements intact.  No jaundice   Ears:     Throat:  Oral mucosa moist   Neck:  Trachea midline, no palpable thyroid.   Back:      Lungs:    Symmetric chest expansion.  Breath sounds are bilateral without wheeze or rhonchi    Heart:   Regular rhythm, S1, S2 auscultated, no murmur   Abdomen:    Nondistended, bowel sounds present, soft   Rectal:   Deferred   Extremities:  No pretibial edema or cyanosis   Pulses:  Pedal pulses present   Skin:  Warm and dry   Lymph nodes:  No cervical adenopathy   Neurologic:  Mild dysarthria, right facial droop, expressive aphasia, no motor drift upper or lower extremities      Interval:  (SHIFT CHANGE)  1a. Level of Consciousness: 0-->Alert, keenly responsive  1b. LOC Questions: 0-->Answers both questions correctly  1c. LOC Commands: 0-->Performs both tasks correctly  2. Best Gaze: 0-->Normal  3. Visual: 0-->No visual loss  4. Facial Palsy: 1-->Minor paralysis (flattened nasolabial fold, asymmetry on smiling)  5a. Motor Arm, Left: 0-->No drift, limb holds 90 (or 45) degrees for full 10 secs  5b. Motor Arm, Right: 0-->No drift, limb holds 90 (or 45) degrees for full 10 secs  6a. Motor Leg, Left: 0-->No drift, leg holds 30 degree position for full 5 secs  6b. " Motor Leg, Right: 0-->No drift, leg holds 30 degree position for full 5 secs  7. Limb Ataxia: 0-->Absent  8. Sensory: 1-->Mild-to-moderate sensory loss, patient feels pinprick is less sharp or is dull on the affected side, or there is a loss of superficial pain with pinprick, but patient is aware of being touched  9. Best Language: 1-->Mild-to-moderate aphasia, some obvious loss of fluency or facility of comprehension, without significant limitation on ideas expressed or form of expression. Reduction of speech and/or comprehension, however, makes conversation. . . (see row details)  10. Dysarthria: 1-->Mild-to-moderate dysarthria, patient slurs at least some words and, at worst, can be understood with some difficulty  11. Extinction and Inattention (formerly Neglect): 0-->No abnormality    Total (NIH Stroke Scale): 4  Results Review:     I reviewed the patient's new clinical results.   Outside hospital, A1c 5.8, glucose 84, BUN 18, creatinine 0.87, potassium 5, bicarbonate 24, calcium 10, LFTs normal        Invalid input(s): LABALBU, PROT        Invalid input(s): NEUTOPHILPCT,  EOSPCT      No results found for: BLOODCX  No results found for: URINECX    I reviewed the patient's new imaging including images and reports.  COMPARISON: NONE     FINDINGS: There is a somewhat confluent area restricted diffusion  compatible with acute infarct noted involving the left parietal and  posterior temporal region with smaller adjacent areas of cortical and  subcortical diffusion restriction compatible with likely left posterior  MCA territory ischemia and acute infarct. There is no evidence of  associated mass effect or hemorrhage. The ventricles are normal in size  and configuration. There is no mass or mass effect. The orbits are  unremarkable and the paranasal sinuses are grossly clear. Intracranial  arterial flow voids are maintained.     IMPRESSION:  Scattered areas of acute infarct noted involving the left  posterior frontal  lobe cortex, left parietal lobe cortex and left  posterior temporal lobe. No associated mass effect or hemorrhage.        This report was finalized on 7/14/2021 8:52 AM by Gilmar Tripathi.         All medications reviewed.   atorvastatin, 80 mg, Oral, Nightly  budesonide-formoterol, 2 puff, Inhalation, BID - RT  levothyroxine, 50 mcg, Oral, Q AM  pantoprazole, 40 mg, Intravenous, Q AM  sodium chloride, 10 mL, Intravenous, Q12H          Assessment/Plan       Suspected cerebrovascular accident (CVA)    Hyperlipidemia    Hypertension    Gastroesophageal reflux disease     ADEN (obstructive sleep apnea)    CVA (cerebral vascular accident) (CMS/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  Current NIH stroke score is a 4 for some mild right-sided weakness and expressive aphasia.    #2 hypertension, systolic blood pressure 137-176 overnight.    #3 GERD with recent H. pylori treatment    #4 hypothyroidism on replacement therapy    #5 history of asthma currently without active wheezing      PLAN:    Monitor NIH stroke scale for any deterioration  N.p.o. for fees tomorrow  Continue saline  Stop Accu-Cheks with A1c of 5.8  Repeat CT scan in 24 hours  Resume thyroid replacement  Start proton pump inhibitor  Check thyroid profile  Start Symbicort 2 substitute for her home medication Advair    VTE Prophylaxis: compression stockings    Stress Ulcer Prophylaxis: PPI    Rossy Pickett MD  07/14/21  15:45 EDT      Time: 25min

## 2021-07-14 NOTE — CONSULTS
Stroke Consult Note    Patient Name: Rebecca Sharma   MRN: [id number]  Age: 76 y.o.  Sex: female  : 1945    Primary Care Physician: Lorene Isaac MD  Referring Physician:  Mahendra Foote MD    Handedness: Right  Race:     Chief Complaint/Reason for Consultation: Expressive aphasia, right facial droop    Subjective .  HPI: Rebecca Sharma is a 76-year-old, , right-handed female with known diagnosis of HTN, HLD, ADEN, venous insufficiency of the lower extremity, mild cognitive disorder, hypothyroidism who [facility] evaluated for strokelike symptoms.  Patient states that while sitting in her living room at 1930 this evening she suddenly was unable to use her right hand; she then developed right facial droop and speech difficulties.  EMS had reported right hemiparesis upon their arrival.  OSH ED physician reported that right-sided weakness had resolved, but patient continued to have right facial droop, right facial numbness and expressive aphasia, NIHSS 3, CT head was obtained and negative for acute abnormality.  Given that patient was well within the time window and without contraindications patient received IV TPA at OSH prior to transfer.  Patient was transferred via air methods to [facility] for advanced imaging and stroke work-up.    Upon exam and CT scan patient is alert and oriented x3.  Her speech is mildly dysarthric, she does have some mild expressive aphasia with some obvious loss of fluency though she is able to correctly named objects.  Right facial droop is present, tongue protrudes midline.  Gaze is normal, PERRLA, patient does have right visual field deficit.  No drift in any extremity.  Decreased sensation to right face as compared to left.  Sensation is equal in bilateral upper and lower extremities.  TPA completed shortly after her arrival at 2240    Last Known Normal Date/Time: 19:30 EST     Review of Systems    Constitutional: Negative for chills and fever.   HENT: Negative.    Eyes: Negative for visual disturbance.   Respiratory: Negative for cough and shortness of breath.    Cardiovascular: Negative for chest pain and palpitations.   Gastrointestinal: Negative for nausea and vomiting.   Musculoskeletal: Negative.    Skin: Negative.    Neurological: Positive for facial asymmetry, speech difficulty, weakness and numbness.   Psychiatric/Behavioral: Negative.       Past Medical History:   Diagnosis Date   • Abnormal MRI, breast 01/03/2020    breast MRI (1/3/20): 1.1cm R. nipple mass sugg of nipple adenoma, indeterminate 0.5cm L. breast mass 12:00, rec surg excision for punch bx R. nipple mass and u/s for further eval L. breast mass   • Abnormal MRI, breast 07/10/2020    breast MRI (7/10/20): postsurg bx R. nipple with dx benign adenoma, post-bx clip left breast adjacent to 0.4cm not worrisome mass, rec L. breast MRI in 12 mos (reg mammo due 12/20)   • Adverse reaction to drug 5/10/2016    Impression: 06/09/2014 - GI side effects to aricept; plan as above to change timing and dose of med;    • Anesthesia     SOB upon awakening from surgery.  one occurrence.   • Breast pain, right 06/14/2016 6/14/16 Notes f/u with Dr. Beckham with neg bx (evaluated at [facility]); bx site healing per patient; 5/10/16 Pain resolved but has residual R. nipple abnlity with erythema and nodular change, therefore dx mammo ordered for further eval; last reg mammo done 7/15; R. nipple mass - s/p bx to r/o Paget's disease (6/16); surg - Dr. Beckham   • Contact dermatitis due to poison ivy 07/30/2014    Impression: 07/30/2014 - Depo-medrol 40 mg IM given in office. Pt will start prednisone taper as ordered. Call or RTC if rash is not improving.;    • Dizziness 10/27/2016    10/27/16 Imbalanced sensation and not vertigo per patient; no focal neuro deficits on exam; will rec f/u eye exam and head imaging if sxs persist and ESR/CRP are normal; ddx  also includes eust tube dysfunction   • Hx of bone density study 07/2013    DEXA (7/13) H -1.3   • Hx of bone density study 07/14/2016    DEXA (7/14/16) L 0.4, H -1.6 repeat 2-3 yrs   • Hx of bone density study 10/08/2019    DEXA (10/8/19): L 1.3, H -1.8, A -2.8, worsened, NEW osteoporosis, repeat 2 yrs   • Hx of colonoscopy 12/2015    surg - Dr. Kartik Gold   • Hx of colonoscopy 11/08/2019    colonosc (11/8/19): nl except diverticulosis, repeat 5 yrs due to h/o polyps; surg - Dr. Kartik oGld   • Hx of CT angio head/neck 07/13/2021    CT angio head/neck (7/13/21, hospitalized): No definite lg vessel occlusion, high-grade stenosis or dissection; indeterminate narrowing/occlusion of small P3 branch of the patient's left PCA; small outpouching at origin of left P-com may represent a small aneurysm versus infundibulum   • Hx of CT brain perfusion 07/13/2021    Normal brain perfusion CT (7/13/21, hospitalized)   • Hx of CT scan of head 07/13/2021    nl CT head (7/13/21): except mild atrophy with mild chronic small vessel ischemic disease   • Hx of esophagogastroduodenoscopy 05/05/2017    EGD (5/5/17): mod chronic gastritis, (+)H.pylori, no hiatal hernia; GI - Dr. Esparza   • Hx of LLE venous duplex 11/13/2020    LLE venous duplex (11/13/20): no DVT; (+) valvular venous insufficiency deep and superficial with severe reflux   • Lightheadedness 04/28/2014    Impression: 04/28/2014 - resolved; will let her know official carotid u/s results when available  Impression: 04/13/2014 - discussed adequate hydration; check CBC;    • Lip swelling 08/05/2015    Impression: 08/05/2015 - L. lower lip lesion significantly improved; complete course of abx as prescribed; if area does not resolve completely, call to make earlier derm f/u appt  Impression: 07/28/2015 - L. lower lip swelling already improving but concern for residual ongoing infection; ddx includes infected cold sore, infection from cryotherapy for AK, and less likely  "shingles; finishing course    • Pap smear for cervical cancer screening 08/16/2016    Pap done 8/16/16 per Dr. Brittanie Bauer   • Peripheral arterial occlusive disease (CMS/HCC) 5/10/2016    Description: abnl ALLEGRA at [facility] with nl arterial duplex (10/08)   • Wears glasses      Past Surgical History:   Procedure Laterality Date   • BILATERAL SALPINGO OOPHORECTOMY Bilateral 06/14/2017    GYN - Dr. Bauer; for benign L. ovarian cyst   • BREAST BIOPSY Right 5/12/2020    s/p R. breast excisional bx R. subsreaolar mass (5/12/20); path - nipple adenoma; surg - Dr. Beckham   • CATARACT EXTRACTION, BILATERAL      L. 4/09 (complicated by nerve injury; R. 6/10; L. corrected 6/15; ophtho - Dr. Hollis, Dr. Lange; neuro-ophtho Dr. Ferris   • CHOLECYSTECTOMY  2002    secondary to \"crystalized GB\" (''02); surg - Dr. Kartik Gold   • MAMMO CORE NEEDLE BIOPSY UNILATERAL Left 01/17/2020    L. breast u/s core needle bx (1/17/20): path - Focal proliferative fibrocystic changes including sclerosing adenosis   • OOPHORECTOMY       Family History   Problem Relation Age of Onset   • Heart failure Mother    • Lung cancer Mother    • Breast cancer Mother 86   • Cancer Mother    • Lung cancer Father         tob use   • Cancer Father    • Depression Sister    • OCD Sister    • Hyperlipidemia Sister    • Stroke Maternal Grandmother    • Heart disease Maternal Grandfather    • Diabetes Paternal Grandmother    • Heart attack Paternal Grandfather    • Heart attack Paternal Uncle         2 pat uncles     Social History     Socioeconomic History   • Marital status: Single     Spouse name: Not on file   • Number of children: Not on file   • Years of education: Not on file   • Highest education level: Not on file   Tobacco Use   • Smoking status: Never Smoker   • Smokeless tobacco: Never Used   Substance and Sexual Activity   • Alcohol use: Yes     Comment: social   • Drug use: No   • Sexual activity: Defer     Allergies   Allergen Reactions   • " Aspirin Swelling     Lip and hand swelling   • Donepezil Other (See Comments)     Nightmares at the 10mg dose   • Naproxen Other (See Comments)     ulcer   • Montelukast Sodium Other (See Comments)     Morning drowsiness even with QHS dosing   • Codeine Rash   • Hydrocodone Rash     Prior to Admission medications    Medication Sig Start Date End Date Taking? Authorizing Provider   albuterol sulfate HFA (Ventolin HFA) 108 (90 Base) MCG/ACT inhaler Inhale 2 puffs Every 6 (Six) Hours As Needed for Wheezing or Shortness of Air. 7/21/20   Lorene Isaac MD   celecoxib (CeleBREX) 200 MG capsule TAKE 1 CAPSULE BY MOUTH DAILY AS NEEDED FOR MODERATE PAIN . 8/29/18   Mario Valderrama MD   fluticasone-salmeterol (Advair Diskus) 100-50 MCG/DOSE DISKUS Inhale 1 puff 2 (Two) Times a Day. Gargle and spit after puffs 7/21/20   Lorene Isaac MD   levothyroxine (SYNTHROID, LEVOTHROID) 50 MCG tablet Take 1 tablet by mouth Every Morning. 7/21/20   Lorene Isaac MD   Mirabegron ER (Myrbetriq) 50 MG tablet sustained-release 24 hour 24 hr tablet Take 50 mg by mouth Daily. 7/21/20   Lorene Isaac MD   montelukast (Singulair) 10 MG tablet Take 1 tablet by mouth Every Night. 7/27/20   Lorene Isaac MD   NON FORMULARY 1 tablet Daily. Pro cap bond and body factor    ProviderArlene MD   omeprazole (priLOSEC) 40 MG capsule Take 1 capsule by mouth Daily. Take 30 minutes 1st meal of the day 7/21/20   Lorene Isaac MD   Prolia 60 MG/ML solution prefilled syringe syringe INJECT 60MG SUBCUTANEOUSLY EVERY 6 MONTHS 4/19/21   Lorene Isaac MD   traMADol (ULTRAM) 50 MG tablet Take 50 mg by mouth Daily As Needed for Severe Pain . 5/21/16   Kevin Law MD   TURMERIC CURCUMIN PO Take 1 tablet by mouth Daily.    Provider, MD Arlene             Objective     Heart Rate:  [75-87] 75  BP: (164-172)/() 170/95  Neurological Exam  Mental Status  Awake, alert and oriented to person, place and time.Alert. Mild dysarthria  present. Expressive aphasia present. Able to name objects.    Cranial Nerves  CN II: Right homonymous hemianopsia. Right visual fields. Left homonymous hemianopsia. Right visual fields.  CN III, IV, VI: Extraocular movements intact bilaterally. Normal lids and orbits bilaterally. Pupils equal round and reactive to light bilaterally.  CN V:  Right: Diminished sensation of the entire right side of the face.  Left: Facial sensation is normal on the left.  CN VII:  Right: There is central facial weakness.  Left: There is no facial weakness.  CN IX, X: Palate elevates symmetrically  CN XI: Shoulder shrug strength is normal.  CN XII: Tongue midline without atrophy or fasciculations.    Motor  Normal muscle bulk throughout. No fasciculations present. Normal muscle tone. No abnormal involuntary movements. Strength is 5/5 throughout all four extremities.    Sensory  Light touch abnormality:   Decreased sensation to light touch on right face as compared to left.    Reflexes                                           Right                      Left  Plantar                           Downgoing                Downgoing    Coordination  Right: Finger-to-nose normal. Rapid alternating movement normal.  Left: Finger-to-nose normal. Rapid alternating movement normal.    Gait  Not tested.      Physical Exam  Vitals reviewed.   Constitutional:       Appearance: Normal appearance.   HENT:      Head: Normocephalic and atraumatic.   Eyes:      General: Lids are normal.      Extraocular Movements: Extraocular movements intact.      Pupils: Pupils are equal, round, and reactive to light.   Cardiovascular:      Rate and Rhythm: Normal rate and regular rhythm.   Pulmonary:      Effort: Pulmonary effort is normal. No respiratory distress.   Abdominal:      General: There is no distension.      Palpations: Abdomen is soft.   Musculoskeletal:         General: No swelling. Normal range of motion.      Cervical back: Normal range of motion and  neck supple.   Skin:     General: Skin is warm and dry.   Neurological:      Mental Status: She is alert.      Cranial Nerves: Dysarthria present.      Deep Tendon Reflexes: Strength normal.   Psychiatric:         Mood and Affect: Mood normal.         Behavior: Behavior normal.         Acute Stroke Data    Alteplase (tPA) Inclusion / Exclusion Criteria    Time:  EDT  Person Administering Scale: SEVEN Nieto    Inclusion Criteria  [x]   18 years of age or greater   [x]   Onset of symptoms < 4.5 hours before beginning treatment (stroke onset = time patient was last seen well or without symptoms).   [x]   Diagnosis of acute ischemic stroke causing measurable disabling deficit (Complete Hemianopia, Any Aphasia, Visual or Sensory Extinction, Any weakness limiting sustained effort against gravity)   []   Any remaining deficit considered potentially disabling in view of patient and practitioner   Exclusion criteria (Do not proceed with Alteplase if any are checked under exclusion criteria)  []   Onset unknown or GREATER than 4.5 hours   []   ICH on CT/MRI   []   CT demonstrates hypodensity representing acute or subacute infarct   []   Significant head trauma or prior stroke in the previous 3 months   []   Symptoms suggestive of subarachnoid hemorrhage   []   History of un-ruptured intracranial aneurysm GREATER than 10 mm   []   Recent intracranial or intraspinal surgery within the last 3 months   []   Arterial puncture at a non-compressible site in the previous 7 days   []   Active internal bleeding   []   Acute bleeding tendency   []   Platelet count LESS than 100,000 for known hematological diseases such as leukemia, thrombocytopenia or chronic cirrhosis   []   Current use of anticoagulant with INR GREATER than 1.7 or PT GREATER than 15 seconds, aPTT GREATER than 40 seconds   []   Heparin received within 48 hours, resulting in abnormally elevated aPTT GREATER than upper limit of normal   []   Current use of  direct thrombin inhibitors or direct factor Xa inhibitors in the past 48 hours   []   Elevated blood pressure refractory to treatment (systolic GREATER than 185 mm/Hg or diastolic  GREATER than 110 mm/Hg   []   Suspected infective endocarditis and aortic arch dissection   []   Current use of therapeutic treatment dose of low-molecular-weight heparin (LMWH) within the previous 24 hours   []   Structural GI malignancy or bleed   Relative exclusion for all patients  []   Only minor non-disabling symptoms   []   Pregnancy   []   Seizure at onset with postictal residual neurological impairments   []   Major surgery or previous trauma within past 14 days   []   History of previous spontaneous ICH, intracranial neoplasm, or AV malformation   []   Postpartum (within previous 14 days)   []   Recent GI or urinary tract hemorrhage (within previous 21 days)   []   Recent acute MI (within previous 3 months)   []   History of un-ruptured intracranial aneurysm LESS than 10 mm   []   History of ruptured intracranial aneurysm   []   Blood glucose LESS than 50 mg/dL (2.7 mmol/L)   []   Dural puncture within the last 7 days   []   Known GREATER than 10 cerebral microbleeds   Additional exclusions for patients with symptoms onset between 3 and 4.5 hours.  []   Age > 80.   []   On any anticoagulants regardless of INR  >>> Warfarin (Coumadin), Heparin, Enoxaparin (Lovenox), fondaparinux (Arixtra), bivalirudin (Angiomax), Argatroban, dabigatran (Pradaxa), rivaroxaban (Xarelto), or apixaban (Eliquis)   []   Severe stroke (NIHSS > 25).   []   History of BOTH diabetes and previous ischemic stroke.   [x]   The risks and benefits have been discussed with the patient or family related to the administration of IV Alteplase for stroke symptoms.   []   I have discussed and reviewed the patient's case and imaging with the attending prior to IV Alteplase.   21:43 Time Alteplase administered   Patient received IV TPA at [facility]  Meds:  Scheduled- atorvastatin, 80 mg, Oral, Nightly  budesonide-formoterol, 2 puff, Inhalation, BID - RT  sodium chloride, 10 mL, Intravenous, Q12H      Infusions- niCARdipine, 5-15 mg/hr       PRNs- labetalol  •  sodium chloride    Functional Status Prior to Current Stroke/Tasneem Score: MRS 0    NIH Stroke Scale  Time: 22:35 EDT  Person Administering Scale: SEVEN Nieto    1a  Level of consciousness: 0=alert; keenly responsive   1b. LOC questions:  0=Answers both questions correctly   1c. LOC commands: 0=Performs both tasks correctly   2.  Best Gaze: 0=normal   3.  Visual: 2=Complete hemianopia   4. Facial Palsy: 1=Minor paralysis (flattened nasolabial fold, asymmetric on smiling)   5a.  Motor left arm: 0=No drift, limb holds 90 (or 45) degrees for full 10 seconds   5b.  Motor right arm: 0=No drift, limb holds 90 (or 45) degrees for full 10 seconds   6a. motor left le=No drift, limb holds 90 (or 45) degrees for full 10 seconds   6b  Motor right le=No drift, limb holds 90 (or 45) degrees for full 10 seconds   7. Limb Ataxia: 0=Absent   8.  Sensory: 1=Mild to moderate sensory loss; patient feels pinprick is less sharp or is dull on the affected side; there is a loss of superficial pain with pinprick but patient is aware She is being touched   9. Best Language:  1=Mild to moderate aphasia; some obvious loss of fluency or facility of comprehension without significant limitation on ideas expressed or form of expression.   10. Dysarthria: 1=Mild to moderate, patient slurs at least some words and at worst, can be understood with some difficulty   11. Extinction and Inattention: 0=No abnormality    Total:   6       Results Reviewed:  I have personally reviewed current lab, radiology, and data and agree with results.    Lab Results   Component Value Date    GLUCOSE 84 2020    BUN 18 2020    CREATININE 0.87 2020    EGFRIFNONA 63 2020    BCR 20.7 2020    K 5.0 2020    CO2  24.1 07/13/2020    CALCIUM 10.0 07/13/2020    ALBUMIN 3.90 07/13/2020    AST 24 07/13/2020    ALT 23 07/13/2020     CT Head Without Contrast    Result Date: 7/13/2021  Senescent changes without acute abnormality. Signer Name: Nicholas Leal MD  Signed: 7/13/2021 10:47 PM  Workstation Name: [facility]    CT Angiogram Neck    Result Date: 7/13/2021  1. No definite evidence of large vessel occlusion, high-grade stenosis or dissection. It is uncertain if there may be some potential narrowing or occlusion involving a small P3 branch of the patient's left PCA. This is indeterminate. 2. Small outpouching in the region of the origin of the left P-com may represent a small aneurysm versus infundibulum. Case was discussed with the Stroke nurse navigator. Signer Name: Katelyn Sebastian MD  Signed: 7/13/2021 11:21 PM  Workstation Name: [email or web address]    CT Angiogram Head w AI Analysis of LVO    Result Date: 7/13/2021  1. No definite evidence of large vessel occlusion, high-grade stenosis or dissection. It is uncertain if there may be some potential narrowing or occlusion involving a small P3 branch of the patient's left PCA. This is indeterminate. 2. Small outpouching in the region of the origin of the left P-com may represent a small aneurysm versus infundibulum. Case was discussed with the Stroke nurse navigator. Signer Name: Katelyn Sebastian MD  Signed: 7/13/2021 11:21 PM  Workstation Name: [id number]  [facility]    CT CEREBRAL PERFUSION WITH & WITHOUT CONTRAST    Result Date: 7/13/2021  Normal brain perfusion CT. Signer Name: Nicholas Leal MD  Signed: 7/13/2021 11:04 PM  Workstation Name: [facility]            Assessment/Plan:  76-year-old, , right-handed female with known diagnosis of HTN, HLD, ADEN, venous insufficiency of the lower extremity, mild cognitive disorder, hypothyroidism who  presents as a transfer from [facility] where she was evaluated for acute onset right-sided weakness, right facial droop, and expressive aphasia.  LKW 19:30, OSH CT head negative for acute abnormality, initial NIH at OSH 3.  Patient was a candidate for TPA which was administered at [facility] at 21:43.  NIH upon arrival to [facility] neuro ICU 6, /87.    Diagnostic/imaging  CT head negative for acute abnormality  CT perfusion negative  CTA head and neck shows a 3.5 mm outpouching at the left carotid at the origin of the left P-comm.  High-grade stenosis versus distal occlusion of the left PCA at the P3 segment is unclear; this was discussed with Dr. Sebastian.  There is no large vessel occlusion amenable to neuro intervention.      1. Suspected acute left PCA stroke s/p TPA  -Admit to ICU  -Ischemic stroke order set with thrombolytic therapy  -Post TPA precautions  -MRI brain without  -24-hour post TPA CT head 7/14 at 2200  -Allergy to aspirin, will need to start Plavix if 24-hour CT head is clear  -Strict bedrest  -PT/OT/SLP eval's when appropriate  -N.p.o. until evaluated by speech  -Stroke neurology following    2.  HTN -/87 on exam  -Maintain SBP <180, DBP <105; titrate Cardene as needed  -Echo with bubble study    3.  HLD   -, HDL 41, , triglycerides 100  -Atorvastatin 80 mg daily      Intensivist and bedside nurse updated with plan of care.  Discussed patient's condition, imaging and plan of care with her POA over the phone.    Yola Olivares, SEVEN  July 14, 2021  00:39 EDT

## 2021-07-14 NOTE — PLAN OF CARE
Goal Outcome Evaluation:  Plan of Care Reviewed With: patient           Outcome Summary: PT initial eval completed. Pt limited by balance impairment, decreased functional endurance, generalized weakness (R>L), coordination deficit, and h/o LBP. Pt amb 10ft with min A via L HHA. Mild instability noted with balance testing. Rec continued skilled IP PT to increase indep with mobility. d/c rec for IRF d/t living alone and fall risk.

## 2021-07-14 NOTE — H&P
Pulmonary/Critical Care History and Physical Exam     LOS: 0 days   Patient Care Team:  Lorene Isaac MD as PCP - General  Lorene Isaac MD as PCP - Internal Medicine  Beiting, Brittanie Suarez MD as Consulting Physician (Obstetrics and Gynecology)  Gigi Esparza MD as Consulting Physician (Gastroenterology)  Kartik Gold MD as Consulting Physician (General Surgery)  Tyrone Tate MD as Consulting Physician (Ophthalmology)  Arik Keane MD as Consulting Physician (Otolaryngology)  Anastacio Dickinson MD as Consulting Physician (Otolaryngology)  Mario Ding MD as Consulting Physician (Neurosurgery)  Travis Beckham MD as Consulting Physician (General Surgery)  Mingo Cerrato MD as Consulting Physician (Gastroenterology)    Chief Complaint: Stroke    Subjective     HPI: Ms. Sharma is a 77 yo female with PMH ADEN, HTN, HLD, GERD, Venous insufficiency of the leg, and mild cognitive disorder who presents as a transfer from [facility] with ongoing issues associated with a stroke. According to verbal report as limited documentation was sent with patient from OSH, patient was in her normal state of health until around 1930 this evening. At that point she developed right sided weakness, expressive aphasia, right facial droop, hemiparesis, and right facial numbness. She was taken to OSH where her initial NIH was a 3. CT head was negative. She was given TPA which was finishing on arrival to [facility] and was transferred for higher level of care.    On arrival, patient is alert but her speech is garbled and aphasic. NIH 2. She is hemodynamically stable on minimal supplemental oxygen.  She reports a mild left-sided headache and that her left nare is stuffy. She is being admitted to ICU for management.  She reports she is on Advair 100 twice daily.    History taken from: patient    Past Medical History:   Diagnosis Date   • Abnormal MRI, breast 01/03/2020    breast MRI (1/3/20): 1.1cm R.  nipple mass sugg of nipple adenoma, indeterminate 0.5cm L. breast mass 12:00, rec surg excision for punch bx R. nipple mass and u/s for further eval L. breast mass   • Abnormal MRI, breast 07/10/2020    breast MRI (7/10/20): postsurg bx R. nipple with dx benign adenoma, post-bx clip left breast adjacent to 0.4cm not worrisome mass, rec L. breast MRI in 12 mos (reg mammo due 12/20)   • Adenoma of nipple, right    • Adverse reaction to drug 5/10/2016    Impression: 06/09/2014 - GI side effects to aricept; plan as above to change timing and dose of med;    • Anesthesia     SOB upon awakening from surgery.  one occurrence.   • Asthma 5/10/2016    ENT/Dr. Dickinson - 1/5/18: RX ProAir prn #1, 1RF   • Breast pain, right 06/14/2016 6/14/16 Notes f/u with Dr. Beckham with neg bx (evaluated at [facility]); bx site healing per patient; 5/10/16 Pain resolved but has residual R. nipple abnlity with erythema and nodular change, therefore dx mammo ordered for further eval; last reg mammo done 7/15; R. nipple mass - s/p bx to r/o Paget's disease (6/16); surg - Dr. Beckham   • Contact dermatitis due to poison ivy 07/30/2014    Impression: 07/30/2014 - Depo-medrol 40 mg IM given in office. Pt will start prednisone taper as ordered. Call or RTC if rash is not improving.;    • Discomfort of left eye 10/27/2016    Impression: 10/27/16 No abnlities on exam and notes updated eye exam 9/16; if workup negative and sxs persist, needs to f/u with Dr. Hollis; 04/13/2014 - check labs to include ESR, CRP but proceed with ophtho consult with Dr. Telma MADRIDP; Description: LEFT; nl eye exam (4/16/14), per ophtho - Dr. Hollis   • Dizziness 10/27/2016    10/27/16 Imbalanced sensation and not vertigo per patient; no focal neuro deficits on exam; will rec f/u eye exam and head imaging if sxs persist and ESR/CRP are normal; ddx also includes eust tube dysfunction   • Gastroesophageal reflux disease  5/10/2016    4/28/21 EGD/Dr. 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 Cerrato - on omeprazole 40mg QD, rec wean after 3 mos, EGD if no better; RTC 3 mos; 7/21/20 reports inadequate relief from OTC nexium and has   • Hx of bone density study 07/2013    DEXA (7/13) H -1.3   • Hx of bone density study 07/14/2016    DEXA (7/14/16) L 0.4, H -1.6 repeat 2-3 yrs   • Hx of bone density study 10/08/2019    DEXA (10/8/19): L 1.3, H -1.8, A -2.8, worsened, NEW osteoporosis, repeat 2 yrs   • Hx of colonoscopy 12/2015    surg - Dr. Kartik Gold   • Hx of colonoscopy 11/08/2019    colonosc (11/8/19): nl except diverticulosis, repeat 5 yrs due to h/o polyps; surg - Dr. Kartik Gold   • Hx of esophagogastroduodenoscopy 05/05/2017    EGD (5/5/17): mod chronic gastritis, (+)H.pylori, no hiatal hernia; GI - Dr. Esparza   • Hx of LLE venous duplex 11/13/2020    LLE venous duplex (11/13/20): no DVT; (+) valvular venous insufficiency deep and superficial with severe reflux   • Hyperlipidemia 5/10/2016    7/21/20 stable lipids , , HDL 41, ; no meds; 7/18/19 stable lipids, at goal , TG 93, HDL 56, LDL 91, no meds; 05/04/2015 - lipids stable without meds  Impression: 10/27/2014 - lipids stable, no meds; repeat in 6 mos  Impression: 04/13/2014 - f/u lipids; no meds currently; Description: low HDL; nl carotid U/S (10/08) (4/14)   • Hypertension 5/10/2016    11/13/20 /72; no meds; 7/21/20 bord /76; 04/13/2014 - no h/o HTN; continue monitoring BPs at home, noting diastolic readings are a little higher on her machine; low Na diet; check electrolytes; f/u in 1 week;    • Lightheadedness 04/28/2014    Impression: 04/28/2014 - resolved; will let her know official carotid u/s results when available  Impression: 04/13/2014 - discussed adequate hydration;  check CBC;    • Lip swelling 08/05/2015    Impression: 08/05/2015 - L. lower lip lesion significantly improved; complete course of abx as prescribed; if area does not resolve completely, call to make earlier derm f/u appt  Impression: 07/28/2015 - L. lower lip swelling already improving but concern for residual ongoing infection; ddx includes infected cold sore, infection from cryotherapy for AK, and less likely shingles; finishing course    • Mild cognitive disorder 5/10/2016    7/21/20 MMSE 30/30;  7/16/18 MMSE 30/30; 7/14/17 current MMSE still 30/30; note previously able to tolerate 5mg donepezil but nightmares with 10mg dose; 10/27/2014 - note MMSE 30/30; reviewed med options for [facility] to include trial of aricept 5mg QD versus exelon patch versus namenda; she would like to proceed with trial of aricept 5mg QD #30, 5RF; f/u MMSE in 6 mos  Impression: 06/09/2014 - reviewed   • ADEN (obstructive sleep apnea) 9/11/2017 7/17/18 remains on CPAP, s/e of dry mouth even with humidifier per patient; 1/5/18 ENT/Dr. Dickinson - success with oral appliance until TMJ, better with CPAP; 9/17 sleep study - mod ADEN, rec autoPap 8-12; ENT - Dr. Keane   • Pap smear for cervical cancer screening 08/16/2016    Pap done 8/16/16 per Dr. Brittanie Bauer   • Peripheral arterial occlusive disease (CMS/HCC) 5/10/2016    Description: abnl ALLEGRA at [facility] with nl arterial duplex (10/08)   • Venous insufficiency of left leg 11/17/2020 11/13/20 LLE venous duplex: no DVT; (+) valvular venous insufficiency deep and superficial with severe reflux   • Wears glasses        Past Surgical History:   Procedure Laterality Date   • BILATERAL SALPINGO OOPHORECTOMY Bilateral 06/14/2017    GYN - Dr. Bauer; for benign L. ovarian cyst   • BREAST BIOPSY Right 5/12/2020    s/p R. breast excisional bx R. subsreaolar mass (5/12/20); path - nipple adenoma; surg - Dr. Beckham   • CATARACT EXTRACTION, BILATERAL      L. 4/09 (complicated by nerve injury; R.  "6/10; L. corrected 6/15; ophtho - Dr. Hollis, Dr. Lange; neuro-ophtho Dr. Ferris   • CHOLECYSTECTOMY  2002    secondary to \"crystalized GB\" (''02); surg - Dr. Kartik Gold   • MAMMO CORE NEEDLE BIOPSY UNILATERAL Left 01/17/2020    L. breast u/s core needle bx (1/17/20): path - Focal proliferative fibrocystic changes including sclerosing adenosis   • OOPHORECTOMY         Family History   Problem Relation Age of Onset   • Heart failure Mother    • Lung cancer Mother    • Breast cancer Mother 86   • Cancer Mother    • Lung cancer Father         tob use   • Cancer Father    • Depression Sister    • OCD Sister    • Hyperlipidemia Sister    • Stroke Maternal Grandmother    • Heart disease Maternal Grandfather    • Diabetes Paternal Grandmother    • Heart attack Paternal Grandfather    • Heart attack Paternal Uncle         2 pat uncles       Social History     Socioeconomic History   • Marital status: Single     Spouse name: Not on file   • Number of children: Not on file   • Years of education: Not on file   • Highest education level: Not on file   Tobacco Use   • Smoking status: Never Smoker   • Smokeless tobacco: Never Used   Substance and Sexual Activity   • Alcohol use: Yes     Comment: social   • Drug use: No   • Sexual activity: Defer       Allergies   Allergen Reactions   • Aspirin Swelling     Lip and hand swelling   • Donepezil Other (See Comments)     Nightmares at the 10mg dose   • Naproxen Other (See Comments)     ulcer   • Montelukast Sodium Other (See Comments)     Morning drowsiness even with QHS dosing   • Codeine Rash   • Hydrocodone Rash       PMH/FH/SocH were reviewed by me and updates were made.     Review of Systems:    All systems were reviewed and negative except as noted in subjective.      Objective     Vital Signs       Physical Exam:     General Appearance:    Alert, cooperative, in no acute distress   Head:    Normocephalic, without obvious abnormality, atraumatic   Eyes:            Lids and " lashes normal, conjunctivae and sclerae normal, no   icterus, no pallor, corneas clear, PER   ENMT:   Ears appear intact with no abnormalities noted      No oral lesions, no thrush, oral mucosa moist   Neck:   No adenopathy, supple, trachea midline, no thyromegaly, no JVD       Lungs/resp:     Normal effort, symmetric chest rise, no crepitus, clear to      auscultation bilaterally, no chest wall tenderness                 Heart/CV:    Regular rhythm and normal rate, normal S1 and S2, no            murmur   Abdomen/GI:     Normal bowel sounds, no masses, no organomegaly, soft        non-tender, non-distended   G/U:     Deferred   Extremities/MSK:   No clubbing, cyanosis or edema.  No deformities.    Pulses:   Pulses palpable and equal bilaterally   Skin:   No bleeding, bruising or rash   Hem/Lymph:   No cervical or supraclavicular adenopathy.    Neurologic:   Moves all extremities with no obvious focal motor deficit.  Mild left facial droop.            Psychiatric:  Normal mood and affect, oriented x 3.   The above findings are documentation my personal physical examination.  Electronically signed by:Mahendra Foote MD 07/13/21 23:59 EDT     Results Review:     I reviewed the patient's new clinical results.         Invalid input(s): LABALBU, PROT        Invalid input(s): NEUTOPHILPCT,  EOSPCT        I reviewed the patient's new imaging including images and reports.    CTA head and neck 7/13/2021:  IMPRESSION:  1. No definite evidence of large vessel occlusion, high-grade stenosis or dissection. It is uncertain if there may be some potential narrowing or occlusion involving a small P3 branch of the patient's left PCA. This is indeterminate.  2. Small outpouching in the region of the origin of the left P-com may represent a small aneurysm versus infundibulum.  Case was discussed with the Stroke nurse navigator.    CT cerebral perfusion 7/13/2021:  IMPRESSION:  Normal brain perfusion CT.    Medication Review:    atorvastatin, 80 mg, Oral, Nightly  sodium chloride, 10 mL, Intravenous, Q12H      niCARdipine, 5-15 mg/hr        Assessment/Plan       Suspected cerebrovascular accident (CVA)    Hyperlipidemia    Hypertension    Gastroesophageal reflux disease     ADEN (obstructive sleep apnea)    Electronically signed by SEVEN Jade, 07/13/21, 10:54 PM EDT.    I performed an independent history and physical examination. Portions of the history were obtained by APRN and were modified by me according to my findings. The above note reflects my findings, assessment, and plan.    Mahendra Foote MD    76 y.o. female with history of obstructive sleep apnea, hypertension, hyperlipidemia, GERD, venous insufficiency of the leg, mild cognitive disorder, who presents for me from [facility] with strokelike symptoms.  She developed right-sided weakness with expressive aphasia and right facial droop at around 1930 per report.  Weakness subsequently resolved however speech difficulty remains.  She is given TPA at outside hospital and transferred to [facility] for further evaluation.  CTA shows possible small P3 branch narrowing or occlusion of left PCA.  Cerebral perfusion study normal.  Patient will be admitted to the intensive care unit for further evaluation management.    Plan:  1.  Admit to the intensive care unit.  2.  Serial neurologic exams.  3.  Follow-up 24-hour CT head.  4.  Post TPA order set utilized.  5.  N.p.o. until speech evaluation.  6.  Continue bronchodilator/inhaled corticosteroid.  7.  Goal blood pressure less than 180 systolic.  Cardene/labetalol if needed.    Patient is critically ill secondary to acute CVA status post TPA with high risk of life-threatening decline in condition including potential for hemorrhagic conversion, progressive stroke, respiratory failure, and death.  As such, she requires continuous monitoring frequent reassessment for consideration of adjustment in management to  minimize this risk.  I personally reassessed her multiple times today.    Critical care time : 38 minutes spent by me personally.(This excludes time spent performing separately reportable procedures and services). including high complexity decision making to assess, manipulate, and support vital organ system failure in this individual who has impairment of one or more vital organ systems such that there is a high probability of imminent or life threatening deterioration in the patient’s condition.      Electronically signed by:  Mahendra Foote MD  07/13/21  23:59 EDT

## 2021-07-14 NOTE — PLAN OF CARE
Goal Outcome Evaluation:  Plan of Care Reviewed With: patient        Progress: no change  SLP evaluation completed. Will address dysphagia. Please see note for further details and recommendations.

## 2021-07-14 NOTE — THERAPY EVALUATION
Patient Name: Rebecca Sharma  : 1945    MRN: [id number]                              Today's Date: 2021       Admit Date: 2021    Visit Dx: No diagnosis found.  Patient Active Problem List   Diagnosis   • Cysts of both ovaries   • Actinic keratosis   • Allergic rhinitis   • Asthma   • Hyperlipidemia   • Dyspepsia   • Hypertension   • Uterine leiomyoma   • Gastroesophageal reflux disease    • Chronic laryngitis   • Hyperactivity of bladder   • Hypothyroidism   • Lumbar spinal stenosis   • Mild cognitive disorder   • Osteoarthritis   • Osteoporosis   • Seborrheic keratosis   • Bilateral sensorineural hearing loss   • Skin tag   • Mixed stress and urge urinary incontinence   • Xerostomia   • Nasal septal deviation, right   • Medicare annual wellness visit, subsequent   • Headache   • ADEN (obstructive sleep apnea)   • Fibrocystic disease of left breast   • Chronic right hip pain   • Menopause   • Macrocytosis without anemia   • Diverticulosis of colon   • Right nipple adenoma   • Venous insufficiency of left leg   • Cricopharyngeal spasm   • Schatzki's ring   • Suspected cerebrovascular accident (CVA)   • CVA (cerebral vascular accident) (CMS/HCC)     Past Medical History:   Diagnosis Date   • Abnormal MRI, breast 2020    breast MRI (1/3/20): 1.1cm R. nipple mass sugg of nipple adenoma, indeterminate 0.5cm L. breast mass 12:00, rec surg excision for punch bx R. nipple mass and u/s for further eval L. breast mass   • Abnormal MRI, breast 07/10/2020    breast MRI (7/10/20): postsurg bx R. nipple with dx benign adenoma, post-bx clip left breast adjacent to 0.4cm not worrisome mass, rec L. breast MRI in 12 mos (reg mammo due )   • Adverse reaction to drug 5/10/2016    Impression: 2014 - GI side effects to aricept; plan as above to change timing and dose of med;    • Anesthesia     SOB upon awakening from surgery.  one occurrence.   • Breast pain, right 2016 Notes f/u with  Dr. Beckham with neg bx (evaluated at [facility]); bx site healing per patient; 5/10/16 Pain resolved but has residual R. nipple abnlity with erythema and nodular change, therefore dx mammo ordered for further eval; last reg mammo done 7/15; R. nipple mass - s/p bx to r/o Paget's disease (6/16); surg - Dr. Beckham   • Contact dermatitis due to poison ivy 07/30/2014    Impression: 07/30/2014 - Depo-medrol 40 mg IM given in office. Pt will start prednisone taper as ordered. Call or RTC if rash is not improving.;    • Dizziness 10/27/2016    10/27/16 Imbalanced sensation and not vertigo per patient; no focal neuro deficits on exam; will rec f/u eye exam and head imaging if sxs persist and ESR/CRP are normal; ddx also includes eust tube dysfunction   • Hx of bone density study 07/2013    DEXA (7/13) H -1.3   • Hx of bone density study 07/14/2016    DEXA (7/14/16) L 0.4, H -1.6 repeat 2-3 yrs   • Hx of bone density study 10/08/2019    DEXA (10/8/19): L 1.3, H -1.8, A -2.8, worsened, NEW osteoporosis, repeat 2 yrs   • Hx of colonoscopy 12/2015    surg - Dr. Kartik Gold   • Hx of colonoscopy 11/08/2019    colonosc (11/8/19): nl except diverticulosis, repeat 5 yrs due to h/o polyps; surg - Dr. Kartik Gold   • Hx of CT angio head/neck 07/13/2021    CT angio head/neck (7/13/21, hospitalized): No definite lg vessel occlusion, high-grade stenosis or dissection; indeterminate narrowing/occlusion of small P3 branch of the patient's left PCA; small outpouching at origin of left P-com may represent a small aneurysm versus infundibulum   • Hx of CT brain perfusion 07/13/2021    Normal brain perfusion CT (7/13/21, hospitalized)   • Hx of CT scan of head 07/13/2021    nl CT head (7/13/21): except mild atrophy with mild chronic small vessel ischemic disease   • Hx of esophagogastroduodenoscopy 05/05/2017    EGD (5/5/17): mod chronic gastritis, (+)H.pylori, no hiatal hernia; GI - Dr. Esparza   • Hx of LLE venous duplex 11/13/2020     "LLE venous duplex (11/13/20): no DVT; (+) valvular venous insufficiency deep and superficial with severe reflux   • Lightheadedness 04/28/2014    Impression: 04/28/2014 - resolved; will let her know official carotid u/s results when available  Impression: 04/13/2014 - discussed adequate hydration; check CBC;    • Lip swelling 08/05/2015    Impression: 08/05/2015 - L. lower lip lesion significantly improved; complete course of abx as prescribed; if area does not resolve completely, call to make earlier derm f/u appt  Impression: 07/28/2015 - L. lower lip swelling already improving but concern for residual ongoing infection; ddx includes infected cold sore, infection from cryotherapy for AK, and less likely shingles; finishing course    • Pap smear for cervical cancer screening 08/16/2016    Pap done 8/16/16 per Dr. Brittanie Bauer   • Peripheral arterial occlusive disease (CMS/HCC) 5/10/2016    Description: abnl ALLEGRA at [facility] with nl arterial duplex (10/08)   • Wears glasses      Past Surgical History:   Procedure Laterality Date   • BILATERAL SALPINGO OOPHORECTOMY Bilateral 06/14/2017    GYN - Dr. Bauer; for benign L. ovarian cyst   • BREAST BIOPSY Right 5/12/2020    s/p R. breast excisional bx R. subsreaolar mass (5/12/20); path - nipple adenoma; surg - Dr. Beckham   • CATARACT EXTRACTION, BILATERAL      L. 4/09 (complicated by nerve injury; R. 6/10; L. corrected 6/15; ophtho - Dr. Hollis, Dr. Lange; neuro-ophtho Dr. Ferris   • CHOLECYSTECTOMY  2002    secondary to \"crystalized GB\" (''02); surg - Dr. Kartik Gold   • MAMMO CORE NEEDLE BIOPSY UNILATERAL Left 01/17/2020    L. breast u/s core needle bx (1/17/20): path - Focal proliferative fibrocystic changes including sclerosing adenosis   • OOPHORECTOMY       General Information     Row Name 07/14/21 1214          OT Time and Intention    Document Type  evaluation  -CL     Mode of Treatment  occupational therapy  -CL     Row Name 07/14/21 1214          General " Information    Patient Profile Reviewed  yes  -CL     Prior Level of Function  independent:;all household mobility;ADL's  -CL     Existing Precautions/Restrictions  fall;other (see comments) urinary incontinence  -CL     Barriers to Rehab  medically complex  -CL     Row Name 07/14/21 1214          Living Environment    Lives With  alone  -CL     Row Name 07/14/21 1214          Home Main Entrance    Number of Stairs, Main Entrance  one  -CL     Row Name 07/14/21 1214          Stairs Within Home, Primary    Number of Stairs, Within Home, Primary  none  -CL     Row Name 07/14/21 1214          Cognition    Orientation Status (Cognition)  oriented x 3  -CL     Row Name 07/14/21 1214          Safety Issues, Functional Mobility    Impairments Affecting Function (Mobility)  balance;endurance/activity tolerance;strength  -CL       User Key  (r) = Recorded By, (t) = Taken By, (c) = Cosigned By    Initials Name Provider Type    CL Briana Suarez OT Occupational Therapist          Mobility/ADL's     Row Name 07/14/21 1216          Bed Mobility    Bed Mobility  supine-sit  -CL     Supine-Sit [place] (Bed Mobility)  minimum assist (75% patient effort);verbal cues  -CL     Assistive Device (Bed Mobility)  bed rails;head of bed elevated  -CL     Row Name 07/14/21 1216          Transfers    Transfers  sit-stand transfer  -CL     Comment (Transfers)  HHA  -CL     Sit-Stand [place] (Transfers)  minimum assist (75% patient effort);verbal cues  -CL     Row Name 07/14/21 1216          Activities of Daily Living    BADL Assessment/Intervention  lower body dressing;toileting;grooming  -CL     Row Name 07/14/21 1216          Lower Body Dressing Assessment/Training    [place] Level (Lower Body Dressing)  don;socks;supervision  -CL     Position (Lower Body Dressing)  edge of bed sitting  -CL     Row Name 07/14/21 1216          Toileting Assessment/Training    [place] Level (Toileting)  adjust/manage clothing;perform  perineal hygiene;dependent (less than 25% patient effort)  -CL     Position (Toileting)  supported sitting;supported standing  -CL       User Key  (r) = Recorded By, (t) = Taken By, (c) = Cosigned By    Initials Name Provider Type    Briana Brown OT Occupational Therapist        Obj/Interventions     Row Name 07/14/21 1219          Sensory Assessment (Somatosensory)    Sensory Assessment (Somatosensory)  UE sensation intact  -CL     Row Name 07/14/21 1219          Vision Assessment/Intervention    Visual Impairment/Limitations  WFL  -CL     Row Name 07/14/21 1219          Range of Motion Comprehensive    General Range of Motion  bilateral upper extremity ROM WFL  -CL     Row Name 07/14/21 1219          Strength Comprehensive (MMT)    Comment, General Manual Muscle Testing (MMT) Assessment  BUE grossly 3+/5  -CL     Row Name 07/14/21 1219          Motor Skills    Motor Skills  coordination  -CL     Coordination  bilateral;upper extremity;finger to nose;WFL  -CL     Row Name 07/14/21 1219          Balance    Balance Assessment  sitting static balance;sitting dynamic balance;standing static balance  -CL     Static Sitting Balance  WFL;sitting, edge of bed  -CL     Dynamic Sitting Balance  mild impairment;sitting, edge of bed  -CL     Static Standing Balance  mild impairment;supported  -CL       User Key  (r) = Recorded By, (t) = Taken By, (c) = Cosigned By    Initials Name Provider Type    Briana Brown OT Occupational Therapist        Goals/Plan     Row Name 07/14/21 1231          Transfer Goal 1 (OT)    Activity/Assistive Device (Transfer Goal 1, OT)  sit-to-stand/stand-to-sit;toilet  -CL     [place] Level/Cues Needed (Transfer Goal 1, OT)  supervision required  -CL     Time Frame (Transfer Goal 1, OT)  long term goal (LTG);10 days  -CL     Progress/Outcome (Transfer Goal 1, OT)  goal ongoing  -CL     Row Name 07/14/21 1231          Dressing Goal 1 (OT)    Activity/Device (Dressing Goal 1, OT)  lower body  dressing don pants  -CL     Mobile/Cues Needed (Dressing Goal 1, OT)  supervision required  -CL     Time Frame (Dressing Goal 1, OT)  long term goal (LTG);10 days  -CL     Progress/Outcome (Dressing Goal 1, OT)  goal ongoing  -CL     Row Name 07/14/21 1231          Toileting Goal 1 (OT)    Activity/Device (Toileting Goal 1, OT)  adjust/manage clothing;perform perineal hygiene  -CL     Mobile Level/Cues Needed (Toileting Goal 1, OT)  supervision required  -CL     Time Frame (Toileting Goal 1, OT)  long term goal (LTG);10 days  -CL     Progress/Outcome (Toileting Goal 1, OT)  goal ongoing  -CL       User Key  (r) = Recorded By, (t) = Taken By, (c) = Cosigned By    Initials Name Provider Type    CL Briana Suarez, JONATHAN Occupational Therapist        Clinical Impression     Row Name 07/14/21 1227          Pain Scale: Numbers Pre/Post-Treatment    Pretreatment Pain Rating  0/10 - no pain  -CL     Posttreatment Pain Rating  0/10 - no pain  -CL     Row Name 07/14/21 1227          Plan of Care Review    Plan of Care Reviewed With  patient  -CL     Outcome Summary  OT eval complete. Pt is Min A for bed mobility, Dep for toileting ADLs, and Supervision for LBD tasks. Pt limited d/t decreased balance and endurance from baseline. Recommend cont skilled IPOT POC. Recommend pt DC to IP rehab based on current level of performance.  -CL     Row Name 07/14/21 1227          Therapy Assessment/Plan (OT)    Patient/Family Therapy Goal Statement (OT)  Return to PLOF  -CL     Rehab Potential (OT)  good, to achieve stated therapy goals  -CL     Criteria for Skilled Therapeutic Interventions Met (OT)  yes;skilled treatment is necessary  -CL     Therapy Frequency (OT)  daily  -CL     Row Name 07/14/21 1227          Therapy Plan Review/Discharge Plan (OT)    Anticipated Discharge Disposition (OT)  inpatient rehabilitation facility  -CL     Row Name 07/14/21 1227          Vital Signs    Pre Systolic BP Rehab  153  -CL     Pre Treatment  Diastolic BP  86  -CL     Post Systolic BP Rehab  -- w/ PT  -CL     Pretreatment Heart Rate (beats/min)  78  -CL     Pre SpO2 (%)  95  -CL     O2 Delivery Pre Treatment  room air  -CL     Pre Patient Position  Supine  -CL     Intra Patient Position  Standing  -CL     Post Patient Position  Sitting  -CL     Row Name 07/14/21 1227          Positioning and Restraints    Pre-Treatment Position  in bed  -CL     Post Treatment Position  bed  -CL     In Bed  notified nsg;sitting EOB;call light within reach;encouraged to call for assist;with PT  -CL       User Key  (r) = Recorded By, (t) = Taken By, (c) = Cosigned By    Initials Name Provider Type    CL Briana Suarez, JONATHAN Occupational Therapist        Outcome Measures     Row Name 07/14/21 1231          How much help from another is currently needed...    Putting on and taking off regular lower body clothing?  2  -CL     Bathing (including washing, rinsing, and drying)  2  -CL     Toileting (which includes using toilet bed pan or urinal)  1  -CL     Putting on and taking off regular upper body clothing  2  -CL     Taking care of personal grooming (such as brushing teeth)  3  -CL     Eating meals  4  -CL     AM-PAC 6 Clicks Score (OT)  14  -CL     Row Name 07/14/21 0931 07/14/21 0800       How much help from another person do you currently need...    Turning from your back to your side while in flat bed without using bedrails?  3  -AY  4  -BS    Moving from lying on back to sitting on the side of a flat bed without bedrails?  3  -AY  4  -BS    Moving to and from a bed to a chair (including a wheelchair)?  3  -AY  3  -BS    Standing up from a chair using your arms (e.g., wheelchair, bedside chair)?  3  -AY  3  -BS    Climbing 3-5 steps with a railing?  3  -AY  3  -BS    To walk in hospital room?  3  -AY  3  -BS    AM-PAC 6 Clicks Score (PT)  18  -AY  20  -BS    Row Name 07/14/21 1231 07/14/21 0931       Modified [place] Scale    Pre-Stroke Modified [place] Scale  0 - No Symptoms  at all.  -CL  6 - Unable to determine (UTD) from the medical record documentation  -AY    Modified [place] Scale  3 - Moderate disability.  Requiring some help, but able to walk without assistance.  -CL  3 - Moderate disability.  Requiring some help, but able to walk without assistance.  -AY    Row Name 07/14/21 1231 07/14/21 0931       Functional Assessment    Outcome Measure Options  AM-PAC 6 Clicks Daily Activity (OT);Modified [place]  -CL  AM-PAC 6 Clicks Basic Mobility (PT);Modified [place]  -AY      User Key  (r) = Recorded By, (t) = Taken By, (c) = Cosigned By    Initials Name Provider Type    CL Briana Suarez, OT Occupational Therapist    Shona Andersen, PT Physical Therapist    Diann Shore RN Registered Nurse        Occupational Therapy Education                 Title: PT OT SLP Therapies (In Progress)     Topic: Occupational Therapy (In Progress)     Point: ADL training (In Progress)     Description:   Instruct learner(s) on proper safety adaptation and remediation techniques during self care or transfers.   Instruct in proper use of assistive devices.              Learning Progress Summary           Patient Acceptance, E, NR by CL at 7/14/2021 1232                   Point: Home exercise program (Not Started)     Description:   Instruct learner(s) on appropriate technique for monitoring, assisting and/or progressing therapeutic exercises/activities.              Learner Progress:  Not documented in this visit.          Point: Precautions (In Progress)     Description:   Instruct learner(s) on prescribed precautions during self-care and functional transfers.              Learning Progress Summary           Patient Acceptance, E, NR by CL at 7/14/2021 1232                   Point: Body mechanics (In Progress)     Description:   Instruct learner(s) on proper positioning and spine alignment during self-care, functional mobility activities and/or exercises.              Learning Progress Summary            Patient Acceptance, E, NR by CL at 7/14/2021 1232                               User Key     Initials Effective Dates Name Provider Type Discipline    CL 04/03/18 -  Briana Suarez OT Occupational Therapist OT              OT Recommendation and Plan  Therapy Frequency (OT): daily  Plan of Care Review  Plan of Care Reviewed With: patient  Outcome Summary: OT eval complete. Pt is Min A for bed mobility, Dep for toileting ADLs, and Supervision for LBD tasks. Pt limited d/t decreased balance and endurance from baseline. Recommend cont skilled IPOT POC. Recommend pt DC to IP rehab based on current level of performance.     Time Calculation:   Time Calculation- OT     Row Name 07/14/21 1232             Time Calculation- OT    OT Start Time  0835  -CL      OT Received On  07/14/21  -CL      OT Goal Re-Cert Due Date  07/24/21  -CL         Timed Charges    72532 - OT Therapeutic Activity Minutes  5  -CL      42820 - OT Self Care/Mgmt Minutes  4  -CL         Untimed Charges    OT Eval/Re-eval Minutes  31  -CL         Total Minutes    Timed Charges Total Minutes  9  -CL      Untimed Charges Total Minutes  31  -CL       Total Minutes  40  -CL        User Key  (r) = Recorded By, (t) = Taken By, (c) = Cosigned By    Initials Name Provider Type    CL Briana Suarez OT Occupational Therapist        Therapy Charges for Today     Code Description Service Date Service Provider Modifiers Qty    19361377836 HC OT THERAPEUTIC ACT EA 15 MIN 7/14/2021 Briana Suarez OT GO 1    74249494051 HC OT EVAL LOW COMPLEXITY 3 7/14/2021 Briana Suarez OT GO 1               Briana Suarez OT  7/14/2021

## 2021-07-14 NOTE — PROGRESS NOTES
"Clinical Nutrition Note      Patient Name: Rebecca Sharma  MRN: [id number]  Admission date: 7/13/2021      Multidisciplinary Rounds    Additional information obtained during MDR:  RN reports pt adm from OSH w/ L MCA CVA s/p tPA.  Pt failed bedside CSE this morning, allowed a few ice chips and meds w/ applesauce. Plan for FEES tomorrow morning. Will have PT/OT see pt today. NIHSS is a \"4\".      Current diet: NPO Diet    Oral Nutrition Supplement: No active supplement orders      Pertinent medical data reviewed:  No nutrition risk identified on nursing screen; MST score \"0\"    Average intake per documentation:              Intervention:  Plan of care and goals of care reviewed    Monitor:  RD to follow per protocol      Sandra Monterroso MS,RD,LD  07/14/21 14:39 EDT  Time: 15  mins       "

## 2021-07-14 NOTE — NURSING NOTE
Patient arrive to CT scan per stretcher, accompanied by Flight crew, [facility], and Manisha AMEZCUA. TPA currently infusing

## 2021-07-14 NOTE — NURSING NOTE
ACC REVIEW REPORT: [facility]        PATIENT NAME: Rebecca Sharma    PATIENT ID: [id number]      COVID-19 ACC SCREENING       DOES THE PATIENT HAVE A FEVER GREATER THAN OR EQUAL .4: NO    IS THE PATIENT EXPERIENCING SHORTNESS OF BREATH: YES    DOES THE PATIENT HAVE A COUGH: NO    DOES THE PATIENT HAVE ANY OF THE FOLLOWING RISK FACTORS:    EXPOSURE TO SUSPECTED OR KNOWN COVID-19: NO    RECENT TRAVEL HISTORY TO ENDEMIC AREA (DOMESTIC/LOCAL): NO    IS THE PATIENT A HEALTHCARE WORKER: NO    HAS THE PATIENT EXPERIENCED A LOSS OF SENSE OF TASTE OR SMELL:  NO    HAS THE PATIENT BEEN TESTED FOR COVID-19: NO    DATE TESTED:     LAB TESTING SENT TO:       BED: N232    BED TYPE: ICU    BED GIVEN TO: PAMELA WERNER RN    TIME BED GIVEN: 2148    TODAY'S DATE: 7/13/2021    TRANSFER DATE: 7/13/21    ETA: 2220    TRANSFERRING FACILITY: [facility]    TRANSFERRING FACILITY PHONE # : [phone]    TRANSFERRING MD: MADINA    ACCEPTING PROVIDER: NAVIGATOR    NEUROLOGY PHYSICIAN: NAUN    DATE/TIME REQUEST RECEIVED: 7/13/21 @ 2127    [facility] RN: MARILIN MCKEON RN    REPORT FROM: PAMELA WERNER    TIME REPORT TAKEN: 2148    DIAGNOSIS: CVA TO GET TPA    REASON FOR TRANSFER TO [facility]: HIGHER LEVEL OF CARE    TRANSPORTATION: AIR    CLINICAL REASON FOR TRANSFER TO [facility]: HIGHER LEVEL OF CARE      CLINICAL INFORMATION    HEIGHT: 65 INCHES    WEIGHT: 77 KG    ALLERGIES:     Agent Severity Comments   Aspirin Medium Lip and hand swelling   Donepezil Medium Nightmares at the 10mg dose   Naproxen Medium ulcer   Montelukast Sodium  Morning drowsiness even with QHS dosing   Codeine Low    Hydrocodone Low        INFECTIOUS DISEASE: NONE    ISOLATION: NO    VITAL SIGNS:   TIME:   TEMP: AFEBRILE  PULSE: 85  B/P: 172/83  RESP: 17      LAB INFORMATION:   UNREMARKABLE    CULTURE INFORMATION: NONE    MEDS/IV FLUIDS:   #20 LEFT AC      CARDIAC SYSTEM:    CHEST PAIN: NO    RATE:     SCALE:     RHYTHM: NSR    Is patient taking or has  patient been given any drugs that could increase bleeding? NO      DRUG:      DOSE/FREQUENCY:     TROPONIN:            RESPIRATORY SYSTEM:    LUNG SOUNDS: CLEAR        OXYGEN: 2L/NC    O2 SAT: 96%        RESPIRATORY STATUS: STABLE      CNS/MUSCULOSKELETAL      PENNY COMA SCALE:    E: 4  M: 6  V: 5    LAST KNOWN WELL: 1930          NIHSS    Survey Item  0: Means Alert  1: Drowsy or Answer Correctly  2: Incorrect, Forced, Can't Resist Gravity  3: Complete or No Effort  4: No Movement  NT: Not Testable Acceptable As Noted Above      1A: Level of Consciousness: 0    1B: LOC Questions (month, age) : 0    1C: LOC Commands (open/close eyes, make a fist & let go): 0    2:  Best Gaze (eyes open-pt follows examiner's fingers or face): 0    3:  Visual (introduce visual stimulus/threat to pt's visual field quad. Cover 1 eye and hold up fingers in all 4 quadrants) : 0    4.  Facial Palsy (show teeth, raise eyebrows and squeeze eyes tightly shut): 2    5A: Motor Arm-Left (elevate extremity to 90 degrees and score drift/movement.  Count to 10 aloud and use fingers for visual cue): 0    5B:  Motor Arm-Right (elevate extremity to 90 degrees and score drift/movement.  Count to 10 aloud and use fingers for visual cue): 0    6A:  Motor Leg-Left (elevate extremity to 30 degrees and score drift/movement.  Count to 5 out loud and use fingers for visual cue): 0    6B:  Motor Leg-Right (elevate extremity to 30 degrees and score drift/movement.  Count to 5 out loud and use fingers for visual cue): 0    7:  Limb Ataxia- finger to nose, heel down shin: 0    8:  Sensory- pin prick to face, arms, trunk, and legs. Compare sharpness side to side: 0    9:  Best Language- name, items, describe picture, and read sentences.  Do not forget glasses if they normally wear them: 0    10: Dysarthria- elevate speech clarity by pt reading or repeating words on a list: 2    11: Extinction and Inattention- Use information from prior testing or double  simultaneous stimuli testing to identify neglect. Face, arms, legs and visual field: 0    Total NIHSS Score: 4  Date: 7/13/21  Time of NIHSS Assessment:             CAT SCAN RESULTS: NEGATIVE        CNS/MUSCULOSKELETAL NOTES:       GI//GY      ABDOMINAL PAIN: NONE    VOMITING: NO    DIARRHEA: NO    NAUSEA: NO    BOWEL SOUNDS: ACTIVE    OCCULT STOOL: N/A    HEMATURIA: NO        PAST MEDICAL HISTORY:   Diagnosis Date Comment Source   Abnormal MRI, breast 01/03/2020 breast MRI (1/3/20): 1.1cm R. nipple mass sugg of nipple adenoma, indeterminate 0.5cm L. breast mass 12:00, rec surg excision for punch bx R. nipple mass and u/s for further eval L. breast mass    Abnormal MRI, breast 07/10/2020 breast MRI (7/10/20): postsurg bx R. nipple with dx benign adenoma, post-bx clip left breast adjacent to 0.4cm not worrisome mass, rec L. breast MRI in 12 mos (reg mammo due 12/20)    Adenoma of nipple, right      Adverse reaction to drug 5/10/2016 Impression: 06/09/2014 - GI side effects to aricept; plan as above to change timing and dose of med;     Anesthesia  SOB upon awakening from surgery.  one occurrence.    Breast pain, right 06/14/2016 6/14/16 Notes f/u with Dr. Beckham with neg bx (evaluated at [facility]); bx site healing per patient; 5/10/16 Pain resolved but has residual R. nipple abnlity with erythema and nodular change, therefore dx mammo ordered for further eval; last reg mammo done 7/15; R. nipple mass - s/p bx to r/o Paget's disease (6/16); surg - Dr. Beckham    Contact dermatitis due to poison ivy 07/30/2014 Impression: 07/30/2014 - Depo-medrol 40 mg IM given in office. Pt will start prednisone taper as ordered. Call or RTC if rash is not improving.;     Discomfort of left eye 10/27/2016 Impression: 10/27/16 No abnlities on exam and notes updated eye exam 9/16; if workup negative and sxs persist, needs to f/u with Dr. Hollis; 04/13/2014 - check labs to include ESR, CRP but proceed with ophtho consult with  Dr. Hollis ASAP; Description: LEFT; nl eye exam (4/16/14), per ophtho - Dr. Hollis    Dizziness 10/27/2016 10/27/16 Imbalanced sensation and not vertigo per patient; no focal neuro deficits on exam; will rec f/u eye exam and head imaging if sxs persist and ESR/CRP are normal; ddx also includes eust tube dysfunction    Hx of bone density study 07/2013 DEXA (7/13) H -1.3    Hx of bone density study 07/14/2016 DEXA (7/14/16) L 0.4, H -1.6 repeat 2-3 yrs    Hx of bone density study 10/08/2019 DEXA (10/8/19): L 1.3, H -1.8, A -2.8, worsened, NEW osteoporosis, repeat 2 yrs    Hx of colonoscopy 12/2015 surg - Dr. Kartik Gold    Hx of colonoscopy 11/08/2019 colonosc (11/8/19): nl except diverticulosis, repeat 5 yrs due to h/o polyps; surg - Dr. aKrtik Gold    Hx of esophagogastroduodenoscopy 05/05/2017 EGD (5/5/17): mod chronic gastritis, (+)H.pylori, no hiatal hernia; GI - Dr. Esparza    Hx of LLE venous duplex 11/13/2020 LLE venous duplex (11/13/20): no DVT; (+) valvular venous insufficiency deep and superficial with severe reflux    Lightheadedness 04/28/2014 Impression: 04/28/2014 - resolved; will let her know official carotid u/s results when available  Impression: 04/13/2014 - discussed adequate hydration; check CBC;     Lip swelling 08/05/2015 Impression: 08/05/2015 - L. lower lip lesion significantly improved; complete course of abx as prescribed; if area does not resolve completely, call to make earlier derm f/u appt  Impression: 07/28/2015 - L. lower lip swelling already improving but concern for residual ongoing infection; ddx includes infected cold sore, infection from cryotherapy for AK, and less likely shingles; finishing course     Pap smear for cervical cancer screening 08/16/2016 Pap done 8/16/16 per Dr. Brittanie eYeiting    Peripheral arterial occlusive disease (CMS/HCC) 5/10/2016 Description: abnl ALLEGRA at [facility] with nl arterial duplex (10/08)    Wears glasses      Surgical History    Past Surgical  "History     Laterality Date Comments   Cholecystectomy [SHX55]  2002 secondary to \"crystalized GB\" (''02); surg - Dr. Kartik Gold   Cataract extraction, bilateral [EQB0206]   L. 4/09 (complicated by nerve injury; R. 6/10; L. corrected 6/15; ophtho - Dr. Hollis, Dr. Lange; neuro-ophtho Dr. Ferris   Bilateral salpingoophorectomy [[id number]] Bilateral 06/14/2017 GYN - Dr. Bauer; for benign L. ovarian cyst   Oophorectomy [SHX86]      Mammo core needle biopsy unilateral [[id number]] Left 01/17/2020 L. breast u/s core needle bx (1/17/20): path - Focal proliferative fibrocystic changes including sclerosing adenosis   Breast biopsy [SHX20] Right 5/12/2020 s/p R. breast excisional bx R. subsreaolar mass (5/12/20); path - nipple adenoma; surg - Dr. Beckham      Pertinent Negatives     Date Comments   BREAST LUMPECTOMY [SHX2] 11/17/2016    AUGMENTATION MAMMAPLASTY [PRU352] 11/17/2016    REDUCTION MAMMAPLASTY [[id number]] 11/17/2016    BREAST CYST ASPIRATION [GLT664] 11/17/2016    BREAST CYST EXCISION [[id number]]           OTHER SYMPTOM NOTES:   RIGHT FACIAL DROOP   DIFFICULTY SWALLOWING  APHASIC     ADDITIONAL NOTES:             Cyndi Rondon, RN  7/13/2021  21:40 EDT    "

## 2021-07-14 NOTE — THERAPY EVALUATION
Patient Name: Rebecca Sharma  : 1945    MRN: [id number]                              Today's Date: 2021       Admit Date: 2021    Visit Dx: No diagnosis found.  Patient Active Problem List   Diagnosis   • Cysts of both ovaries   • Actinic keratosis   • Allergic rhinitis   • Asthma   • Hyperlipidemia   • Dyspepsia   • Hypertension   • Uterine leiomyoma   • Gastroesophageal reflux disease    • Chronic laryngitis   • Hyperactivity of bladder   • Hypothyroidism   • Lumbar spinal stenosis   • Mild cognitive disorder   • Osteoarthritis   • Osteoporosis   • Seborrheic keratosis   • Bilateral sensorineural hearing loss   • Skin tag   • Mixed stress and urge urinary incontinence   • Xerostomia   • Nasal septal deviation, right   • Medicare annual wellness visit, subsequent   • Headache   • ADEN (obstructive sleep apnea)   • Fibrocystic disease of left breast   • Chronic right hip pain   • Menopause   • Macrocytosis without anemia   • Diverticulosis of colon   • Right nipple adenoma   • Venous insufficiency of left leg   • Cricopharyngeal spasm   • Schatzki's ring   • Suspected cerebrovascular accident (CVA)   • CVA (cerebral vascular accident) (CMS/HCC)     Past Medical History:   Diagnosis Date   • Abnormal MRI, breast 2020    breast MRI (1/3/20): 1.1cm R. nipple mass sugg of nipple adenoma, indeterminate 0.5cm L. breast mass 12:00, rec surg excision for punch bx R. nipple mass and u/s for further eval L. breast mass   • Abnormal MRI, breast 07/10/2020    breast MRI (7/10/20): postsurg bx R. nipple with dx benign adenoma, post-bx clip left breast adjacent to 0.4cm not worrisome mass, rec L. breast MRI in 12 mos (reg mammo due )   • Adverse reaction to drug 5/10/2016    Impression: 2014 - GI side effects to aricept; plan as above to change timing and dose of med;    • Anesthesia     SOB upon awakening from surgery.  one occurrence.   • Breast pain, right 2016 Notes f/u with  Dr. Beckham with neg bx (evaluated at [facility]); bx site healing per patient; 5/10/16 Pain resolved but has residual R. nipple abnlity with erythema and nodular change, therefore dx mammo ordered for further eval; last reg mammo done 7/15; R. nipple mass - s/p bx to r/o Paget's disease (6/16); surg - Dr. Beckham   • Contact dermatitis due to poison ivy 07/30/2014    Impression: 07/30/2014 - Depo-medrol 40 mg IM given in office. Pt will start prednisone taper as ordered. Call or RTC if rash is not improving.;    • Dizziness 10/27/2016    10/27/16 Imbalanced sensation and not vertigo per patient; no focal neuro deficits on exam; will rec f/u eye exam and head imaging if sxs persist and ESR/CRP are normal; ddx also includes eust tube dysfunction   • Hx of bone density study 07/2013    DEXA (7/13) H -1.3   • Hx of bone density study 07/14/2016    DEXA (7/14/16) L 0.4, H -1.6 repeat 2-3 yrs   • Hx of bone density study 10/08/2019    DEXA (10/8/19): L 1.3, H -1.8, A -2.8, worsened, NEW osteoporosis, repeat 2 yrs   • Hx of colonoscopy 12/2015    surg - Dr. Kartik Gold   • Hx of colonoscopy 11/08/2019    colonosc (11/8/19): nl except diverticulosis, repeat 5 yrs due to h/o polyps; surg - Dr. Kartik Gold   • Hx of CT angio head/neck 07/13/2021    CT angio head/neck (7/13/21, hospitalized): No definite lg vessel occlusion, high-grade stenosis or dissection; indeterminate narrowing/occlusion of small P3 branch of the patient's left PCA; small outpouching at origin of left P-com may represent a small aneurysm versus infundibulum   • Hx of CT brain perfusion 07/13/2021    Normal brain perfusion CT (7/13/21, hospitalized)   • Hx of CT scan of head 07/13/2021    nl CT head (7/13/21): except mild atrophy with mild chronic small vessel ischemic disease   • Hx of esophagogastroduodenoscopy 05/05/2017    EGD (5/5/17): mod chronic gastritis, (+)H.pylori, no hiatal hernia; GI - Dr. Esparza   • Hx of LLE venous duplex 11/13/2020     "LLE venous duplex (11/13/20): no DVT; (+) valvular venous insufficiency deep and superficial with severe reflux   • Lightheadedness 04/28/2014    Impression: 04/28/2014 - resolved; will let her know official carotid u/s results when available  Impression: 04/13/2014 - discussed adequate hydration; check CBC;    • Lip swelling 08/05/2015    Impression: 08/05/2015 - L. lower lip lesion significantly improved; complete course of abx as prescribed; if area does not resolve completely, call to make earlier derm f/u appt  Impression: 07/28/2015 - L. lower lip swelling already improving but concern for residual ongoing infection; ddx includes infected cold sore, infection from cryotherapy for AK, and less likely shingles; finishing course    • Pap smear for cervical cancer screening 08/16/2016    Pap done 8/16/16 per Dr. Brittanie Bauer   • Peripheral arterial occlusive disease (CMS/HCC) 5/10/2016    Description: abnl ALLEGRA at [facility] with nl arterial duplex (10/08)   • Wears glasses      Past Surgical History:   Procedure Laterality Date   • BILATERAL SALPINGO OOPHORECTOMY Bilateral 06/14/2017    GYN - Dr. Bauer; for benign L. ovarian cyst   • BREAST BIOPSY Right 5/12/2020    s/p R. breast excisional bx R. subsreaolar mass (5/12/20); path - nipple adenoma; surg - Dr. Beckham   • CATARACT EXTRACTION, BILATERAL      L. 4/09 (complicated by nerve injury; R. 6/10; L. corrected 6/15; ophtho - Dr. Hollis, Dr. Lange; neuro-ophtho Dr. Ferris   • CHOLECYSTECTOMY  2002    secondary to \"crystalized GB\" (''02); surg - Dr. Kartik Gold   • MAMMO CORE NEEDLE BIOPSY UNILATERAL Left 01/17/2020    L. breast u/s core needle bx (1/17/20): path - Focal proliferative fibrocystic changes including sclerosing adenosis   • OOPHORECTOMY       General Information     Row Name 07/14/21 0924          Physical Therapy Time and Intention    Document Type  evaluation  -AY     Mode of Treatment  physical therapy  -AY     Row Name 07/14/21 0924       "    General Information    Patient Profile Reviewed  yes  -AY     Prior Level of Function  independent:;all household mobility;community mobility;gait;transfer;bed mobility;using stairs amb with use of SPC as needed; short distances only d/t LBP from stenosis  -AY     Existing Precautions/Restrictions  fall dysarthria, urinary incontience  -AY     Barriers to Rehab  medically complex  -AY     Row Name 07/14/21 0924          Living Environment    Lives With  alone  -AY     Row Name 07/14/21 0924          Home Main Entrance    Number of Stairs, Main Entrance  one  -AY     Stair Railings, Main Entrance  none  -AY     Row Name 07/14/21 0924          Stairs Within Home, Primary    Stairs, Within Home, Primary  sunken living room  -AY     Number of Stairs, Within Home, Primary  one  -AY     Stair Railings, Within Home, Primary  none  -AY     Row Name 07/14/21 0924          Cognition    Orientation Status (Cognition)  oriented x 3  -AY     Row Name 07/14/21 0924          Safety Issues, Functional Mobility    Impairments Affecting Function (Mobility)  balance;endurance/activity tolerance;strength  -AY       User Key  (r) = Recorded By, (t) = Taken By, (c) = Cosigned By    Initials Name Provider Type    AY Shona Foster, PT Physical Therapist        Mobility     Row Name 07/14/21 0925          Bed Mobility    Comment (Bed Mobility)  received sitting EOB  -AY     Row Name 07/14/21 0925          Transfers    Comment (Transfers)  VC for sequencing  -AY     Row Name 07/14/21 0925          Sit-Stand Transfer    Sit-Stand [place] (Transfers)  minimum assist (75% patient effort);1 person assist;verbal cues  -AY     Row Name 07/14/21 0925          Gait/Stairs (Locomotion)    [place] Level (Gait)  minimum assist (75% patient effort);1 person assist;verbal cues  -AY     Assistive Device (Gait)  other (see comments) R HHA  -AY     Distance in Feet (Gait)  10  -AY     Deviations/Abnormal Patterns (Gait)  bita decreased;gait  speed decreased;stride length decreased;base of support, narrow  -AY     Bilateral Gait Deviations  forward flexed posture;heel strike decreased  -AY     Comment (Gait/Stairs)  pt demonstrated step through gait pattern with mild instability noted. VC for posture, increased stride length, and heel strike. Gait distance limited by fatigue. Only cleared for in-room ambulation per neuro.  -AY       User Key  (r) = Recorded By, (t) = Taken By, (c) = Cosigned By    Initials Name Provider Type    AY Shona Foster, PT Physical Therapist        Obj/Interventions     Row Name 07/14/21 0927          Range of Motion Comprehensive    General Range of Motion  bilateral lower extremity ROM WFL  -AY     Row Name 07/14/21 0927          Strength Comprehensive (MMT)    General Manual Muscle Testing (MMT) Assessment  lower extremity strength deficits identified  -AY     Comment, General Manual Muscle Testing (MMT) Assessment  RLE grossly 4-/5. LLE grossly 4/5  -AY     Row Name 07/14/21 0927          Motor Skills    Motor Skills  coordination  -AY     Coordination  gross motor deficit;bilateral;lower extremity;minimal impairment;heel to queen  -AY     Row Name 07/14/21 0927          Balance    Balance Assessment  sitting static balance;sitting dynamic balance;standing static balance;standing dynamic balance  -AY     Static Sitting Balance  WFL;sitting, edge of bed  -AY     Dynamic Sitting Balance  mild impairment;sitting, edge of bed  -AY     Static Standing Balance  mild impairment;standing;supported  -AY     Dynamic Standing Balance  mild impairment;supported;standing  -AY     Comment, Balance  mild instability noted with balance testing requring min A for static narrow stance with EC and static tandem stance.  -AY     Row Name 07/14/21 0927          Sensory Assessment (Somatosensory)    Sensory Assessment (Somatosensory)  LE sensation intact  -AY       User Key  (r) = Recorded By, (t) = Taken By, (c) = Cosigned By    Initials Name  Provider Type    Shona Andersen, PT Physical Therapist        Goals/Plan     Row Name 07/14/21 0931          Bed Mobility Goal 1 (PT)    Activity/Assistive Device (Bed Mobility Goal 1, PT)  sit to supine/supine to sit  -AY     [place] Level/Cues Needed (Bed Mobility Goal 1, PT)  independent  -AY     Time Frame (Bed Mobility Goal 1, PT)  long term goal (LTG);2 weeks  -AY     Row Name 07/14/21 0931          Transfer Goal 1 (PT)    Activity/Assistive Device (Transfer Goal 1, PT)  sit-to-stand/stand-to-sit  -AY     [place] Level/Cues Needed (Transfer Goal 1, PT)  independent  -AY     Time Frame (Transfer Goal 1, PT)  long term goal (LTG);2 weeks  -AY     Row Name 07/14/21 0931          Gait Training Goal 1 (PT)    Activity/Assistive Device (Gait Training Goal 1, PT)  gait (walking locomotion);walker, rolling  -AY     [place] Level (Gait Training Goal 1, PT)  modified independence  -AY     Distance (Gait Training Goal 1, PT)  200  -AY     Time Frame (Gait Training Goal 1, PT)  long term goal (LTG);2 weeks  -AY     Row Name 07/14/21 0931          Stairs Goal 1 (PT)    Activity/Assistive Device (Stairs Goal 1, PT)  ascending stairs;descending stairs  -AY     [place] Level/Cues Needed (Stairs Goal 1, PT)  supervision required  -AY     Number of Stairs (Stairs Goal 1, PT)  1  -AY     Time Frame (Stairs Goal 1, PT)  long term goal (LTG);2 weeks  -AY       User Key  (r) = Recorded By, (t) = Taken By, (c) = Cosigned By    Initials Name Provider Type    Shona Andersen, PT Physical Therapist        Clinical Impression     Row Name 07/14/21 0928          Pain    Additional Documentation  Pain Scale: Numbers Pre/Post-Treatment (Group)  -AY     Row Name 07/14/21 0928          Pain Scale: Numbers Pre/Post-Treatment    Pretreatment Pain Rating  0/10 - no pain  -AY     Posttreatment Pain Rating  0/10 - no pain  -AY     Pain Intervention(s)  Ambulation/increased activity;Repositioned  -AY     Row Name 07/14/21  0928          Plan of Care Review    Plan of Care Reviewed With  patient  -AY     Outcome Summary  PT initial eval completed. Pt limited by balance impairment, decreased functional endurance, generalized weakness (R>L), coordination deficit, and h/o LBP. Pt amb 10ft with min A via L HHA. Mild instability noted with balance testing. Rec continued skilled IP PT to increase indep with mobility. d/c rec for IRF d/t living alone and fall risk.  -AY     Row Name 07/14/21 0928          Therapy Assessment/Plan (PT)    Rehab Potential (PT)  good, to achieve stated therapy goals  -AY     Criteria for Skilled Interventions Met (PT)  yes;meets criteria;skilled treatment is necessary  -AY     Row Name 07/14/21 0928          Vital Signs    Pre Systolic BP Rehab  153  -AY     Pre Treatment Diastolic BP  86  -AY     Post Systolic BP Rehab  153  -AY     Post Treatment Diastolic BP  94  -AY     Pretreatment Heart Rate (beats/min)  78  -AY     Posttreatment Heart Rate (beats/min)  76  -AY     Pre SpO2 (%)  96  -AY     O2 Delivery Pre Treatment  room air  -AY     O2 Delivery Intra Treatment  room air  -AY     Post SpO2 (%)  97  -AY     O2 Delivery Post Treatment  room air  -AY     Pre Patient Position  Sitting  -AY     Intra Patient Position  Standing  -AY     Post Patient Position  Sitting  -AY     Row Name 07/14/21 0928          Positioning and Restraints    Pre-Treatment Position  in bed sitting EOB with OT  -AY     Post Treatment Position  chair  -AY     In Chair  notified nsg;reclined;sitting;call light within reach;encouraged to call for assist;exit alarm on;waffle cushion;legs elevated;heels elevated  -AY       User Key  (r) = Recorded By, (t) = Taken By, (c) = Cosigned By    Initials Name Provider Type    AY Shona Foster, PT Physical Therapist        Outcome Measures     Row Name 07/14/21 0931          How much help from another person do you currently need...    Turning from your back to your side while in flat bed without  using bedrails?  3  -AY     Moving from lying on back to sitting on the side of a flat bed without bedrails?  3  -AY     Moving to and from a bed to a chair (including a wheelchair)?  3  -AY     Standing up from a chair using your arms (e.g., wheelchair, bedside chair)?  3  -AY     Climbing 3-5 steps with a railing?  3  -AY     To walk in hospital room?  3  -AY     AM-PAC 6 Clicks Score (PT)  18  -AY     Row Name 07/14/21 0931          Modified [place] Scale    Pre-Stroke Modified [place] Scale  6 - Unable to determine (UTD) from the medical record documentation  -AY     Modified [place] Scale  3 - Moderate disability.  Requiring some help, but able to walk without assistance.  -AY     Row Name 07/14/21 0931          Functional Assessment    Outcome Measure Options  AM-PAC 6 Clicks Basic Mobility (PT);Modified [place]  -AY       User Key  (r) = Recorded By, (t) = Taken By, (c) = Cosigned By    Initials Name Provider Type    AY Shona Foster PT Physical Therapist        Physical Therapy Education                 Title: PT OT SLP Therapies (In Progress)     Topic: Physical Therapy (In Progress)     Point: Mobility training (Done)     Learning Progress Summary           Patient Acceptance, E,TB, VU,NR by AY at 7/14/2021 0932                   Point: Home exercise program (Not Started)     Learner Progress:  Not documented in this visit.          Point: Body mechanics (Done)     Learning Progress Summary           Patient Acceptance, E,TB, VU,NR by AY at 7/14/2021 0932                   Point: Precautions (Done)     Learning Progress Summary           Patient Acceptance, E,TB, VU,NR by AY at 7/14/2021 0932                               User Key     Initials Effective Dates Name Provider Type Discipline    AY 11/10/20 -  Shona Foster PT Physical Therapist PT              PT Recommendation and Plan  Planned Therapy Interventions (PT): balance training, bed mobility training, home exercise program, gait training,  patient/family education, strengthening, stair training, transfer training  Plan of Care Reviewed With: patient  Outcome Summary: PT initial eval completed. Pt limited by balance impairment, decreased functional endurance, generalized weakness (R>L), coordination deficit, and h/o LBP. Pt amb 10ft with min A via L HHA. Mild instability noted with balance testing. Rec continued skilled IP PT to increase indep with mobility. d/c rec for IRF d/t living alone and fall risk.     Time Calculation:   PT Charges     Row Name 07/14/21 0933             Time Calculation    Start Time  0845  -AY      PT Received On  07/14/21  -AY      PT Goal Re-Cert Due Date  07/24/21  -AY         Untimed Charges    PT Eval/Re-eval Minutes  46  -AY         Total Minutes    Untimed Charges Total Minutes  46  -AY       Total Minutes  46  -AY        User Key  (r) = Recorded By, (t) = Taken By, (c) = Cosigned By    Initials Name Provider Type    AY Shona Foster, SHAUN Physical Therapist        Therapy Charges for Today     Code Description Service Date Service Provider Modifiers Qty    [id number]  PT EVAL LOW COMPLEXITY 4 7/14/2021 Shona Foster, PT GP 1          PT G-Codes  Outcome Measure Options: AM-PAC 6 Clicks Basic Mobility (PT), Modified [place]  AM-PAC 6 Clicks Score (PT): 18  Modified [place] Scale: 3 - Moderate disability.  Requiring some help, but able to walk without assistance.    Shona Foster PT  7/14/2021

## 2021-07-14 NOTE — PLAN OF CARE
Problem: Adult Inpatient Plan of Care  Goal: Plan of Care Review  7/14/2021 0440 by Brooklynn Marquez RN  Outcome: Ongoing, Progressing  Flowsheets (Taken 7/14/2021 0436)  Outcome Summary: Pt arrived via helicopter at 22:30 s/p CVA with TPA.  VSS. blood pressure remains within perimeters without intervention.  Neuro status stable. pt c/o minor headache (2/10).  MRI completed  7/14/2021 0436 by Brooklynn Marquez RN  Outcome: Ongoing, Progressing  Flowsheets (Taken 7/14/2021 0436)  Outcome Summary: Pt arrived via helicopter at 22:30 s/p CVA with TPA.  VSS. blood pressure remains within perimeters without intervention.  Neuro status stable. pt c/o minor headache (2/10).  MRI completed   Goal Outcome Evaluation:              Outcome Summary: Pt arrived via helicopter at 22:30 s/p CVA with TPA.  VSS. blood pressure remains within perimeters without intervention.  Neuro status stable. pt c/o minor headache (2/10).  MRI completed

## 2021-07-14 NOTE — NURSING NOTE
WOC consult for POA to coccyx.     Area assessed-no breakdown or pressure injuries noted-skin is pink/dry/blanching-may use Z guard for moisture management.     No other concerns-heels intact-patient stands well with assist. All interventions in place and implemented. Just needs good general skin care.     WOC will sign off-please reconsult for questions or concerns. Thank you.

## 2021-07-14 NOTE — THERAPY EVALUATION
Acute Care - Speech Language Pathology   Swallow Initial Evaluation [facility]   Clinical Swallow Evaluation     Patient Name: Rebecca Sharma  : 1945  MRN: [id number]  Today's Date: 2021               Admit Date: 2021    Visit Dx:     ICD-10-CM ICD-9-CM   1. Dysphagia, unspecified type  R13.10 787.20   2. Cerebrovascular accident (CVA), unspecified mechanism (CMS/HCC)  I63.9 434.91     Patient Active Problem List   Diagnosis   • Cysts of both ovaries   • Actinic keratosis   • Allergic rhinitis   • Asthma   • Hyperlipidemia   • Dyspepsia   • Hypertension   • Uterine leiomyoma   • Gastroesophageal reflux disease    • Chronic laryngitis   • Hyperactivity of bladder   • Hypothyroidism   • Lumbar spinal stenosis   • Mild cognitive disorder   • Osteoarthritis   • Osteoporosis   • Seborrheic keratosis   • Bilateral sensorineural hearing loss   • Skin tag   • Mixed stress and urge urinary incontinence   • Xerostomia   • Nasal septal deviation, right   • Medicare annual wellness visit, subsequent   • Headache   • ADEN (obstructive sleep apnea)   • Fibrocystic disease of left breast   • Chronic right hip pain   • Menopause   • Macrocytosis without anemia   • Diverticulosis of colon   • Right nipple adenoma   • Venous insufficiency of left leg   • Cricopharyngeal spasm   • Schatzki's ring   • Suspected cerebrovascular accident (CVA)   • CVA (cerebral vascular accident) (CMS/[facility])     Past Medical History:   Diagnosis Date   • Abnormal MRI, breast 2020    breast MRI (1/3/20): 1.1cm R. nipple mass sugg of nipple adenoma, indeterminate 0.5cm L. breast mass 12:00, rec surg excision for punch bx R. nipple mass and u/s for further eval L. breast mass   • Abnormal MRI, breast 07/10/2020    breast MRI (7/10/20): postsurg bx R. nipple with dx benign adenoma, post-bx clip left breast adjacent to 0.4cm not worrisome mass, rec L. breast MRI in 12 mos (reg mammo due )   • Adverse reaction to drug 5/10/2016     Impression: 06/09/2014 - GI side effects to aricept; plan as above to change timing and dose of med;    • Anesthesia     SOB upon awakening from surgery.  one occurrence.   • Breast pain, right 06/14/2016 6/14/16 Notes f/u with Dr. Beckham with neg bx (evaluated at [facility]); bx site healing per patient; 5/10/16 Pain resolved but has residual R. nipple abnlity with erythema and nodular change, therefore dx mammo ordered for further eval; last reg mammo done 7/15; R. nipple mass - s/p bx to r/o Paget's disease (6/16); surg - Dr. Beckham   • Contact dermatitis due to poison ivy 07/30/2014    Impression: 07/30/2014 - Depo-medrol 40 mg IM given in office. Pt will start prednisone taper as ordered. Call or RTC if rash is not improving.;    • Dizziness 10/27/2016    10/27/16 Imbalanced sensation and not vertigo per patient; no focal neuro deficits on exam; will rec f/u eye exam and head imaging if sxs persist and ESR/CRP are normal; ddx also includes eust tube dysfunction   • Hx of bone density study 07/2013    DEXA (7/13) H -1.3   • Hx of bone density study 07/14/2016    DEXA (7/14/16) L 0.4, H -1.6 repeat 2-3 yrs   • Hx of bone density study 10/08/2019    DEXA (10/8/19): L 1.3, H -1.8, A -2.8, worsened, NEW osteoporosis, repeat 2 yrs   • Hx of colonoscopy 12/2015    surg - Dr. Kartik Gold   • Hx of colonoscopy 11/08/2019    colonosc (11/8/19): nl except diverticulosis, repeat 5 yrs due to h/o polyps; surg - Dr. Kartik Gold   • Hx of CT angio head/neck 07/13/2021    CT angio head/neck (7/13/21, hospitalized): No definite lg vessel occlusion, high-grade stenosis or dissection; indeterminate narrowing/occlusion of small P3 branch of the patient's left PCA; small outpouching at origin of left P-com may represent a small aneurysm versus infundibulum   • Hx of CT brain perfusion 07/13/2021    Normal brain perfusion CT (7/13/21, hospitalized)   • Hx of CT scan of head 07/13/2021    nl CT head (7/13/21): except mild  "atrophy with mild chronic small vessel ischemic disease   • Hx of esophagogastroduodenoscopy 05/05/2017    EGD (5/5/17): mod chronic gastritis, (+)H.pylori, no hiatal hernia; GI - Dr. Esparza   • Hx of LLE venous duplex 11/13/2020    LLE venous duplex (11/13/20): no DVT; (+) valvular venous insufficiency deep and superficial with severe reflux   • Lightheadedness 04/28/2014    Impression: 04/28/2014 - resolved; will let her know official carotid u/s results when available  Impression: 04/13/2014 - discussed adequate hydration; check CBC;    • Lip swelling 08/05/2015    Impression: 08/05/2015 - L. lower lip lesion significantly improved; complete course of abx as prescribed; if area does not resolve completely, call to make earlier derm f/u appt  Impression: 07/28/2015 - L. lower lip swelling already improving but concern for residual ongoing infection; ddx includes infected cold sore, infection from cryotherapy for AK, and less likely shingles; finishing course    • Pap smear for cervical cancer screening 08/16/2016    Pap done 8/16/16 per Dr. Brittanie Bauer   • Peripheral arterial occlusive disease (CMS/HCC) 5/10/2016    Description: abnl ALLEGRA at [facility] with nl arterial duplex (10/08)   • Wears glasses      Past Surgical History:   Procedure Laterality Date   • BILATERAL SALPINGO OOPHORECTOMY Bilateral 06/14/2017    GYN - Dr. Bauer; for benign L. ovarian cyst   • BREAST BIOPSY Right 5/12/2020    s/p R. breast excisional bx R. subsreaolar mass (5/12/20); path - nipple adenoma; surg - Dr. Beckham   • CATARACT EXTRACTION, BILATERAL      L. 4/09 (complicated by nerve injury; R. 6/10; L. corrected 6/15; ophtho - Dr. Hollis, Dr. Lange; neuro-ophtho Dr. Ferris   • CHOLECYSTECTOMY  2002    secondary to \"crystalized GB\" (''02); surg - Dr. Kartik Gold   • MAMMO CORE NEEDLE BIOPSY UNILATERAL Left 01/17/2020    L. breast u/s core needle bx (1/17/20): path - Focal proliferative fibrocystic changes including sclerosing " adenosis   • OOPHORECTOMY          SWALLOW EVALUATION (last 72 hours)      SLP Adult Swallow Evaluation     Row Name 07/14/21 0930 07/14/21 0924                Rehab Evaluation    Document Type  evaluation  -KT  --       Subjective Information  no complaints  -KT  --       Patient Observations  alert;cooperative;agree to therapy  -KT  --       Patient/Family/Caregiver Comments/Observations  pt family member present  -KT  --       Care Plan Review  evaluation/treatment results reviewed;care plan/treatment goals reviewed;risks/benefits reviewed;patient/other agree to care plan  -KT  --       Patient Effort  excellent  -KT  --          General Information    Patient Profile Reviewed  yes  -KT  --       Pertinent History Of Current Problem  Pt admitted w/ r facial weakness and aphasia s/p TPA yesterday at 22:30. Pt has a hx of MCI, GERD, ADEN, and asthma. She had EDG in April '21 that revealed a hiatal hernia, cricopharyngeal spasm, and Schatski's ring s/p dilatation. Pt reported H.Pylori s/p antibiotics.   -KT  --       Current Method of Nutrition  NPO  -KT  --       Precautions/Limitations, Vision  WFL with corrective lenses;for purposes of eval  -KT  --       Precautions/Limitations, Hearing  WFL;for purposes of eval  -KT  --       Prior Level of Function-Communication  unknown  -KT  --       Prior Level of Function-Swallowing  no diet consistency restrictions  -KT  --       Plans/Goals Discussed with  patient;agreed upon  -KT  --       Barriers to Rehab  none identified  -KT  --          Pain    Additional Documentation  Pain Scale: Numbers Pre/Post-Treatment (Group)  -KT  --          Pain Scale: Numbers Pre/Post-Treatment    Pretreatment Pain Rating  0/10 - no pain  -KT  --       Posttreatment Pain Rating  0/10 - no pain  -KT  --          Oral Motor Structure and Function    Oral Lesions or Structural Abnormalities and/or variants  none identified  -KT  --       Dentition Assessment  natural, present and adequate   -KT  --       Secretion Management  WNL/WFL  -KT  --       Mucosal Quality  moist, healthy  -KT  --       Volitional Swallow  WFL  -KT  --       Volitional Cough  WFL  -KT  --          Oral Musculature and Cranial Nerve Assessment    Oral Motor General Assessment  WFL  -KT  --          General Eating/Swallowing Observations    Respiratory Support Currently in Use  room air  -KT  --       Eating/Swallowing Skills  self-fed;fed by SLP  -KT  --       Positioning During Eating  upright 90 degree;upright in chair  -KT  --       Utensils Used  spoon;cup;straw  -KT  --       Consistencies Trialed  ice chips;thin liquids;nectar/syrup-thick liquids;pureed;regular textures  -KT  --          Respiratory    Respiratory Status  WFL  -KT  --          Clinical Swallow Eval    Oral Prep Phase  WFL  -KT  --       Oral Transit  WFL  -KT  --       Oral Residue  WFL  -KT  --       Pharyngeal Phase  suspected pharyngeal impairment  -KT  --       Clinical Swallow Evaluation Summary  CSE completed with am. Pt positioned upright in chair to accept PO trials of ice chips, thins, nectar, puree and regular solid trials. The patient was alert and cooperative. Pt presented with adequate bilabial closure and lingal ROM. Both volitional cough and volitional swallow are generally weak. Hoarse vocal quality. Good rotary jaw movement with adequate crush power for solids. No overt s/sxs of aspiration with ice chips. There was a delayed cough response with thin liquids and change in vocal quality with NTL, puree, and regular solids. Mutliple swallows with all trials. There is suspected pharyngeal dysphagia. Recommend NPO with ice chips following scrupulous oral care and upright at 90 degrees. Meds given crushed in puree. Pt has history of GERD and esophageal dilatition. May require FEES following 24-hr window s/p TPA.   -KT  --  -KT          Pharyngeal Phase Concerns    Pharyngeal Phase Concerns  multiple swallows;cough;other (see comments) change in  vocal quality  -KT  --  -KT       Multiple Swallows  all consistencies  -KT  --       Cough  thin  -KT  --          Clinical Impression    SLP Swallowing Diagnosis  suspected pharyngeal dysphagia  -KT  --       Functional Impact  risk of aspiration/pneumonia  -KT  --       Rehab Potential/Prognosis, Swallowing  good, to achieve stated therapy goals  -KT  --       Swallow Criteria for Skilled Therapeutic Interventions Met  demonstrates skilled criteria  -KT  --          Recommendations    SLP Diet Recommendation  NPO;ice chips between meals after oral care, with supervision  -KT  --       Recommended Diagnostics  reassess via clinical swallow evaluation  -KT  --       Recommended Precautions and Strategies  general aspiration precautions  -KT  --       Oral Care Recommendations  Oral Care BID/PRN  -KT  --       SLP Rec. for Method of Medication Administration  meds crushed;with pudding or applesauce  -KT  --       Monitor for Signs of Aspiration  yes;notify SLP if any concerns  -KT  --       Anticipated Discharge Disposition (SLP)  anticipate therapy at next level of care  -KT  --         User Key  (r) = Recorded By, (t) = Taken By, (c) = Cosigned By    Initials Name Effective Dates    KT Vernell Victoria, MS CCC-SLP 06/15/21 -           EDUCATION  The patient has been educated in the following areas:   Dysphagia (Swallowing Impairment) Oral Care/Hydration NPO rationale.    SLP Recommendation and Plan  SLP Swallowing Diagnosis: suspected pharyngeal dysphagia  SLP Diet Recommendation: NPO, ice chips between meals after oral care, with supervision  Recommended Precautions and Strategies: general aspiration precautions  SLP Rec. for Method of Medication Administration: meds crushed, with pudding or applesauce     Monitor for Signs of Aspiration: yes, notify SLP if any concerns  Recommended Diagnostics: reassess via clinical swallow evaluation  Swallow Criteria for Skilled Therapeutic Interventions Met: demonstrates skilled  criteria  Anticipated Discharge Disposition (SLP): anticipate therapy at next level of care  Rehab Potential/Prognosis, Swallowing: good, to achieve stated therapy goals                               Plan of Care Reviewed With: patient  Progress: no change           Time Calculation:   Time Calculation- SLP     Row Name 07/14/21 1108             Time Calculation- SLP    SLP Start Time  0930  -KT      SLP Received On  07/14/21  -KT         Untimed Charges    81740-HM Eval Oral Pharyng Swallow Minutes  60  -KT         Total Minutes    Untimed Charges Total Minutes  60  -KT       Total Minutes  60  -KT        User Key  (r) = Recorded By, (t) = Taken By, (c) = Cosigned By    Initials Name Provider Type    Vernell Gtz MS CCC-SLP Speech and Language Pathologist          Therapy Charges for Today     Code Description Service Date Service Provider Modifiers Qty    [id number] HC ST EVAL ORAL PHARYNG SWALLOW 4 7/14/2021 Vernell Victoria MS CCC-SLP GN 1            Patient was not wearing a face mask and did exhibit coughing during this therapy encounter.  Procedure performed was aerosolizing, involved close contact (within 6 feet for at least 15 minutes or longer), and did not involve contact with infectious secretions or specimens.  Therapist used appropriate personal protective equipment including gloves, standard procedure mask and eye protection.  Appropriate PPE was worn during the entire therapy session.  Hand hygiene was completed before and after therapy session.  XENIA Durant was also present during this encounter and the aforementioned applies to them, as well.     Vernell Victoria MS CCC-SLP  7/14/2021

## 2021-07-14 NOTE — CONSULTS
Chart review for diabetes educator consult.    At the time of this review patient A1c is 5.8 , they have no noted history of diabetes and no home medications noted for treatment of diabetes. At this time we do not feel the patient would benefit from diabetes education. Thank you for this consult, should patient needs change please re consult us.

## 2021-07-15 LAB
ALBUMIN SERPL-MCNC: 3.3 G/DL (ref 3.5–5.2)
ALBUMIN/GLOB SERPL: 1.2 G/DL
ALP SERPL-CCNC: 93 U/L (ref 39–117)
ALT SERPL W P-5'-P-CCNC: 13 U/L (ref 1–33)
ANION GAP SERPL CALCULATED.3IONS-SCNC: 8 MMOL/L (ref 5–15)
AST SERPL-CCNC: 18 U/L (ref 1–32)
BASOPHILS # BLD AUTO: 0.04 10*3/MM3 (ref 0–0.2)
BASOPHILS NFR BLD AUTO: 0.6 % (ref 0–1.5)
BILIRUB SERPL-MCNC: 0.5 MG/DL (ref 0–1.2)
BUN SERPL-MCNC: 11 MG/DL (ref 8–23)
BUN/CREAT SERPL: 16.7 (ref 7–25)
CALCIUM SPEC-SCNC: 7.8 MG/DL (ref 8.6–10.5)
CHLORIDE SERPL-SCNC: 112 MMOL/L (ref 98–107)
CO2 SERPL-SCNC: 18 MMOL/L (ref 22–29)
CREAT SERPL-MCNC: 0.66 MG/DL (ref 0.57–1)
DEPRECATED RDW RBC AUTO: 51.1 FL (ref 37–54)
EOSINOPHIL # BLD AUTO: 0.12 10*3/MM3 (ref 0–0.4)
EOSINOPHIL NFR BLD AUTO: 1.8 % (ref 0.3–6.2)
ERYTHROCYTE [DISTWIDTH] IN BLOOD BY AUTOMATED COUNT: 14.3 % (ref 12.3–15.4)
GFR SERPL CREATININE-BSD FRML MDRD: 87 ML/MIN/1.73
GLOBULIN UR ELPH-MCNC: 2.7 GM/DL
GLUCOSE BLDC GLUCOMTR-MCNC: 89 MG/DL (ref 70–130)
GLUCOSE SERPL-MCNC: 92 MG/DL (ref 65–99)
HCT VFR BLD AUTO: 41.8 % (ref 34–46.6)
HGB BLD-MCNC: 13.1 G/DL (ref 12–15.9)
IMM GRANULOCYTES # BLD AUTO: 0.01 10*3/MM3 (ref 0–0.05)
IMM GRANULOCYTES NFR BLD AUTO: 0.1 % (ref 0–0.5)
LYMPHOCYTES # BLD AUTO: 1.97 10*3/MM3 (ref 0.7–3.1)
LYMPHOCYTES NFR BLD AUTO: 28.8 % (ref 19.6–45.3)
MAGNESIUM SERPL-MCNC: 2.1 MG/DL (ref 1.6–2.4)
MCH RBC QN AUTO: 30.3 PG (ref 26.6–33)
MCHC RBC AUTO-ENTMCNC: 31.3 G/DL (ref 31.5–35.7)
MCV RBC AUTO: 96.5 FL (ref 79–97)
MONOCYTES # BLD AUTO: 0.68 10*3/MM3 (ref 0.1–0.9)
MONOCYTES NFR BLD AUTO: 9.9 % (ref 5–12)
NEUTROPHILS NFR BLD AUTO: 4.02 10*3/MM3 (ref 1.7–7)
NEUTROPHILS NFR BLD AUTO: 58.8 % (ref 42.7–76)
NRBC BLD AUTO-RTO: 0 /100 WBC (ref 0–0.2)
PHOSPHATE SERPL-MCNC: 1.5 MG/DL (ref 2.5–4.5)
PHOSPHATE SERPL-MCNC: 2.5 MG/DL (ref 2.5–4.5)
PLATELET # BLD AUTO: 270 10*3/MM3 (ref 140–450)
PMV BLD AUTO: 11 FL (ref 6–12)
POTASSIUM SERPL-SCNC: 3.8 MMOL/L (ref 3.5–5.2)
POTASSIUM SERPL-SCNC: 5.4 MMOL/L (ref 3.5–5.2)
PROT SERPL-MCNC: 6 G/DL (ref 6–8.5)
RBC # BLD AUTO: 4.33 10*6/MM3 (ref 3.77–5.28)
SODIUM SERPL-SCNC: 138 MMOL/L (ref 136–145)
T4 FREE SERPL-MCNC: 1.16 NG/DL (ref 0.93–1.7)
TSH SERPL DL<=0.05 MIU/L-ACNC: 1.37 UIU/ML (ref 0.27–4.2)
WBC # BLD AUTO: 6.84 10*3/MM3 (ref 3.4–10.8)

## 2021-07-15 PROCEDURE — 99222 1ST HOSP IP/OBS MODERATE 55: CPT | Performed by: NURSE PRACTITIONER

## 2021-07-15 PROCEDURE — 92610 EVALUATE SWALLOWING FUNCTION: CPT

## 2021-07-15 PROCEDURE — 83735 ASSAY OF MAGNESIUM: CPT | Performed by: INTERNAL MEDICINE

## 2021-07-15 PROCEDURE — 33285 INSJ SUBQ CAR RHYTHM MNTR: CPT | Performed by: INTERNAL MEDICINE

## 2021-07-15 PROCEDURE — C1764 EVENT RECORDER, CARDIAC: HCPCS | Performed by: INTERNAL MEDICINE

## 2021-07-15 PROCEDURE — 0JH632Z INSERTION OF MONITORING DEVICE INTO CHEST SUBCUTANEOUS TISSUE AND FASCIA, PERCUTANEOUS APPROACH: ICD-10-PCS | Performed by: INTERNAL MEDICINE

## 2021-07-15 PROCEDURE — 84100 ASSAY OF PHOSPHORUS: CPT | Performed by: INTERNAL MEDICINE

## 2021-07-15 PROCEDURE — 84443 ASSAY THYROID STIM HORMONE: CPT | Performed by: INTERNAL MEDICINE

## 2021-07-15 PROCEDURE — 84439 ASSAY OF FREE THYROXINE: CPT | Performed by: INTERNAL MEDICINE

## 2021-07-15 PROCEDURE — 99232 SBSQ HOSP IP/OBS MODERATE 35: CPT | Performed by: INTERNAL MEDICINE

## 2021-07-15 PROCEDURE — 85025 COMPLETE CBC W/AUTO DIFF WBC: CPT | Performed by: INTERNAL MEDICINE

## 2021-07-15 PROCEDURE — 99233 SBSQ HOSP IP/OBS HIGH 50: CPT | Performed by: PSYCHIATRY & NEUROLOGY

## 2021-07-15 PROCEDURE — 94799 UNLISTED PULMONARY SVC/PX: CPT

## 2021-07-15 PROCEDURE — 82962 GLUCOSE BLOOD TEST: CPT

## 2021-07-15 PROCEDURE — 84132 ASSAY OF SERUM POTASSIUM: CPT | Performed by: INTERNAL MEDICINE

## 2021-07-15 PROCEDURE — 25010000002 CEFAZOLIN 1-4 GM/50ML-% SOLUTION: Performed by: NURSE PRACTITIONER

## 2021-07-15 PROCEDURE — 92523 SPEECH SOUND LANG COMPREHEN: CPT

## 2021-07-15 PROCEDURE — 80053 COMPREHEN METABOLIC PANEL: CPT | Performed by: INTERNAL MEDICINE

## 2021-07-15 DEVICE — LUX-DX™ INSERTABLE CARDIAC MONITOR
Type: IMPLANTABLE DEVICE | Status: FUNCTIONAL
Brand: LUX-DX™ INSERTABLE CARDIAC MONITOR

## 2021-07-15 RX ORDER — CEFAZOLIN SODIUM 1 G/50ML
1 INJECTION, SOLUTION INTRAVENOUS ONCE
Status: DISCONTINUED | OUTPATIENT
Start: 2021-07-16 | End: 2021-07-15

## 2021-07-15 RX ORDER — LIDOCAINE HYDROCHLORIDE 10 MG/ML
INJECTION, SOLUTION EPIDURAL; INFILTRATION; INTRACAUDAL; PERINEURAL AS NEEDED
Status: DISCONTINUED | OUTPATIENT
Start: 2021-07-15 | End: 2021-07-15 | Stop reason: HOSPADM

## 2021-07-15 RX ORDER — POTASSIUM CHLORIDE 750 MG/1
40 CAPSULE, EXTENDED RELEASE ORAL AS NEEDED
Status: DISCONTINUED | OUTPATIENT
Start: 2021-07-15 | End: 2021-07-16 | Stop reason: HOSPADM

## 2021-07-15 RX ORDER — CEFAZOLIN SODIUM 1 G/50ML
1 INJECTION, SOLUTION INTRAVENOUS ONCE
Status: DISCONTINUED | OUTPATIENT
Start: 2021-07-15 | End: 2021-07-15

## 2021-07-15 RX ORDER — POTASSIUM CHLORIDE 1.5 G/1.77G
40 POWDER, FOR SOLUTION ORAL AS NEEDED
Status: DISCONTINUED | OUTPATIENT
Start: 2021-07-15 | End: 2021-07-16 | Stop reason: HOSPADM

## 2021-07-15 RX ADMIN — SODIUM PHOSPHATE, MONOBASIC, MONOHYDRATE AND SODIUM PHOSPHATE, DIBASIC, ANHYDROUS 30 MMOL: 276; 142 INJECTION, SOLUTION INTRAVENOUS at 07:27

## 2021-07-15 RX ADMIN — SODIUM CHLORIDE, PRESERVATIVE FREE 10 ML: 5 INJECTION INTRAVENOUS at 08:00

## 2021-07-15 RX ADMIN — BUDESONIDE AND FORMOTEROL FUMARATE DIHYDRATE 2 PUFF: 160; 4.5 AEROSOL RESPIRATORY (INHALATION) at 07:29

## 2021-07-15 RX ADMIN — SODIUM PHOSPHATE, MONOBASIC, MONOHYDRATE AND SODIUM PHOSPHATE, DIBASIC, ANHYDROUS 15 MMOL: 276; 142 INJECTION, SOLUTION INTRAVENOUS at 21:53

## 2021-07-15 RX ADMIN — POTASSIUM CHLORIDE 40 MEQ: 1.5 POWDER, FOR SOLUTION ORAL at 14:08

## 2021-07-15 RX ADMIN — PANTOPRAZOLE SODIUM 40 MG: 40 INJECTION, POWDER, FOR SOLUTION INTRAVENOUS at 05:54

## 2021-07-15 RX ADMIN — SODIUM CHLORIDE, PRESERVATIVE FREE 10 ML: 5 INJECTION INTRAVENOUS at 21:54

## 2021-07-15 RX ADMIN — ACETAMINOPHEN 650 MG: 325 TABLET ORAL at 05:54

## 2021-07-15 RX ADMIN — LEVOTHYROXINE SODIUM 50 MCG: 50 TABLET ORAL at 05:54

## 2021-07-15 RX ADMIN — POTASSIUM CHLORIDE 40 MEQ: 1.5 POWDER, FOR SOLUTION ORAL at 10:16

## 2021-07-15 RX ADMIN — BUDESONIDE AND FORMOTEROL FUMARATE DIHYDRATE 2 PUFF: 160; 4.5 AEROSOL RESPIRATORY (INHALATION) at 20:56

## 2021-07-15 RX ADMIN — POTASSIUM & SODIUM PHOSPHATES POWDER PACK 280-160-250 MG 2 PACKET: 280-160-250 PACK at 10:16

## 2021-07-15 RX ADMIN — CEFAZOLIN SODIUM 1 G: 1 INJECTION, SOLUTION INTRAVENOUS at 15:54

## 2021-07-15 RX ADMIN — ATORVASTATIN CALCIUM 80 MG: 40 TABLET, FILM COATED ORAL at 21:53

## 2021-07-15 NOTE — PROGRESS NOTES
Stroke Progress Note       Chief Complaint: Right-sided weakness and expressive aphasia    Subjective    Subjective     Subjective:  No acute issues overnight.  Patient continues to have mild expressive aphasia, but is much improved.  Denies any new symptoms.    Review of Systems   Constitutional: Negative.    HENT: Negative.    Eyes: Negative.    Respiratory: Negative.    Cardiovascular: Negative.    Gastrointestinal: Negative.    Endocrine: Negative.    Genitourinary: Negative.    Musculoskeletal: Negative.    Skin: Negative.    Allergic/Immunologic: Negative.    Psychiatric/Behavioral: Negative.             Objective    Objective      Temp:  [97.7 °F (36.5 °C)-98.3 °F (36.8 °C)] 97.9 °F (36.6 °C)  Heart Rate:  [63-82] 68  Resp:  [16-20] 16  BP: (101-165)/(58-91) 139/80        Neurological Exam  Mental Status  Awake, alert and oriented to person, place and time.Alert. Mild dysarthria present. Subtle expressive aphasia. Attention and concentration are normal. Fund of knowledge is appropriate for level of education.    Cranial Nerves  CN II: Visual fields full to confrontation.  CN III, IV, VI: Extraocular movements intact bilaterally. Normal lids and orbits bilaterally. Pupils equal round and reactive to light bilaterally.  CN V: Facial sensation is normal.  CN VII: Full and symmetric facial movement.  CN VIII: Equal hearing bilaterally.  CN IX, X: Palate elevates symmetrically  CN XI: Shoulder shrug strength is normal.  CN XII: Tongue midline without atrophy or fasciculations.    Motor  Normal muscle bulk throughout. No fasciculations present. Normal muscle tone.  Right upper extremity is 4+/5, right lower extremities 5/5, no drift.  Axising around the right arm  Left upper and lower extremities 5/5.    Sensory  Light touch is normal in upper and lower extremities.     Reflexes  Deep tendon reflexes are 2+ and symmetric in all four extremities with downgoing toes bilaterally.    Coordination  No  dysmetria.    Gait  Not assessed.      Physical Exam  Vitals and nursing note reviewed.   Constitutional:       Appearance: Normal appearance.   HENT:      Head: Normocephalic and atraumatic.      Mouth/Throat:      Mouth: Mucous membranes are moist.      Pharynx: Oropharynx is clear.   Eyes:      General: Lids are normal.      Extraocular Movements: Extraocular movements intact.      Pupils: Pupils are equal, round, and reactive to light.   Cardiovascular:      Rate and Rhythm: Normal rate and regular rhythm.   Pulmonary:      Effort: Pulmonary effort is normal. No respiratory distress.   Musculoskeletal:      Cervical back: Normal range of motion and neck supple.   Neurological:      Mental Status: She is alert.      Cranial Nerves: Dysarthria present.      Deep Tendon Reflexes: Reflexes are normal and symmetric.   Psychiatric:         Mood and Affect: Mood normal.         Behavior: Behavior normal.         Results Review:    I reviewed the patient's new clinical results.    Results for orders placed during the hospital encounter of 07/13/21    Adult Transthoracic Echo Complete W/ Cont if Necessary Per Protocol (With Agitated Saline)    Interpretation Summary  · Left ventricular ejection fraction appears to be 56 - 60%.  · Saline test results are negative.  · Estimated right ventricular systolic pressure from tricuspid regurgitation is normal (<35 mmHg). Calculated right ventricular systolic pressure from tricuspid regurgitation is 33 mmHg.  · Mild RVE  · Mod TR            Assessment/Plan     Assessment/Plan:  76-year-old right-handed white female with known diagnosis of hypertension, hyperlipidemia, sleep apnea, mild cognitive disorder, who has been admitted with left MCA stroke, status post IV TPA.      #1.  Left middle cerebral artery stroke.  Unknown etiology.  Could be cardioembolic.  2D echocardiogram shows EF of 56 to 60%, no PFO, normal atrial sizes.  Recommend prolonged cardiac monitoring, possibly with  loop recorder.     #2.  Status post TPA, given within 24 hours prior to arrival.    24-hour CT head is negative for any hemorrhage, shows evolving left MCA stroke in the parietal region.  Patient has been started on Plavix 75 mg and Lipitor 80 mg for secondary stroke prevention.  Patient is allergic to aspirin, causing swelling of the lips and throat.     #3.  Essential hypertension.  Blood pressures have been better controlled.  Continue current management for normal blood pressure goals.     #4.  Dyslipidemia.    Her LDL is 105, total cholesterol 166 and triglyceride of 100. Full dose of statins.     #5.  PT/OT evaluation.     #6.  Healthy heart diet, once cleared swallow evaluation.     Case was discussed with patient, nursing and will talk to the intensivist.  Okay to transfer to floor. Thank you for the consult.          Jaguar Loza MD  07/15/21  11:57 EDT

## 2021-07-15 NOTE — NURSING NOTE
Returned from cath lab. Loop recorder implanted. Incision with dsg CDI to chest. Phone plugged into wall. A&Ox4. NIH-2.

## 2021-07-15 NOTE — PLAN OF CARE
Goal Outcome Evaluation:  Plan of Care Reviewed With: patient            SLP evaluation completed. Will  f/u for assessment of diet tolerance & language tx . Please see note for further details and recommendations.

## 2021-07-15 NOTE — PLAN OF CARE
Goal Outcome Evaluation:              Outcome Summary: VSS. Neuro stable. Pt hypoglycemic at the beginning of shift. Treated with 1 amp of D50 and IVF changed to D5NS.  Monitoring FSBG's q 6 hours. Awaiting FEES in the am.

## 2021-07-15 NOTE — PLAN OF CARE
Goal Outcome Evaluation:  Plan of Care Reviewed With: patient        Progress: improving     A&Ox4. NIH-1 this am. Continues to verbalize mild sensation loss to right side of body. Facial droop not evident this shift. Mild aphasia noted intermittently this shift. Able to TRAN without difficulty. Loop recorder to be set this early evening. Patient educated. Phosphorus and Potassium replacement given. UOP adequate. PO intake adequate after passing FEES, no s/s of swallowing or choking/coughing.

## 2021-07-15 NOTE — THERAPY EVALUATION
Acute Care - Speech Language Pathology Initial Evaluation  [facility]   Cognitive-Communication Evaluation  Clinical Swallow Evaluation     Patient Name: Rebecca Sharma  : 1945  MRN: [id number]  Today's Date: 7/15/2021               Admit Date: 2021     Visit Dx:    ICD-10-CM ICD-9-CM   1. Dysphagia, unspecified type  R13.10 787.20   2. Cerebrovascular accident (CVA), unspecified mechanism (CMS/HCC)  I63.9 434.91     Patient Active Problem List   Diagnosis   • Cysts of both ovaries   • Actinic keratosis   • Allergic rhinitis   • Asthma   • Hyperlipidemia   • Dyspepsia   • Hypertension   • Uterine leiomyoma   • Gastroesophageal reflux disease    • Chronic laryngitis   • Hyperactivity of bladder   • Hypothyroidism   • Lumbar spinal stenosis   • Mild cognitive disorder   • Osteoarthritis   • Osteoporosis   • Seborrheic keratosis   • Bilateral sensorineural hearing loss   • Skin tag   • Mixed stress and urge urinary incontinence   • Xerostomia   • Nasal septal deviation, right   • Medicare annual wellness visit, subsequent   • Headache   • ADEN (obstructive sleep apnea)   • Fibrocystic disease of left breast   • Chronic right hip pain   • Menopause   • Macrocytosis without anemia   • Diverticulosis of colon   • Right nipple adenoma   • Venous insufficiency of left leg   • Cricopharyngeal spasm   • Schatzki's ring   • CVA (cerebral vascular accident) (CMS/HCC)     Past Medical History:   Diagnosis Date   • Abnormal MRI, breast 2020    breast MRI (1/3/20): 1.1cm R. nipple mass sugg of nipple adenoma, indeterminate 0.5cm L. breast mass 12:00, rec surg excision for punch bx R. nipple mass and u/s for further eval L. breast mass   • Abnormal MRI, breast 07/10/2020    breast MRI (7/10/20): postsurg bx R. nipple with dx benign adenoma, post-bx clip left breast adjacent to 0.4cm not worrisome mass, rec L. breast MRI in 12 mos (reg mammo due )   • Adverse reaction to drug 5/10/2016    Impression:  06/09/2014 - GI side effects to aricept; plan as above to change timing and dose of med;    • Anesthesia     SOB upon awakening from surgery.  one occurrence.   • Breast pain, right 06/14/2016 6/14/16 Notes f/u with Dr. Beckham with neg bx (evaluated at [facility]); bx site healing per patient; 5/10/16 Pain resolved but has residual R. nipple abnlity with erythema and nodular change, therefore dx mammo ordered for further eval; last reg mammo done 7/15; R. nipple mass - s/p bx to r/o Paget's disease (6/16); surg - Dr. Beckham   • Contact dermatitis due to poison ivy 07/30/2014    Impression: 07/30/2014 - Depo-medrol 40 mg IM given in office. Pt will start prednisone taper as ordered. Call or RTC if rash is not improving.;    • Dizziness 10/27/2016    10/27/16 Imbalanced sensation and not vertigo per patient; no focal neuro deficits on exam; will rec f/u eye exam and head imaging if sxs persist and ESR/CRP are normal; ddx also includes eust tube dysfunction   • Hx of bone density study 07/2013    DEXA (7/13) H -1.3   • Hx of bone density study 07/14/2016    DEXA (7/14/16) L 0.4, H -1.6 repeat 2-3 yrs   • Hx of bone density study 10/08/2019    DEXA (10/8/19): L 1.3, H -1.8, A -2.8, worsened, NEW osteoporosis, repeat 2 yrs   • Hx of colonoscopy 12/2015    surg - Dr. Kartik Gold   • Hx of colonoscopy 11/08/2019    colonosc (11/8/19): nl except diverticulosis, repeat 5 yrs due to h/o polyps; surg - Dr. Kartik Gold   • Hx of CT angio head/neck 07/13/2021    CT angio head/neck (7/13/21, hospitalized): No definite lg vessel occlusion, high-grade stenosis or dissection; indeterminate narrowing/occlusion of small P3 branch of the patient's left PCA; small outpouching at origin of left P-com may represent a small aneurysm versus infundibulum   • Hx of CT brain perfusion 07/13/2021    Normal brain perfusion CT (7/13/21, hospitalized)   • Hx of CT scan of head 07/13/2021    nl CT head (7/13/21): except mild atrophy with  "mild chronic small vessel ischemic disease   • Hx of echocardiogram 07/13/2021    ECHO (7/13/21, hosp): EF 56-60%, mild-mod TR, neg saline test   • Hx of esophagogastroduodenoscopy 05/05/2017    EGD (5/5/17): mod chronic gastritis, (+)H.pylori, no hiatal hernia; GI - Dr. Esparza   • Hx of LLE venous duplex 11/13/2020    LLE venous duplex (11/13/20): no DVT; (+) valvular venous insufficiency deep and superficial with severe reflux   • Lightheadedness 04/28/2014    Impression: 04/28/2014 - resolved; will let her know official carotid u/s results when available  Impression: 04/13/2014 - discussed adequate hydration; check CBC;    • Lip swelling 08/05/2015    Impression: 08/05/2015 - L. lower lip lesion significantly improved; complete course of abx as prescribed; if area does not resolve completely, call to make earlier derm f/u appt  Impression: 07/28/2015 - L. lower lip swelling already improving but concern for residual ongoing infection; ddx includes infected cold sore, infection from cryotherapy for AK, and less likely shingles; finishing course    • Pap smear for cervical cancer screening 08/16/2016    Pap done 8/16/16 per Dr. Brittanie Bauer   • Peripheral arterial occlusive disease (CMS/HCC) 5/10/2016    Description: abnl ALLEGRA at [facility] with nl arterial duplex (10/08)   • Wears glasses      Past Surgical History:   Procedure Laterality Date   • BILATERAL SALPINGO OOPHORECTOMY Bilateral 06/14/2017    GYN - Dr. Bauer; for benign L. ovarian cyst   • BREAST BIOPSY Right 5/12/2020    s/p R. breast excisional bx R. subsreaolar mass (5/12/20); path - nipple adenoma; surg - Dr. Beckham   • CATARACT EXTRACTION, BILATERAL      L. 4/09 (complicated by nerve injury; R. 6/10; L. corrected 6/15; ophtho - Dr. Hollis, Dr. Lange; neuro-ophtho Dr. Ferris   • CHOLECYSTECTOMY  2002    secondary to \"crystalized GB\" (''02); surg - Dr. Kartik Gold   • MAMMO CORE NEEDLE BIOPSY UNILATERAL Left 01/17/2020    L. breast u/s core " needle bx (1/17/20): path - Focal proliferative fibrocystic changes including sclerosing adenosis   • OOPHORECTOMY          SLP EVALUATION (last 72 hours)      SLP SLC Evaluation     Row Name 07/15/21 0850                   General Information    Prior Level of Function-Communication  WFL  -MP        Patient's Goals for Discharge  patient did not state  -MP           Pain    Additional Documentation  Pain Scale: FACES Pre/Post-Treatment (Group)  -MP           Pain Scale: FACES Pre/Post-Treatment    Pain: FACES Scale, Pretreatment  0-->no hurt  -MP        Posttreatment Pain Rating  0-->no hurt  -MP           Comprehension Assessment/Intervention    Comprehension Assessment/Intervention  Auditory Comprehension  -MP           Auditory Comprehension Assessment/Intervention    Auditory Comprehension (Communication)  WFL  -MP        Able to Identify Objects/Pictures (Communication)  familiar objects;WFL  -MP        Answers Questions (Communication)  yes/no;wh questions;personal;simple;complex;WFL  -MP        Able to Follow Commands (Communication)  3-step;WFL  -MP        Narrative Discourse  conversational level;WFL  -MP           Expression Assessment/Intervention    Expression Assessment/Intervention  verbal expression  -MP           Verbal Expression Assessment/Intervention    Verbal Expression  mild impairment  -MP        Automatic Speech (Communication)  response to greeting;WFL  -MP        Repetition  phrases;WFL  -MP        Phrase Completion  automatic/predictable;WFL  -MP        Responsive Naming  simple;WFL  -MP        Confrontational Naming  high frequency;WFL  -MP        Sentence Formulation  simple;WFL  -MP        Conversational Discourse/Fluency  mild impairment  -MP           Oral Motor Structure and Function    Oral Motor Structure and Function  WFL  -MP        Dentition Assessment  natural, present and adequate  -MP           Oral Musculature and Cranial Nerve Assessment    Oral Motor General Assessment   WFL  -MP           Motor Speech Assessment/Intervention    Motor Speech Function  WFL  -MP           Cursory Voice Assessment/Intervention    Quality and Resonance (Voice)  hoarse;other (see comment) pt reported @ baseline   -MP           Cognitive Assessment Intervention- SLP    Cognitive Function (Cognition)  WFL  -MP        Orientation Status (Cognition)  awareness of basic personal information;person;place;time;situation;WFL  -MP        Memory (Cognitive)  short-term;long-term;WFL  -MP        Attention (Cognitive)  selective;sustained;WFL  -MP        Thought Organization (Cognitive)  concrete convergent;abstract convergent;drawing conclusions;WFL  -MP        Reasoning (Cognitive)  simple;deductive;WFL  -MP        Problem Solving (Cognitive)  simple;WFL  -MP        Functional Math (Cognitive)  simple;word problems;WFL  -MP        Pragmatics (Communication)  affect;awareness/response to humor;initiation;eye contact;topic maintenance;turn taking;WFL  -MP           SLP Clinical Impressions    SLP Diagnosis  Mild conversational-level expressive language deficits  -MP        Rehab Potential/Prognosis  good  -MP        SLC Criteria for Skilled Therapy Interventions Met  yes  -MP        Functional Impact  difficulty in expressing complex messages;restrictions in personal and social life  -MP           Recommendations    Therapy Frequency (SLP SLC)  3 days per week  -MP        Predicted Duration Therapy Intervention (Days)  until discharge  -MP        Anticipated Discharge Disposition (SLP)  home with OP services;anticipate therapy at next level of care  -MP          User Key  (r) = Recorded By, (t) = Taken By, (c) = Cosigned By    Initials Name Effective Dates    José Hua MS CCC-SLP 06/16/21 -              EDUCATION  The patient has been educated in the following areas:     Cognitive Impairment Communication Impairment.    SLP Recommendation and Plan  SLP Diagnosis: Mild conversational-level expressive  language deficits        Swallow Criteria for Skilled Therapeutic Interventions Met: demonstrates skilled criteria  SLC Criteria for Skilled Therapy Interventions Met: yes  Anticipated Discharge Disposition (SLP): home with OP services, anticipate therapy at next level of care     Therapy Frequency (Swallow): PRN  Predicted Duration Therapy Intervention (Days): until discharge                   Plan of Care Reviewed With: patient      SLP GOALS     Row Name 07/15/21 0830             Oral Nutrition/Hydration Goal 1 (SLP)    Oral Nutrition/Hydration Goal 1, SLP  LTG: Pt will tolerate regular diet, thin liquids w/ no overt s/sxs aspiration/distress w/ 100% acc and no cues  -MP      Time Frame (Oral Nutrition/Hydration Goal 1, SLP)  by discharge  -MP         Oral Nutrition/Hydration Goal 2 (SLP)    Oral Nutrition/Hydration Goal 2, SLP  Pt will tolerate regular solids & thin liquids w/ no overt s/sxs aspiration/distress w/ 100% acc and no cues  -MP      Time Frame (Oral Nutrition/Hydration Goal 2, SLP)  short term goal (STG)  -MP         Connected Speech to Express Thoughts Goal 1 (SLP)    Improve Narrative Discourse to Express Thoughts By Goal 1 (SLP)  describing a picture;explaining a proverb or idiom;conversational task, self-directed;80%;with minimal cues (75-90%)  -MP      Time Frame (Connected Speech Goal 1, SLP)  short term goal (STG)  -MP         Additional Goal 1 (SLP)    Additional Goal 1, SLP  LTG: Pt will improve communication in order to fully return to ADLs w/ 100% acc and no cues  -MP      Time Frame (Additional Goal 1, SLP)  by discharge  -MP        User Key  (r) = Recorded By, (t) = Taken By, (c) = Cosigned By    Initials Name Provider Type    José Hua MS CCC-SLP Speech and Language Pathologist                  Time Calculation:     Time Calculation- SLP     Row Name 07/15/21 0911             Time Calculation- SLP    SLP Start Time  0830  -MP      SLP Received On  07/15/21  -MP          Untimed Charges    08974-IM Eval Speech and Production w/ Language Minutes  25  -MP      85073-PI Eval Oral Pharyng Swallow Minutes  25  -MP         Total Minutes    Untimed Charges Total Minutes  50  -MP       Total Minutes  50  -MP        User Key  (r) = Recorded By, (t) = Taken By, (c) = Cosigned By    Initials Name Provider Type    MP José Travis, MS CCC-SLP Speech and Language Pathologist          Therapy Charges for Today     Code Description Service Date Service Provider Modifiers Qty    89103130181 HC ST EVAL ORAL PHARYNG SWALLOW 2 7/15/2021 José Travis MS CCC-SLP GN 1    69576511670 HC ST EVAL SPEECH AND PROD W LANG  2 7/15/2021 José Travis MS CCC-SLP GN 1                     José Travis MS CCC-SLP  7/15/2021 and Acute Care - Speech Language Pathology   Swallow Initial Evaluation [facility]     Patient Name: Rebecca Sharma  : 1945  MRN: [id number]  Today's Date: 7/15/2021               Admit Date: 2021    Visit Dx:     ICD-10-CM ICD-9-CM   1. Dysphagia, unspecified type  R13.10 787.20   2. Cerebrovascular accident (CVA), unspecified mechanism (CMS/[facility])  I63.9 434.91     Patient Active Problem List   Diagnosis   • Cysts of both ovaries   • Actinic keratosis   • Allergic rhinitis   • Asthma   • Hyperlipidemia   • Dyspepsia   • Hypertension   • Uterine leiomyoma   • Gastroesophageal reflux disease    • Chronic laryngitis   • Hyperactivity of bladder   • Hypothyroidism   • Lumbar spinal stenosis   • Mild cognitive disorder   • Osteoarthritis   • Osteoporosis   • Seborrheic keratosis   • Bilateral sensorineural hearing loss   • Skin tag   • Mixed stress and urge urinary incontinence   • Xerostomia   • Nasal septal deviation, right   • Medicare annual wellness visit, subsequent   • Headache   • ADEN (obstructive sleep apnea)   • Fibrocystic disease of left breast   • Chronic right hip pain   • Menopause   • Macrocytosis without anemia   • Diverticulosis of colon   • Right  nipple adenoma   • Venous insufficiency of left leg   • Cricopharyngeal spasm   • Schatzki's ring   • CVA (cerebral vascular accident) (CMS/HCC)     Past Medical History:   Diagnosis Date   • Abnormal MRI, breast 01/03/2020    breast MRI (1/3/20): 1.1cm R. nipple mass sugg of nipple adenoma, indeterminate 0.5cm L. breast mass 12:00, rec surg excision for punch bx R. nipple mass and u/s for further eval L. breast mass   • Abnormal MRI, breast 07/10/2020    breast MRI (7/10/20): postsurg bx R. nipple with dx benign adenoma, post-bx clip left breast adjacent to 0.4cm not worrisome mass, rec L. breast MRI in 12 mos (reg mammo due 12/20)   • Adverse reaction to drug 5/10/2016    Impression: 06/09/2014 - GI side effects to aricept; plan as above to change timing and dose of med;    • Anesthesia     SOB upon awakening from surgery.  one occurrence.   • Breast pain, right 06/14/2016 6/14/16 Notes f/u with Dr. Beckham with neg bx (evaluated at [facility]); bx site healing per patient; 5/10/16 Pain resolved but has residual R. nipple abnlity with erythema and nodular change, therefore dx mammo ordered for further eval; last reg mammo done 7/15; R. nipple mass - s/p bx to r/o Paget's disease (6/16); surg - Dr. Beckham   • Contact dermatitis due to poison ivy 07/30/2014    Impression: 07/30/2014 - Depo-medrol 40 mg IM given in office. Pt will start prednisone taper as ordered. Call or RTC if rash is not improving.;    • Dizziness 10/27/2016    10/27/16 Imbalanced sensation and not vertigo per patient; no focal neuro deficits on exam; will rec f/u eye exam and head imaging if sxs persist and ESR/CRP are normal; ddx also includes eust tube dysfunction   • Hx of bone density study 07/2013    DEXA (7/13) H -1.3   • Hx of bone density study 07/14/2016    DEXA (7/14/16) L 0.4, H -1.6 repeat 2-3 yrs   • Hx of bone density study 10/08/2019    DEXA (10/8/19): L 1.3, H -1.8, A -2.8, worsened, NEW osteoporosis, repeat 2 yrs   • Hx  of colonoscopy 12/2015    surg - Dr. Kartik Gold   • Hx of colonoscopy 11/08/2019    colonosc (11/8/19): nl except diverticulosis, repeat 5 yrs due to h/o polyps; surg - Dr. Kartik Gold   • Hx of CT angio head/neck 07/13/2021    CT angio head/neck (7/13/21, hospitalized): No definite lg vessel occlusion, high-grade stenosis or dissection; indeterminate narrowing/occlusion of small P3 branch of the patient's left PCA; small outpouching at origin of left P-com may represent a small aneurysm versus infundibulum   • Hx of CT brain perfusion 07/13/2021    Normal brain perfusion CT (7/13/21, hospitalized)   • Hx of CT scan of head 07/13/2021    nl CT head (7/13/21): except mild atrophy with mild chronic small vessel ischemic disease   • Hx of echocardiogram 07/13/2021    ECHO (7/13/21, hosp): EF 56-60%, mild-mod TR, neg saline test   • Hx of esophagogastroduodenoscopy 05/05/2017    EGD (5/5/17): mod chronic gastritis, (+)H.pylori, no hiatal hernia; GI - Dr. Esparza   • Hx of LLE venous duplex 11/13/2020    LLE venous duplex (11/13/20): no DVT; (+) valvular venous insufficiency deep and superficial with severe reflux   • Lightheadedness 04/28/2014    Impression: 04/28/2014 - resolved; will let her know official carotid u/s results when available  Impression: 04/13/2014 - discussed adequate hydration; check CBC;    • Lip swelling 08/05/2015    Impression: 08/05/2015 - L. lower lip lesion significantly improved; complete course of abx as prescribed; if area does not resolve completely, call to make earlier derm f/u appt  Impression: 07/28/2015 - L. lower lip swelling already improving but concern for residual ongoing infection; ddx includes infected cold sore, infection from cryotherapy for AK, and less likely shingles; finishing course    • Pap smear for cervical cancer screening 08/16/2016    Pap done 8/16/16 per Dr. Brittanie Bauer   • Peripheral arterial occlusive disease (CMS/HCC) 5/10/2016    Description: abnl ALLEGRA at  "[facility] with nl arterial duplex (10/08)   • Wears glasses      Past Surgical History:   Procedure Laterality Date   • BILATERAL SALPINGO OOPHORECTOMY Bilateral 06/14/2017    GYN - Dr. Bauer; for benign L. ovarian cyst   • BREAST BIOPSY Right 5/12/2020    s/p R. breast excisional bx R. subsreaolar mass (5/12/20); path - nipple adenoma; surg - Dr. Beckham   • CATARACT EXTRACTION, BILATERAL      L. 4/09 (complicated by nerve injury; R. 6/10; L. corrected 6/15; ophtho - Dr. Hollis, Dr. Lange; neuro-ophtho Dr. Ferris   • CHOLECYSTECTOMY  2002    secondary to \"crystalized GB\" (''02); surg - Dr. Kartik Gold   • MAMMO CORE NEEDLE BIOPSY UNILATERAL Left 01/17/2020    L. breast u/s core needle bx (1/17/20): path - Focal proliferative fibrocystic changes including sclerosing adenosis   • OOPHORECTOMY          SWALLOW EVALUATION (last 72 hours)      SLP Adult Swallow Evaluation     Row Name 07/15/21 0830 07/14/21 0930 07/14/21 0924             Rehab Evaluation    Document Type  evaluation  -MP  evaluation  -KT  --      Subjective Information  no complaints  -MP  no complaints  -KT  --      Patient Observations  alert;cooperative  -MP  alert;cooperative;agree to therapy  -KT  --      Patient/Family/Caregiver Comments/Observations  No family present  -MP  pt family member present  -KT  --      Care Plan Review  evaluation/treatment results reviewed;patient/other agree to care plan  -MP  evaluation/treatment results reviewed;care plan/treatment goals reviewed;risks/benefits reviewed;patient/other agree to care plan  -KT  --      Patient Effort  good  -MP  excellent  -KT  --         General Information    Patient Profile Reviewed  yes  -MP  yes  -KT  --      Pertinent History Of Current Problem  See previous eval - L CVA s/p tPA  -MP  Pt admitted w/ r facial weakness and aphasia s/p TPA yesterday at 22:30. Pt has a hx of MCI, GERD, ADEN, and asthma. She had EDG in April '21 that revealed a hiatal hernia, cricopharyngeal spasm, " and Schatski's ring s/p dilatation. Pt reported H.Pylori s/p antibiotics.   -KT  --      Current Method of Nutrition  NPO  -MP  NPO  -KT  --      Precautions/Limitations, Vision  WFL with corrective lenses;for purposes of eval  -MP  WFL with corrective lenses;for purposes of eval  -KT  --      Precautions/Limitations, Hearing  WFL;for purposes of eval  -MP  WFL;for purposes of eval  -KT  --      Prior Level of Function-Communication  unknown  -MP  unknown  -KT  --      Prior Level of Function-Swallowing  no diet consistency restrictions;esophageal concerns;other (see comments) EDG 4/2021 w/ HH, CP spasm, Schatzki's ring s/p dilation  -MP  no diet consistency restrictions  -KT  --      Plans/Goals Discussed with  patient;agreed upon  -MP  patient;agreed upon  -KT  --      Barriers to Rehab  none identified  -MP  none identified  -KT  --      Patient's Goals for Discharge  patient did not state  -MP  --  --         Pain    Additional Documentation  --  Pain Scale: Numbers Pre/Post-Treatment (Group)  -KT  --         Pain Scale: Numbers Pre/Post-Treatment    Pretreatment Pain Rating  --  0/10 - no pain  -KT  --      Posttreatment Pain Rating  --  0/10 - no pain  -KT  --         Oral Motor Structure and Function    Oral Lesions or Structural Abnormalities and/or variants  --  none identified  -KT  --      Dentition Assessment  --  natural, present and adequate  -KT  --      Secretion Management  WNL/WFL  -MP  WNL/WFL  -KT  --      Mucosal Quality  moist, healthy  -MP  moist, healthy  -KT  --      Volitional Swallow  --  WFL  -KT  --      Volitional Cough  --  WFL  -KT  --         Oral Musculature and Cranial Nerve Assessment    Oral Motor General Assessment  --  WFL  -KT  --      Vocal Impairment, Detail. Cranial Nerve X (Vagus)  vocal quality abnormality (see comments)  -MP  --  --      Oral Motor, Comment  Mild hoarsness, but pt reports baseline VQ  -MP  --  --         General Eating/Swallowing Observations     Respiratory Support Currently in Use  room air  -MP  room air  -KT  --      Eating/Swallowing Skills  self-fed;appropriate self-feeding skills observed  -MP  self-fed;fed by SLP  -KT  --      Positioning During Eating  upright in bed  -  upright 90 degree;upright in chair  -KT  --      Utensils Used  spoon;cup;straw  -  spoon;cup;straw  -KT  --      Consistencies Trialed  thin liquids;pureed;regular textures  -  ice chips;thin liquids;nectar/syrup-thick liquids;pureed;regular textures  -KT  --         Respiratory    Respiratory Status  --  WFL  -KT  --         Clinical Swallow Eval    Oral Prep Phase  --  WFL  -KT  --      Oral Transit  --  WFL  -KT  --      Oral Residue  --  WFL  -KT  --      Pharyngeal Phase  no overt signs/symptoms of pharyngeal impairment  -MP  suspected pharyngeal impairment  -KT  --      Clinical Swallow Evaluation Summary  No overt clinical s/sxs aspiration/distress observed @ bedside. Rec initiate regular diet/thin liquids. Standard aspiration & reflux prctns. SLP will f/u for assessment of diet tolerance.  -  CSE completed with am. Pt positioned upright in chair to accept PO trials of ice chips, thins, nectar, puree and regular solid trials. The patient was alert and cooperative. Pt presented with adequate bilabial closure and lingal ROM. Both volitional cough and volitional swallow are generally weak. Hoarse vocal quality. Good rotary jaw movement with adequate crush power for solids. No overt s/sxs of aspiration with ice chips. There was a delayed cough response with thin liquids and change in vocal quality with NTL, puree, and regular solids. Mutliple swallows with all trials. There is suspected pharyngeal dysphagia. Recommend NPO with ice chips following scrupulous oral care and upright at 90 degrees. Meds given crushed in puree. Pt has history of GERD and esophageal dilatition. May require FEES following 24-hr window s/p TPA.   -KT  --  -KT         Pharyngeal Phase Concerns     Pharyngeal Phase Concerns  --  multiple swallows;cough;other (see comments) change in vocal quality  -KT  --  -KT      Multiple Swallows  --  all consistencies  -KT  --      Cough  --  thin  -KT  --         Clinical Impression    SLP Swallowing Diagnosis  other (see comments) no suspected pharyngeal impairment  -MP  suspected pharyngeal dysphagia  -KT  --      Functional Impact  risk of aspiration/pneumonia  -MP  risk of aspiration/pneumonia  -KT  --      Rehab Potential/Prognosis, Swallowing  good, to achieve stated therapy goals  -MP  good, to achieve stated therapy goals  -KT  --      Swallow Criteria for Skilled Therapeutic Interventions Met  demonstrates skilled criteria  -MP  demonstrates skilled criteria  -KT  --         Recommendations    Therapy Frequency (Swallow)  PRN  -MP  --  --      SLP Diet Recommendation  regular textures;thin liquids  -MP  NPO;ice chips between meals after oral care, with supervision  -KT  --      Recommended Diagnostics  --  reassess via clinical swallow evaluation  -KT  --      Recommended Precautions and Strategies  upright posture during/after eating;general aspiration precautions  -MP  general aspiration precautions  -KT  --      Oral Care Recommendations  Oral Care BID/PRN  -MP  Oral Care BID/PRN  -KT  --      SLP Rec. for Method of Medication Administration  meds whole;with thin liquids;with pudding or applesauce;as tolerated  -MP  meds crushed;with pudding or applesauce  -KT  --      Monitor for Signs of Aspiration  yes;notify SLP if any concerns  -MP  yes;notify SLP if any concerns  -KT  --      Anticipated Discharge Disposition (SLP)  --  anticipate therapy at next level of care  -KT  --        User Key  (r) = Recorded By, (t) = Taken By, (c) = Cosigned By    Initials Name Effective Dates    MP José Travis, MS CCC-SLP 06/16/21 -     KT Vernell Victoria, MS CCC-SLP 06/15/21 -           EDUCATION  The patient has been educated in the following areas:   Dysphagia  (Swallowing Impairment).    SLP Recommendation and Plan  SLP Swallowing Diagnosis: other (see comments) (no suspected pharyngeal impairment)  SLP Diet Recommendation: regular textures, thin liquids  Recommended Precautions and Strategies: upright posture during/after eating, general aspiration precautions  SLP Rec. for Method of Medication Administration: meds whole, with thin liquids, with pudding or applesauce, as tolerated     Monitor for Signs of Aspiration: yes, notify SLP if any concerns     Swallow Criteria for Skilled Therapeutic Interventions Met: demonstrates skilled criteria  Anticipated Discharge Disposition (SLP): home with OP services, anticipate therapy at next level of care  Rehab Potential/Prognosis, Swallowing: good, to achieve stated therapy goals  Therapy Frequency (Swallow): PRN  Predicted Duration Therapy Intervention (Days): until discharge                         Plan of Care Reviewed With: patient    SLP GOALS     Row Name 07/15/21 0830             Oral Nutrition/Hydration Goal 1 (SLP)    Oral Nutrition/Hydration Goal 1, SLP  LTG: Pt will tolerate regular diet, thin liquids w/ no overt s/sxs aspiration/distress w/ 100% acc and no cues  -MP      Time Frame (Oral Nutrition/Hydration Goal 1, SLP)  by discharge  -MP         Oral Nutrition/Hydration Goal 2 (SLP)    Oral Nutrition/Hydration Goal 2, SLP  Pt will tolerate regular solids & thin liquids w/ no overt s/sxs aspiration/distress w/ 100% acc and no cues  -MP      Time Frame (Oral Nutrition/Hydration Goal 2, SLP)  short term goal (STG)  -MP         Connected Speech to Express Thoughts Goal 1 (SLP)    Improve Narrative Discourse to Express Thoughts By Goal 1 (SLP)  describing a picture;explaining a proverb or idiom;conversational task, self-directed;80%;with minimal cues (75-90%)  -MP      Time Frame (Connected Speech Goal 1, SLP)  short term goal (STG)  -MP         Additional Goal 1 (SLP)    Additional Goal 1, SLP  LTG: Pt will improve  communication in order to fully return to ADLs w/ 100% acc and no cues  -MP      Time Frame (Additional Goal 1, SLP)  by discharge  -MP        User Key  (r) = Recorded By, (t) = Taken By, (c) = Cosigned By    Initials Name Provider Type    José Hua MS CCC-SLP Speech and Language Pathologist             Time Calculation:   Time Calculation- SLP     Row Name 07/15/21 0911             Time Calculation- SLP    SLP Start Time  0830  -MP      SLP Received On  07/15/21  -MP         Untimed Charges    [id number] Eval Speech and Production w/ Language Minutes  25  -MP      [id number] Eval Oral Pharyng Swallow Minutes  25  -MP         Total Minutes    Untimed Charges Total Minutes  50  -MP       Total Minutes  50  -MP        User Key  (r) = Recorded By, (t) = Taken By, (c) = Cosigned By    Initials Name Provider Type    José Hua MS CCC-SLP Speech and Language Pathologist          Therapy Charges for Today     Code Description Service Date Service Provider Modifiers Qty    80748923772 HC ST EVAL ORAL PHARYNG SWALLOW 2 7/15/2021 José Travis MS CCC-SLP GN 1    05411260240 HC ST EVAL SPEECH AND PROD W LANG  2 7/15/2021 José Travis MS CCC-SLP GN 1               MS MARLENA Snow  7/15/2021

## 2021-07-15 NOTE — CASE MANAGEMENT/SOCIAL WORK
Continued Stay Note  [facility]     Patient Name: Rebecca Sharma  MRN: [id number]  Today's Date: 7/15/2021    Admit Date: 7/13/2021    Discharge Plan     Row Name 07/15/21 1512       Plan    Plan  Rehab    Patient/Family in Agreement with Plan  yes    Plan Comments  Spoke to Maria Luz at the [facility]. [facility] does not have any beds at this time until possibly first of next week. Pt agreeable to referrals to [facility] at  and [facility]. Notified Maria Luz at the [facility] for  referral. Called referral to [place] at [facility].        Discharge Codes    No documentation.       Expected Discharge Date and Time     Expected Discharge Date Expected Discharge Time    Jul 19, 2021             Marlene Wall RN

## 2021-07-15 NOTE — PROGRESS NOTES
Critical Care Note     LOS: 2 days   Patient Care Team:  Lorene Isaac MD as PCP - General  Lorene Isaac MD as PCP - Internal Medicine  Bechema, Brittanie Suarez MD as Consulting Physician (Obstetrics and Gynecology)  Gigi Esparza MD as Consulting Physician (Gastroenterology)  Kartik Gold MD as Consulting Physician (General Surgery)  Tyrone Tate MD as Consulting Physician (Ophthalmology)  Arik Keane MD as Consulting Physician (Otolaryngology)  Anastacio Dickinson MD as Consulting Physician (Otolaryngology)  Mario Ding MD as Consulting Physician (Neurosurgery)  Travis Beckham MD as Consulting Physician (General Surgery)  Mingo Cerrato MD as Consulting Physician (Gastroenterology)    Chief Complaint/Reason for visit:    Ischemic CVA    Subjective     76-year-old woman with sleep apnea, hypertension, dyslipidemia, GERD transferred from [facility] with onset of right-sided weakness expressive aphasia facial droop and numbness around 1930 on July 13, 2021.  She was given TPA.  On arrival to [facility] her NIH stroke score was a 2.  She reports a history of asthma diagnosed when she was in her 50s.  She currently uses Advair 100, 1 puff twice daily.    Interval History:     NIH this morning is a 1.  She remains afebrile.  Systolic blood pressure is 100-1 70.  On room air her saturation is 94%.  She is in sinus rhythm.  Yesterday she dropped her blood glucose and received an amp of D50.  She is currently on D5 saline.  This morning a fees was done and she had no evidence of aspiration and a diet was ordered.   Cardiology consulted for a loop recorder as there is no discernible etiology for stroke, would like to look for atrial arrhythmias.    Review of Systems:    All systems were reviewed and negative except as noted in subjective.    Medical history, surgical history, social history, family history reviewed    Objective  "    Intake/Output:    Intake/Output Summary (Last 24 hours) at 7/15/2021 1506  Last data filed at 7/15/2021 1400  Gross per 24 hour   Intake 2584.1 ml   Output 1900 ml   Net 684.1 ml       Nutrition:  NPO Diet    Infusions:  dextrose 5 % and sodium chloride 0.9 %, 75 mL/hr, Last Rate: 75 mL/hr (07/14/21 1950)  niCARdipine, 5-15 mg/hr        Telemetry: Sinus rhythm             Vital Signs  Blood pressure 136/93, pulse 73, temperature 98.2 °F (36.8 °C), temperature source Oral, resp. rate 16, height 165.1 cm (65\"), weight 78 kg (172 lb), SpO2 97 %.    Physical Exam:  General Appearance:   Pleasant older woman sitting in the chair in no acute distress   Head:   Normocephalic, atraumatic.   Eyes:          Pupils are equal and reactive to light.  Extraocular movements intact.  No jaundice   Ears:     Throat:  Oral mucosa moist   Neck:  Trachea midline, no palpable thyroid.   Back:      Lungs:    Symmetric chest expansion.  Breath sounds are bilateral without wheeze or rhonchi    Heart:   Regular rhythm, S1, S2 auscultated, no murmur   Abdomen:    Nondistended, bowel sounds present, soft   Rectal:   Deferred   Extremities:  No pretibial edema or cyanosis   Pulses:  Pedal pulses present   Skin:  Warm and dry   Lymph nodes:  No cervical adenopathy   Neurologic:  Mild dysarthria, extremity strength symmetric.  Facial droop resolved.      Interval:  (handoff)  1a. Level of Consciousness: 0-->Alert, keenly responsive  1b. LOC Questions: 0-->Answers both questions correctly  1c. LOC Commands: 0-->Performs both tasks correctly  2. Best Gaze: 0-->Normal  3. Visual: 0-->No visual loss  4. Facial Palsy: 0-->Normal symmetrical movements  5a. Motor Arm, Left: 0-->No drift, limb holds 90 (or 45) degrees for full 10 secs  5b. Motor Arm, Right: 0-->No drift, limb holds 90 (or 45) degrees for full 10 secs  6a. Motor Leg, Left: 0-->No drift, leg holds 30 degree position for full 5 secs  6b. Motor Leg, Right: 0-->No drift, leg holds 30 " degree position for full 5 secs  7. Limb Ataxia: 0-->Absent  8. Sensory: 1-->Mild-to-moderate sensory loss, patient feels pinprick is less sharp or is dull on the affected side, or there is a loss of superficial pain with pinprick, but patient is aware of being touched  9. Best Language: 0-->No aphasia, normal  10. Dysarthria: 0-->Normal  11. Extinction and Inattention (formerly Neglect): 0-->No abnormality    Total (NIH Stroke Scale): 1  Results Review:     I reviewed the patient's new clinical results.   Outside hospital, A1c 5.8, glucose 84, BUN 18, creatinine 0.87, potassium 5, bicarbonate 24, calcium 10, LFTs normal  Results from last 7 days   Lab Units 07/15/21  0508   SODIUM mmol/L 138   POTASSIUM mmol/L 3.8   CHLORIDE mmol/L 112*   CO2 mmol/L 18.0*   BUN mg/dL 11   CREATININE mg/dL 0.66   CALCIUM mg/dL 7.8*   BILIRUBIN mg/dL 0.5   ALK PHOS U/L 93   ALT (SGPT) U/L 13   AST (SGOT) U/L 18   GLUCOSE mg/dL 92     Results from last 7 days   Lab Units 07/15/21  0508   WBC 10*3/mm3 6.84   HEMOGLOBIN g/dL 13.1   HEMATOCRIT % 41.8   PLATELETS 10*3/mm3 270         No results found for: BLOODCX  No results found for: URINECX    I reviewed the patient's new imaging including images and reports.  COMPARISON: NONE     FINDINGS: There is a somewhat confluent area restricted diffusion  compatible with acute infarct noted involving the left parietal and  posterior temporal region with smaller adjacent areas of cortical and  subcortical diffusion restriction compatible with likely left posterior  MCA territory ischemia and acute infarct. There is no evidence of  associated mass effect or hemorrhage. The ventricles are normal in size  and configuration. There is no mass or mass effect. The orbits are  unremarkable and the paranasal sinuses are grossly clear. Intracranial  arterial flow voids are maintained.     IMPRESSION:  Scattered areas of acute infarct noted involving the left  posterior frontal lobe cortex, left parietal  lobe cortex and left  posterior temporal lobe. No associated mass effect or hemorrhage.        This report was finalized on 7/14/2021 8:52 AM by Gilmar Tripathi.         All medications reviewed.   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  Current NIH stroke score is a 1 for some mild persistent aphasia.    #2 hypertension, systolic blood pressure 100-165 on no medication    #3 GERD with recent H. pylori treatment    #4 hypothyroidism on replacement therapy    #5 history of asthma currently without active wheezing      PLAN:    Patient has an aspirin allergy  Continue Plavix, Lipitor    Thyroid replacement  Symbicort inhaler is replacing her home Advair  Proton pump inhibitor for known history of GERD with Schatzki's ring  Mobilize with therapy    Loop recorder today  Okay to telemetry  Anticipate discharge tomorrow      VTE Prophylaxis: compression stockings    Stress Ulcer Prophylaxis: PPI    Rossy Pickett MD  07/15/21  15:06 EDT      Time: 20min

## 2021-07-15 NOTE — PROGRESS NOTES
"  Clinical Nutrition Note      Patient Name: Rebecca Sharma  MRN: [id number]  Admission date: 7/13/2021      Multidisciplinary Rounds    Additional information obtained during MDR:  RN reports pt doing well this morning. NIHSS is a 1 for mild dysarthria, she passed her FEES evaluation w/ SLP. K, Phos and Mg need replacement. IV phos hanging. MD will transfer to telemetry post 24 hr CT.      Current diet: NPO Diet    Oral Nutrition Supplement: No active supplement orders      Pertinent medical data reviewed:  No nutrition risk identified on nursing screen; MST score \"0\"    Average intake per documentation:              Intervention:  Menu provided, pt's family advised of alternate selections as pt was in CT  Plan of care and goals of care reviewed    Monitor:  RD to follow per protocol      Sandra Monterroso MS,RD,LD  07/15/21 16:31 EDT  Time: 15  mins       "

## 2021-07-15 NOTE — CONSULTS
Inpatient Cardiology Consult  Consult performed by: Vicky Del Cid APRN  Consult ordered by: Jaguar Loza MD        [facility] at   Cardiovascular Consultation Note    Reason for consult: Cardioembolic CVA     Patient is a 76-year-old female with no prior cardiac history but history of hypertension, hyperlipidemia, obstructive sleep apnea, venous insufficiency and GERD who was transferred from [facility] on 7/13/2021 after presenting to their facility with right-sided weakness, expressive aphasia and dysarthria.  She was given TPA and transferred to our facility for higher level of care.  Imaging here has revealed a left MCA CVA concerning for cardioembolic source which is the reasoning for consult today.  Patient is currently sitting up in the bed.  She reports still having some mild right-sided weakness but no longer has facial drooping or slurred speech.  She is alert and oriented.  She denies chest pain, dyspnea, orthopnea, palpitations/fluttering, presyncope or syncope.  An echocardiogram performed showed normal LVEF, moderate TR and negative bubble study.  No arrhythmias have been noted on telemetry.    Cardiac risk factors: Advanced age, hypertension, hyperlipidemia    Past medical and surgical history, social and family history reviewed in EMR.    REVIEW OF SYSTEMS:   H&P ROS reviewed and pertinent CV ROS as noted in HPI.         Vital Sign Min/Max for last 24 hours  Temp  Min: 97.7 °F (36.5 °C)  Max: 98.3 °F (36.8 °C)   BP  Min: 101/58  Max: 165/91   Pulse  Min: 63  Max: 82   Resp  Min: 16  Max: 20   SpO2  Min: 92 %  Max: 98 %   No data recorded      Intake/Output Summary (Last 24 hours) at 7/15/2021 1344  Last data filed at 7/15/2021 1159  Gross per 24 hour   Intake 2224.1 ml   Output 1800 ml   Net 424.1 ml           Constitutional:       Appearance: Healthy appearance. Well-developed.   Eyes:      General: Lids are normal. No  scleral icterus.     Conjunctiva/sclera: Conjunctivae normal.   HENT:      Head: Normocephalic and atraumatic.   Neck:      Thyroid: No thyromegaly.      Vascular: No carotid bruit or JVD.   Pulmonary:      Effort: Pulmonary effort is normal.      Breath sounds: Normal breath sounds. No wheezing. No rhonchi. No rales.   Cardiovascular:      Normal rate. Regular rhythm.      Murmurs: There is no murmur.      No gallop. No rub.   Pulses:     Intact distal pulses.   Abdominal:      General: There is no distension.      Palpations: Abdomen is soft. There is no abdominal mass.   Musculoskeletal:      Cervical back: Normal range of motion. Skin:     General: Skin is warm and dry.      Findings: No rash.   Neurological:      General: No focal deficit present.      Mental Status: Alert and oriented to person, place, and time.      Gait: Gait is intact.   Psychiatric:         Attention and Perception: Attention normal.         Mood and Affect: Mood normal.         Behavior: Behavior normal.         Lab Review:   Labs reviewed in the electronic medical record.  Pertinent findings include:  Lab Results   Component Value Date    GLUCOSE 92 07/15/2021    BUN 11 07/15/2021    CREATININE 0.66 07/15/2021    EGFRIFNONA 87 07/15/2021    BCR 16.7 07/15/2021    K 3.8 07/15/2021    CO2 18.0 (L) 07/15/2021    CALCIUM 7.8 (L) 07/15/2021    ALBUMIN 3.30 (L) 07/15/2021    AST 18 07/15/2021    ALT 13 07/15/2021     Lab Results   Component Value Date    WBC 6.84 07/15/2021    HGB 13.1 07/15/2021    HCT 41.8 07/15/2021    MCV 96.5 07/15/2021     07/15/2021     Lab Results   Component Value Date    CHOL 166 07/13/2020    CHLPL 151 06/07/2016    TRIG 100 07/13/2020    HDL 41 07/13/2020     (H) 07/13/2020     Results from last 7 days   Lab Units 07/14/21  0441   HEMOGLOBIN A1C % 5.80*            Left MCA CVA   · Status post TPA given within 24 hours prior to arrival.    · Follow-up CT in 24 hours negative for hemorrhage.  · Imaging  showed evolving left MCA stroke in the parietal region.    · Started on Plavix 75 mg daily.  PATIENT IS ALLERGIC TO ASPIRIN which causes swelling of lips and throat  · Concerns for possible cardioembolic source.  · Echo LVEF 56-60%.  No PFO.  · No arrhythmias noted on telemetry     Essential hypertension  · Currently 139/80      Hyperlipidemia  · , total cholesterol 166  · Lipitor 80 mg daily              · Keep patient n.p.o. and will schedule for loop recorder implant today.  Patient high risk for atrial fibrillation as a cardioembolic reasoning for her CVA.  · Continue Plavix as patient is allergic to aspirin and continue statin therapy  · If atrial fibrillation were detected timing of initiation of Eliquis to be determined by neurology due to recent TPA on 7/13        SEVEN Malave

## 2021-07-16 ENCOUNTER — READMISSION MANAGEMENT (OUTPATIENT)
Dept: CALL CENTER | Facility: HOSPITAL | Age: 76
End: 2021-07-16

## 2021-07-16 VITALS
WEIGHT: 171.52 LBS | BODY MASS INDEX: 28.58 KG/M2 | SYSTOLIC BLOOD PRESSURE: 157 MMHG | RESPIRATION RATE: 18 BRPM | HEART RATE: 72 BPM | TEMPERATURE: 98.3 F | OXYGEN SATURATION: 96 % | DIASTOLIC BLOOD PRESSURE: 90 MMHG | HEIGHT: 65 IN

## 2021-07-16 LAB
GLUCOSE BLDC GLUCOMTR-MCNC: 76 MG/DL (ref 70–130)
GLUCOSE BLDC GLUCOMTR-MCNC: 81 MG/DL (ref 70–130)
PHOSPHATE SERPL-MCNC: 2.4 MG/DL (ref 2.5–4.5)

## 2021-07-16 PROCEDURE — 99231 SBSQ HOSP IP/OBS SF/LOW 25: CPT | Performed by: NURSE PRACTITIONER

## 2021-07-16 PROCEDURE — 82962 GLUCOSE BLOOD TEST: CPT

## 2021-07-16 PROCEDURE — 94799 UNLISTED PULMONARY SVC/PX: CPT

## 2021-07-16 PROCEDURE — 97116 GAIT TRAINING THERAPY: CPT

## 2021-07-16 PROCEDURE — 84100 ASSAY OF PHOSPHORUS: CPT | Performed by: NURSE PRACTITIONER

## 2021-07-16 PROCEDURE — 99238 HOSP IP/OBS DSCHRG MGMT 30/<: CPT | Performed by: INTERNAL MEDICINE

## 2021-07-16 RX ORDER — ATORVASTATIN CALCIUM 80 MG/1
80 TABLET, FILM COATED ORAL NIGHTLY
Qty: 30 TABLET | Refills: 0 | Status: SHIPPED | OUTPATIENT
Start: 2021-07-16 | End: 2021-07-25 | Stop reason: SDUPTHER

## 2021-07-16 RX ORDER — CLOPIDOGREL BISULFATE 75 MG/1
75 TABLET ORAL DAILY
Qty: 30 TABLET | Refills: 0 | Status: SHIPPED | OUTPATIENT
Start: 2021-07-17 | End: 2021-07-25 | Stop reason: SDUPTHER

## 2021-07-16 RX ORDER — PANTOPRAZOLE SODIUM 40 MG/1
40 TABLET, DELAYED RELEASE ORAL
Status: DISCONTINUED | OUTPATIENT
Start: 2021-07-17 | End: 2021-07-16 | Stop reason: HOSPADM

## 2021-07-16 RX ADMIN — SODIUM CHLORIDE, PRESERVATIVE FREE 10 ML: 5 INJECTION INTRAVENOUS at 09:00

## 2021-07-16 RX ADMIN — CLOPIDOGREL BISULFATE 75 MG: 75 TABLET ORAL at 08:20

## 2021-07-16 RX ADMIN — LEVOTHYROXINE SODIUM 50 MCG: 50 TABLET ORAL at 05:49

## 2021-07-16 RX ADMIN — PANTOPRAZOLE SODIUM 40 MG: 40 INJECTION, POWDER, FOR SOLUTION INTRAVENOUS at 05:49

## 2021-07-16 RX ADMIN — SODIUM PHOSPHATE, MONOBASIC, MONOHYDRATE AND SODIUM PHOSPHATE, DIBASIC, ANHYDROUS 15 MMOL: 276; 142 INJECTION, SOLUTION INTRAVENOUS at 12:05

## 2021-07-16 RX ADMIN — BUDESONIDE AND FORMOTEROL FUMARATE DIHYDRATE 2 PUFF: 160; 4.5 AEROSOL RESPIRATORY (INHALATION) at 07:15

## 2021-07-16 NOTE — DISCHARGE PLACEMENT REQUEST
"From; Marlene Wall RN,  [phone]    Jori Sharma (76 y.o. Female)     Date of Birth Social Security Number Address Home Phone MRN    1945  [address] [address] [phone] [id number]    Religious Marital Status          P[employer]ism Single       Admission Date Admission Type Admitting Provider Attending Provider Department, Room/Bed    7/13/21 Urgent Mahendra Foote MD Mueller, Joseph C, MD [facility] 2B ICU, N232/1    Discharge Date Discharge Disposition Discharge Destination                       Attending Provider: Mahendra Foote MD    Allergies: Aspirin, Donepezil, Naproxen, Montelukast Sodium, Codeine, Hydrocodone    Isolation: None   Infection: COVID Screen (preop/placement) (07/14/21)   Code Status: CPR    Ht: 165.1 cm (65\")   Wt: 77.8 kg (171 lb 8.3 oz)    Admission Cmt: None   Principal Problem: CVA (cerebral vascular accident) (CMS/[facility]) [I63.9] More...                 Active Insurance as of 7/13/2021     Primary Coverage     Payor Plan Insurance Group Employer/Plan Group    MEDICARE MEDICARE A & B      Payor Plan Address Payor Plan Phone Number Payor Plan Fax Number Effective Dates    [address] [phone]  6/1/2009 - None Entered    Formerly [facility] [id number]       Subscriber Name Subscriber Birth Date Member ID       JORI SHARMA 1945 [id number]           Secondary Coverage     Payor Plan Insurance Group Employer/Plan Group    [employer]      Payor Plan Address Payor Plan Phone Number Payor Plan Fax Number Effective Dates    [address] [address] [phone]  5/9/2020 - None Entered    [place] [address]       Subscriber Name Subscriber Birth Date Member ID       JORI SHARMA 1945 [id number]                 Emergency Contacts      (Rel.) Home Phone Work Phone Mobile Phone    Mayne,Charles (Friend) -- -- [phone]               History & Physical      Dary, " Mahendra WALTER MD at 07/13/21 7183          Pulmonary/Critical Care History and Physical Exam     LOS: 0 days   Patient Care Team:  Lorene Isaac MD as PCP - General  Lorene Isaac MD as PCP - Internal Medicine  Brittanie Bauer MD as Consulting Physician (Obstetrics and Gynecology)  Gigi Esparza MD as Consulting Physician (Gastroenterology)  Kartik Gold MD as Consulting Physician (General Surgery)  Tyrone Tate MD as Consulting Physician (Ophthalmology)  Arik Keane MD as Consulting Physician (Otolaryngology)  Anastacio Dickinson MD as Consulting Physician (Otolaryngology)  Mario Ding MD as Consulting Physician (Neurosurgery)  Travis Beckham MD as Consulting Physician (General Surgery)  Mingo Cerrato MD as Consulting Physician (Gastroenterology)    Chief Complaint: Stroke    Subjective     HPI: Ms. Sharma is a 77 yo female with PMH ADEN, HTN, HLD, GERD, Venous insufficiency of the leg, and mild cognitive disorder who presents as a transfer from [facility] with ongoing issues associated with a stroke. According to verbal report as limited documentation was sent with patient from OSH, patient was in her normal state of health until around 1930 this evening. At that point she developed right sided weakness, expressive aphasia, right facial droop, hemiparesis, and right facial numbness. She was taken to OSH where her initial NIH was a 3. CT head was negative. She was given TPA which was finishing on arrival to [facility] and was transferred for higher level of care.    On arrival, patient is alert but her speech is garbled and aphasic. NIH 2. She is hemodynamically stable on minimal supplemental oxygen.  She reports a mild left-sided headache and that her left nare is stuffy. She is being admitted to ICU for management.  She reports she is on Advair 100 twice daily.    History taken from: patient    Past Medical History:   Diagnosis Date   • Abnormal MRI, breast  01/03/2020    breast MRI (1/3/20): 1.1cm R. nipple mass sugg of nipple adenoma, indeterminate 0.5cm L. breast mass 12:00, rec surg excision for punch bx R. nipple mass and u/s for further eval L. breast mass   • Abnormal MRI, breast 07/10/2020    breast MRI (7/10/20): postsurg bx R. nipple with dx benign adenoma, post-bx clip left breast adjacent to 0.4cm not worrisome mass, rec L. breast MRI in 12 mos (reg mammo due 12/20)   • Adenoma of nipple, right    • Adverse reaction to drug 5/10/2016    Impression: 06/09/2014 - GI side effects to aricept; plan as above to change timing and dose of med;    • Anesthesia     SOB upon awakening from surgery.  one occurrence.   • Asthma 5/10/2016    ENT/Dr. Dickinson - 1/5/18: RX ProAir prn #1, 1RF   • Breast pain, right 06/14/2016 6/14/16 Notes f/u with Dr. Beckham with neg bx (evaluated at [facility]); bx site healing per patient; 5/10/16 Pain resolved but has residual R. nipple abnlity with erythema and nodular change, therefore dx mammo ordered for further eval; last reg mammo done 7/15; R. nipple mass - s/p bx to r/o Paget's disease (6/16); surg - Dr. Beckham   • Contact dermatitis due to poison ivy 07/30/2014    Impression: 07/30/2014 - Depo-medrol 40 mg IM given in office. Pt will start prednisone taper as ordered. Call or RTC if rash is not improving.;    • Discomfort of left eye 10/27/2016    Impression: 10/27/16 No abnlities on exam and notes updated eye exam 9/16; if workup negative and sxs persist, needs to f/u with Dr. Hollis; 04/13/2014 - check labs to include ESR, CRP but proceed with ophtho consult with Dr. Hollis ASAP; Description: LEFT; nl eye exam (4/16/14), per ophtho - Dr. Hollis   • Dizziness 10/27/2016    10/27/16 Imbalanced sensation and not vertigo per patient; no focal neuro deficits on exam; will rec f/u eye exam and head imaging if sxs persist and ESR/CRP are normal; ddx also includes eust tube dysfunction   • Gastroesophageal reflux disease   5/10/2016    4/28/21 EGD/Dr. 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 Cerrato - on omeprazole 40mg QD, rec wean after 3 mos, EGD if no better; RTC 3 mos; 7/21/20 reports inadequate relief from OTC nexium and has   • Hx of bone density study 07/2013    DEXA (7/13) H -1.3   • Hx of bone density study 07/14/2016    DEXA (7/14/16) L 0.4, H -1.6 repeat 2-3 yrs   • Hx of bone density study 10/08/2019    DEXA (10/8/19): L 1.3, H -1.8, A -2.8, worsened, NEW osteoporosis, repeat 2 yrs   • Hx of colonoscopy 12/2015    surg - Dr. Kartik Gold   • Hx of colonoscopy 11/08/2019    colonosc (11/8/19): nl except diverticulosis, repeat 5 yrs due to h/o polyps; surg - Dr. Kartik Gold   • Hx of esophagogastroduodenoscopy 05/05/2017    EGD (5/5/17): mod chronic gastritis, (+)H.pylori, no hiatal hernia; GI - Dr. Esparza   • Hx of LLE venous duplex 11/13/2020    LLE venous duplex (11/13/20): no DVT; (+) valvular venous insufficiency deep and superficial with severe reflux   • Hyperlipidemia 5/10/2016    7/21/20 stable lipids , , HDL 41, ; no meds; 7/18/19 stable lipids, at goal , TG 93, HDL 56, LDL 91, no meds; 05/04/2015 - lipids stable without meds  Impression: 10/27/2014 - lipids stable, no meds; repeat in 6 mos  Impression: 04/13/2014 - f/u lipids; no meds currently; Description: low HDL; nl carotid U/S (10/08) (4/14)   • Hypertension 5/10/2016    11/13/20 /72; no meds; 7/21/20 bord /76; 04/13/2014 - no h/o HTN; continue monitoring BPs at home, noting diastolic readings are a little higher on her machine; low Na diet; check electrolytes; f/u in 1 week;    • Lightheadedness 04/28/2014    Impression: 04/28/2014 - resolved; will let her know official carotid u/s results when available  Impression:  04/13/2014 - discussed adequate hydration; check CBC;    • Lip swelling 08/05/2015    Impression: 08/05/2015 - L. lower lip lesion significantly improved; complete course of abx as prescribed; if area does not resolve completely, call to make earlier derm f/u appt  Impression: 07/28/2015 - L. lower lip swelling already improving but concern for residual ongoing infection; ddx includes infected cold sore, infection from cryotherapy for AK, and less likely shingles; finishing course    • Mild cognitive disorder 5/10/2016    7/21/20 MMSE 30/30;  7/16/18 MMSE 30/30; 7/14/17 current MMSE still 30/30; note previously able to tolerate 5mg donepezil but nightmares with 10mg dose; 10/27/2014 - note MMSE 30/30; reviewed med options for [facility]CI to include trial of aricept 5mg QD versus exelon patch versus namenda; she would like to proceed with trial of aricept 5mg QD #30, 5RF; f/u MMSE in 6 mos  Impression: 06/09/2014 - reviewed   • ADEN (obstructive sleep apnea) 9/11/2017 7/17/18 remains on CPAP, s/e of dry mouth even with humidifier per patient; 1/5/18 ENT/Dr. Dickinson - success with oral appliance until TMJ, better with CPAP; 9/17 sleep study - mod ADEN, rec autoPap 8-12; ENT - Dr. Keane   • Pap smear for cervical cancer screening 08/16/2016    Pap done 8/16/16 per Dr. Brittanie Bauer   • Peripheral arterial occlusive disease (CMS/HCC) 5/10/2016    Description: abnl ALLEGRA at [facility] with nl arterial duplex (10/08)   • Venous insufficiency of left leg 11/17/2020 11/13/20 LLE venous duplex: no DVT; (+) valvular venous insufficiency deep and superficial with severe reflux   • Wears glasses        Past Surgical History:   Procedure Laterality Date   • BILATERAL SALPINGO OOPHORECTOMY Bilateral 06/14/2017    GYN - Dr. Bauer; for benign L. ovarian cyst   • BREAST BIOPSY Right 5/12/2020    s/p R. breast excisional bx R. subsreaolar mass (5/12/20); path - nipple adenoma; surg - Dr. Beckham   • CATARACT EXTRACTION, BILATERAL       "L. 4/09 (complicated by nerve injury; R. 6/10; L. corrected 6/15; ophtho - Dr. Hollis, Dr. Lange; neuro-ophtho Dr. Ferris   • CHOLECYSTECTOMY  2002    secondary to \"crystalized GB\" (''02); surg - Dr. Kartik Gold   • MAMMO CORE NEEDLE BIOPSY UNILATERAL Left 01/17/2020    L. breast u/s core needle bx (1/17/20): path - Focal proliferative fibrocystic changes including sclerosing adenosis   • OOPHORECTOMY         Family History   Problem Relation Age of Onset   • Heart failure Mother    • Lung cancer Mother    • Breast cancer Mother 86   • Cancer Mother    • Lung cancer Father         tob use   • Cancer Father    • Depression Sister    • OCD Sister    • Hyperlipidemia Sister    • Stroke Maternal Grandmother    • Heart disease Maternal Grandfather    • Diabetes Paternal Grandmother    • Heart attack Paternal Grandfather    • Heart attack Paternal Uncle         2 pat uncles       Social History     Socioeconomic History   • Marital status: Single     Spouse name: Not on file   • Number of children: Not on file   • Years of education: Not on file   • Highest education level: Not on file   Tobacco Use   • Smoking status: Never Smoker   • Smokeless tobacco: Never Used   Substance and Sexual Activity   • Alcohol use: Yes     Comment: social   • Drug use: No   • Sexual activity: Defer       Allergies   Allergen Reactions   • Aspirin Swelling     Lip and hand swelling   • Donepezil Other (See Comments)     Nightmares at the 10mg dose   • Naproxen Other (See Comments)     ulcer   • Montelukast Sodium Other (See Comments)     Morning drowsiness even with QHS dosing   • Codeine Rash   • Hydrocodone Rash       PMH/FH/SocH were reviewed by me and updates were made.     Review of Systems:    All systems were reviewed and negative except as noted in subjective.      Objective     Vital Signs       Physical Exam:     General Appearance:    Alert, cooperative, in no acute distress   Head:    Normocephalic, without obvious abnormality, " atraumatic   Eyes:            Lids and lashes normal, conjunctivae and sclerae normal, no   icterus, no pallor, corneas clear, PER   ENMT:   Ears appear intact with no abnormalities noted      No oral lesions, no thrush, oral mucosa moist   Neck:   No adenopathy, supple, trachea midline, no thyromegaly, no JVD       Lungs/resp:     Normal effort, symmetric chest rise, no crepitus, clear to      auscultation bilaterally, no chest wall tenderness                 Heart/CV:    Regular rhythm and normal rate, normal S1 and S2, no            murmur   Abdomen/GI:     Normal bowel sounds, no masses, no organomegaly, soft        non-tender, non-distended   G/U:     Deferred   Extremities/MSK:   No clubbing, cyanosis or edema.  No deformities.    Pulses:   Pulses palpable and equal bilaterally   Skin:   No bleeding, bruising or rash   Hem/Lymph:   No cervical or supraclavicular adenopathy.    Neurologic:   Moves all extremities with no obvious focal motor deficit.  Mild left facial droop.            Psychiatric:  Normal mood and affect, oriented x 3.   The above findings are documentation my personal physical examination.  Electronically signed by:Mahendra Foote MD 07/13/21 23:59 EDT     Results Review:     I reviewed the patient's new clinical results.         Invalid input(s): LABALBU, PROT        Invalid input(s): NEUTOPHILPCT,  EOSPCT        I reviewed the patient's new imaging including images and reports.    CTA head and neck 7/13/2021:  IMPRESSION:  1. No definite evidence of large vessel occlusion, high-grade stenosis or dissection. It is uncertain if there may be some potential narrowing or occlusion involving a small P3 branch of the patient's left PCA. This is indeterminate.  2. Small outpouching in the region of the origin of the left P-com may represent a small aneurysm versus infundibulum.  Case was discussed with the Stroke nurse navigator.    CT cerebral perfusion 7/13/2021:  IMPRESSION:  Normal brain  perfusion CT.    Medication Review:   atorvastatin, 80 mg, Oral, Nightly  sodium chloride, 10 mL, Intravenous, Q12H      niCARdipine, 5-15 mg/hr        Assessment/Plan       Suspected cerebrovascular accident (CVA)    Hyperlipidemia    Hypertension    Gastroesophageal reflux disease     ADEN (obstructive sleep apnea)    Electronically signed by SEVEN Jade, 07/13/21, 10:54 PM EDT.    I performed an independent history and physical examination. Portions of the history were obtained by APRN and were modified by me according to my findings. The above note reflects my findings, assessment, and plan.    Mahendra Foote MD    76 y.o. female with history of obstructive sleep apnea, hypertension, hyperlipidemia, GERD, venous insufficiency of the leg, mild cognitive disorder, who presents for me from [facility] with strokelike symptoms.  She developed right-sided weakness with expressive aphasia and right facial droop at around 1930 per report.  Weakness subsequently resolved however speech difficulty remains.  She is given TPA at outside hospital and transferred to [facility] for further evaluation.  CTA shows possible small P3 branch narrowing or occlusion of left PCA.  Cerebral perfusion study normal.  Patient will be admitted to the intensive care unit for further evaluation management.    Plan:  1.  Admit to the intensive care unit.  2.  Serial neurologic exams.  3.  Follow-up 24-hour CT head.  4.  Post TPA order set utilized.  5.  N.p.o. until speech evaluation.  6.  Continue bronchodilator/inhaled corticosteroid.  7.  Goal blood pressure less than 180 systolic.  Cardene/labetalol if needed.    Patient is critically ill secondary to acute CVA status post TPA with high risk of life-threatening decline in condition including potential for hemorrhagic conversion, progressive stroke, respiratory failure, and death.  As such, she requires continuous monitoring frequent reassessment for  consideration of adjustment in management to minimize this risk.  I personally reassessed her multiple times today.    Critical care time : 38 minutes spent by me personally.(This excludes time spent performing separately reportable procedures and services). including high complexity decision making to assess, manipulate, and support vital organ system failure in this individual who has impairment of one or more vital organ systems such that there is a high probability of imminent or life threatening deterioration in the patient’s condition.      Electronically signed by:  Mahendra Foote MD  07/13/21  23:59 EDT            Electronically signed by Mahendra Foote MD at 07/14/21 0012         Current Facility-Administered Medications   Medication Dose Route Frequency Provider Last Rate Last Admin   • acetaminophen (TYLENOL) tablet 650 mg  650 mg Oral Q6H PRN Bartolome Smith IV, MD   650 mg at 07/15/21 0554   • albuterol (PROVENTIL) nebulizer solution 0.083% 2.5 mg/3mL  2.5 mg Nebulization Q6H PRN Bartolome Smith IV, MD       • atorvastatin (LIPITOR) tablet 80 mg  80 mg Oral Nightly Bartolome Smith IV, MD   80 mg at 07/15/21 2153   • budesonide-formoterol (SYMBICORT) 160-4.5 MCG/ACT inhaler 2 puff  2 puff Inhalation BID - RT Bartolome Smith IV, MD   2 puff at 07/16/21 0715   • clopidogrel (PLAVIX) tablet 75 mg  75 mg Oral Daily Bartolome Smith IV, MD   75 mg at 07/16/21 0820   • labetalol (NORMODYNE,TRANDATE) injection 20 mg  20 mg Intravenous Q10 Min PRN Bartolome Smith IV, MD       • levothyroxine (SYNTHROID, LEVOTHROID) tablet 50 mcg  50 mcg Oral Q AM Bartolome Smith IV, MD   50 mcg at 07/16/21 0549   • Magnesium Sulfate 2 gram Bolus, followed by 8 gram infusion (total Mg dose 10 grams)- Mg less than or equal to 1mg/dL  2 g Intravenous PRN Bartolome Smith IV, MD        Or   • Magnesium Sulfate 2 gram / 50mL Infusion (GIVE X 3 BAGS  TO EQUAL 6GM TOTAL DOSE) - Mg 1.1 - 1.5 mg/dl  2 g Intravenous PRBartolome Clark IV, MD        Or   • Magnesium Sulfate 4 gram infusion- Mg 1.6-1.9 mg/dL  4 g Intravenous Bartolome Crowe IV, MD       • [START ON 7/17/2021] pantoprazole (PROTONIX) EC tablet 40 mg  40 mg Oral Q AM Rossy Pickett MD       • potassium & sodium phosphates (PHOS-NAK) 280-160-250 MG packet - for Phosphorus less than 1.25 mg/dL  2 packet Oral Q6H PRN Bartolome Smith IV, MD        Or   • potassium & sodium phosphates (PHOS-NAK) 280-160-250 MG packet - for Phosphorus 1.25 - 2.5 mg/dL  2 packet Oral Q6H Bartolome Crowe IV, MD   2 packet at 07/15/21 1016   • potassium chloride (KLOR-CON) packet 40 mEq  40 mEq Oral PRN Bartolome Smith IV, MD   40 mEq at 07/15/21 1408   • potassium chloride (MICRO-K) CR capsule 40 mEq  40 mEq Oral PRN Bartolome Smith IV, MD       • potassium chloride 10 mEq in 100 mL IVPB  10 mEq Intravenous Q1H Bartolome Crowe IV, MD       • potassium phosphate 45 mmol in sodium chloride 0.9 % 500 mL infusion  45 mmol Intravenous Bartolome Crowe IV, MD        Or   • potassium phosphate 30 mmol in sodium chloride 0.9 % 250 mL infusion  30 mmol Intravenous Bartolome Crowe IV, MD        Or   • potassium phosphate 15 mmol in sodium chloride 0.9 % 100 mL infusion  15 mmol Intravenous Bartolome Crowe IV, MD        Or   • sodium phosphates 45 mmol in sodium chloride 0.9 % 250 mL IVPB  45 mmol Intravenous Bartolome Crowe IV, MD        Or   • sodium phosphates 30 mmol in sodium chloride 0.9 % 250 mL IVPB  30 mmol Intravenous Bartolome Crowe IV, MD 31.3 mL/hr at 07/15/21 0727 30 mmol at 07/15/21 0727    Or   • sodium phosphates 15 mmol in sodium chloride 0.9 % 250 mL IVPB  15 mmol Intravenous PRBartolome Clark IV, MD 62.5 mL/hr at 07/15/21 2153 15 mmol at 07/15/21  2153   • sodium chloride 0.9 % flush 10 mL  10 mL Intravenous Q12H Bartolome Smith IV, MD   10 mL at 07/16/21 0900   • sodium chloride 0.9 % flush 10 mL  10 mL Intravenous PRN Bartolome Smith IV, MD            Physician Progress Notes (most recent note)          Inpatient Cardiology Consult  Consult performed by: Vicky Del Cid APRN  Consult ordered by: Jaguar Loza MD        [facility]  Cardiovascular Consultation Note    Reason for consult: Cardioembolic CVA  Subjective   Patient is a 76-year-old female with no prior cardiac history but history of hypertension, hyperlipidemia, obstructive sleep apnea, venous insufficiency and GERD who was transferred from [facility] on 7/13/2021 after presenting to their facility with right-sided weakness, expressive aphasia and dysarthria.  She was given TPA and transferred to our facility for higher level of care.  Imaging here has revealed a left MCA CVA concerning for cardioembolic source which is the reasoning for consult today.  Patient is currently sitting up in the bed.  She reports still having some mild right-sided weakness but no longer has facial drooping or slurred speech.  She is alert and oriented.  She denies chest pain, dyspnea, orthopnea, palpitations/fluttering, presyncope or syncope.  An echocardiogram performed showed normal LVEF, moderate TR and negative bubble study.  No arrhythmias have been noted on telemetry.    Cardiac risk factors: Advanced age, hypertension, hyperlipidemia    Past medical and surgical history, social and family history reviewed in EMR.    REVIEW OF SYSTEMS:   H&P ROS reviewed and pertinent CV ROS as noted in HPI.    Objective     Vital Sign Min/Max for last 24 hours  Temp  Min: 97.7 °F (36.5 °C)  Max: 98.3 °F (36.8 °C)   BP  Min: 101/58  Max: 165/91   Pulse  Min: 63  Max: 82   Resp  Min: 16  Max: 20   SpO2  Min: 92 %  Max: 98 %   No  data recorded      Intake/Output Summary (Last 24 hours) at 7/15/2021 1344  Last data filed at 7/15/2021 1159  Gross per 24 hour   Intake 2224.1 ml   Output 1800 ml   Net 424.1 ml           Constitutional:       Appearance: Healthy appearance. Well-developed.   Eyes:      General: Lids are normal. No scleral icterus.     Conjunctiva/sclera: Conjunctivae normal.   HENT:      Head: Normocephalic and atraumatic.   Neck:      Thyroid: No thyromegaly.      Vascular: No carotid bruit or JVD.   Pulmonary:      Effort: Pulmonary effort is normal.      Breath sounds: Normal breath sounds. No wheezing. No rhonchi. No rales.   Cardiovascular:      Normal rate. Regular rhythm.      Murmurs: There is no murmur.      No gallop. No rub.   Pulses:     Intact distal pulses.   Abdominal:      General: There is no distension.      Palpations: Abdomen is soft. There is no abdominal mass.   Musculoskeletal:      Cervical back: Normal range of motion. Skin:     General: Skin is warm and dry.      Findings: No rash.   Neurological:      General: No focal deficit present.      Mental Status: Alert and oriented to person, place, and time.      Gait: Gait is intact.   Psychiatric:         Attention and Perception: Attention normal.         Mood and Affect: Mood normal.         Behavior: Behavior normal.         Lab Review:   Labs reviewed in the electronic medical record.  Pertinent findings include:  Lab Results   Component Value Date    GLUCOSE 92 07/15/2021    BUN 11 07/15/2021    CREATININE 0.66 07/15/2021    EGFRIFNONA 87 07/15/2021    BCR 16.7 07/15/2021    K 3.8 07/15/2021    CO2 18.0 (L) 07/15/2021    CALCIUM 7.8 (L) 07/15/2021    ALBUMIN 3.30 (L) 07/15/2021    AST 18 07/15/2021    ALT 13 07/15/2021     Lab Results   Component Value Date    WBC 6.84 07/15/2021    HGB 13.1 07/15/2021    HCT 41.8 07/15/2021    MCV 96.5 07/15/2021     07/15/2021     Lab Results   Component Value Date    CHOL 166 07/13/2020    CHLPL 151 06/07/2016     TRIG 100 2020    HDL 41 2020     (H) 2020     Results from last 7 days   Lab Units 21  0441   HEMOGLOBIN A1C % 5.80*         Assessment   Left MCA CVA   · Status post TPA given within 24 hours prior to arrival.    · Follow-up CT in 24 hours negative for hemorrhage.  · Imaging showed evolving left MCA stroke in the parietal region.    · Started on Plavix 75 mg daily.  PATIENT IS ALLERGIC TO ASPIRIN which causes swelling of lips and throat  · Concerns for possible cardioembolic source.  · Echo LVEF 56-60%.  No PFO.  · No arrhythmias noted on telemetry     Essential hypertension  · Currently 139/80      Hyperlipidemia  · , total cholesterol 166  · Lipitor 80 mg daily          Plan    · Keep patient n.p.o. and will schedule for loop recorder implant today.  Patient high risk for atrial fibrillation as a cardioembolic reasoning for her CVA.  · Continue Plavix as patient is allergic to aspirin and continue statin therapy  · If atrial fibrillation were detected timing of initiation of Eliquis to be determined by neurology due to recent TPA on         [address] [place]            Electronically signed by Bartolome Smith IV, MD at 07/15/21 1432          Physical Therapy Notes (most recent note)      Shona Foster, PT at 21 0941  Version 1 of 1         Patient Name: Rebecca Sharma  : 1945    MRN: [id number]                              Today's Date: 2021       Admit Date: 2021    Visit Dx:     ICD-10-CM ICD-9-CM   1. Dysphagia, unspecified type  R13.10 787.20   2. Cerebrovascular accident (CVA), unspecified mechanism (CMS/[facility])  I63.9 434.91     Patient Active Problem List   Diagnosis   • Cysts of both ovaries   • Actinic keratosis   • Allergic rhinitis   • Asthma   • Hyperlipidemia LDL goal <70   • Dyspepsia   • Essential hypertension   • Uterine leiomyoma   • Gastroesophageal reflux disease    • Chronic laryngitis   • Hyperactivity of  bladder   • Hypothyroidism   • Lumbar spinal stenosis   • Mild cognitive disorder   • Osteoarthritis   • Osteoporosis   • Seborrheic keratosis   • Bilateral sensorineural hearing loss   • Skin tag   • Mixed stress and urge urinary incontinence   • Xerostomia   • Nasal septal deviation, right   • Medicare annual wellness visit, subsequent   • Headache   • ADEN (obstructive sleep apnea)   • Fibrocystic disease of left breast   • Chronic right hip pain   • Menopause   • Macrocytosis without anemia   • Diverticulosis of colon   • Right nipple adenoma   • Venous insufficiency of left leg   • Cricopharyngeal spasm   • Schatzki's ring   • CVA (cerebral vascular accident) (CMS/HCC)     Past Medical History:   Diagnosis Date   • Abnormal MRI, breast 01/03/2020    breast MRI (1/3/20): 1.1cm R. nipple mass sugg of nipple adenoma, indeterminate 0.5cm L. breast mass 12:00, rec surg excision for punch bx R. nipple mass and u/s for further eval L. breast mass   • Abnormal MRI, breast 07/10/2020    breast MRI (7/10/20): postsurg bx R. nipple with dx benign adenoma, post-bx clip left breast adjacent to 0.4cm not worrisome mass, rec L. breast MRI in 12 mos (reg mammo due 12/20)   • Adverse reaction to drug 5/10/2016    Impression: 06/09/2014 - GI side effects to aricept; plan as above to change timing and dose of med;    • Anesthesia     SOB upon awakening from surgery.  one occurrence.   • Breast pain, right 06/14/2016 6/14/16 Notes f/u with Dr. Beckham with neg bx (evaluated at [facility]); bx site healing per patient; 5/10/16 Pain resolved but has residual R. nipple abnlity with erythema and nodular change, therefore dx mammo ordered for further eval; last reg mammo done 7/15; R. nipple mass - s/p bx to r/o Paget's disease (6/16); surg - Dr. Beckham   • Contact dermatitis due to poison ivy 07/30/2014    Impression: 07/30/2014 - Depo-medrol 40 mg IM given in office. Pt will start prednisone taper as ordered. Call or RTC if  rash is not improving.;    • Dizziness 10/27/2016    10/27/16 Imbalanced sensation and not vertigo per patient; no focal neuro deficits on exam; will rec f/u eye exam and head imaging if sxs persist and ESR/CRP are normal; ddx also includes eust tube dysfunction   • Hx of bone density study 07/2013    DEXA (7/13) H -1.3   • Hx of bone density study 07/14/2016    DEXA (7/14/16) L 0.4, H -1.6 repeat 2-3 yrs   • Hx of bone density study 10/08/2019    DEXA (10/8/19): L 1.3, H -1.8, A -2.8, worsened, NEW osteoporosis, repeat 2 yrs   • Hx of colonoscopy 12/2015    surg - Dr. Kartik Gold   • Hx of colonoscopy 11/08/2019    colonosc (11/8/19): nl except diverticulosis, repeat 5 yrs due to h/o polyps; surg - Dr. Kartik Gold   • Hx of CT angio head/neck 07/13/2021    CT angio head/neck (7/13/21, hospitalized): No definite lg vessel occlusion, high-grade stenosis or dissection; indeterminate narrowing/occlusion of small P3 branch of the patient's left PCA; small outpouching at origin of left P-com may represent a small aneurysm versus infundibulum   • Hx of CT brain perfusion 07/13/2021    Normal brain perfusion CT (7/13/21, hospitalized)   • Hx of CT scan of head 07/13/2021    nl CT head (7/13/21): except mild atrophy with mild chronic small vessel ischemic disease   • Hx of echocardiogram 07/13/2021    ECHO (7/13/21, hosp): EF 56-60%, mild-mod TR, neg saline test   • Hx of esophagogastroduodenoscopy 05/05/2017    EGD (5/5/17): mod chronic gastritis, (+)H.pylori, no hiatal hernia; GI - Dr. Esparza   • Hx of LLE venous duplex 11/13/2020    LLE venous duplex (11/13/20): no DVT; (+) valvular venous insufficiency deep and superficial with severe reflux   • Lightheadedness 04/28/2014    Impression: 04/28/2014 - resolved; will let her know official carotid u/s results when available  Impression: 04/13/2014 - discussed adequate hydration; check CBC;    • Lip swelling 08/05/2015    Impression: 08/05/2015 - L. lower lip lesion  "significantly improved; complete course of abx as prescribed; if area does not resolve completely, call to make earlier derm f/u appt  Impression: 07/28/2015 - L. lower lip swelling already improving but concern for residual ongoing infection; ddx includes infected cold sore, infection from cryotherapy for AK, and less likely shingles; finishing course    • Pap smear for cervical cancer screening 08/16/2016    Pap done 8/16/16 per Dr. Brittanie Bauer   • Peripheral arterial occlusive disease (CMS/HCC) 5/10/2016    Description: abnl ALLEGRA at [facility] with nl arterial duplex (10/08)   • Wears glasses      Past Surgical History:   Procedure Laterality Date   • BILATERAL SALPINGO OOPHORECTOMY Bilateral 06/14/2017    GYN - Dr. Bauer; for benign L. ovarian cyst   • BREAST BIOPSY Right 5/12/2020    s/p R. breast excisional bx R. subsreaolar mass (5/12/20); path - nipple adenoma; surg - Dr. Beckham   • CARDIAC ELECTROPHYSIOLOGY PROCEDURE N/A 7/15/2021    Procedure: loop implant;  Surgeon: Bartolome Smith IV, MD;  Location:  [facility] INVASIVE LOCATION;  Service: Cardiovascular;  Laterality: N/A;   • CATARACT EXTRACTION, BILATERAL      L. 4/09 (complicated by nerve injury; R. 6/10; L. corrected 6/15; ophtho - Dr. Hollis, Dr. Lange; neuro-ophtho Dr. Ferris   • CHOLECYSTECTOMY  2002    secondary to \"crystalized GB\" (''02); surg - Dr. Kartik Gold   • MAMMO CORE NEEDLE BIOPSY UNILATERAL Left 01/17/2020    L. breast u/s core needle bx (1/17/20): path - Focal proliferative fibrocystic changes including sclerosing adenosis   • OOPHORECTOMY       General Information     Row Name 07/16/21 1021          Physical Therapy Time and Intention    Document Type  therapy note (daily note)  -AY     Mode of Treatment  physical therapy  -AY     Row Name 07/16/21 1021          General Information    Patient Profile Reviewed  yes  -AY     Existing Precautions/Restrictions  fall;other (see comments) urinary incontinence; dysarthria  " -AY     Barriers to Rehab  medically complex  -AY     Row Name 07/16/21 1021          Cognition    Orientation Status (Cognition)  oriented x 3  -AY     Row Name 07/16/21 1021          Safety Issues, Functional Mobility    Impairments Affecting Function (Mobility)  balance;endurance/activity tolerance;strength  -AY       User Key  (r) = Recorded By, (t) = Taken By, (c) = Cosigned By    Initials Name Provider Type    AY Shona Foster, PT Physical Therapist        Mobility     Row Name 07/16/21 1021          Bed Mobility    Bed Mobility  supine-sit  -AY     Supine-Sit [place] (Bed Mobility)  contact guard;verbal cues  -AY     Assistive Device (Bed Mobility)  head of bed elevated  -AY     Comment (Bed Mobility)  VC for sequencing  -AY     Row Name 07/16/21 1021          Transfers    Comment (Transfers)  STS performed x3  -AY     Row Name 07/16/21 1021          Sit-Stand Transfer    Sit-Stand [place] (Transfers)  contact guard progressing to SBA  -AY     Assistive Device (Sit-Stand Transfers)  walker, front-wheeled  -AY     Row Name 07/16/21 1021          Gait/Stairs (Locomotion)    [place] Level (Gait)  minimum assist (75% patient effort);1 person assist;verbal cues progressing to SBA  -AY     Assistive Device (Gait)  walker, front-wheeled  -AY     Distance in Feet (Gait)  200  -AY     Deviations/Abnormal Patterns (Gait)  bita decreased;gait speed decreased;stride length decreased;base of support, narrow  -AY     Bilateral Gait Deviations  forward flexed posture;heel strike decreased  -AY     [place] Level (Stairs)  1 person assist;contact guard;verbal cues  -AY     Assistive Device (Stairs)  walker, front-wheeled  -AY     Handrail Location (Stairs)  none  -AY     Number of Steps (Stairs)  1 x3 reps  -AY     Ascending Technique (Stairs)  step-to-step  -AY     Descending Technique (Stairs)  step-to-step  -AY     Comment (Gait/Stairs)  pt demonstrated step through gait pattern with mild instability  which improved with continued mobility. VC for posture, walker management/positioning, increased stride length, heel strike, and PLB. Gait distance limited by fatigue.  -AY       User Key  (r) = Recorded By, (t) = Taken By, (c) = Cosigned By    Initials Name Provider Type    Shona Andersen PT Physical Therapist        Obj/Interventions     [facility] Name 07/16/21 1023          Motor Skills    Therapeutic Exercise  hip;knee;ankle  -AY     Row Name 07/16/21 1023          Hip (Therapeutic Exercise)    Hip (Therapeutic Exercise)  strengthening exercise  -AY     Hip Strengthening (Therapeutic Exercise)  bilateral;marching while seated;10 repetitions;sitting  -AY     Row Name 07/16/21 1023          Knee (Therapeutic Exercise)    Knee (Therapeutic Exercise)  strengthening exercise  -AY     Knee Strengthening (Therapeutic Exercise)  bilateral;LAQ (long arc quad);10 repetitions;sitting  -AY     Row Name 07/16/21 1023          Ankle (Therapeutic Exercise)    Ankle (Therapeutic Exercise)  AROM (active range of motion)  -AY     Ankle AROM (Therapeutic Exercise)  bilateral;dorsiflexion;plantarflexion;10 repetitions;supine  -AY     Row Name 07/16/21 1023          Balance    Balance Assessment  sitting static balance;sitting dynamic balance;standing static balance;standing dynamic balance  -AY     Static Sitting Balance  WFL;sitting, edge of bed  -AY     Dynamic Sitting Balance  WFL;sitting, edge of bed  -AY     Static Standing Balance  WFL;standing;supported  -AY     Dynamic Standing Balance  mild impairment;supported;standing  -AY       User Key  (r) = Recorded By, (t) = Taken By, (c) = Cosigned By    Initials Name Provider Type    Shona Andersen PT Physical Therapist        Goals/Plan    No documentation.       Clinical Impression     Row Name 07/16/21 1023          Pain    Additional Documentation  Pain Scale: Numbers Pre/Post-Treatment (Group)  -AY     Row Name 07/16/21 1023          Pain Scale: Numbers Pre/Post-Treatment     Pretreatment Pain Rating  0/10 - no pain  -AY     Posttreatment Pain Rating  0/10 - no pain  -AY     Pain Intervention(s)  Ambulation/increased activity;Repositioned  -AY     Row Name 07/16/21 1023          Plan of Care Review    Plan of Care Reviewed With  patient  -AY     Progress  improving  -AY     Outcome Summary  Pt showing improvement as she increased ambulation distance to 200ft with RWx and min A progressing to SBA. 1 step navigated x3 reps with RWx with CGA. Pt limited by balance deficit, coordination impairment, decreased functional endurance, and generalized weakness. Continue to progress as able. d/c rec for HWA, HH PT/OT, and RWx.  -AY     Row Name 07/16/21 1023          Vital Signs    Pre Systolic BP Rehab  129  -AY     Pre Treatment Diastolic BP  93  -AY     Post Systolic BP Rehab  138  -AY     Post Treatment Diastolic BP  85  -AY     Pretreatment Heart Rate (beats/min)  74  -AY     Posttreatment Heart Rate (beats/min)  74  -AY     Pre SpO2 (%)  95  -AY     O2 Delivery Pre Treatment  room air  -AY     O2 Delivery Intra Treatment  room air  -AY     Post SpO2 (%)  96  -AY     O2 Delivery Post Treatment  room air  -AY     Pre Patient Position  Supine  -AY     Intra Patient Position  Standing  -AY     Post Patient Position  Sitting  -AY     Row Name 07/16/21 1023          Positioning and Restraints    Pre-Treatment Position  in bed  -AY     Post Treatment Position  chair  -AY     In Chair  notified nsg;reclined;call light within reach;sitting;encouraged to call for assist;exit alarm on;waffle cushion;legs elevated  -AY       User Key  (r) = Recorded By, (t) = Taken By, (c) = Cosigned By    Initials Name Provider Type    AY Shona Foster, PT Physical Therapist        Outcome Measures     Row Name 07/16/21 1026 07/16/21 0800       How much help from another person do you currently need...    Turning from your back to your side while in flat bed without using bedrails?  4  -AY  4  -PM    Moving from lying  on back to sitting on the side of a flat bed without bedrails?  3  -AY  4  -PM    Moving to and from a bed to a chair (including a wheelchair)?  3  -AY  3  -PM    Standing up from a chair using your arms (e.g., wheelchair, bedside chair)?  3  -AY  3  -PM    Climbing 3-5 steps with a railing?  3  -AY  2  -PM    To walk in hospital room?  3  -AY  3  -PM    AM-PAC 6 Clicks Score (PT)  19  -AY  19  -PM    Row Name 07/16/21 1026          Modified Burke Scale    Modified Burke Scale  2 - Slight disability.  Unable to carry out all previous activities but able to look after own affairs without assistance.  -AY     Row Name 07/16/21 1026          Functional Assessment    Outcome Measure Options  AM-PAC 6 Clicks Basic Mobility (PT);Modified [place]  -AY       User Key  (r) = Recorded By, (t) = Taken By, (c) = Cosigned By    Initials Name Provider Type    PM Cassius James RN Registered Nurse    Shona Andersen, PT Physical Therapist        Physical Therapy Education                 Title: PT OT SLP Therapies (Done)     Topic: Physical Therapy (Done)     Point: Mobility training (Done)     Learning Progress Summary           Patient Acceptance, E,TB,D, VU,NR by AY at 7/16/2021 1026    Acceptance, E,H, VU by PM at 7/15/2021 1459    Acceptance, E,TB, VU,NR by AY at 7/14/2021 0932                   Point: Home exercise program (Done)     Learning Progress Summary           Patient Acceptance, E,TB,D, VU,NR by AY at 7/16/2021 1026    Acceptance, E,H, VU by PM at 7/15/2021 1459                   Point: Body mechanics (Done)     Learning Progress Summary           Patient Acceptance, E,TB,D, VU,NR by AY at 7/16/2021 1026    Acceptance, E,H, VU by PM at 7/15/2021 1459    Acceptance, E,TB, VU,NR by AY at 7/14/2021 0932                   Point: Precautions (Done)     Learning Progress Summary           Patient Acceptance, E,TB,D, VU,NR by AY at 7/16/2021 1026    Acceptance, E,H, VU by PM at 7/15/2021 1459    Acceptance, E,TB,  VU,NR by JAYA at 7/14/2021 0932                               User Key     Initials Effective Dates Name Provider Type Discipline    PM 06/16/21 -  Cassius James RN Registered Nurse Nurse     11/10/20 -  Shona Foster PT Physical Therapist PT              PT Recommendation and Plan  Planned Therapy Interventions (PT): balance training, bed mobility training, home exercise program, gait training, patient/family education, strengthening, stair training, transfer training  Plan of Care Reviewed With: patient  Progress: improving  Outcome Summary: Pt showing improvement as she increased ambulation distance to 200ft with RWx and min A progressing to SBA. 1 step navigated x3 reps with RWx with CGA. Pt limited by balance deficit, coordination impairment, decreased functional endurance, and generalized weakness. Continue to progress as able. d/c rec for HWA, HH PT/OT, and RWx.     Time Calculation:   PT Charges     Row Name 07/16/21 1027             Time Calculation    Start Time  0941  -AY      PT Received On  07/16/21  -AY         Time Calculation- PT    Total Timed Code Minutes- PT  38 minute(s)  -AY         Timed Charges    84637 - Gait Training Minutes   38  -AY         Total Minutes    Timed Charges Total Minutes  38  -AY       Total Minutes  38  -AY        User Key  (r) = Recorded By, (t) = Taken By, (c) = Cosigned By    Initials Name Provider Type    Shona Andersen PT Physical Therapist        Therapy Charges for Today     Code Description Service Date Service Provider Modifiers Qty    [id number] HC GAIT TRAINING EA 15 MIN 7/16/2021 Shona Foster PT GP 3          PT G-Codes  Outcome Measure Options: AM-PAC 6 Clicks Basic Mobility (PT), Modified [place]  AM-PAC 6 Clicks Score (PT): 19  AM-PAC 6 Clicks Score (OT): 14  Modified [place] Scale: 2 - Slight disability.  Unable to carry out all previous activities but able to look after own affairs without assistance.    Shona Foster  PT  2021      Electronically signed by Shona Foster, PT at 21 1028          Occupational Therapy Notes (most recent note)      Briana Suarez, OT at 21 0864          Patient Name: Rebecca Sharma  : 1945    MRN: [id number]                              Today's Date: 2021       Admit Date: 2021    Visit Dx: No diagnosis found.  Patient Active Problem List   Diagnosis   • Cysts of both ovaries   • Actinic keratosis   • Allergic rhinitis   • Asthma   • Hyperlipidemia   • Dyspepsia   • Hypertension   • Uterine leiomyoma   • Gastroesophageal reflux disease    • Chronic laryngitis   • Hyperactivity of bladder   • Hypothyroidism   • Lumbar spinal stenosis   • Mild cognitive disorder   • Osteoarthritis   • Osteoporosis   • Seborrheic keratosis   • Bilateral sensorineural hearing loss   • Skin tag   • Mixed stress and urge urinary incontinence   • Xerostomia   • Nasal septal deviation, right   • Medicare annual wellness visit, subsequent   • Headache   • ADEN (obstructive sleep apnea)   • Fibrocystic disease of left breast   • Chronic right hip pain   • Menopause   • Macrocytosis without anemia   • Diverticulosis of colon   • Right nipple adenoma   • Venous insufficiency of left leg   • Cricopharyngeal spasm   • Schatzki's ring   • Suspected cerebrovascular accident (CVA)   • CVA (cerebral vascular accident) (CMS/HCC)     Past Medical History:   Diagnosis Date   • Abnormal MRI, breast 2020    breast MRI (1/3/20): 1.1cm R. nipple mass sugg of nipple adenoma, indeterminate 0.5cm L. breast mass 12:00, rec surg excision for punch bx R. nipple mass and u/s for further eval L. breast mass   • Abnormal MRI, breast 07/10/2020    breast MRI (7/10/20): postsurg bx R. nipple with dx benign adenoma, post-bx clip left breast adjacent to 0.4cm not worrisome mass, rec L. breast MRI in 12 mos (reg mammo due )   • Adverse reaction to drug 5/10/2016    Impression: 2014 - GI side effects to  aricept; plan as above to change timing and dose of med;    • Anesthesia     SOB upon awakening from surgery.  one occurrence.   • Breast pain, right 06/14/2016 6/14/16 Notes f/u with Dr. Beckham with neg bx (evaluated at [facility]); bx site healing per patient; 5/10/16 Pain resolved but has residual R. nipple abnlity with erythema and nodular change, therefore dx mammo ordered for further eval; last reg mammo done 7/15; R. nipple mass - s/p bx to r/o Paget's disease (6/16); surg - Dr. Beckham   • Contact dermatitis due to poison ivy 07/30/2014    Impression: 07/30/2014 - Depo-medrol 40 mg IM given in office. Pt will start prednisone taper as ordered. Call or RTC if rash is not improving.;    • Dizziness 10/27/2016    10/27/16 Imbalanced sensation and not vertigo per patient; no focal neuro deficits on exam; will rec f/u eye exam and head imaging if sxs persist and ESR/CRP are normal; ddx also includes eust tube dysfunction   • Hx of bone density study 07/2013    DEXA (7/13) H -1.3   • Hx of bone density study 07/14/2016    DEXA (7/14/16) L 0.4, H -1.6 repeat 2-3 yrs   • Hx of bone density study 10/08/2019    DEXA (10/8/19): L 1.3, H -1.8, A -2.8, worsened, NEW osteoporosis, repeat 2 yrs   • Hx of colonoscopy 12/2015    surg - Dr. Kartik Gold   • Hx of colonoscopy 11/08/2019    colonosc (11/8/19): nl except diverticulosis, repeat 5 yrs due to h/o polyps; surg - Dr. Kartik Gold   • Hx of CT angio head/neck 07/13/2021    CT angio head/neck (7/13/21, hospitalized): No definite lg vessel occlusion, high-grade stenosis or dissection; indeterminate narrowing/occlusion of small P3 branch of the patient's left PCA; small outpouching at origin of left P-com may represent a small aneurysm versus infundibulum   • Hx of CT brain perfusion 07/13/2021    Normal brain perfusion CT (7/13/21, hospitalized)   • Hx of CT scan of head 07/13/2021    nl CT head (7/13/21): except mild atrophy with mild chronic small vessel ischemic  "disease   • Hx of esophagogastroduodenoscopy 05/05/2017    EGD (5/5/17): mod chronic gastritis, (+)H.pylori, no hiatal hernia; GI - Dr. Esparza   • Hx of LLE venous duplex 11/13/2020    LLE venous duplex (11/13/20): no DVT; (+) valvular venous insufficiency deep and superficial with severe reflux   • Lightheadedness 04/28/2014    Impression: 04/28/2014 - resolved; will let her know official carotid u/s results when available  Impression: 04/13/2014 - discussed adequate hydration; check CBC;    • Lip swelling 08/05/2015    Impression: 08/05/2015 - L. lower lip lesion significantly improved; complete course of abx as prescribed; if area does not resolve completely, call to make earlier derm f/u appt  Impression: 07/28/2015 - L. lower lip swelling already improving but concern for residual ongoing infection; ddx includes infected cold sore, infection from cryotherapy for AK, and less likely shingles; finishing course    • Pap smear for cervical cancer screening 08/16/2016    Pap done 8/16/16 per Dr. Brittanie Bauer   • Peripheral arterial occlusive disease (CMS/HCC) 5/10/2016    Description: abnl ALLEGRA at [facility] with nl arterial duplex (10/08)   • Wears glasses      Past Surgical History:   Procedure Laterality Date   • BILATERAL SALPINGO OOPHORECTOMY Bilateral 06/14/2017    GYN - Dr. Bauer; for benign L. ovarian cyst   • BREAST BIOPSY Right 5/12/2020    s/p R. breast excisional bx R. subsreaolar mass (5/12/20); path - nipple adenoma; surg - Dr. Beckham   • CATARACT EXTRACTION, BILATERAL      L. 4/09 (complicated by nerve injury; R. 6/10; L. corrected 6/15; ophtho - Dr. Hollis, Dr. Lange; neuro-ophtho Dr. Ferris   • CHOLECYSTECTOMY  2002    secondary to \"crystalized GB\" (''02); surg - Dr. Kartik Gold   • MAMMO CORE NEEDLE BIOPSY UNILATERAL Left 01/17/2020    L. breast u/s core needle bx (1/17/20): path - Focal proliferative fibrocystic changes including sclerosing adenosis   • OOPHORECTOMY       General Information  "    Row Name 07/14/21 1214          OT Time and Intention    Document Type  evaluation  -CL     Mode of Treatment  occupational therapy  -CL     Row Name 07/14/21 1214          General Information    Patient Profile Reviewed  yes  -CL     Prior Level of Function  independent:;all household mobility;ADL's  -CL     Existing Precautions/Restrictions  fall;other (see comments) urinary incontinence  -CL     Barriers to Rehab  medically complex  -CL     Row Name 07/14/21 1214          Living Environment    Lives With  alone  -CL     Row Name 07/14/21 1214          Home Main Entrance    Number of Stairs, Main Entrance  one  -CL     Row Name 07/14/21 1214          Stairs Within Home, Primary    Number of Stairs, Within Home, Primary  none  -CL     Row Name 07/14/21 1214          Cognition    Orientation Status (Cognition)  oriented x 3  -CL     Row Name 07/14/21 1214          Safety Issues, Functional Mobility    Impairments Affecting Function (Mobility)  balance;endurance/activity tolerance;strength  -CL       User Key  (r) = Recorded By, (t) = Taken By, (c) = Cosigned By    Initials Name Provider Type    CL Briana Suarez OT Occupational Therapist          Mobility/ADL's     Row Name 07/14/21 1216          Bed Mobility    Bed Mobility  supine-sit  -CL     Supine-Sit [place] (Bed Mobility)  minimum assist (75% patient effort);verbal cues  -CL     Assistive Device (Bed Mobility)  bed rails;head of bed elevated  -CL     Row Name 07/14/21 1216          Transfers    Transfers  sit-stand transfer  -CL     Comment (Transfers)  HHA  -CL     Sit-Stand [place] (Transfers)  minimum assist (75% patient effort);verbal cues  -CL     Row Name 07/14/21 1216          Activities of Daily Living    BADL Assessment/Intervention  lower body dressing;toileting;grooming  -CL     Row Name 07/14/21 1216          Lower Body Dressing Assessment/Training    [place] Level (Lower Body Dressing)  don;socks;supervision  -CL     Position  (Lower Body Dressing)  edge of bed sitting  -CL     Row Name 07/14/21 1216          Toileting Assessment/Training    [place] Level (Toileting)  adjust/manage clothing;perform perineal hygiene;dependent (less than 25% patient effort)  -CL     Position (Toileting)  supported sitting;supported standing  -CL       User Key  (r) = Recorded By, (t) = Taken By, (c) = Cosigned By    Initials Name Provider Type    CL Briana Suarez OT Occupational Therapist        Obj/Interventions     Row Name 07/14/21 1219          Sensory Assessment (Somatosensory)    Sensory Assessment (Somatosensory)  UE sensation intact  -CL     Row Name 07/14/21 1219          Vision Assessment/Intervention    Visual Impairment/Limitations  WFL  -CL     Row Name 07/14/21 1219          Range of Motion Comprehensive    General Range of Motion  bilateral upper extremity ROM WFL  -CL     Row Name 07/14/21 1219          Strength Comprehensive (MMT)    Comment, General Manual Muscle Testing (MMT) Assessment  BUE grossly 3+/5  -CL     Row Name 07/14/21 1219          Motor Skills    Motor Skills  coordination  -CL     Coordination  bilateral;upper extremity;finger to nose;WFL  -CL     Row Name 07/14/21 1219          Balance    Balance Assessment  sitting static balance;sitting dynamic balance;standing static balance  -CL     Static Sitting Balance  WFL;sitting, edge of bed  -CL     Dynamic Sitting Balance  mild impairment;sitting, edge of bed  -CL     Static Standing Balance  mild impairment;supported  -CL       User Key  (r) = Recorded By, (t) = Taken By, (c) = Cosigned By    Initials Name Provider Type    CL Briana Suarez OT Occupational Therapist        Goals/Plan     Row Name 07/14/21 1231          Transfer Goal 1 (OT)    Activity/Assistive Device (Transfer Goal 1, OT)  sit-to-stand/stand-to-sit;toilet  -CL     [place] Level/Cues Needed (Transfer Goal 1, OT)  supervision required  -CL     Time Frame (Transfer Goal 1, OT)  long term goal (LTG);10  days  -CL     Progress/Outcome (Transfer Goal 1, OT)  goal ongoing  -CL     Row Name 07/14/21 1231          Dressing Goal 1 (OT)    Activity/Device (Dressing Goal 1, OT)  lower body dressing don pants  -CL     [place]/Cues Needed (Dressing Goal 1, OT)  supervision required  -CL     Time Frame (Dressing Goal 1, OT)  long term goal (LTG);10 days  -CL     Progress/Outcome (Dressing Goal 1, OT)  goal ongoing  -CL     Row Name 07/14/21 1231          Toileting Goal 1 (OT)    Activity/Device (Toileting Goal 1, OT)  adjust/manage clothing;perform perineal hygiene  -CL     [place] Level/Cues Needed (Toileting Goal 1, OT)  supervision required  -CL     Time Frame (Toileting Goal 1, OT)  long term goal (LTG);10 days  -CL     Progress/Outcome (Toileting Goal 1, OT)  goal ongoing  -CL       User Key  (r) = Recorded By, (t) = Taken By, (c) = Cosigned By    Initials Name Provider Type    CL Briana Suarez, OT Occupational Therapist        Clinical Impression     Row Name 07/14/21 1227          Pain Scale: Numbers Pre/Post-Treatment    Pretreatment Pain Rating  0/10 - no pain  -CL     Posttreatment Pain Rating  0/10 - no pain  -CL     Row Name 07/14/21 1227          Plan of Care Review    Plan of Care Reviewed With  patient  -CL     Outcome Summary  OT eval complete. Pt is Min A for bed mobility, Dep for toileting ADLs, and Supervision for LBD tasks. Pt limited d/t decreased balance and endurance from baseline. Recommend cont skilled IPOT POC. Recommend pt DC to IP rehab based on current level of performance.  -CL     Row Name 07/14/21 1227          Therapy Assessment/Plan (OT)    Patient/Family Therapy Goal Statement (OT)  Return to PLOF  -CL     Rehab Potential (OT)  good, to achieve stated therapy goals  -CL     Criteria for Skilled Therapeutic Interventions Met (OT)  yes;skilled treatment is necessary  -CL     Therapy Frequency (OT)  daily  -CL     Row Name 07/14/21 1227          Therapy Plan Review/Discharge Plan (OT)     Anticipated Discharge Disposition (OT)  inpatient rehabilitation facility  -CL     Row Name 07/14/21 1227          Vital Signs    Pre Systolic BP Rehab  153  -CL     Pre Treatment Diastolic BP  86  -CL     Post Systolic BP Rehab  -- w/ PT  -CL     Pretreatment Heart Rate (beats/min)  78  -CL     Pre SpO2 (%)  95  -CL     O2 Delivery Pre Treatment  room air  -CL     Pre Patient Position  Supine  -CL     Intra Patient Position  Standing  -CL     Post Patient Position  Sitting  -CL     Row Name 07/14/21 1227          Positioning and Restraints    Pre-Treatment Position  in bed  -CL     Post Treatment Position  bed  -CL     In Bed  notified nsg;sitting EOB;call light within reach;encouraged to call for assist;with PT  -CL       User Key  (r) = Recorded By, (t) = Taken By, (c) = Cosigned By    Initials Name Provider Type    CL Briana Suarez, JONATHAN Occupational Therapist        Outcome Measures     Row Name 07/14/21 1231          How much help from another is currently needed...    Putting on and taking off regular lower body clothing?  2  -CL     Bathing (including washing, rinsing, and drying)  2  -CL     Toileting (which includes using toilet bed pan or urinal)  1  -CL     Putting on and taking off regular upper body clothing  2  -CL     Taking care of personal grooming (such as brushing teeth)  3  -CL     Eating meals  4  -CL     AM-PAC 6 Clicks Score (OT)  14  -CL     Row Name 07/14/21 0931 07/14/21 0800       How much help from another person do you currently need...    Turning from your back to your side while in flat bed without using bedrails?  3  -AY  4  -BS    Moving from lying on back to sitting on the side of a flat bed without bedrails?  3  -AY  4  -BS    Moving to and from a bed to a chair (including a wheelchair)?  3  -AY  3  -BS    Standing up from a chair using your arms (e.g., wheelchair, bedside chair)?  3  -AY  3  -BS    Climbing 3-5 steps with a railing?  3  -AY  3  -BS    To walk in hospital room?  3   -AY  3  -BS    AM-PAC 6 Clicks Score (PT)  18  -AY  20  -BS    Row Name 07/14/21 1231 07/14/21 0931       Modified Stewart Scale    Pre-Stroke Modified Stewart Scale  0 - No Symptoms at all.  -CL  6 - Unable to determine (UTD) from the medical record documentation  -AY    Modified [place] Scale  3 - Moderate disability.  Requiring some help, but able to walk without assistance.  -CL  3 - Moderate disability.  Requiring some help, but able to walk without assistance.  -AY    Row Name 07/14/21 1231 07/14/21 0931       Functional Assessment    Outcome Measure Options  AM-PAC 6 Clicks Daily Activity (OT);Modified Stewart  -CL  AM-PAC 6 Clicks Basic Mobility (PT);Modified [place]  -AY      User Key  (r) = Recorded By, (t) = Taken By, (c) = Cosigned By    Initials Name Provider Type    CL Briana Suarez, OT Occupational Therapist    Shona Andersen, PT Physical Therapist    Diann Shore, RN Registered Nurse        Occupational Therapy Education                 Title: PT OT SLP Therapies (In Progress)     Topic: Occupational Therapy (In Progress)     Point: ADL training (In Progress)     Description:   Instruct learner(s) on proper safety adaptation and remediation techniques during self care or transfers.   Instruct in proper use of assistive devices.              Learning Progress Summary           Patient Acceptance, E, NR by CL at 7/14/2021 1232                   Point: Home exercise program (Not Started)     Description:   Instruct learner(s) on appropriate technique for monitoring, assisting and/or progressing therapeutic exercises/activities.              Learner Progress:  Not documented in this visit.          Point: Precautions (In Progress)     Description:   Instruct learner(s) on prescribed precautions during self-care and functional transfers.              Learning Progress Summary           Patient Acceptance, E, NR by CL at 7/14/2021 1232                   Point: Body mechanics (In Progress)     Description:    Instruct learner(s) on proper positioning and spine alignment during self-care, functional mobility activities and/or exercises.              Learning Progress Summary           Patient Acceptance, E, NR by CL at 7/14/2021 1232                               User Key     Initials Effective Dates Name Provider Type Discipline    CL 04/03/18 -  Briana Suarez OT Occupational Therapist OT              OT Recommendation and Plan  Therapy Frequency (OT): daily  Plan of Care Review  Plan of Care Reviewed With: patient  Outcome Summary: OT eval complete. Pt is Min A for bed mobility, Dep for toileting ADLs, and Supervision for LBD tasks. Pt limited d/t decreased balance and endurance from baseline. Recommend cont skilled IPOT POC. Recommend pt DC to IP rehab based on current level of performance.     Time Calculation:   Time Calculation- OT     Row Name 07/14/21 1232             Time Calculation- OT    OT Start Time  0835  -CL      OT Received On  07/14/21  -CL      OT Goal Re-Cert Due Date  07/24/21  -CL         Timed Charges    19950 - OT Therapeutic Activity Minutes  5  -CL      22041 - OT Self Care/Mgmt Minutes  4  -CL         Untimed Charges    OT Eval/Re-eval Minutes  31  -CL         Total Minutes    Timed Charges Total Minutes  9  -CL      Untimed Charges Total Minutes  31  -CL       Total Minutes  40  -CL        User Key  (r) = Recorded By, (t) = Taken By, (c) = Cosigned By    Initials Name Provider Type    CL Briana Suarez OT Occupational Therapist        Therapy Charges for Today     Code Description Service Date Service Provider Modifiers Qty    77876379266 HC OT THERAPEUTIC ACT EA 15 MIN 7/14/2021 Briana Suarez OT GO 1    29289980241 HC OT EVAL LOW COMPLEXITY 3 7/14/2021 Briana Suarez OT GO 1               Briana Suarez OT  7/14/2021    Electronically signed by Briana Suarez OT at 07/14/21 1234          Speech Language Pathology Notes (most recent note)      José Travis, MS CCC-SLP at 07/15/21 0911           Acute Care - Speech Language Pathology Initial Evaluation  [facility]   Cognitive-Communication Evaluation  Clinical Swallow Evaluation     Patient Name: Rebecca Sharma  : 1945  MRN: [id number]  Today's Date: 7/15/2021               Admit Date: 2021     Visit Dx:    ICD-10-CM ICD-9-CM   1. Dysphagia, unspecified type  R13.10 787.20   2. Cerebrovascular accident (CVA), unspecified mechanism (CMS/HCC)  I63.9 434.91     Patient Active Problem List   Diagnosis   • Cysts of both ovaries   • Actinic keratosis   • Allergic rhinitis   • Asthma   • Hyperlipidemia   • Dyspepsia   • Hypertension   • Uterine leiomyoma   • Gastroesophageal reflux disease    • Chronic laryngitis   • Hyperactivity of bladder   • Hypothyroidism   • Lumbar spinal stenosis   • Mild cognitive disorder   • Osteoarthritis   • Osteoporosis   • Seborrheic keratosis   • Bilateral sensorineural hearing loss   • Skin tag   • Mixed stress and urge urinary incontinence   • Xerostomia   • Nasal septal deviation, right   • Medicare annual wellness visit, subsequent   • Headache   • ADEN (obstructive sleep apnea)   • Fibrocystic disease of left breast   • Chronic right hip pain   • Menopause   • Macrocytosis without anemia   • Diverticulosis of colon   • Right nipple adenoma   • Venous insufficiency of left leg   • Cricopharyngeal spasm   • Schatzki's ring   • CVA (cerebral vascular accident) (CMS/HCC)     Past Medical History:   Diagnosis Date   • Abnormal MRI, breast 2020    breast MRI (1/3/20): 1.1cm R. nipple mass sugg of nipple adenoma, indeterminate 0.5cm L. breast mass 12:00, rec surg excision for punch bx R. nipple mass and u/s for further eval L. breast mass   • Abnormal MRI, breast 07/10/2020    breast MRI (7/10/20): postsurg bx R. nipple with dx benign adenoma, post-bx clip left breast adjacent to 0.4cm not worrisome mass, rec L. breast MRI in 12 mos (reg mammo due )   • Adverse reaction to drug 5/10/2016    Impression:  06/09/2014 - GI side effects to aricept; plan as above to change timing and dose of med;    • Anesthesia     SOB upon awakening from surgery.  one occurrence.   • Breast pain, right 06/14/2016 6/14/16 Notes f/u with Dr. Beckham with neg bx (evaluated at [facility]); bx site healing per patient; 5/10/16 Pain resolved but has residual R. nipple abnlity with erythema and nodular change, therefore dx mammo ordered for further eval; last reg mammo done 7/15; R. nipple mass - s/p bx to r/o Paget's disease (6/16); surg - Dr. Beckham   • Contact dermatitis due to poison ivy 07/30/2014    Impression: 07/30/2014 - Depo-medrol 40 mg IM given in office. Pt will start prednisone taper as ordered. Call or RTC if rash is not improving.;    • Dizziness 10/27/2016    10/27/16 Imbalanced sensation and not vertigo per patient; no focal neuro deficits on exam; will rec f/u eye exam and head imaging if sxs persist and ESR/CRP are normal; ddx also includes eust tube dysfunction   • Hx of bone density study 07/2013    DEXA (7/13) H -1.3   • Hx of bone density study 07/14/2016    DEXA (7/14/16) L 0.4, H -1.6 repeat 2-3 yrs   • Hx of bone density study 10/08/2019    DEXA (10/8/19): L 1.3, H -1.8, A -2.8, worsened, NEW osteoporosis, repeat 2 yrs   • Hx of colonoscopy 12/2015    surg - Dr. Kartik Gold   • Hx of colonoscopy 11/08/2019    colonosc (11/8/19): nl except diverticulosis, repeat 5 yrs due to h/o polyps; surg - Dr. Kartik Gold   • Hx of CT angio head/neck 07/13/2021    CT angio head/neck (7/13/21, hospitalized): No definite lg vessel occlusion, high-grade stenosis or dissection; indeterminate narrowing/occlusion of small P3 branch of the patient's left PCA; small outpouching at origin of left P-com may represent a small aneurysm versus infundibulum   • Hx of CT brain perfusion 07/13/2021    Normal brain perfusion CT (7/13/21, hospitalized)   • Hx of CT scan of head 07/13/2021    nl CT head (7/13/21): except mild atrophy with  "mild chronic small vessel ischemic disease   • Hx of echocardiogram 07/13/2021    ECHO (7/13/21, hosp): EF 56-60%, mild-mod TR, neg saline test   • Hx of esophagogastroduodenoscopy 05/05/2017    EGD (5/5/17): mod chronic gastritis, (+)H.pylori, no hiatal hernia; GI - Dr. Esparza   • Hx of LLE venous duplex 11/13/2020    LLE venous duplex (11/13/20): no DVT; (+) valvular venous insufficiency deep and superficial with severe reflux   • Lightheadedness 04/28/2014    Impression: 04/28/2014 - resolved; will let her know official carotid u/s results when available  Impression: 04/13/2014 - discussed adequate hydration; check CBC;    • Lip swelling 08/05/2015    Impression: 08/05/2015 - L. lower lip lesion significantly improved; complete course of abx as prescribed; if area does not resolve completely, call to make earlier derm f/u appt  Impression: 07/28/2015 - L. lower lip swelling already improving but concern for residual ongoing infection; ddx includes infected cold sore, infection from cryotherapy for AK, and less likely shingles; finishing course    • Pap smear for cervical cancer screening 08/16/2016    Pap done 8/16/16 per Dr. Brittanie Bauer   • Peripheral arterial occlusive disease (CMS/HCC) 5/10/2016    Description: abnl ALLEGRA at [facility] with nl arterial duplex (10/08)   • Wears glasses      Past Surgical History:   Procedure Laterality Date   • BILATERAL SALPINGO OOPHORECTOMY Bilateral 06/14/2017    GYN - Dr. Bauer; for benign L. ovarian cyst   • BREAST BIOPSY Right 5/12/2020    s/p R. breast excisional bx R. subsreaolar mass (5/12/20); path - nipple adenoma; surg - Dr. Beckham   • CATARACT EXTRACTION, BILATERAL      L. 4/09 (complicated by nerve injury; R. 6/10; L. corrected 6/15; ophtho - Dr. Hollis, Dr. Lange; neuro-ophtho Dr. Ferris   • CHOLECYSTECTOMY  2002    secondary to \"crystalized GB\" (''02); surg - Dr. Kartik Gold   • MAMMO CORE NEEDLE BIOPSY UNILATERAL Left 01/17/2020    L. breast u/s core " needle bx (1/17/20): path - Focal proliferative fibrocystic changes including sclerosing adenosis   • OOPHORECTOMY          SLP EVALUATION (last 72 hours)      SLP SLC Evaluation     Row Name 07/15/21 0808                   General Information    Prior Level of Function-Communication  WFL  -MP        Patient's Goals for Discharge  patient did not state  -MP           Pain    Additional Documentation  Pain Scale: FACES Pre/Post-Treatment (Group)  -MP           Pain Scale: FACES Pre/Post-Treatment    Pain: FACES Scale, Pretreatment  0-->no hurt  -MP        Posttreatment Pain Rating  0-->no hurt  -MP           Comprehension Assessment/Intervention    Comprehension Assessment/Intervention  Auditory Comprehension  -MP           Auditory Comprehension Assessment/Intervention    Auditory Comprehension (Communication)  WFL  -MP        Able to Identify Objects/Pictures (Communication)  familiar objects;WFL  -MP        Answers Questions (Communication)  yes/no;wh questions;personal;simple;complex;WFL  -MP        Able to Follow Commands (Communication)  3-step;WFL  -MP        Narrative Discourse  conversational level;WFL  -MP           Expression Assessment/Intervention    Expression Assessment/Intervention  verbal expression  -MP           Verbal Expression Assessment/Intervention    Verbal Expression  mild impairment  -MP        Automatic Speech (Communication)  response to greeting;WFL  -MP        Repetition  phrases;WFL  -MP        Phrase Completion  automatic/predictable;WFL  -MP        Responsive Naming  simple;WFL  -MP        Confrontational Naming  high frequency;WFL  -MP        Sentence Formulation  simple;WFL  -MP        Conversational Discourse/Fluency  mild impairment  -MP           Oral Motor Structure and Function    Oral Motor Structure and Function  WFL  -MP        Dentition Assessment  natural, present and adequate  -MP           Oral Musculature and Cranial Nerve Assessment    Oral Motor General Assessment   WFL  -MP           Motor Speech Assessment/Intervention    Motor Speech Function  WFL  -MP           Cursory Voice Assessment/Intervention    Quality and Resonance (Voice)  hoarse;other (see comment) pt reported @ baseline   -MP           Cognitive Assessment Intervention- SLP    Cognitive Function (Cognition)  WFL  -MP        Orientation Status (Cognition)  awareness of basic personal information;person;place;time;situation;WFL  -MP        Memory (Cognitive)  short-term;long-term;WFL  -MP        Attention (Cognitive)  selective;sustained;WFL  -MP        Thought Organization (Cognitive)  concrete convergent;abstract convergent;drawing conclusions;WFL  -MP        Reasoning (Cognitive)  simple;deductive;WFL  -MP        Problem Solving (Cognitive)  simple;WFL  -MP        Functional Math (Cognitive)  simple;word problems;WFL  -MP        Pragmatics (Communication)  affect;awareness/response to humor;initiation;eye contact;topic maintenance;turn taking;WFL  -MP           SLP Clinical Impressions    SLP Diagnosis  Mild conversational-level expressive language deficits  -MP        Rehab Potential/Prognosis  good  -MP        SLC Criteria for Skilled Therapy Interventions Met  yes  -MP        Functional Impact  difficulty in expressing complex messages;restrictions in personal and social life  -MP           Recommendations    Therapy Frequency (SLP SLC)  3 days per week  -MP        Predicted Duration Therapy Intervention (Days)  until discharge  -MP        Anticipated Discharge Disposition (SLP)  home with OP services;anticipate therapy at next level of care  -MP          User Key  (r) = Recorded By, (t) = Taken By, (c) = Cosigned By    Initials Name Effective Dates    José Hua MS CCC-SLP 06/16/21 -              EDUCATION  The patient has been educated in the following areas:     Cognitive Impairment Communication Impairment.    SLP Recommendation and Plan  SLP Diagnosis: Mild conversational-level expressive  language deficits        Swallow Criteria for Skilled Therapeutic Interventions Met: demonstrates skilled criteria  SLC Criteria for Skilled Therapy Interventions Met: yes  Anticipated Discharge Disposition (SLP): home with OP services, anticipate therapy at next level of care     Therapy Frequency (Swallow): PRN  Predicted Duration Therapy Intervention (Days): until discharge                   Plan of Care Reviewed With: patient      SLP GOALS     Row Name 07/15/21 0830             Oral Nutrition/Hydration Goal 1 (SLP)    Oral Nutrition/Hydration Goal 1, SLP  LTG: Pt will tolerate regular diet, thin liquids w/ no overt s/sxs aspiration/distress w/ 100% acc and no cues  -MP      Time Frame (Oral Nutrition/Hydration Goal 1, SLP)  by discharge  -MP         Oral Nutrition/Hydration Goal 2 (SLP)    Oral Nutrition/Hydration Goal 2, SLP  Pt will tolerate regular solids & thin liquids w/ no overt s/sxs aspiration/distress w/ 100% acc and no cues  -MP      Time Frame (Oral Nutrition/Hydration Goal 2, SLP)  short term goal (STG)  -MP         Connected Speech to Express Thoughts Goal 1 (SLP)    Improve Narrative Discourse to Express Thoughts By Goal 1 (SLP)  describing a picture;explaining a proverb or idiom;conversational task, self-directed;80%;with minimal cues (75-90%)  -MP      Time Frame (Connected Speech Goal 1, SLP)  short term goal (STG)  -MP         Additional Goal 1 (SLP)    Additional Goal 1, SLP  LTG: Pt will improve communication in order to fully return to ADLs w/ 100% acc and no cues  -MP      Time Frame (Additional Goal 1, SLP)  by discharge  -MP        User Key  (r) = Recorded By, (t) = Taken By, (c) = Cosigned By    Initials Name Provider Type    José Hua MS CCC-SLP Speech and Language Pathologist                  Time Calculation:     Time Calculation- SLP     Row Name 07/15/21 0911             Time Calculation- SLP    SLP Start Time  0830  -MP      SLP Received On  07/15/21  -MP          Untimed Charges    68798-RQ Eval Speech and Production w/ Language Minutes  25  -MP      33206-GD Eval Oral Pharyng Swallow Minutes  25  -MP         Total Minutes    Untimed Charges Total Minutes  50  -MP       Total Minutes  50  -MP        User Key  (r) = Recorded By, (t) = Taken By, (c) = Cosigned By    Initials Name Provider Type    MP José Travis, MS CCC-SLP Speech and Language Pathologist          Therapy Charges for Today     Code Description Service Date Service Provider Modifiers Qty    37761847635 HC ST EVAL ORAL PHARYNG SWALLOW 2 7/15/2021 José Travis MS CCC-SLP GN 1    26075877470 HC ST EVAL SPEECH AND PROD W LANG  2 7/15/2021 José Travis MS CCC-SLP GN 1                     José Travis MS CCC-SLP  7/15/2021 and Acute Care - Speech Language Pathology   Swallow Initial Evaluation [facility]     Patient Name: Rebecca Sharma  : 1945  MRN: [id number]  Today's Date: 7/15/2021               Admit Date: 2021    Visit Dx:     ICD-10-CM ICD-9-CM   1. Dysphagia, unspecified type  R13.10 787.20   2. Cerebrovascular accident (CVA), unspecified mechanism (CMS/[facility])  I63.9 434.91     Patient Active Problem List   Diagnosis   • Cysts of both ovaries   • Actinic keratosis   • Allergic rhinitis   • Asthma   • Hyperlipidemia   • Dyspepsia   • Hypertension   • Uterine leiomyoma   • Gastroesophageal reflux disease    • Chronic laryngitis   • Hyperactivity of bladder   • Hypothyroidism   • Lumbar spinal stenosis   • Mild cognitive disorder   • Osteoarthritis   • Osteoporosis   • Seborrheic keratosis   • Bilateral sensorineural hearing loss   • Skin tag   • Mixed stress and urge urinary incontinence   • Xerostomia   • Nasal septal deviation, right   • Medicare annual wellness visit, subsequent   • Headache   • ADEN (obstructive sleep apnea)   • Fibrocystic disease of left breast   • Chronic right hip pain   • Menopause   • Macrocytosis without anemia   • Diverticulosis of colon   • Right  nipple adenoma   • Venous insufficiency of left leg   • Cricopharyngeal spasm   • Schatzki's ring   • CVA (cerebral vascular accident) (CMS/HCC)     Past Medical History:   Diagnosis Date   • Abnormal MRI, breast 01/03/2020    breast MRI (1/3/20): 1.1cm R. nipple mass sugg of nipple adenoma, indeterminate 0.5cm L. breast mass 12:00, rec surg excision for punch bx R. nipple mass and u/s for further eval L. breast mass   • Abnormal MRI, breast 07/10/2020    breast MRI (7/10/20): postsurg bx R. nipple with dx benign adenoma, post-bx clip left breast adjacent to 0.4cm not worrisome mass, rec L. breast MRI in 12 mos (reg mammo due 12/20)   • Adverse reaction to drug 5/10/2016    Impression: 06/09/2014 - GI side effects to aricept; plan as above to change timing and dose of med;    • Anesthesia     SOB upon awakening from surgery.  one occurrence.   • Breast pain, right 06/14/2016 6/14/16 Notes f/u with Dr. Beckham with neg bx (evaluated at [facility]); bx site healing per patient; 5/10/16 Pain resolved but has residual R. nipple abnlity with erythema and nodular change, therefore dx mammo ordered for further eval; last reg mammo done 7/15; R. nipple mass - s/p bx to r/o Paget's disease (6/16); surg - Dr. Beckham   • Contact dermatitis due to poison ivy 07/30/2014    Impression: 07/30/2014 - Depo-medrol 40 mg IM given in office. Pt will start prednisone taper as ordered. Call or RTC if rash is not improving.;    • Dizziness 10/27/2016    10/27/16 Imbalanced sensation and not vertigo per patient; no focal neuro deficits on exam; will rec f/u eye exam and head imaging if sxs persist and ESR/CRP are normal; ddx also includes eust tube dysfunction   • Hx of bone density study 07/2013    DEXA (7/13) H -1.3   • Hx of bone density study 07/14/2016    DEXA (7/14/16) L 0.4, H -1.6 repeat 2-3 yrs   • Hx of bone density study 10/08/2019    DEXA (10/8/19): L 1.3, H -1.8, A -2.8, worsened, NEW osteoporosis, repeat 2 yrs   • Hx  of colonoscopy 12/2015    surg - Dr. Kartik Gold   • Hx of colonoscopy 11/08/2019    colonosc (11/8/19): nl except diverticulosis, repeat 5 yrs due to h/o polyps; surg - Dr. Kartik Gold   • Hx of CT angio head/neck 07/13/2021    CT angio head/neck (7/13/21, hospitalized): No definite lg vessel occlusion, high-grade stenosis or dissection; indeterminate narrowing/occlusion of small P3 branch of the patient's left PCA; small outpouching at origin of left P-com may represent a small aneurysm versus infundibulum   • Hx of CT brain perfusion 07/13/2021    Normal brain perfusion CT (7/13/21, hospitalized)   • Hx of CT scan of head 07/13/2021    nl CT head (7/13/21): except mild atrophy with mild chronic small vessel ischemic disease   • Hx of echocardiogram 07/13/2021    ECHO (7/13/21, hosp): EF 56-60%, mild-mod TR, neg saline test   • Hx of esophagogastroduodenoscopy 05/05/2017    EGD (5/5/17): mod chronic gastritis, (+)H.pylori, no hiatal hernia; GI - Dr. Esparza   • Hx of LLE venous duplex 11/13/2020    LLE venous duplex (11/13/20): no DVT; (+) valvular venous insufficiency deep and superficial with severe reflux   • Lightheadedness 04/28/2014    Impression: 04/28/2014 - resolved; will let her know official carotid u/s results when available  Impression: 04/13/2014 - discussed adequate hydration; check CBC;    • Lip swelling 08/05/2015    Impression: 08/05/2015 - L. lower lip lesion significantly improved; complete course of abx as prescribed; if area does not resolve completely, call to make earlier derm f/u appt  Impression: 07/28/2015 - L. lower lip swelling already improving but concern for residual ongoing infection; ddx includes infected cold sore, infection from cryotherapy for AK, and less likely shingles; finishing course    • Pap smear for cervical cancer screening 08/16/2016    Pap done 8/16/16 per Dr. Brittanie Bauer   • Peripheral arterial occlusive disease (CMS/HCC) 5/10/2016    Description: abnl ALLEGRA at  "[facility] with nl arterial duplex (10/08)   • Wears glasses      Past Surgical History:   Procedure Laterality Date   • BILATERAL SALPINGO OOPHORECTOMY Bilateral 06/14/2017    GYN - Dr. Bauer; for benign L. ovarian cyst   • BREAST BIOPSY Right 5/12/2020    s/p R. breast excisional bx R. subsreaolar mass (5/12/20); path - nipple adenoma; surg - Dr. Beckham   • CATARACT EXTRACTION, BILATERAL      L. 4/09 (complicated by nerve injury; R. 6/10; L. corrected 6/15; ophtho - Dr. Hollis, Dr. Lange; neuro-ophtho Dr. Ferris   • CHOLECYSTECTOMY  2002    secondary to \"crystalized GB\" (''02); surg - Dr. Kartik Gold   • MAMMO CORE NEEDLE BIOPSY UNILATERAL Left 01/17/2020    L. breast u/s core needle bx (1/17/20): path - Focal proliferative fibrocystic changes including sclerosing adenosis   • OOPHORECTOMY          SWALLOW EVALUATION (last 72 hours)      SLP Adult Swallow Evaluation     Row Name 07/15/21 0830 07/14/21 0930 07/14/21 0924             Rehab Evaluation    Document Type  evaluation  -MP  evaluation  -KT  --      Subjective Information  no complaints  -MP  no complaints  -KT  --      Patient Observations  alert;cooperative  -MP  alert;cooperative;agree to therapy  -KT  --      Patient/Family/Caregiver Comments/Observations  No family present  -MP  pt family member present  -KT  --      Care Plan Review  evaluation/treatment results reviewed;patient/other agree to care plan  -MP  evaluation/treatment results reviewed;care plan/treatment goals reviewed;risks/benefits reviewed;patient/other agree to care plan  -KT  --      Patient Effort  good  -MP  excellent  -KT  --         General Information    Patient Profile Reviewed  yes  -MP  yes  -KT  --      Pertinent History Of Current Problem  See previous eval - L CVA s/p tPA  -MP  Pt admitted w/ r facial weakness and aphasia s/p TPA yesterday at 22:30. Pt has a hx of MCI, GERD, ADEN, and asthma. She had EDG in April '21 that revealed a hiatal hernia, cricopharyngeal spasm, " and Schatski's ring s/p dilatation. Pt reported H.Pylori s/p antibiotics.   -KT  --      Current Method of Nutrition  NPO  -MP  NPO  -KT  --      Precautions/Limitations, Vision  WFL with corrective lenses;for purposes of eval  -MP  WFL with corrective lenses;for purposes of eval  -KT  --      Precautions/Limitations, Hearing  WFL;for purposes of eval  -MP  WFL;for purposes of eval  -KT  --      Prior Level of Function-Communication  unknown  -MP  unknown  -KT  --      Prior Level of Function-Swallowing  no diet consistency restrictions;esophageal concerns;other (see comments) EDG 4/2021 w/ HH, CP spasm, Schatzki's ring s/p dilation  -MP  no diet consistency restrictions  -KT  --      Plans/Goals Discussed with  patient;agreed upon  -MP  patient;agreed upon  -KT  --      Barriers to Rehab  none identified  -MP  none identified  -KT  --      Patient's Goals for Discharge  patient did not state  -MP  --  --         Pain    Additional Documentation  --  Pain Scale: Numbers Pre/Post-Treatment (Group)  -KT  --         Pain Scale: Numbers Pre/Post-Treatment    Pretreatment Pain Rating  --  0/10 - no pain  -KT  --      Posttreatment Pain Rating  --  0/10 - no pain  -KT  --         Oral Motor Structure and Function    Oral Lesions or Structural Abnormalities and/or variants  --  none identified  -KT  --      Dentition Assessment  --  natural, present and adequate  -KT  --      Secretion Management  WNL/WFL  -MP  WNL/WFL  -KT  --      Mucosal Quality  moist, healthy  -MP  moist, healthy  -KT  --      Volitional Swallow  --  WFL  -KT  --      Volitional Cough  --  WFL  -KT  --         Oral Musculature and Cranial Nerve Assessment    Oral Motor General Assessment  --  WFL  -KT  --      Vocal Impairment, Detail. Cranial Nerve X (Vagus)  vocal quality abnormality (see comments)  -MP  --  --      Oral Motor, Comment  Mild hoarsness, but pt reports baseline VQ  -MP  --  --         General Eating/Swallowing Observations     Respiratory Support Currently in Use  room air  -MP  room air  -KT  --      Eating/Swallowing Skills  self-fed;appropriate self-feeding skills observed  -MP  self-fed;fed by SLP  -KT  --      Positioning During Eating  upright in bed  -  upright 90 degree;upright in chair  -KT  --      Utensils Used  spoon;cup;straw  -  spoon;cup;straw  -KT  --      Consistencies Trialed  thin liquids;pureed;regular textures  -  ice chips;thin liquids;nectar/syrup-thick liquids;pureed;regular textures  -KT  --         Respiratory    Respiratory Status  --  WFL  -KT  --         Clinical Swallow Eval    Oral Prep Phase  --  WFL  -KT  --      Oral Transit  --  WFL  -KT  --      Oral Residue  --  WFL  -KT  --      Pharyngeal Phase  no overt signs/symptoms of pharyngeal impairment  -MP  suspected pharyngeal impairment  -KT  --      Clinical Swallow Evaluation Summary  No overt clinical s/sxs aspiration/distress observed @ bedside. Rec initiate regular diet/thin liquids. Standard aspiration & reflux prctns. SLP will f/u for assessment of diet tolerance.  -  CSE completed with am. Pt positioned upright in chair to accept PO trials of ice chips, thins, nectar, puree and regular solid trials. The patient was alert and cooperative. Pt presented with adequate bilabial closure and lingal ROM. Both volitional cough and volitional swallow are generally weak. Hoarse vocal quality. Good rotary jaw movement with adequate crush power for solids. No overt s/sxs of aspiration with ice chips. There was a delayed cough response with thin liquids and change in vocal quality with NTL, puree, and regular solids. Mutliple swallows with all trials. There is suspected pharyngeal dysphagia. Recommend NPO with ice chips following scrupulous oral care and upright at 90 degrees. Meds given crushed in puree. Pt has history of GERD and esophageal dilatition. May require FEES following 24-hr window s/p TPA.   -KT  --  -KT         Pharyngeal Phase Concerns     Pharyngeal Phase Concerns  --  multiple swallows;cough;other (see comments) change in vocal quality  -KT  --  -KT      Multiple Swallows  --  all consistencies  -KT  --      Cough  --  thin  -KT  --         Clinical Impression    SLP Swallowing Diagnosis  other (see comments) no suspected pharyngeal impairment  -MP  suspected pharyngeal dysphagia  -KT  --      Functional Impact  risk of aspiration/pneumonia  -MP  risk of aspiration/pneumonia  -KT  --      Rehab Potential/Prognosis, Swallowing  good, to achieve stated therapy goals  -MP  good, to achieve stated therapy goals  -KT  --      Swallow Criteria for Skilled Therapeutic Interventions Met  demonstrates skilled criteria  -MP  demonstrates skilled criteria  -KT  --         Recommendations    Therapy Frequency (Swallow)  PRN  -MP  --  --      SLP Diet Recommendation  regular textures;thin liquids  -MP  NPO;ice chips between meals after oral care, with supervision  -KT  --      Recommended Diagnostics  --  reassess via clinical swallow evaluation  -KT  --      Recommended Precautions and Strategies  upright posture during/after eating;general aspiration precautions  -MP  general aspiration precautions  -KT  --      Oral Care Recommendations  Oral Care BID/PRN  -MP  Oral Care BID/PRN  -KT  --      SLP Rec. for Method of Medication Administration  meds whole;with thin liquids;with pudding or applesauce;as tolerated  -MP  meds crushed;with pudding or applesauce  -KT  --      Monitor for Signs of Aspiration  yes;notify SLP if any concerns  -MP  yes;notify SLP if any concerns  -KT  --      Anticipated Discharge Disposition (SLP)  --  anticipate therapy at next level of care  -KT  --        User Key  (r) = Recorded By, (t) = Taken By, (c) = Cosigned By    Initials Name Effective Dates    MP José Travis, MS CCC-SLP 06/16/21 -     KT Vernell Victoria, MS CCC-SLP 06/15/21 -           EDUCATION  The patient has been educated in the following areas:   Dysphagia  (Swallowing Impairment).    SLP Recommendation and Plan  SLP Swallowing Diagnosis: other (see comments) (no suspected pharyngeal impairment)  SLP Diet Recommendation: regular textures, thin liquids  Recommended Precautions and Strategies: upright posture during/after eating, general aspiration precautions  SLP Rec. for Method of Medication Administration: meds whole, with thin liquids, with pudding or applesauce, as tolerated     Monitor for Signs of Aspiration: yes, notify SLP if any concerns     Swallow Criteria for Skilled Therapeutic Interventions Met: demonstrates skilled criteria  Anticipated Discharge Disposition (SLP): home with OP services, anticipate therapy at next level of care  Rehab Potential/Prognosis, Swallowing: good, to achieve stated therapy goals  Therapy Frequency (Swallow): PRN  Predicted Duration Therapy Intervention (Days): until discharge                         Plan of Care Reviewed With: patient    SLP GOALS     Row Name 07/15/21 0830             Oral Nutrition/Hydration Goal 1 (SLP)    Oral Nutrition/Hydration Goal 1, SLP  LTG: Pt will tolerate regular diet, thin liquids w/ no overt s/sxs aspiration/distress w/ 100% acc and no cues  -MP      Time Frame (Oral Nutrition/Hydration Goal 1, SLP)  by discharge  -MP         Oral Nutrition/Hydration Goal 2 (SLP)    Oral Nutrition/Hydration Goal 2, SLP  Pt will tolerate regular solids & thin liquids w/ no overt s/sxs aspiration/distress w/ 100% acc and no cues  -MP      Time Frame (Oral Nutrition/Hydration Goal 2, SLP)  short term goal (STG)  -MP         Connected Speech to Express Thoughts Goal 1 (SLP)    Improve Narrative Discourse to Express Thoughts By Goal 1 (SLP)  describing a picture;explaining a proverb or idiom;conversational task, self-directed;80%;with minimal cues (75-90%)  -MP      Time Frame (Connected Speech Goal 1, SLP)  short term goal (STG)  -MP         Additional Goal 1 (SLP)    Additional Goal 1, SLP  LTG: Pt will improve  communication in order to fully return to ADLs w/ 100% acc and no cues  -MP      Time Frame (Additional Goal 1, SLP)  by discharge  -MP        User Key  (r) = Recorded By, (t) = Taken By, (c) = Cosigned By    Initials Name Provider Type    José Hua MS CCC-SLP Speech and Language Pathologist             Time Calculation:   Time Calculation- SLP     Row Name 07/15/21 0911             Time Calculation- SLP    SLP Start Time  0830  -MP      SLP Received On  07/15/21  -MP         Untimed Charges    16523-NQ Eval Speech and Production w/ Language Minutes  25  -MP      82343-XP Eval Oral Pharyng Swallow Minutes  25  -MP         Total Minutes    Untimed Charges Total Minutes  50  -MP       Total Minutes  50  -MP        User Key  (r) = Recorded By, (t) = Taken By, (c) = Cosigned By    Initials Name Provider Type    José Hua MS CCC-SLP Speech and Language Pathologist          Therapy Charges for Today     Code Description Service Date Service Provider Modifiers Qty    65047298086 HC ST EVAL ORAL PHARYNG SWALLOW 2 7/15/2021 José Travis MS CCC-SLP GN 1    39913582442 HC ST EVAL SPEECH AND PROD W LANG  2 7/15/2021 José Travis MS CCC-SLP GN 1               José Travis MS CCC-SLP  7/15/2021    Electronically signed by José Travis MS CCC-SLP at 07/15/21 0912         [facility] 2B ICU  [address] [address]  Phone:  [phone]  Fax:  [phone] Date: 2021      Ambulatory Referral to Home Health     Patient:  Rebecca Sharma MRN:  [id number]   [address] [address] :  1945  SSN:    Phone: [phone] Sex:  F      INSURANCE PAYOR PLAN GROUP # SUBSCRIBER ID   Primary:  Secondary:    [employer] [id number]  [id number]      [id number]  [id number]      Referring Provider Information:  TAISHA SMITH Phone: [phone] Fax: [phone]      Referral Information:   #  Visits:  999 Referral Type: Home Health [42]   Urgency:  Routine Referral Reason: Specialty Services Required   Start Date: Jul 16, 2021 End Date:  To be determined by Insurer   Diagnosis: Dysphagia, unspecified type (R13.10 [ICD-10-CM] 787.20 [ICD-9-CM])  Cerebrovascular accident (CVA), unspecified mechanism (CMS/HCC) (I63.9 [ICD-10-CM] 434.91 [ICD-9-CM])      Refer to Dept:   Refer to Provider:   Refer to Facility:       Face to Face Visit Date: 7/16/2021  Follow-up provider for Plan of Care? I treated the patient in an acute care facility and will not continue treatment after discharge.  Follow-up provider: ELBERT MEDRANO [[id number]]  Reason/Clinical Findings: Cerebrovascular accident (CVA), dysphagia, impaired mobility  Describe mobility limitations that make leaving home difficult: Walked 200 ft min assist with walker  Nursing/Therapeutic Services Requested: Physical Therapy  Nursing/Therapeutic Services Requested: Occupational Therapy  Nursing/Therapeutic Services Requested: Speech Therapy  PT orders: Therapeutic exercise  PT orders: Gait Training  PT orders: Transfer training  PT orders: Strengthening  PT orders: Home safety assessment  Weight Bearing Status: As Tolerated  Occupational orders: Activities of daily living  Occupational orders: Strengthening  Occupational orders: Fine motor  Occupational orders: Cognition  Occupational orders: Energy conservation  Occupational orders: Home safety assessment  SLP orders: Dysphagia therapy (Eval and Treat)  SLP orders: Language  SLP orders: Articulation  SLP orders: Other  SLP orders: Cognition  Frequency: Other     This document serves as a request of services and does not constitute Insurance authorization or approval of services.  To determine eligibility, please contact the members Insurance carrier to verify and review coverage.     If you have medical questions regarding this request for services. Please contact [facility] 2B ICU at [phone]  during normal business hours.       Authorizing Provider:Rossy Pickett MD  Authorizing Provider's NPI: [id number]  Order Entered By: Marlene Wall RN 7/16/2021 10:42 AM     Electronically signed by: Rossy Pickett MD 7/16/2021 10:42 AM

## 2021-07-16 NOTE — CASE MANAGEMENT/SOCIAL WORK
Continued Stay Note  [facility]     Patient Name: Rebecca Sharma  MRN: [id number]  Today's Date: 7/16/2021    Admit Date: 7/13/2021    Discharge Plan     Row Name 07/16/21 0956       Plan    Plan  Rehab vs home with outpatient therapy or HH    Patient/Family in Agreement with Plan  yes    Plan Comments  Spoke to Mary at [facility] and they can offer an acute bed. Discussed with pt at , she wants to see how well she works with PT today to see if she could possibly go home. PT to see pt this am.        Discharge Codes    No documentation.       Expected Discharge Date and Time     Expected Discharge Date Expected Discharge Time    Jul 19, 2021             Marlene Wall RN

## 2021-07-16 NOTE — DISCHARGE SUMMARY
Discharge Summary    Patient name: Rebecca Sharma  CSN: [id number]  MRN: [id number]  : 1945  Today's date: 2021     Date of Admission: 2021    Date of Discharge:  2021     Admitting Physician: NETO Foote MD    Attending Physician: ABELINO Pickett MD    Primary Care Provider: Lorene Isaac MD    Consultations:   Dr. Loza- Neurology/Stroke    Admission Diagnosis:    Suspected cerebrovascular accident (CVA)    Hyperlipidemia    Hypertension    Gastroesophageal reflux disease     ADEN (obstructive sleep apnea)    Discharge Diagnoses:     CVA (cerebral vascular accident) (CMS/[facility])    Essential hypertension    ADEN (obstructive sleep apnea)    Hyperlipidemia LDL goal <70    Gastroesophageal reflux disease     Procedures:  Procedure(s):  loop implant       History of Present Illness:   Ms. Sharma is a 75 yo female with PMH ADEN, HTN, HLD, GERD, Venous insufficiency of the leg, and mild cognitive disorder who presents as a transfer from [facility] with ongoing issues associated with a stroke. According to verbal report as limited documentation was sent with patient from OSH, patient was in her normal state of health until around 1930 the evening of admission. At that point she developed right sided weakness, expressive aphasia, right facial droop, hemiparesis, and right facial numbness. She was taken to OSH where her initial NIH was a 3. CT head was negative. She was given TPA which was finishing on arrival to [facility] and was transferred for higher level of care.     On arrival, patient was alert but her speech was garbled and aphasic. NIH 2. She was hemodynamically stable on minimal supplemental oxygen.  She reported a mild left-sided headache and that her left nare is stuffy. She was admitted to ICU for management.  She reported she is on Advair 100 twice daily.    Hospital Course:  Rebecca Sharma is a 76 y.o. female who presented to [facility] as discussed in HPI.  She  "was admitted to ICU and initiated on CVA post TPA protocol.  CTA of the head was obtained which showed a 3.5 mm outpouching at the left carotid as well as high-grade stenosis versus distal occlusion of the left PCA.  There is no evidence of large vessel occlusion and she was not a candidate for neuro intervention.  Following morning the patient's NIH stroke scale was 4.  She was found to have some decreased balance and endurance but was able to ambulate 10 feet with PT with mild instability on balance test.  A fees test was planned for the following day and she remained n.p.o.  Neurology/stroke noted left MCA stroke with unknown etiology, and concern for cardioembolic event.  Loop recorder was obtained.  Hospital day #2 the patient passed her fees study and was able to tolerate cardiac diet.  Reevaluation by PT OT notes patient's ability to discharge home with home PT/OT and speech.  He was deemed acceptable for discharge and is to follow-up with primary care soon as possible, and neurology/stroke in 6 weeks.  She will be discharged on atorvastatin, and Plavix.       Vitals:  /90 (BP Location: Left arm, Patient Position: Sitting)   Pulse 72   Temp 98.3 °F (36.8 °C) (Oral)   Resp 18   Ht 165.1 cm (65\")   Wt 77.8 kg (171 lb 8.3 oz)   SpO2 96%   BMI 28.54 kg/m²     Advance Directives: Code Status and Medical Interventions:   Ordered at: 07/14/21 0853     Code Status:    CPR     Medical Interventions (Level of Support Prior to Arrest):    Full        Physical Exam:  General Appearance:   Pleasant older woman sitting in the chair in no acute distress   Head:   Normocephalic, atraumatic.   Eyes:          Pupils are equal and reactive to light.  Extraocular movements intact.  No jaundice   Ears:      Throat:  Oral mucosa moist   Neck:  Trachea midline, no palpable thyroid.   Back:       Lungs:    Symmetric chest expansion.  Breath sounds are bilateral without wheeze or rhonchi    Heart:   Regular rhythm, S1, " S2 auscultated, no murmur   Abdomen:    Nondistended, bowel sounds present, soft   Rectal:   Deferred   Extremities:  No pretibial edema or cyanosis   Pulses:  Pedal pulses present   Skin:  Warm and dry   Lymph nodes:  No cervical adenopathy   Neurologic:  Mild dysarthria, extremity strength symmetric.  Facial droop        Labs:  Results from last 7 days   Lab Units 07/15/21  0508   WBC 10*3/mm3 6.84   HEMOGLOBIN g/dL 13.1   HEMATOCRIT % 41.8   PLATELETS 10*3/mm3 270     Results from last 7 days   Lab Units 07/15/21  1930 07/15/21  0508   SODIUM mmol/L  --  138   POTASSIUM mmol/L 5.4* 3.8   CHLORIDE mmol/L  --  112*   CO2 mmol/L  --  18.0*   BUN mg/dL  --  11   CREATININE mg/dL  --  0.66   CALCIUM mg/dL  --  7.8*   BILIRUBIN mg/dL  --  0.5   ALK PHOS U/L  --  93   ALT (SGPT) U/L  --  13   AST (SGOT) U/L  --  18   GLUCOSE mg/dL  --  92         Magnesium   Date Value Ref Range Status   07/15/2021 2.1 1.6 - 2.4 mg/dL Final     Phosphorus   Date Value Ref Range Status   07/16/2021 2.4 (L) 2.5 - 4.5 mg/dL Final   07/15/2021 2.5 2.5 - 4.5 mg/dL Final   07/15/2021 1.5 (C) 2.5 - 4.5 mg/dL Final                 Discharge Medications:     Discharge Medications      New Medications      Instructions Start Date   atorvastatin 80 MG tablet  Commonly known as: LIPITOR   80 mg, Oral, Nightly      clopidogrel 75 MG tablet  Commonly known as: PLAVIX   75 mg, Oral, Daily   Start Date: July 17, 2021        Continue These Medications      Instructions Start Date   albuterol sulfate  (90 Base) MCG/ACT inhaler  Commonly known as: Ventolin HFA   2 puffs, Inhalation, Every 6 Hours PRN      calcium carbonate-cholecalciferol 500-400 MG-UNIT tablet tablet   1 tablet, Oral, Daily      celecoxib 200 MG capsule  Commonly known as: CeleBREX   200 mg, Oral, Daily PRN      fluticasone-salmeterol 100-50 MCG/DOSE DISKUS  Commonly known as: Advair Diskus   1 puff, Inhalation, 2 Times Daily - RT, Gargle and spit after puffs      levothyroxine 50  MCG tablet  Commonly known as: SYNTHROID, LEVOTHROID   50 mcg, Oral, Every Morning      Mirabegron ER 50 MG tablet sustained-release 24 hour 24 hr tablet  Commonly known as: Myrbetriq   50 mg, Oral, Daily      montelukast 10 MG tablet  Commonly known as: Singulair   10 mg, Oral, Nightly      omeprazole 40 MG capsule  Commonly known as: priLOSEC   40 mg, Oral, Daily, Take 30 minutes 1st meal of the day      Prolia 60 MG/ML solution prefilled syringe syringe  Generic drug: denosumab   INJECT 60MG SUBCUTANEOUSLY EVERY 6 MONTHS      TURMERIC CURCUMIN PO   1 tablet, Oral, Daily         Stop These Medications    NON FORMULARY     traMADol 50 MG tablet  Commonly known as: ULTRAM            Discharge Diet:   Diet Instructions     Diet: Regular, Cardiac; Thin      Discharge Diet:  Regular  Cardiac       Fluid Consistency: Thin          Activity at Discharge:   Ambulate with walker  With assistance    Follow-up Appointments  Future Appointments   Date Time Provider Department Center   7/29/2021 11:00 AM Elbert Medrano MD [facility]   11/11/2021 11:30 AM  [facility]     Additional Instructions for the Follow-ups that You Need to Schedule     Ambulatory Referral to Home Health   As directed      Face to Face Visit Date: 7/16/2021    Follow-up provider for Plan of Care?: I treated the patient in an acute care facility and will not continue treatment after discharge.    Follow-up provider: ELBERT MEDRANO [[id number]]    Reason/Clinical Findings: Cerebrovascular accident (CVA), dysphagia, impaired mobility    Describe mobility limitations that make leaving home difficult: Walked 200 ft min assist with walker    Nursing/Therapeutic Services Requested: Physical Therapy Occupational Therapy Speech Therapy    PT orders: Therapeutic exercise Gait Training Transfer training Strengthening Home safety assessment    Weight Bearing Status: As Tolerated    Occupational orders: Activities of daily living Strengthening  Fine motor Cognition Energy conservation Home safety assessment    SLP orders: Dysphagia therapy (Eval and Treat) Language Articulation Other Cognition    Frequency: Other         Discharge Follow-up with PCP   As directed       Currently Documented PCP:    Lorene Isaac MD    PCP Phone Number:    [phone]     Follow Up Details: ASAP for transition of care.         Discharge Follow-up with Specialty: [facility]; 3 Months   As directed      Specialty: [facility]    Follow Up: 3 Months    Follow Up Details: Hospital FU for CVA and loop recorder         Discharge Follow-up with Specialty: [facility]; 1 Week   As directed      Specialty: [facility]    Follow Up: 1 Week    Follow Up Details: Wound check for loop recorder         Discharge Follow-up with Specified Provider: Neurology/Stroke; 6 Weeks   As directed      To: Neurology/Stroke    Follow Up: 6 Weeks               Discharge Instructions:  Home with assistance  Follow-up as above  PT/OT/speech to begin Monday       SEVEN Guerra, [facility]  Pulmonary & Critical Care Medicine      Time: I spent 30 minutes on this discharge activity which included: face-to-face encounter with the patient, reviewing the data in the system, coordination of the care with the nursing staff as well as consultants, documentation, and entering orders.       CC: Lorene Isaac MD         [unfilled]

## 2021-07-16 NOTE — PLAN OF CARE
Goal Outcome Evaluation:  Plan of Care Reviewed With: patient        Progress: improving  Outcome Summary: Pt showing improvement as she increased ambulation distance to 200ft with RWx and min A progressing to SBA. 1 step navigated x3 reps with RWx with CGA. Pt limited by balance deficit, coordination impairment, decreased functional endurance, and generalized weakness. Continue to progress as able. d/c rec for HWA, HH PT/OT, and RWx.

## 2021-07-16 NOTE — PLAN OF CARE
Goal Outcome Evaluation:           Progress: improving   Pt resting well throughout shift. VSS, oxygenating well on RA, and remains afebrile. Pt able to take pills whole. Pt still having dysarthria and aphasia at times, minor r sided facial droop, and  bilateral blurred vision per patient but no visual field cuts present, no motor deficits, NIH 3. Loop recorder present, dressing and incision CDI. Pt's AM FSBS 76, juice and snack provided. Urine output adequate. Pt repositioning independently in bed q2h and as tolerated.

## 2021-07-16 NOTE — THERAPY TREATMENT NOTE
Patient Name: Rebecca Sharma  : 1945    MRN: [id number]                              Today's Date: 2021       Admit Date: 2021    Visit Dx:     ICD-10-CM ICD-9-CM   1. Dysphagia, unspecified type  R13.10 787.20   2. Cerebrovascular accident (CVA), unspecified mechanism (CMS/[facility])  I63.9 434.91     Patient Active Problem List   Diagnosis   • Cysts of both ovaries   • Actinic keratosis   • Allergic rhinitis   • Asthma   • Hyperlipidemia LDL goal <70   • Dyspepsia   • Essential hypertension   • Uterine leiomyoma   • Gastroesophageal reflux disease    • Chronic laryngitis   • Hyperactivity of bladder   • Hypothyroidism   • Lumbar spinal stenosis   • Mild cognitive disorder   • Osteoarthritis   • Osteoporosis   • Seborrheic keratosis   • Bilateral sensorineural hearing loss   • Skin tag   • Mixed stress and urge urinary incontinence   • Xerostomia   • Nasal septal deviation, right   • Medicare annual wellness visit, subsequent   • Headache   • ADEN (obstructive sleep apnea)   • Fibrocystic disease of left breast   • Chronic right hip pain   • Menopause   • Macrocytosis without anemia   • Diverticulosis of colon   • Right nipple adenoma   • Venous insufficiency of left leg   • Cricopharyngeal spasm   • Schatzki's ring   • CVA (cerebral vascular accident) (CMS/[facility])     Past Medical History:   Diagnosis Date   • Abnormal MRI, breast 2020    breast MRI (1/3/20): 1.1cm R. nipple mass sugg of nipple adenoma, indeterminate 0.5cm L. breast mass 12:00, rec surg excision for punch bx R. nipple mass and u/s for further eval L. breast mass   • Abnormal MRI, breast 07/10/2020    breast MRI (7/10/20): postsurg bx R. nipple with dx benign adenoma, post-bx clip left breast adjacent to 0.4cm not worrisome mass, rec L. breast MRI in 12 mos (reg mammo due )   • Adverse reaction to drug 5/10/2016    Impression: 2014 - GI side effects to aricept; plan as above to change timing and dose of med;    •  Anesthesia     SOB upon awakening from surgery.  one occurrence.   • Breast pain, right 06/14/2016 6/14/16 Notes f/u with Dr. Beckham with neg bx (evaluated at [facility]); bx site healing per patient; 5/10/16 Pain resolved but has residual R. nipple abnlity with erythema and nodular change, therefore dx mammo ordered for further eval; last reg mammo done 7/15; R. nipple mass - s/p bx to r/o Paget's disease (6/16); surg - Dr. Beckham   • Contact dermatitis due to poison ivy 07/30/2014    Impression: 07/30/2014 - Depo-medrol 40 mg IM given in office. Pt will start prednisone taper as ordered. Call or RTC if rash is not improving.;    • Dizziness 10/27/2016    10/27/16 Imbalanced sensation and not vertigo per patient; no focal neuro deficits on exam; will rec f/u eye exam and head imaging if sxs persist and ESR/CRP are normal; ddx also includes eust tube dysfunction   • Hx of bone density study 07/2013    DEXA (7/13) H -1.3   • Hx of bone density study 07/14/2016    DEXA (7/14/16) L 0.4, H -1.6 repeat 2-3 yrs   • Hx of bone density study 10/08/2019    DEXA (10/8/19): L 1.3, H -1.8, A -2.8, worsened, NEW osteoporosis, repeat 2 yrs   • Hx of colonoscopy 12/2015    surg - Dr. Kartik Gold   • Hx of colonoscopy 11/08/2019    colonosc (11/8/19): nl except diverticulosis, repeat 5 yrs due to h/o polyps; surg - Dr. Kartik Gold   • Hx of CT angio head/neck 07/13/2021    CT angio head/neck (7/13/21, hospitalized): No definite lg vessel occlusion, high-grade stenosis or dissection; indeterminate narrowing/occlusion of small P3 branch of the patient's left PCA; small outpouching at origin of left P-com may represent a small aneurysm versus infundibulum   • Hx of CT brain perfusion 07/13/2021    Normal brain perfusion CT (7/13/21, hospitalized)   • Hx of CT scan of head 07/13/2021    nl CT head (7/13/21): except mild atrophy with mild chronic small vessel ischemic disease   • Hx of echocardiogram 07/13/2021    ECHO (7/13/21,  "hosp): EF 56-60%, mild-mod TR, neg saline test   • Hx of esophagogastroduodenoscopy 05/05/2017    EGD (5/5/17): mod chronic gastritis, (+)H.pylori, no hiatal hernia; GI - Dr. Esparza   • Hx of LLE venous duplex 11/13/2020    LLE venous duplex (11/13/20): no DVT; (+) valvular venous insufficiency deep and superficial with severe reflux   • Lightheadedness 04/28/2014    Impression: 04/28/2014 - resolved; will let her know official carotid u/s results when available  Impression: 04/13/2014 - discussed adequate hydration; check CBC;    • Lip swelling 08/05/2015    Impression: 08/05/2015 - L. lower lip lesion significantly improved; complete course of abx as prescribed; if area does not resolve completely, call to make earlier derm f/u appt  Impression: 07/28/2015 - L. lower lip swelling already improving but concern for residual ongoing infection; ddx includes infected cold sore, infection from cryotherapy for AK, and less likely shingles; finishing course    • Pap smear for cervical cancer screening 08/16/2016    Pap done 8/16/16 per Dr. Brittanie Bauer   • Peripheral arterial occlusive disease (CMS/HCC) 5/10/2016    Description: abnl ALLEGRA at [facility] with nl arterial duplex (10/08)   • Wears glasses      Past Surgical History:   Procedure Laterality Date   • BILATERAL SALPINGO OOPHORECTOMY Bilateral 06/14/2017    GYN - Dr. Bauer; for benign L. ovarian cyst   • BREAST BIOPSY Right 5/12/2020    s/p R. breast excisional bx R. subsreaolar mass (5/12/20); path - nipple adenoma; surg - Dr. Beckham   • CARDIAC ELECTROPHYSIOLOGY PROCEDURE N/A 7/15/2021    Procedure: loop implant;  Surgeon: Bartolome Smith IV, MD;  Location: [facility] INVASIVE LOCATION;  Service: Cardiovascular;  Laterality: N/A;   • CATARACT EXTRACTION, BILATERAL      L. 4/09 (complicated by nerve injury; R. 6/10; L. corrected 6/15; ophtho - Dr. Hollis, Dr. Lange; neuro-ophtho Dr. Ferris   • CHOLECYSTECTOMY  2002    secondary to \"crystalized GB\" " (''02); surg - Dr. Kartik Gold   • MAMMO CORE NEEDLE BIOPSY UNILATERAL Left 01/17/2020    L. breast u/s core needle bx (1/17/20): path - Focal proliferative fibrocystic changes including sclerosing adenosis   • OOPHORECTOMY       General Information     Row Name 07/16/21 1021          Physical Therapy Time and Intention    Document Type  therapy note (daily note)  -AY     Mode of Treatment  physical therapy  -AY     Row Name 07/16/21 1021          General Information    Patient Profile Reviewed  yes  -AY     Existing Precautions/Restrictions  fall;other (see comments) urinary incontinence; dysarthria  -AY     Barriers to Rehab  medically complex  -AY     Row Name 07/16/21 1021          Cognition    Orientation Status (Cognition)  oriented x 3  -AY     Row Name 07/16/21 1021          Safety Issues, Functional Mobility    Impairments Affecting Function (Mobility)  balance;endurance/activity tolerance;strength  -AY       User Key  (r) = Recorded By, (t) = Taken By, (c) = Cosigned By    Initials Name Provider Type    AY Shona Foster, SHAUN Physical Therapist        Mobility     Row Name 07/16/21 1021          Bed Mobility    Bed Mobility  supine-sit  -AY     Supine-Sit [place] (Bed Mobility)  contact guard;verbal cues  -AY     Assistive Device (Bed Mobility)  head of bed elevated  -AY     Comment (Bed Mobility)  VC for sequencing  -AY     Row Name 07/16/21 1021          Transfers    Comment (Transfers)  STS performed x3  -AY     Row Name 07/16/21 1021          Sit-Stand Transfer    Sit-Stand [place] (Transfers)  contact guard progressing to SBA  -AY     Assistive Device (Sit-Stand Transfers)  walker, front-wheeled  -AY     Row Name 07/16/21 1021          Gait/Stairs (Locomotion)    [place] Level (Gait)  minimum assist (75% patient effort);1 person assist;verbal cues progressing to SBA  -AY     Assistive Device (Gait)  walker, front-wheeled  -AY     Distance in Feet (Gait)  200  -AY     Deviations/Abnormal  Patterns (Gait)  bita decreased;gait speed decreased;stride length decreased;base of support, narrow  -AY     Bilateral Gait Deviations  forward flexed posture;heel strike decreased  -AY     [place] Level (Stairs)  1 person assist;contact guard;verbal cues  -AY     Assistive Device (Stairs)  walker, front-wheeled  -AY     Handrail Location (Stairs)  none  -AY     Number of Steps (Stairs)  1 x3 reps  -AY     Ascending Technique (Stairs)  step-to-step  -AY     Descending Technique (Stairs)  step-to-step  -AY     Comment (Gait/Stairs)  pt demonstrated step through gait pattern with mild instability which improved with continued mobility. VC for posture, walker management/positioning, increased stride length, heel strike, and PLB. Gait distance limited by fatigue.  -AY       User Key  (r) = Recorded By, (t) = Taken By, (c) = Cosigned By    Initials Name Provider Type    AY Shona Foster PT Physical Therapist        Obj/Interventions     Row Name 07/16/21 1023          Motor Skills    Therapeutic Exercise  hip;knee;ankle  -AY     Row Name 07/16/21 1023          Hip (Therapeutic Exercise)    Hip (Therapeutic Exercise)  strengthening exercise  -AY     Hip Strengthening (Therapeutic Exercise)  bilateral;marching while seated;10 repetitions;sitting  -AY     Row Name 07/16/21 1023          Knee (Therapeutic Exercise)    Knee (Therapeutic Exercise)  strengthening exercise  -AY     Knee Strengthening (Therapeutic Exercise)  bilateral;LAQ (long arc quad);10 repetitions;sitting  -AY     Row Name 07/16/21 1023          Ankle (Therapeutic Exercise)    Ankle (Therapeutic Exercise)  AROM (active range of motion)  -AY     Ankle AROM (Therapeutic Exercise)  bilateral;dorsiflexion;plantarflexion;10 repetitions;supine  -AY     Row Name 07/16/21 1023          Balance    Balance Assessment  sitting static balance;sitting dynamic balance;standing static balance;standing dynamic balance  -AY     Static Sitting Balance  WFL;sitting,  edge of bed  -AY     Dynamic Sitting Balance  WFL;sitting, edge of bed  -AY     Static Standing Balance  WFL;standing;supported  -AY     Dynamic Standing Balance  mild impairment;supported;standing  -AY       User Key  (r) = Recorded By, (t) = Taken By, (c) = Cosigned By    Initials Name Provider Type    AY Shona Foster, PT Physical Therapist        Goals/Plan    No documentation.       Clinical Impression     Row Name 07/16/21 1023          Pain    Additional Documentation  Pain Scale: Numbers Pre/Post-Treatment (Group)  -AY     Row Name 07/16/21 1023          Pain Scale: Numbers Pre/Post-Treatment    Pretreatment Pain Rating  0/10 - no pain  -AY     Posttreatment Pain Rating  0/10 - no pain  -AY     Pain Intervention(s)  Ambulation/increased activity;Repositioned  -AY     Row Name 07/16/21 1023          Plan of Care Review    Plan of Care Reviewed With  patient  -AY     Progress  improving  -AY     Outcome Summary  Pt showing improvement as she increased ambulation distance to 200ft with RWx and min A progressing to SBA. 1 step navigated x3 reps with RWx with CGA. Pt limited by balance deficit, coordination impairment, decreased functional endurance, and generalized weakness. Continue to progress as able. d/c rec for HWA, HH PT/OT, and RWx.  -AY     Row Name 07/16/21 1023          Vital Signs    Pre Systolic BP Rehab  129  -AY     Pre Treatment Diastolic BP  93  -AY     Post Systolic BP Rehab  138  -AY     Post Treatment Diastolic BP  85  -AY     Pretreatment Heart Rate (beats/min)  74  -AY     Posttreatment Heart Rate (beats/min)  74  -AY     Pre SpO2 (%)  95  -AY     O2 Delivery Pre Treatment  room air  -AY     O2 Delivery Intra Treatment  room air  -AY     Post SpO2 (%)  96  -AY     O2 Delivery Post Treatment  room air  -AY     Pre Patient Position  Supine  -AY     Intra Patient Position  Standing  -AY     Post Patient Position  Sitting  -AY     Row Name 07/16/21 1023          Positioning and Restraints     Pre-Treatment Position  in bed  -AY     Post Treatment Position  chair  -AY     In Chair  notified nsg;reclined;call light within reach;sitting;encouraged to call for assist;exit alarm on;waffle cushion;legs elevated  -AY       User Key  (r) = Recorded By, (t) = Taken By, (c) = Cosigned By    Initials Name Provider Type    Shona Andersen, PT Physical Therapist        Outcome Measures     Row Name 07/16/21 1026 07/16/21 0800       How much help from another person do you currently need...    Turning from your back to your side while in flat bed without using bedrails?  4  -AY  4  -PM    Moving from lying on back to sitting on the side of a flat bed without bedrails?  3  -AY  4  -PM    Moving to and from a bed to a chair (including a wheelchair)?  3  -AY  3  -PM    Standing up from a chair using your arms (e.g., wheelchair, bedside chair)?  3  -AY  3  -PM    Climbing 3-5 steps with a railing?  3  -AY  2  -PM    To walk in hospital room?  3  -AY  3  -PM    AM-PAC 6 Clicks Score (PT)  19  -AY  19  -PM    Row Name 07/16/21 1026          Modified [place] Scale    Modified [place] Scale  2 - Slight disability.  Unable to carry out all previous activities but able to look after own affairs without assistance.  -AY     Row Name 07/16/21 1026          Functional Assessment    Outcome Measure Options  AM-PAC 6 Clicks Basic Mobility (PT);Modified [place]  -AY       User Key  (r) = Recorded By, (t) = Taken By, (c) = Cosigned By    Initials Name Provider Type    PM Cassius James RN Registered Nurse    Shona Andersen, PT Physical Therapist        Physical Therapy Education                 Title: PT OT SLP Therapies (Done)     Topic: Physical Therapy (Done)     Point: Mobility training (Done)     Learning Progress Summary           Patient Acceptance, E,TB,D, VU,NR by  at 7/16/2021 1026    Acceptance, E,H, VU by PM at 7/15/2021 1459    Acceptance, E,TB, VU,NR by JAYA at 7/14/2021 0932                   Point: Home exercise  program (Done)     Learning Progress Summary           Patient Acceptance, E,TB,D, VU,NR by AY at 7/16/2021 1026    Acceptance, E,H, VU by PM at 7/15/2021 1459                   Point: Body mechanics (Done)     Learning Progress Summary           Patient Acceptance, E,TB,D, VU,NR by AY at 7/16/2021 1026    Acceptance, E,H, VU by PM at 7/15/2021 1459    Acceptance, E,TB, VU,NR by AY at 7/14/2021 0932                   Point: Precautions (Done)     Learning Progress Summary           Patient Acceptance, E,TB,D, VU,NR by AY at 7/16/2021 1026    Acceptance, E,H, VU by PM at 7/15/2021 1459    Acceptance, E,TB, VU,NR by AY at 7/14/2021 0932                               User Key     Initials Effective Dates Name Provider Type Discipline    PM 06/16/21 -  Cassius James RN Registered Nurse Nurse     11/10/20 -  Shona Foster, PT Physical Therapist PT              PT Recommendation and Plan  Planned Therapy Interventions (PT): balance training, bed mobility training, home exercise program, gait training, patient/family education, strengthening, stair training, transfer training  Plan of Care Reviewed With: patient  Progress: improving  Outcome Summary: Pt showing improvement as she increased ambulation distance to 200ft with RWx and min A progressing to SBA. 1 step navigated x3 reps with RWx with CGA. Pt limited by balance deficit, coordination impairment, decreased functional endurance, and generalized weakness. Continue to progress as able. d/c rec for HWA, HH PT/OT, and RWx.     Time Calculation:   PT Charges     Row Name 07/16/21 1027             Time Calculation    Start Time  0941  -AY      PT Received On  07/16/21  -AY         Time Calculation- PT    Total Timed Code Minutes- PT  38 minute(s)  -AY         Timed Charges    09253 - Gait Training Minutes   38  -AY         Total Minutes    Timed Charges Total Minutes  38  -AY       Total Minutes  38  -AY        User Key  (r) = Recorded By, (t) = Taken By, (c) =  Cosigned By    Initials Name Provider Type    AY Shona Foster, PT Physical Therapist        Therapy Charges for Today     Code Description Service Date Service Provider Modifiers Qty    [id number] HC GAIT TRAINING EA 15 MIN 7/16/2021 Shona Foster, PT GP 3          PT G-Codes  Outcome Measure Options: AM-PAC 6 Clicks Basic Mobility (PT), Modified [place]  AM-PAC 6 Clicks Score (PT): 19  AM-PAC 6 Clicks Score (OT): 14  Modified [place] Scale: 2 - Slight disability.  Unable to carry out all previous activities but able to look after own affairs without assistance.    Shona Foster PT  7/16/2021

## 2021-07-16 NOTE — OUTREACH NOTE
Prep Survey      Responses   [facility] facility patient discharged from?  [facility]   Is LACE score < 7 ?  No   Emergency Room discharge w/ pulse ox?  No   Eligibility  [facility]   Date of Admission  07/13/21   Date of Discharge  07/16/21   Discharge Disposition  Home or Self Care   Discharge diagnosis  CVA    Does the patient have one of the following disease processes/diagnoses(primary or secondary)?  Stroke (TIA)   Does the patient have Home health ordered?  Yes   What is the Home health agency?   [facility]   Is there a DME ordered?  Yes   What DME was ordered?  [facility]    Prep survey completed?  Yes          Lida Sierra RN

## 2021-07-16 NOTE — CASE MANAGEMENT/SOCIAL WORK
Continued Stay Note  [facility]     Patient Name: Rebecca Sharma  MRN: [id number]  Today's Date: 7/16/2021    Admit Date: 7/13/2021    Discharge Plan     Row Name 07/16/21 1045       Plan    Plan  Home with friend to assist and [facility]    Patient/Family in Agreement with Plan  yes    Plan Comments  Pt walked 200 ft with PT, recommending HH or outpatient therapy. Pt would like to have HH and transition to outpatient when better. Her friend Luis Angel can assist and transport her home. Pt has no preference of HH or DME agencies. Called and efaxed referral to Elham at [facility]. They can start care on Monday and will call pt on Monday morning to arrange a time to see her. RW referral called to Nubia at [facility]. Nubia will bring pt's RW to bedside prior to d/c. CM will follow. Marlene ext. [phone]    Final Discharge Disposition Code  06 - home with home health care    Row Name 07/16/21 0956       Plan    Plan  Rehab vs home with outpatient therapy or HH    Patient/Family in Agreement with Plan  yes    Plan Comments  Spoke to Walden at [facility] and they can offer an acute bed. Discussed with pt at , she wants to see how well she works with PT today to see if she could possibly go home. PT to see pt this am.        Discharge Codes    No documentation.       Expected Discharge Date and Time     Expected Discharge Date Expected Discharge Time    Jul 19, 2021             Marlene Wall RN

## 2021-07-16 NOTE — PROGRESS NOTES
Cardiology Progress Note      Roberto for visit:    · Suspicion of cardioembolic CVA    IDENTIFICATION: 76-year-old female who resides in [place], [place]    Active Hospital Problems    Diagnosis  POA   • **CVA (cerebral vascular accident) (CMS/[facility]) [I63.9]  Yes     Priority: High     · Transferred from [facility] to [facility] ED for stroke status post TPA on 7/13/2021.  Symptoms were expressive aphasia and dysarthria.   · Imaging revealed left MCA CVA. Normal brain perfusion CT (7/13/21)  · CT angio head/neck (7/13/21, hospitalized): No definite lg vessel occlusion, high-grade stenosis or dissection; indeterminate narrowing/occlusion of small P3 branch of the patient's left PCA; small outpouching at origin of left P-com may represent a small aneurysm versus infundibulum.    · ECHO (7/14/2021): LVEF 56 - 60%.  Negative bubble study.   · Loop recorder (BSC) implanted 7/15/2021     • Essential hypertension [I10]  Yes     Priority: Medium   • Hyperlipidemia LDL goal <70 [E78.5]  Yes     Priority: Medium   • Gastroesophageal reflux disease  [K21.9]  Yes     Priority: Low   • ADEN (obstructive sleep apnea) [G47.33]  Yes     7/17/18 remains on CPAP, s/e of dry mouth even with humidifier per patient; 1/5/18 ENT/Dr. Dickinson - success with oral appliance until TMJ, better with CPAP; 9/17 sleep study - mod ADEN, rec autoPap 8-12; ENT - Dr. Keane              Patient underwent loop recorder implant yesterday.  No events noted overnight.  She is currently sitting up in the bed.  Reports some soreness at the loop recorder insertion site.           Vital Sign Min/Max for last 24 hours  Temp  Min: 97.7 °F (36.5 °C)  Max: 98.5 °F (36.9 °C)   BP  Min: 118/90  Max: 168/90   Pulse  Min: 62  Max: 78   Resp  Min: 14  Max: 18   SpO2  Min: 92 %  Max: 98 %   No data recorded      Intake/Output Summary (Last 24 hours) at 7/16/2021 0931  Last data filed at 7/16/2021 0800  Gross per 24 hour   Intake 2044.5 ml   Output 2100 ml    Net -55.5 ml           Physical Exam  Constitutional:       General: She is awake.   Cardiovascular:      Rate and Rhythm: Normal rate and regular rhythm.   Pulmonary:      Effort: Pulmonary effort is normal.      Breath sounds: Normal breath sounds.   Skin:     General: Skin is warm and dry.   Neurological:      Mental Status: She is alert.   Psychiatric:         Behavior: Behavior is cooperative.         Tele: NSR    Results Review (reviewed the patient's recent labs in the electronic medical record):   CT of the head (7/14/2021): Area of subacute infarct is seen in the patient's left posterior temporal and parietal region.  No hemorrhagic transformation.    ECHO (7/14/2021): LVEF 56-60%.  Negative bubble study.  RVSP 33 mmHg.  Moderate TR.    Result Review:  I have personally reviewed the results from the time of this admission to 7/16/2021 09:31 EDT and agree with these findings:  [x]  Laboratory  []  Microbiology  [x]  Radiology  [x]  EKG/Telemetry   [x]  Cardiology/Vascular   []  Pathology  []  Old records  []  Other:      Results from last 7 days   Lab Units 07/15/21  1930 07/15/21  0508   SODIUM mmol/L  --  138   POTASSIUM mmol/L 5.4* 3.8   CHLORIDE mmol/L  --  112*   BUN mg/dL  --  11   CREATININE mg/dL  --  0.66   MAGNESIUM mg/dL  --  2.1         Results from last 7 days   Lab Units 07/15/21  0508   WBC 10*3/mm3 6.84   HEMOGLOBIN g/dL 13.1   HEMATOCRIT % 41.8   PLATELETS 10*3/mm3 270       Lab Results   Component Value Date    HGBA1C 5.80 (H) 07/14/2021       Lab Results   Component Value Date    CHOL 166 07/13/2020    CHLPL 151 06/07/2016    TRIG 100 07/13/2020    HDL 41 07/13/2020     (H) 07/13/2020            Left MCA CVA   · Status post TPA given within 24 hours prior to arrival.    · Follow-up CT in 24 hours negative for hemorrhage.  · Imaging showed evolving left MCA stroke in the parietal region.    · Started on Plavix 75 mg daily.  PATIENT IS ALLERGIC TO ASPIRIN which causes swelling of  lips and throat  · Concerns for possible cardioembolic source.  · Echo LVEF 56-60%.  No PFO.  · No arrhythmias noted on telemetry  · Loop recorder implant 7/15/2021     Essential hypertension  · Currently 129/70     Hyperlipidemia  · , total cholesterol 166  · Lipitor 80 mg daily                   · Patient to follow-up with cardiology in 2 months after discharge.  · If atrial fibrillation were detected timing of initiation of Eliquis to be determined by neurology due to recent TPA on 7/13  · Continue statin therapy and Plavix.  No aspirin as patient is allergic    Electronically signed by SEVEN Beck, 07/16/21, 9:31 AM EDT.

## 2021-07-16 NOTE — PROGRESS NOTES
Critical Care Note     LOS: 3 days   Patient Care Team:  Lorene Isaac MD as PCP - General  Lorene Isaac MD as PCP - Internal Medicine  Bechema, Brittanie Suarez MD as Consulting Physician (Obstetrics and Gynecology)  Gigi Esparza MD as Consulting Physician (Gastroenterology)  Kartik Gold MD as Consulting Physician (General Surgery)  Tyrone Tate MD as Consulting Physician (Ophthalmology)  Arik Keane MD as Consulting Physician (Otolaryngology)  Anastacio Dickinson MD as Consulting Physician (Otolaryngology)  Mario Ding MD as Consulting Physician (Neurosurgery)  Travis Beckham MD as Consulting Physician (General Surgery)  Mingo Cerrato MD as Consulting Physician (Gastroenterology)    Chief Complaint/Reason for visit:    Ischemic CVA    Subjective     76-year-old woman with sleep apnea, hypertension, dyslipidemia, GERD transferred from [facility] with onset of right-sided weakness expressive aphasia facial droop and numbness around 1930 on July 13, 2021.  She was given TPA.  On arrival to [facility] her NIH stroke score was a 2.  She reports a history of asthma diagnosed when she was in her 50s.  She currently uses Advair 100, 1 puff twice daily.  She passed her swallow evaluation and a p.o. diet was initiated.  Cardiology was consulted and a loop recorder was placed.    Interval History:     Physical therapy assisted and she was able to walk 200 feet with a rolling walker.  She still had impaired coordination and decreased endurance.  She continues to have some dysarthria but feels it is improving.    Review of Systems:    All systems were reviewed and negative except as noted in subjective.    Medical history, surgical history, social history, family history reviewed    Objective     Intake/Output:    Intake/Output Summary (Last 24 hours) at 7/16/2021 1203  Last data filed at 7/16/2021 1000  Gross per 24 hour   Intake 1373.5 ml   Output 1900 ml  "  Net -526.5 ml       Nutrition:  Diet Regular; Cardiac    Infusions:       Telemetry: Sinus rhythm             Vital Signs  Blood pressure 129/74, pulse 67, temperature 97.7 °F (36.5 °C), temperature source Oral, resp. rate 16, height 165.1 cm (65\"), weight 77.8 kg (171 lb 8.3 oz), SpO2 96 %.    Physical Exam:  General Appearance:   Pleasant older woman sitting in the chair in no acute distress   Head:   Normocephalic, atraumatic.   Eyes:          Pupils are equal and reactive to light.  Extraocular movements intact.  No jaundice   Ears:     Throat:  Oral mucosa moist   Neck:  Trachea midline, no palpable thyroid.   Back:      Lungs:    Symmetric chest expansion.  Breath sounds are bilateral without wheeze or rhonchi    Heart:   Regular rhythm, S1, S2 auscultated, no murmur   Abdomen:    Nondistended, bowel sounds present, soft   Rectal:   Deferred   Extremities:  No pretibial edema or cyanosis   Pulses:  Pedal pulses present   Skin:  Warm and dry   Lymph nodes:  No cervical adenopathy   Neurologic:  Mild dysarthria, extremity strength symmetric.  Facial droop       Interval:  (handoff)  1a. Level of Consciousness: 0-->Alert, keenly responsive  1b. LOC Questions: 0-->Answers both questions correctly  1c. LOC Commands: 0-->Performs both tasks correctly  2. Best Gaze: 0-->Normal  3. Visual: 0-->No visual loss  4. Facial Palsy: 1-->Minor paralysis (flattened nasolabial fold, asymmetry on smiling)  5a. Motor Arm, Left: 0-->No drift, limb holds 90 (or 45) degrees for full 10 secs  5b. Motor Arm, Right: 0-->No drift, limb holds 90 (or 45) degrees for full 10 secs  6a. Motor Leg, Left: 0-->No drift, leg holds 30 degree position for full 5 secs  6b. Motor Leg, Right: 0-->No drift, leg holds 30 degree position for full 5 secs  7. Limb Ataxia: 0-->Absent  8. Sensory: 1-->Mild-to-moderate sensory loss, patient feels pinprick is less sharp or is dull on the affected side, or there is a loss of superficial pain with pinprick, " but patient is aware of being touched  9. Best Language: 1-->Mild-to-moderate aphasia, some obvious loss of fluency or facility of comprehension, without significant limitation on ideas expressed or form of expression. Reduction of speech and/or comprehension, however, makes conversation. . . (see row details)  10. Dysarthria: 0-->Normal  11. Extinction and Inattention (formerly Neglect): 0-->No abnormality    Total (NIH Stroke Scale): 3  Results Review:     I reviewed the patient's new clinical results.   Outside hospital, A1c 5.8, glucose 84, BUN 18, creatinine 0.87, potassium 5, bicarbonate 24, calcium 10, LFTs normal  Results from last 7 days   Lab Units 07/15/21  1930 07/15/21  0508   SODIUM mmol/L  --  138   POTASSIUM mmol/L 5.4* 3.8   CHLORIDE mmol/L  --  112*   CO2 mmol/L  --  18.0*   BUN mg/dL  --  11   CREATININE mg/dL  --  0.66   CALCIUM mg/dL  --  7.8*   BILIRUBIN mg/dL  --  0.5   ALK PHOS U/L  --  93   ALT (SGPT) U/L  --  13   AST (SGOT) U/L  --  18   GLUCOSE mg/dL  --  92     Results from last 7 days   Lab Units 07/15/21  0508   WBC 10*3/mm3 6.84   HEMOGLOBIN g/dL 13.1   HEMATOCRIT % 41.8   PLATELETS 10*3/mm3 270         No results found for: BLOODCX  No results found for: URINECX    I reviewed the patient's new imaging including images and reports.  COMPARISON: NONE     FINDINGS: There is a somewhat confluent area restricted diffusion  compatible with acute infarct noted involving the left parietal and  posterior temporal region with smaller adjacent areas of cortical and  subcortical diffusion restriction compatible with likely left posterior  MCA territory ischemia and acute infarct. There is no evidence of  associated mass effect or hemorrhage. The ventricles are normal in size  and configuration. There is no mass or mass effect. The orbits are  unremarkable and the paranasal sinuses are grossly clear. Intracranial  arterial flow voids are maintained.     IMPRESSION:  Scattered areas of acute  infarct noted involving the left  posterior frontal lobe cortex, left parietal lobe cortex and left  posterior temporal lobe. No associated mass effect or hemorrhage.        This report was finalized on 7/14/2021 8:52 AM by Gilmar Tripathi.         All medications reviewed.   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  Current NIH stroke score is a 3 for some mild persistent aphasia.    #2 hypertension, systolic blood pressure 100-165 on no medication    #3 GERD with recent H. pylori treatment    #4 hypothyroidism on replacement therapy    #5 history of asthma currently without active wheezing      PLAN:    Home today  Home PT, OT, speech therapy to start Monday  Walker for any ambulation  Restart her usual asthma Medication, albuterol rescue inhaler, fluticasone-salmeterol 100-50 mcg 1 puff twice daily  Continue her usual home dose of Synthroid, mirabegron for bladder overactivity  Resume omeprazole as an outpatient, this is a known home medication  We will need prescriptions for atorvastatin, Plavix          Rossy Pickett MD  07/16/21  12:03 EDT      Time: 20min

## 2021-07-16 NOTE — PROGRESS NOTES
Stroke Progress Note       Chief Complaint: Right-sided weakness and expressive aphasia    Subjective    Subjective     Subjective:  No acute issues overnight.  Patient continues to have mild expressive aphasia, but is much improved.  Denies any new symptoms. Would like to go home     Review of Systems   Constitutional: Negative.    HENT: Negative.    Eyes: Negative.    Respiratory: Negative.    Cardiovascular: Negative.    Gastrointestinal: Negative.    Endocrine: Negative.    Genitourinary: Negative.    Musculoskeletal: Negative.    Skin: Negative.    Allergic/Immunologic: Negative.    Psychiatric/Behavioral: Negative.             Objective    Objective      Temp:  [97.7 °F (36.5 °C)-98.5 °F (36.9 °C)] 97.7 °F (36.5 °C)  Heart Rate:  [62-78] 67  Resp:  [14-18] 16  BP: (118-168)/() 129/74        Neurological Exam  Mental Status  Awake, alert and oriented to person, place and time.Alert. Mild dysarthria present. Subtle expressive aphasia. Attention and concentration are normal. Fund of knowledge is appropriate for level of education.    Cranial Nerves  CN II: Visual fields full to confrontation.  CN III, IV, VI: Extraocular movements intact bilaterally. Normal lids and orbits bilaterally. Pupils equal round and reactive to light bilaterally.  CN V: Facial sensation is normal.  CN VII: Full and symmetric facial movement.  CN VIII: Equal hearing bilaterally.  CN IX, X: Palate elevates symmetrically  CN XI: Shoulder shrug strength is normal.  CN XII: Tongue midline without atrophy or fasciculations.    Motor  Normal muscle bulk throughout. No fasciculations present. Normal muscle tone.  Right upper extremity is 4+/5, right lower extremities 5/5, no drift.  Axising around the right arm  Left upper and lower extremities 5/5.    Sensory  Light touch is normal in upper and lower extremities.     Reflexes  Deep tendon reflexes are 2+ and symmetric in all four extremities with downgoing toes  bilaterally.    Coordination  No dysmetria.    Gait  Not assessed.      Physical Exam  Vitals and nursing note reviewed.   Constitutional:       Appearance: Normal appearance.   HENT:      Head: Normocephalic and atraumatic.      Mouth/Throat:      Mouth: Mucous membranes are moist.      Pharynx: Oropharynx is clear.   Eyes:      General: Lids are normal.      Extraocular Movements: Extraocular movements intact.      Pupils: Pupils are equal, round, and reactive to light.   Cardiovascular:      Rate and Rhythm: Normal rate and regular rhythm.   Pulmonary:      Effort: Pulmonary effort is normal. No respiratory distress.   Musculoskeletal:      Cervical back: Normal range of motion and neck supple.   Neurological:      Mental Status: She is alert.      Cranial Nerves: Dysarthria present.      Deep Tendon Reflexes: Reflexes are normal and symmetric.   Psychiatric:         Mood and Affect: Mood normal.         Behavior: Behavior normal.         Results Review:    I reviewed the patient's new clinical results.    Results for orders placed during the hospital encounter of 07/13/21    Adult Transthoracic Echo Complete W/ Cont if Necessary Per Protocol (With Agitated Saline)    Interpretation Summary  · Left ventricular ejection fraction appears to be 56 - 60%.  · Saline test results are negative.  · Estimated right ventricular systolic pressure from tricuspid regurgitation is normal (<35 mmHg). Calculated right ventricular systolic pressure from tricuspid regurgitation is 33 mmHg.  · Mild RVE  · Mod TR            Assessment/Plan     Assessment/Plan:  76-year-old right-handed white female with known diagnosis of hypertension, hyperlipidemia, sleep apnea, mild cognitive disorder, who has been admitted with left MCA stroke, status post IV TPA.      #1.  Left middle cerebral artery stroke.  Unknown etiology.  Could be cardioembolic.  2D echocardiogram shows EF of 56 to 60%, no PFO, normal atrial sizes.  Recommend prolonged  cardiac monitoring with loop recorder which was placed yesterday. She will f/u with cardiology outpt for this.      #2.  Status post TPA, given within 24 hours prior to arrival.    24-hour CT head is negative for any hemorrhage, shows evolving left MCA stroke in the parietal region.  Patient has been started on Plavix 75 mg and Lipitor 80 mg for secondary stroke prevention.  Patient is allergic to aspirin, causing swelling of the lips and throat.     #3.  Essential hypertension.  Blood pressures have been better controlled.  Continue current management for normal blood pressure goals.     #4.  Dyslipidemia.    Her LDL is 105, total cholesterol 166 and triglyceride of 100. Full dose of statins.     #5.  PT/OT evaluation. They are now recommending home with outpt therapy. Patient ok to discharge home       #6.  Healthy heart diet     Case was discussed with patient, nursing and will talk to the intensivist.  Okay to discharge home today from our perspective. Stroke neurology will sign off at this time. Patient should follow up with neurology outpt in 6-8 weeks. Please call with questions or concerns.         Shikha Adair, SEVEN  07/16/21  09:17 EDT

## 2021-07-19 ENCOUNTER — TRANSITIONAL CARE MANAGEMENT TELEPHONE ENCOUNTER (OUTPATIENT)
Dept: CALL CENTER | Facility: HOSPITAL | Age: 76
End: 2021-07-19

## 2021-07-19 ENCOUNTER — TELEPHONE (OUTPATIENT)
Dept: INTERNAL MEDICINE | Facility: CLINIC | Age: 76
End: 2021-07-19

## 2021-07-19 NOTE — TELEPHONE ENCOUNTER
LYNN FROM [facility] CALLED TO LET DR MEDRANO KNOW THAT THEY WILL BE SEEING PATIENT 1X WEEK FOR 5 WEEKS AFTER HER STROKE

## 2021-07-19 NOTE — OUTREACH NOTE
Call Center TCM Note      Responses   [facility] patient discharged from?  [place]   Does the patient have one of the following disease processes/diagnoses(primary or secondary)?  Stroke (TIA)   TCM attempt successful?  No   Unsuccessful attempts  Attempt 1          Valentina Sutton RN    7/19/2021, 15:37 EDT

## 2021-07-19 NOTE — OUTREACH NOTE
Call Center TCM Note      Responses   [facility] patient discharged from?  [place]   Does the patient have one of the following disease processes/diagnoses(primary or secondary)?  Stroke (TIA)   TCM attempt successful?  No   Unsuccessful attempts  Attempt 2          Valentina Sutton RN    7/19/2021, 16:15 EDT

## 2021-07-20 ENCOUNTER — TRANSITIONAL CARE MANAGEMENT TELEPHONE ENCOUNTER (OUTPATIENT)
Dept: CALL CENTER | Facility: HOSPITAL | Age: 76
End: 2021-07-20

## 2021-07-20 NOTE — OUTREACH NOTE
Call Center TCM Note      Responses   [facility] patient discharged from?  [place]   Does the patient have one of the following disease processes/diagnoses(primary or secondary)?  Stroke (TIA)   TCM attempt successful?  No   Unsuccessful attempts  Attempt 3          Bere Rodriguez RN    7/20/2021, 12:16 EDT

## 2021-07-20 NOTE — TELEPHONE ENCOUNTER
Pt states that she is doing fine. She cannot drive for 2 weeks, but will keep her appt on 07/29/21 at 11 with you and make it a hospital follow up. She does need refills on medications, but understands that we will do the wellness visit at a later date. Will change to TCM visit.

## 2021-07-25 PROBLEM — K22.2 SCHATZKI'S RING: Chronic | Status: ACTIVE | Noted: 2021-07-11

## 2021-07-25 PROBLEM — I63.9 CVA (CEREBRAL VASCULAR ACCIDENT): Chronic | Status: ACTIVE | Noted: 2021-07-14

## 2021-07-26 NOTE — PROGRESS NOTES
TRANSITIONAL CARE HOSPITAL FOLLOW-UP VISIT    Hospitalized at:    [x]   [facility]  []   [place]  []     []   Other -    Outside records reviewed. Pertinent radiology and labs reviewed  []   Records requested    Dates of hospitalization:  Admission - 7/13/21         Discharge - 7/16/21   If transferred to rehab facility, date of discharge : na    Hospital Course: transferred from [facility] with presumed CVA s/p tPA with sxs of right-sided weakness, right facial droop/weakness, and mild aphasia. Initial imaging did not reveal source with nl CT perfusion and CT head. Subsequent brain MRI showed left posterior MCA CVA. Patient received PT, OT, and ST in the hospital. FEEs was performed and passed. Loop recorder was implanted during hospitalization with planned outpatient f/u. No complications during hospitalization.      Lab/Radiology Results:   7/13/21 nl CT head; nl CT perfusion    7/13/21 CT angio head/neck:   No definite lg vessel occlusion, high-grade stenosis or dissection; indeterminate narrowing/occlusion of small P3 branch of the patient's left PCA; small outpouching at origin of left P-com may represent a small aneurysm versus infundibulum    7/14/21 brain MRI  IMPRESSION:  Scattered areas of acute infarct noted involving the left posterior frontal lobe cortex, left parietal lobe cortex and left posterior temporal lobe. No associated mass effect or hemorrhage.    7/14/21 CT head  IMPRESSION:  Area of subacute infarct is seen in the patient's left posterior temporal and parietal region. No hemorrhagic transformation or blood product on CT    7/14/21 A1C 5.8    7/15/21 stable CBC, Mg 2.1, TFTs, CMP except HCO3 18, Ca 7.8 and subsequent K 5.4    New Medications or Medication Changes:   · Clopidogrel 75mg QD  · Atorvastatin 80mg QD     Current outpatient and discharge medications have been reconciled for the patient.  Reviewed by: Lorene Isaac MD      Status:  better    Current symptoms:   · Minimal  residual abnl sensation around lips and left side of face  · Minimal residual right arm weakness, only with certain motions, such as opening jar  · Mild RLE weakness, ongoing PT  · Occasional word finding difficulty but eventually the words come to her  · SOB with sitting or lying down; asx with her PT or walking around the house  · Loose stools that just started today - thinks maybe she just ate something  · RLE sciatica since hospital discharge - asking about seeing chiropractor    Post discharge concerns/issues:   · Not currently driving  · Wants to know when word finding issues will resolve, if ever    Treatment plan   Med Changes:  •  reviewed and updated with patient      •  no other changes required     Referrals: none    Risk for Readmission (LACE) Score: 9 (7/16/2021  6:01 AM)      Follow-up appointments:   · F/u cards 8/31/21 with Mylene Del Cid  · F/u neuro 10/6/21 with Dr. Bautista    Discussed with: patient  ___________________________________________________________  Chief Complaint   Patient presents with   • Transitional Care Management   • Cerebrovascular Accident       History of Present Illness  76 y.o.  woman presents for hospital follow-up after left MCV CVA causing right-sided weakness, right facial/droop, aphasia. Hospital course as noted. Notes minimal deficits in the right face, just with slightly altered sensation in her lips but no problem with chewing or eating.  No swallowing issues.  Reports right arm is basically back to normal except for certain activities, such as opening a jar.  Walking slower due to mild right leg weakness but overall doing well and not using an assistive device.    Complains of shortness of breath only with sitting or lying down.  Does not get short of breath with walking around the house or physical therapy.  Has no associated cough, sore throat, fevers, chills.    Reports flareup of sciatica in the right leg.  She thinks it is from the hospital because they  "did not get her out of the bed until the day of discharge.  Wondering about chiropractic care.  She is getting PT at the house once per week currently and speech therapy twice per week.  She is not driving.    She is having no significant issues with walking.  Notes that bending over is usually not a problem, but she also thinks this probably helps with her spinal stenosis.    States mood is actually stable.  Feels that her depression/anxiety is doing well despite having the stroke.  Has no concerns or self today.    Reports BP yesterday was 118/70. Needs med RFs.    Review of Systems  ROS (+) for minimal residual deficits in the right face and RUE, mild weakness in RLE but still able to ambulate without assistive device.  No trouble with swallowing or chewing.  Denies any fevers or chills, headaches, visual changes, numbness/tingling.  Reports shortness of breath with sitting and lying down but not with activity.  No associated cough or wheezing.  Notes loose stools just today without any blood or pain.      Remains on myrbetriq for incontinence and states it helps.All other ROS reviewed and negative.      Current Outpatient Medications:   •  albuterol sulfate HFA (Ventolin HFA) 108 (90 Base) MCG/ACT inhaler prn  •  atorvastatin (LIPITOR) 80 MG QD  •  calcium carbonate-cholecalciferol 500-400 MG-UNIT tablet QD  •  clopidogrel (PLAVIX) 75 MG QD  •  fluticasone-salmeterol 100-50 MCG/DOSE BID  •  levothyroxine 50 MCG QD  •  Mirabegron ER (Myrbetriq) 50 MG QD  •  montelukast (Singulair) 10 MG qD  •  omeprazole (priLOSEC) 40 MG qD  •  Prolia 60 MG/ML solution q6 mos  •  TURMERIC CURCUMIN PO QD      VITALS:  /64 (BP Location: Left arm, Patient Position: Sitting)   Pulse 75   Ht 165.1 cm (65\")   Wt 79.4 kg (175 lb)   SpO2 98%   BMI 29.12 kg/m²     Physical Exam  Vitals and nursing note reviewed.   Constitutional:       General: She is not in acute distress.     Appearance: Normal appearance.   HENT:      Right " Ear: External ear normal.      Left Ear: External ear normal.   Eyes:      Extraocular Movements: Extraocular movements intact.      Conjunctiva/sclera: Conjunctivae normal.   Cardiovascular:      Rate and Rhythm: Normal rate and regular rhythm.      Heart sounds: Normal heart sounds.   Pulmonary:      Effort: Pulmonary effort is normal. No respiratory distress.      Breath sounds: Normal breath sounds. No wheezing or rales.   Abdominal:      General: Bowel sounds are normal.      Palpations: Abdomen is soft.   Musculoskeletal:      Right lower leg: No edema.      Left lower leg: No edema.   Neurological:      Mental Status: She is alert and oriented to person, place, and time. Mental status is at baseline.      Comments: Forehead furrowing intact, nasolabial fold symmetric with smile; tongue extension symmetric; strength 4++/5 RUE, 5/5 LUE; strength 4+/5 RLE, 5/5 LLE; careful but stable gait   Psychiatric:         Mood and Affect: Mood normal.         Behavior: Behavior normal.      Comments: Good spirits; pos outlook         LABS  ECHO (7/13/21, hosp): EF 56-60%, mild-mod TR, neg saline test    s/p nl swallow study/FEES (7/15/21): aspiration precautions; regular textures, thin liquids    ASSESSMENT/PLAN  Diagnoses and all orders for this visit:    1. Cerebrovascular accident (CVA), unspecified mechanism (CMS/HCC) (Primary)  Assessment & Plan:  Posterior L. MCA CVA with sequelae ofr right-sided weakness, imbalance, right facial droop/numbness and mild expressive aphasia, all of which is significantly resolved; ongoing PT for balance and strength (no further OT per pt); ongoing ST for cognition; passed FEEs while hospitalized; loop recorder in place to eval for cardioembolic etiology; on clopidogrel 75mg QD #90, 0RF and atorvastastin 80mg QD #90, 0RF; homebound with new CVA and noted deficits; f/u neuro Dr. Bautista 10/6/21 as scheduled    Orders:  -     clopidogrel (PLAVIX) 75 MG tablet; Take 1 tablet by mouth Daily.   Dispense: 90 tablet; Refill: 0  -     atorvastatin (LIPITOR) 80 MG tablet; Take 1 tablet by mouth Daily.  Dispense: 90 tablet; Refill: 0    2. Essential hypertension  Assessment & Plan:  BP bord today 138/72; goal < 130/80; no meds      3. Hyperlipidemia LDL goal <70  Assessment & Plan:  Newly on atorvastatin 80mg QD goal with goal LDL < 100, ideally < 70; needs updated lipids in about 2 mos (w/ rescheduled wellness)    Orders:  -     atorvastatin (LIPITOR) 80 MG tablet; Take 1 tablet by mouth Daily.  Dispense: 90 tablet; Refill: 0  -     Lipid Panel; Future    4. Mild cognitive disorder  Assessment & Plan:  With mild expressive aphasia, attributed to new posterior left MCA CVA; cont ST      5. Normocytic anemia  Comments:  macrocytosis resolved; no hemorrhage on CT or brain MRI; f/u CBC  Orders:  -     CBC & Differential; Future    6. Hyperkalemia  Comments:  f/u BMP  Orders:  -     Basic Metabolic Panel; Future    7. Hypothyroidism  Assessment & Plan:  Hospital labs euthyroid on levothyroxine 50mcg QD #90, 3RF    Orders:  -     levothyroxine (SYNTHROID, LEVOTHROID) 50 MCG tablet; Take 1 tablet by mouth Every Morning.  Dispense: 90 tablet; Refill: 3    8. Mixed stress and urge urinary incontinence  Assessment & Plan:  Remains on myrbetriq 50mg QD #90, 3RF    Orders:  -     Mirabegron ER (Myrbetriq) 50 MG tablet sustained-release 24 hour 24 hr tablet; Take 50 mg by mouth Daily.  Dispense: 90 tablet; Refill: 3    9. Mild intermittent asthma without complication  Assessment & Plan:  Stable on advair 100/5 BID, gargle/spit after puffs #3, 3RF; does not need alb MDI rx today      Orders:  -     fluticasone-salmeterol (Advair Diskus) 100-50 MCG/DOSE DISKUS; Inhale 1 puff 2 (Two) Times a Day. Gargle and spit after puffs  Dispense: 3 each; Refill: 3    10. Sciatica of right side  Comments:  since she is already getting PT, rec that she discuss PT for this as well; can supply new order if needed      FOLLOW-UP  1. Health  maintenance - COVID19 vacc done  2. CBC and BMP on the way out the door  3. Recommend consideration getting alert system, like [employer] Alert, since she lives alone  4. RTC 2 mos for rescheduled wellness (labs depend on results of today's labs - will at least need lipids - escribed) and BP check    Electronically signed by:    Lorene Isaac MD, FACP  07/29/2021

## 2021-07-26 NOTE — ASSESSMENT & PLAN NOTE
Posterior L. MCA CVA with sequelae ofr right-sided weakness, imbalance, right facial droop/numbness and mild expressive aphasia, all of which is significantly resolved; ongoing PT for balance and strength (no further OT per pt); ongoing ST for cognition; passed FEEs while hospitalized; loop recorder in place to eval for cardioembolic etiology; on clopidogrel 75mg QD #90, 0RF and atorvastastin 80mg QD #90, 0RF; homebound with new CVA and noted deficits; f/u neuro Dr. Bautista 10/6/21 as scheduled

## 2021-07-26 NOTE — ASSESSMENT & PLAN NOTE
Newly on atorvastatin 80mg QD goal with goal LDL < 100, ideally < 70; needs updated lipids in about 2 mos (w/ rescheduled wellness)

## 2021-07-27 ENCOUNTER — READMISSION MANAGEMENT (OUTPATIENT)
Dept: CALL CENTER | Facility: HOSPITAL | Age: 76
End: 2021-07-27

## 2021-07-27 NOTE — OUTREACH NOTE
Stroke Week 2 Survey      Responses   [facility] patient discharged from?  [place]   Does the patient have one of the following disease processes/diagnoses(primary or secondary)?  Stroke (TIA)   Week 2 attempt successful?  No   Unsuccessful attempts  Attempt 1          Marina Salinas RN

## 2021-07-29 ENCOUNTER — OUTSIDE FACILITY SERVICE (OUTPATIENT)
Dept: INTERNAL MEDICINE | Facility: CLINIC | Age: 76
End: 2021-07-29

## 2021-07-29 ENCOUNTER — LAB (OUTPATIENT)
Dept: LAB | Facility: HOSPITAL | Age: 76
End: 2021-07-29

## 2021-07-29 ENCOUNTER — OFFICE VISIT (OUTPATIENT)
Dept: INTERNAL MEDICINE | Facility: CLINIC | Age: 76
End: 2021-07-29

## 2021-07-29 VITALS
WEIGHT: 175 LBS | BODY MASS INDEX: 29.16 KG/M2 | OXYGEN SATURATION: 98 % | DIASTOLIC BLOOD PRESSURE: 64 MMHG | SYSTOLIC BLOOD PRESSURE: 124 MMHG | HEIGHT: 65 IN | HEART RATE: 75 BPM

## 2021-07-29 DIAGNOSIS — N39.46 MIXED STRESS AND URGE URINARY INCONTINENCE: ICD-10-CM

## 2021-07-29 DIAGNOSIS — E87.5 HYPERKALEMIA: ICD-10-CM

## 2021-07-29 DIAGNOSIS — M54.31 SCIATICA OF RIGHT SIDE: ICD-10-CM

## 2021-07-29 DIAGNOSIS — F09 MILD COGNITIVE DISORDER: Chronic | ICD-10-CM

## 2021-07-29 DIAGNOSIS — E03.9 ACQUIRED HYPOTHYROIDISM: ICD-10-CM

## 2021-07-29 DIAGNOSIS — E78.5 HYPERLIPIDEMIA LDL GOAL <70: Chronic | ICD-10-CM

## 2021-07-29 DIAGNOSIS — D64.9 NORMOCYTIC ANEMIA: ICD-10-CM

## 2021-07-29 DIAGNOSIS — I63.9 CEREBROVASCULAR ACCIDENT (CVA), UNSPECIFIED MECHANISM (HCC): Primary | Chronic | ICD-10-CM

## 2021-07-29 DIAGNOSIS — J45.20 MILD INTERMITTENT ASTHMA WITHOUT COMPLICATION: ICD-10-CM

## 2021-07-29 DIAGNOSIS — I10 ESSENTIAL HYPERTENSION: Chronic | ICD-10-CM

## 2021-07-29 LAB
ANION GAP SERPL CALCULATED.3IONS-SCNC: 10.7 MMOL/L (ref 5–15)
BASOPHILS # BLD AUTO: 0.07 10*3/MM3 (ref 0–0.2)
BASOPHILS NFR BLD AUTO: 0.8 % (ref 0–1.5)
BUN SERPL-MCNC: 17 MG/DL (ref 8–23)
BUN/CREAT SERPL: 19.8 (ref 7–25)
CALCIUM SPEC-SCNC: 9.5 MG/DL (ref 8.6–10.5)
CHLORIDE SERPL-SCNC: 107 MMOL/L (ref 98–107)
CO2 SERPL-SCNC: 19.3 MMOL/L (ref 22–29)
CREAT SERPL-MCNC: 0.86 MG/DL (ref 0.57–1)
DEPRECATED RDW RBC AUTO: 46.4 FL (ref 37–54)
EOSINOPHIL # BLD AUTO: 0.13 10*3/MM3 (ref 0–0.4)
EOSINOPHIL NFR BLD AUTO: 1.5 % (ref 0.3–6.2)
ERYTHROCYTE [DISTWIDTH] IN BLOOD BY AUTOMATED COUNT: 13.1 % (ref 12.3–15.4)
GFR SERPL CREATININE-BSD FRML MDRD: 64 ML/MIN/1.73
GLUCOSE SERPL-MCNC: 98 MG/DL (ref 65–99)
HCT VFR BLD AUTO: 42.9 % (ref 34–46.6)
HGB BLD-MCNC: 13.5 G/DL (ref 12–15.9)
IMM GRANULOCYTES # BLD AUTO: 0.03 10*3/MM3 (ref 0–0.05)
IMM GRANULOCYTES NFR BLD AUTO: 0.3 % (ref 0–0.5)
LYMPHOCYTES # BLD AUTO: 1.62 10*3/MM3 (ref 0.7–3.1)
LYMPHOCYTES NFR BLD AUTO: 18.6 % (ref 19.6–45.3)
MCH RBC QN AUTO: 30.1 PG (ref 26.6–33)
MCHC RBC AUTO-ENTMCNC: 31.5 G/DL (ref 31.5–35.7)
MCV RBC AUTO: 95.5 FL (ref 79–97)
MONOCYTES # BLD AUTO: 0.69 10*3/MM3 (ref 0.1–0.9)
MONOCYTES NFR BLD AUTO: 7.9 % (ref 5–12)
NEUTROPHILS NFR BLD AUTO: 6.15 10*3/MM3 (ref 1.7–7)
NEUTROPHILS NFR BLD AUTO: 70.9 % (ref 42.7–76)
NRBC BLD AUTO-RTO: 0 /100 WBC (ref 0–0.2)
PLATELET # BLD AUTO: 300 10*3/MM3 (ref 140–450)
PMV BLD AUTO: 11.3 FL (ref 6–12)
POTASSIUM SERPL-SCNC: 4.4 MMOL/L (ref 3.5–5.2)
RBC # BLD AUTO: 4.49 10*6/MM3 (ref 3.77–5.28)
SODIUM SERPL-SCNC: 137 MMOL/L (ref 136–145)
WBC # BLD AUTO: 8.69 10*3/MM3 (ref 3.4–10.8)

## 2021-07-29 PROCEDURE — 85025 COMPLETE CBC W/AUTO DIFF WBC: CPT

## 2021-07-29 PROCEDURE — 99495 TRANSJ CARE MGMT MOD F2F 14D: CPT | Performed by: INTERNAL MEDICINE

## 2021-07-29 PROCEDURE — G0180 MD CERTIFICATION HHA PATIENT: HCPCS | Performed by: INTERNAL MEDICINE

## 2021-07-29 PROCEDURE — 80048 BASIC METABOLIC PNL TOTAL CA: CPT

## 2021-07-29 PROCEDURE — 99999 PR OFFICE/OUTPT VISIT,PROCEDURE ONLY: CPT | Performed by: INTERNAL MEDICINE

## 2021-07-29 PROCEDURE — 99214 OFFICE O/P EST MOD 30 MIN: CPT | Performed by: INTERNAL MEDICINE

## 2021-07-29 RX ORDER — CLOPIDOGREL BISULFATE 75 MG/1
75 TABLET ORAL DAILY
Qty: 90 TABLET | Refills: 0 | Status: SHIPPED | OUTPATIENT
Start: 2021-07-29 | End: 2021-09-19 | Stop reason: SDUPTHER

## 2021-07-29 RX ORDER — ATORVASTATIN CALCIUM 80 MG/1
80 TABLET, FILM COATED ORAL DAILY
Qty: 90 TABLET | Refills: 0 | Status: SHIPPED | OUTPATIENT
Start: 2021-07-29 | End: 2021-09-19 | Stop reason: SDUPTHER

## 2021-07-29 RX ORDER — LEVOTHYROXINE SODIUM 0.05 MG/1
50 TABLET ORAL EVERY MORNING
Qty: 90 TABLET | Refills: 3 | Status: SHIPPED | OUTPATIENT
Start: 2021-07-29 | End: 2022-04-27 | Stop reason: SDUPTHER

## 2021-07-30 ENCOUNTER — TELEPHONE (OUTPATIENT)
Dept: INTERNAL MEDICINE | Facility: CLINIC | Age: 76
End: 2021-07-30

## 2021-07-30 NOTE — TELEPHONE ENCOUNTER
----- Message from Lorene Isaac MD sent at 7/29/2021  7:16 PM EDT -----  Blood counts normal; electrolytes, including potassium, and liver and kidney function.Glad to see patient doing so well. Continue current meds and therapy

## 2021-07-30 NOTE — TELEPHONE ENCOUNTER
Pt notified and verbalized understanding. She would like to know if you would be able to let her know when she would be released to drive.

## 2021-08-02 ENCOUNTER — READMISSION MANAGEMENT (OUTPATIENT)
Dept: CALL CENTER | Facility: HOSPITAL | Age: 76
End: 2021-08-02

## 2021-08-02 NOTE — OUTREACH NOTE
Stroke Week 2 Survey      Responses   [facility] patient discharged from?  [place]   Does the patient have one of the following disease processes/diagnoses(primary or secondary)?  Stroke (TIA)   Week 2 attempt successful?  No   Unsuccessful attempts  Attempt 2   Week 2 call completed?  Yes   Revoked  No further contact(revokes)-requires comment   Is the patient interested in additional calls from an ambulatory ?  NOTE:  applies to high risk patients requiring additional follow-up.  No   Graduated/Revoked comments  UTR x 5          Kalie Wood RN

## 2021-08-13 ENCOUNTER — TELEPHONE (OUTPATIENT)
Dept: INTERNAL MEDICINE | Facility: CLINIC | Age: 76
End: 2021-08-13

## 2021-08-13 DIAGNOSIS — K21.9 GASTROESOPHAGEAL REFLUX DISEASE WITHOUT ESOPHAGITIS: ICD-10-CM

## 2021-08-13 RX ORDER — OMEPRAZOLE 40 MG/1
40 CAPSULE, DELAYED RELEASE ORAL DAILY
Qty: 90 CAPSULE | Refills: 0 | Status: SHIPPED | OUTPATIENT
Start: 2021-08-13 | End: 2021-09-19 | Stop reason: SDUPTHER

## 2021-08-13 NOTE — TELEPHONE ENCOUNTER
Caller: MARIAN    Relationship: REPRESENTATIVE FROM [facility]    Best call back number:     [phone]    Medication needed:   Requested Prescriptions     Pending Prescriptions Disp Refills   • omeprazole (priLOSEC) 40 MG capsule 90 capsule 3     Sig: Take 1 capsule by mouth Daily. Take 30 minutes 1st meal of the day     CELECOXIB - 200 MG CAPSULE    PEARLGERARDO REQUESTED THE MEDICATION LISTED ABOVE TO BE REFILLED    [facility]UB DID NOT FIND THIS MEDICATION LISTED IN HER MEDICATION LIST    When do you need the refill by:     ASAP    What additional details did the patient provide when requesting the medication:     CALLER STATED THAT PATIENT IS OUT OF THE OMEPRAZOLE    AGAIN THE CELECOXIB WAS NOT LISTED TO KNOW IF PATIENT IS OUT OR NOT    Does the patient have less than a 3 day supply:  [x] Yes  [] No    What is the patient's preferred pharmacy:      [facility]- [place], [place]    TELEPHONE CONTACT:    [phone]    DR MEDRANO

## 2021-08-26 PROBLEM — Z86.73 HISTORY OF CVA (CEREBROVASCULAR ACCIDENT): Status: ACTIVE | Noted: 2021-07-14

## 2021-08-26 PROBLEM — Z95.818 STATUS POST PLACEMENT OF IMPLANTABLE LOOP RECORDER: Status: ACTIVE | Noted: 2021-08-26

## 2021-08-26 NOTE — PROGRESS NOTES
"[facility]      Identification: Rebecca Sharma is a 76 y.o. female who resides in [place], [place]    Reason for visit:  · History of CVA status post loop recorder  · Hypertension  · Hyperlipidemia      Subjective      Rebecca Sharma presents to [facility] for followup.    History of Present Illness    Patient is 76-year-old female who returns today for follow-up of her recent loop recorder implant, hypertension hyperlipidemia.  Patient was hospitalized in July of this year after being transferred from [facility] for acute CVA.  The patient received IV TPA at OSH.  Her imaging revealed a left MCA CVA that was concerning for cardioembolic source.  An echocardiogram performed showed normal LVEF, moderate tricuspid regurgitation and negative bubble study.  No arrhythmias were noted through her stay but due to high concern of cardioembolic source a loop recorder was implanted.  She presents today as a follow-up from that hospital stay.  She has had no episodes of atrial fibrillation noted on her loop recorder implant.  No arrhythmias at all have been identified.  She is tolerating Plavix without reports of active or overt bleeding.  She has had no further signs or symptoms of TIA or CVA.  She does report still having some difficulty word finding and pronunciation at times but is following speech therapy.  She reports having a flareup of shortness of breath and even sent an extra transmissions wondering if she had an arrhythmia but she was in normal sinus rhythm.  She does have asthma and is wondering if it is allergies that has her flared up.  She is on statin therapy and tolerating without myalgias.    Objective     /78 (BP Location: Right arm, Patient Position: Sitting)   Pulse 84   Ht 166.4 cm (65.5\")   Wt 80.2 kg (176 lb 12.8 oz)   SpO2 96%   BMI 28.97 kg/m²       Constitutional:       Appearance: Healthy appearance. Well-developed.   Eyes: "      General: Lids are normal. No scleral icterus.     Conjunctiva/sclera: Conjunctivae normal.   HENT:      Head: Normocephalic and atraumatic.   Neck:      Thyroid: No thyromegaly.      Vascular: No carotid bruit or JVD.   Pulmonary:      Effort: Pulmonary effort is normal.      Breath sounds: Normal breath sounds. No wheezing. No rhonchi. No rales.   Cardiovascular:      Normal rate. Regular rhythm.      Murmurs: There is no murmur.      No gallop. No rub.   Pulses:     Intact distal pulses.   Edema:     Peripheral edema absent.   Abdominal:      General: There is no distension.      Palpations: Abdomen is soft. There is no abdominal mass.   Musculoskeletal:      Cervical back: Normal range of motion. Skin:     General: Skin is warm and dry.      Findings: No rash.   Neurological:      General: No focal deficit present.      Mental Status: Alert and oriented to person, place, and time.      Gait: Gait is intact.   Psychiatric:         Attention and Perception: Attention normal.         Mood and Affect: Mood normal.         Behavior: Behavior normal.         Result Review :    Lab Results   Component Value Date    GLUCOSE 98 07/29/2021    BUN 17 07/29/2021    CREATININE 0.86 07/29/2021    EGFRIFNONA 64 07/29/2021    BCR 19.8 07/29/2021    K 4.4 07/29/2021    CO2 19.3 (L) 07/29/2021    CALCIUM 9.5 07/29/2021    ALBUMIN 3.30 (L) 07/15/2021    AST 18 07/15/2021    ALT 13 07/15/2021     Lab Results   Component Value Date    WBC 8.69 07/29/2021    HGB 13.5 07/29/2021    HCT 42.9 07/29/2021    MCV 95.5 07/29/2021     07/29/2021     Lab Results   Component Value Date    CHOL 166 07/13/2020    CHLPL 151 06/07/2016    TRIG 100 07/13/2020    HDL 41 07/13/2020     (H) 07/13/2020     Lab Results   Component Value Date    HGBA1C 5.80 (H) 07/14/2021             Assessment     Problem List Items Addressed This Visit        Cardiac and Vasculature    Hyperlipidemia LDL goal <70 (Chronic)    Current Assessment & Plan      · Continue Lipitor 40 mg daily.         Relevant Orders    Lipid Panel    Comprehensive Metabolic Panel    Essential hypertension (Chronic)    Overview     7/29/21 BP bord 138/72; no meds         Current Assessment & Plan     · Hypertension is controlled           Status post placement of implantable loop recorder    Current Assessment & Plan     · Normal functioning device  · No arrhythmias and no atrial fibrillation detected            Neuro    History of CVA (cerebrovascular accident) - Primary    Overview     · Transferred from [facility] to [facility] ED for stroke status post TPA on 7/13/2021.  Symptoms were expressive aphasia and dysarthria.   · Imaging revealed left MCA CVA. Normal brain perfusion CT (7/13/21)  · CT angio head/neck (7/13/21, hospitalized): No definite lg vessel occlusion, high-grade stenosis or dissection; indeterminate narrowing/occlusion of small P3 branch of the patient's left PCA; small outpouching at origin of left P-com may represent a small aneurysm versus infundibulum.    · ECHO (7/14/2021): LVEF 56 - 60%.  Negative bubble study.   · MRI brain (7/14/21): acute infarct left parietal and posterior temporal lobe w/ adjacent area c/w left posterior MCA CVA  · Loop recorder (BSC) implanted 7/15/2021  · 7/15/21 passed FEES with dysphagia, no pharyngeal impairment, rec reg textures and thin liquids, aspiration precautions  · 7/16/21 discharge on atorvastatin 80mg and plavix 75mg QD, f/u neuro 6-8 wks         Current Assessment & Plan     · No signs or symptoms TIA or CVA  · If atrial fibrillation noted on device interrogation will need NOAC  · Continue Plavix 75 mg daily           Patient has no signs or symptoms TIA or CVA.  We will continue her Plavix and statin.  If atrial fibrillation were to be detected on her device interrogation she would need transition to Eliquis.  We will obtain a CMP and lipid profile today.  She will follow-up in 12 months or sooner if  needed.    Plan   • If atrial fibrillation detected on device interrogation will need Plavix transition to Eliquis  • Obtain CMP and lipid profile today      Follow-up   Return in about 1 year (around 8/31/2022), or if symptoms worsen or fail to improve, for Follow-up with Dr. Smith next visit.      Vicky Del Cid APRN    8/31/2021

## 2021-08-28 PROCEDURE — G2066 INTER DEVC REMOTE 30D: HCPCS | Performed by: INTERNAL MEDICINE

## 2021-08-28 PROCEDURE — 93298 REM INTERROG DEV EVAL SCRMS: CPT | Performed by: INTERNAL MEDICINE

## 2021-08-31 ENCOUNTER — OFFICE VISIT (OUTPATIENT)
Dept: CARDIOLOGY | Facility: CLINIC | Age: 76
End: 2021-08-31

## 2021-08-31 VITALS
HEART RATE: 84 BPM | WEIGHT: 176.8 LBS | HEIGHT: 66 IN | OXYGEN SATURATION: 96 % | DIASTOLIC BLOOD PRESSURE: 78 MMHG | SYSTOLIC BLOOD PRESSURE: 134 MMHG | BODY MASS INDEX: 28.42 KG/M2

## 2021-08-31 DIAGNOSIS — Z86.73 HISTORY OF CVA (CEREBROVASCULAR ACCIDENT): Primary | ICD-10-CM

## 2021-08-31 DIAGNOSIS — Z95.818 STATUS POST PLACEMENT OF IMPLANTABLE LOOP RECORDER: ICD-10-CM

## 2021-08-31 DIAGNOSIS — E78.5 HYPERLIPIDEMIA LDL GOAL <70: Chronic | ICD-10-CM

## 2021-08-31 DIAGNOSIS — I10 ESSENTIAL HYPERTENSION: Chronic | ICD-10-CM

## 2021-08-31 PROCEDURE — 99214 OFFICE O/P EST MOD 30 MIN: CPT | Performed by: NURSE PRACTITIONER

## 2021-08-31 RX ORDER — CHLORAL HYDRATE 500 MG
1000 CAPSULE ORAL
COMMUNITY

## 2021-08-31 RX ORDER — CELECOXIB 200 MG/1
200 CAPSULE ORAL AS NEEDED
COMMUNITY
End: 2021-09-21

## 2021-08-31 NOTE — ASSESSMENT & PLAN NOTE
· No signs or symptoms TIA or CVA  · If atrial fibrillation noted on device interrogation will need NOAC  · Continue Plavix 75 mg daily

## 2021-09-01 ENCOUNTER — TELEPHONE (OUTPATIENT)
Dept: INTERNAL MEDICINE | Facility: CLINIC | Age: 76
End: 2021-09-01

## 2021-09-01 NOTE — TELEPHONE ENCOUNTER
Caller: Rebecca Sharma    Relationship: Self    Best call back number: [phone]    PATIENT HAD LABWORK DONE AT HER LAST VISIT ON 07/29. SHE WOULD LIKE DR MEDRANO TO CHECK THE CHART TO SEE IF ANY TESTS ARE NEEDED FOR HER UPCOMING WELLNESS APPOINTMENT ON 09/21. OR IF SHE HAS ALL THE RESULTS THAT SHE WOULD NORMALLY ORDER FOR THAT VISIT.      PLEASE CALL AND ADVISE [phone]

## 2021-09-01 NOTE — TELEPHONE ENCOUNTER
Let pt know there are orders for her in chart to have done week before appt. Pt verbalized understanding

## 2021-09-02 ENCOUNTER — TELEPHONE (OUTPATIENT)
Dept: INTERNAL MEDICINE | Facility: CLINIC | Age: 76
End: 2021-09-02

## 2021-09-02 NOTE — TELEPHONE ENCOUNTER
Arminda with [facility] called stating th pt has voice issues & is needing an ENT referral.  Her phone # is [phone].

## 2021-09-07 ENCOUNTER — OFFICE (OUTPATIENT)
Dept: URBAN - METROPOLITAN AREA CLINIC 4 | Facility: CLINIC | Age: 76
End: 2021-09-07
Payer: COMMERCIAL

## 2021-09-07 VITALS — DIASTOLIC BLOOD PRESSURE: 74 MMHG | SYSTOLIC BLOOD PRESSURE: 124 MMHG | WEIGHT: 176 LBS | HEIGHT: 65 IN

## 2021-09-07 DIAGNOSIS — K30 FUNCTIONAL DYSPEPSIA: ICD-10-CM

## 2021-09-07 DIAGNOSIS — R13.10 DYSPHAGIA, UNSPECIFIED: ICD-10-CM

## 2021-09-07 DIAGNOSIS — B96.81 HELICOBACTER PYLORI [H. PYLORI] AS THE CAUSE OF DISEASES CLA: ICD-10-CM

## 2021-09-07 DIAGNOSIS — A04.8 OTHER SPECIFIED BACTERIAL INTESTINAL INFECTIONS: ICD-10-CM

## 2021-09-07 PROBLEM — R10.13 EPIGASTRIC PAIN: Status: ACTIVE | Noted: 2021-04-29

## 2021-09-07 PROCEDURE — 99214 OFFICE O/P EST MOD 30 MIN: CPT | Performed by: NURSE PRACTITIONER

## 2021-09-08 PROBLEM — A04.8 HELICOBACTER PYLORI (H. PYLORI) INFECTION: Status: ACTIVE | Noted: 2021-09-08

## 2021-09-09 ENCOUNTER — TELEPHONE (OUTPATIENT)
Dept: INTERNAL MEDICINE | Facility: CLINIC | Age: 76
End: 2021-09-09

## 2021-09-09 ENCOUNTER — TELEPHONE (OUTPATIENT)
Dept: CARDIOLOGY | Facility: CLINIC | Age: 76
End: 2021-09-09

## 2021-09-09 ENCOUNTER — LAB (OUTPATIENT)
Dept: LAB | Facility: HOSPITAL | Age: 76
End: 2021-09-09

## 2021-09-09 DIAGNOSIS — M81.0 AGE-RELATED OSTEOPOROSIS WITHOUT CURRENT PATHOLOGICAL FRACTURE: ICD-10-CM

## 2021-09-09 DIAGNOSIS — E03.9 ACQUIRED HYPOTHYROIDISM: ICD-10-CM

## 2021-09-09 DIAGNOSIS — I10 ESSENTIAL HYPERTENSION: ICD-10-CM

## 2021-09-09 DIAGNOSIS — E78.5 HYPERLIPIDEMIA LDL GOAL <70: ICD-10-CM

## 2021-09-09 DIAGNOSIS — Z00.00 MEDICARE ANNUAL WELLNESS VISIT, SUBSEQUENT: Primary | ICD-10-CM

## 2021-09-09 DIAGNOSIS — Z00.00 MEDICARE ANNUAL WELLNESS VISIT, SUBSEQUENT: ICD-10-CM

## 2021-09-09 LAB
BACTERIA UR QL AUTO: ABNORMAL /HPF
BASOPHILS # BLD AUTO: 0.04 10*3/MM3 (ref 0–0.2)
BASOPHILS NFR BLD AUTO: 0.6 % (ref 0–1.5)
BILIRUB UR QL STRIP: NEGATIVE
CLARITY UR: CLEAR
COLOR UR: YELLOW
DEPRECATED RDW RBC AUTO: 45.3 FL (ref 37–54)
EOSINOPHIL # BLD AUTO: 0.15 10*3/MM3 (ref 0–0.4)
EOSINOPHIL NFR BLD AUTO: 2.1 % (ref 0.3–6.2)
ERYTHROCYTE [DISTWIDTH] IN BLOOD BY AUTOMATED COUNT: 13.2 % (ref 12.3–15.4)
GLUCOSE UR STRIP-MCNC: NEGATIVE MG/DL
HCT VFR BLD AUTO: 40.8 % (ref 34–46.6)
HGB BLD-MCNC: 13.4 G/DL (ref 12–15.9)
HGB UR QL STRIP.AUTO: NEGATIVE
HYALINE CASTS UR QL AUTO: ABNORMAL /LPF
IMM GRANULOCYTES # BLD AUTO: 0.03 10*3/MM3 (ref 0–0.05)
IMM GRANULOCYTES NFR BLD AUTO: 0.4 % (ref 0–0.5)
KETONES UR QL STRIP: NEGATIVE
LEUKOCYTE ESTERASE UR QL STRIP.AUTO: ABNORMAL
LYMPHOCYTES # BLD AUTO: 1.68 10*3/MM3 (ref 0.7–3.1)
LYMPHOCYTES NFR BLD AUTO: 23.4 % (ref 19.6–45.3)
MCH RBC QN AUTO: 30.9 PG (ref 26.6–33)
MCHC RBC AUTO-ENTMCNC: 32.8 G/DL (ref 31.5–35.7)
MCV RBC AUTO: 94.2 FL (ref 79–97)
MONOCYTES # BLD AUTO: 0.62 10*3/MM3 (ref 0.1–0.9)
MONOCYTES NFR BLD AUTO: 8.6 % (ref 5–12)
NEUTROPHILS NFR BLD AUTO: 4.67 10*3/MM3 (ref 1.7–7)
NEUTROPHILS NFR BLD AUTO: 64.9 % (ref 42.7–76)
NITRITE UR QL STRIP: NEGATIVE
NRBC BLD AUTO-RTO: 0.1 /100 WBC (ref 0–0.2)
PH UR STRIP.AUTO: 6.5 [PH] (ref 5–8)
PLATELET # BLD AUTO: 306 10*3/MM3 (ref 140–450)
PMV BLD AUTO: 11 FL (ref 6–12)
PROT UR QL STRIP: NEGATIVE
RBC # BLD AUTO: 4.33 10*6/MM3 (ref 3.77–5.28)
RBC # UR: ABNORMAL /HPF
REF LAB TEST METHOD: ABNORMAL
SP GR UR STRIP: 1.01 (ref 1–1.03)
SQUAMOUS #/AREA URNS HPF: ABNORMAL /HPF
UROBILINOGEN UR QL STRIP: ABNORMAL
WBC # BLD AUTO: 7.19 10*3/MM3 (ref 3.4–10.8)
WBC UR QL AUTO: ABNORMAL /HPF

## 2021-09-09 PROCEDURE — 86803 HEPATITIS C AB TEST: CPT

## 2021-09-09 PROCEDURE — 84443 ASSAY THYROID STIM HORMONE: CPT

## 2021-09-09 PROCEDURE — 80061 LIPID PANEL: CPT

## 2021-09-09 PROCEDURE — 85025 COMPLETE CBC W/AUTO DIFF WBC: CPT

## 2021-09-09 PROCEDURE — 80053 COMPREHEN METABOLIC PANEL: CPT

## 2021-09-09 PROCEDURE — 81001 URINALYSIS AUTO W/SCOPE: CPT

## 2021-09-09 PROCEDURE — 82306 VITAMIN D 25 HYDROXY: CPT

## 2021-09-09 PROCEDURE — 84439 ASSAY OF FREE THYROXINE: CPT

## 2021-09-09 NOTE — TELEPHONE ENCOUNTER
Called Mrs Sharma due to [employer] loop recorder monitor isn't reading.  Left message for a return call.

## 2021-09-10 LAB
25(OH)D3 SERPL-MCNC: 37.6 NG/ML (ref 30–100)
ALBUMIN SERPL-MCNC: 4 G/DL (ref 3.5–5.2)
ALBUMIN/GLOB SERPL: 1.6 G/DL
ALP SERPL-CCNC: 93 U/L (ref 39–117)
ALT SERPL W P-5'-P-CCNC: 21 U/L (ref 1–33)
ANION GAP SERPL CALCULATED.3IONS-SCNC: 8.9 MMOL/L (ref 5–15)
AST SERPL-CCNC: 24 U/L (ref 1–32)
BILIRUB SERPL-MCNC: 0.4 MG/DL (ref 0–1.2)
BUN SERPL-MCNC: 11 MG/DL (ref 8–23)
BUN/CREAT SERPL: 14.1 (ref 7–25)
CALCIUM SPEC-SCNC: 9.1 MG/DL (ref 8.6–10.5)
CHLORIDE SERPL-SCNC: 108 MMOL/L (ref 98–107)
CHOLEST SERPL-MCNC: 106 MG/DL (ref 0–200)
CO2 SERPL-SCNC: 23.1 MMOL/L (ref 22–29)
CREAT SERPL-MCNC: 0.78 MG/DL (ref 0.57–1)
GFR SERPL CREATININE-BSD FRML MDRD: 72 ML/MIN/1.73
GLOBULIN UR ELPH-MCNC: 2.5 GM/DL
GLUCOSE SERPL-MCNC: 78 MG/DL (ref 65–99)
HCV AB SER DONR QL: NORMAL
HDLC SERPL-MCNC: 31 MG/DL (ref 40–60)
LDLC SERPL CALC-MCNC: 60 MG/DL (ref 0–100)
LDLC/HDLC SERPL: 1.97 {RATIO}
POTASSIUM SERPL-SCNC: 4.2 MMOL/L (ref 3.5–5.2)
PROT SERPL-MCNC: 6.5 G/DL (ref 6–8.5)
SODIUM SERPL-SCNC: 140 MMOL/L (ref 136–145)
T4 FREE SERPL-MCNC: 1.31 NG/DL (ref 0.93–1.7)
TRIGL SERPL-MCNC: 69 MG/DL (ref 0–150)
TSH SERPL DL<=0.05 MIU/L-ACNC: 2.23 UIU/ML (ref 0.27–4.2)
VLDLC SERPL-MCNC: 15 MG/DL (ref 5–40)

## 2021-09-14 ENCOUNTER — TELEPHONE (OUTPATIENT)
Dept: INTERNAL MEDICINE | Facility: CLINIC | Age: 76
End: 2021-09-14

## 2021-09-19 PROBLEM — I63.412 CEREBROVASCULAR ACCIDENT (CVA) DUE TO EMBOLISM OF LEFT MIDDLE CEREBRAL ARTERY: Status: ACTIVE | Noted: 2021-07-14

## 2021-09-20 NOTE — ASSESSMENT & PLAN NOTE
Remains on clopidogrel 75mg QD and atorvastatin 80mg QD, both #90, 3RF; does not have side effects with high dose atorvastatin; next appt with neuro Dr. Bautista 10/6/21

## 2021-09-20 NOTE — PROGRESS NOTES
ANNUAL WELLNESS VISIT    DRUG AND ALCOHOL USE      no alcohol use and alcohol intake:glass of wine with dinner    DIET AND PHYSICAL ACTIVITY     Diet: low fat, diabetic    Exercise: daily   Exercise Details: Stretches, and Balancing     MOOD DISORDER AND COGNITIVE SCREENING   Depression Screening Tool Used yes - see PHQ-9; components reviewed with patient; 15-min counseling done - reports emotional lability since having stroke, sometimes with easy crying; does not feel blue or hopeless and no problems with interests; denies concerns with appetite, sleep (uses her CPAP), concentration, energy, negative thoughts. Has some issues with word finding and slower speaking since stroke but constantly improving. Does not have trouble engaging in life, making it to appointments, going to meet friends, and pursuing her regular activity. Specifically states she does not feel depressed and she is not anxious. Screening for depression is negative.   Anxiety Screening Tool Used yes     Mini-Cog Performed   Yes    1. Tell Patient 3 Words Aplle, Table, Patricia    2. Administer Clock Test normal    3. Recall 3 words  Apple, Table, Patricia    4. Number Correct Items 3    FUNCTIONAL ABILITY AND LEVEL OF SAFETY   Hearing no hearing loss     Wears Hearing Aids No       Current Activities Independent      none  - see Funct/Cog Status Intake     Fall Risk Assessment       Has difficulty with walking or balance  No         Timed Up and Go (TUG) Test  11 sec.       If >12 sec, normal    ADVANCED DIRECTIVE  Advance Care Planning   ACP discussion was held with the patient during this visit. Patient has an advance directive in EMR which is still valid.      Advance Care Planning Discussion:  16 min or more spent on counseling; patient has advanced directive and living will.  Reviewed desires for end of life care, which is to have comfort care.  Patient does not want extraordinary life-sustaining measures, including no prolonged artificial life  support.  Reviewed code status options, meanings, desires. Patient 's code status is Full Code.   Encouraged patient to ensure family is aware of desires/preferences. Her friend Charlie Mayne is her HCA and she states her sister is aware of this.    PAIN SCREENING Do you have pain right now? no      If so, 1-10 scale: 0        Do you have pain every day? No      Probable chronic pain: No     Recent Hospitalizations:  Recently treated at the following:  [facility].     MEDICATION REVIEW   - updated and reviewed (see Medication List).   - reviewed for potentially harmful drug-disease interactions in the elderly.   - reviewed for high risk medications in the elderly.   - aspirin use: No    BMI  Body mass index is 28.24 kg/m².    Patient's Body mass index is 28.24 kg/m². indicating that she is overweight (BMI 25-29.9). Obesity-related health conditions include the following: obstructive sleep apnea, hypertension, dyslipidemias, GERD and osteoarthritis. Obesity is unchanged. BMI is is above average; BMI management plan is completed. We discussed portion control and increasing exercise..    _________________________________________________________    Chief Complaint   Patient presents with   • Medicare Wellness-subsequent   • Hyperlipidemia   • Hypertension       History of Present Illness  76 y.o.  woman presents for updated wellness visit and f/u on CVA. Had intermittent hoarseness prior to CVA and now fairly constant (except a little better today). Notes some discomfort in her left side of the neck/throat that comes and goes but no pain with swallowing and food does not get stuck in her throat. 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 At that time she went to get evaluated due to on/off hoarseness and inability to sing. At that time, she had to go to voice therapy. Requesting to go see ENT in [place] so it is closer to her home.    Gets 4-5 hours  "sleep with her CPAP. States although this is not many hours, she does feel rested and refreshed when he awakens.     Reports taking advair for asthma but for some reason is off of singulair; would like to resume med.     Review of Systems  Denies headaches, visual changes, CP, palpitations, SOB, cough, abd pain, n/v/d, difficulty with urination, numbness/tingling, falls,  lightheadedness, hearing changes, rashes.    ROS (+) for hoarseness without dysphagia or sore throat.    ROS (+) for mild emotional lability, some crying, particularly after CVA, but reports sleep is good and is still socially engaging. Is not withdrawing from life activities.    Denies vaginal discharge or bleeding (no periods) or breast concerns.   All other ROS reviewed and negative.    Current Outpatient Medications:   •  albuterol sulfate HFA (Ventolin HFA) 108 (90 Base) MCG/ACT inhaler prn  •  atorvastatin (LIPITOR) 80 MG QD  •  calcium carbonate-cholecalciferol 500-400 MG-UNIT QD  •  clopidogrel (PLAVIX) 75 MG QD  •  fluticasone-salmeterol (Advair Diskus) 100-50 MCG/DOSE DISKUS 1 puff BID  •  levothyroxine 50 MCG QD  •  Mirabegron ER (Myrbetriq) 50 MG QD  •  montelukast (Singulair) 10 MG QD  •  Omega-3 Fatty Acids (fish oil) 1000 MG QD  •  omeprazole (priLOSEC) 40 MG QD  •  Prolia 60 MG/ML solution prefilled q6 mos  •  psyllium (KONSYL) 28.3 % pack QD  •  TURMERIC CURCUMIN PO QD      VITALS:  /76   Pulse 80   Temp 98.3 °F (36.8 °C)   Resp 16   Ht 166.4 cm (65.5\")   Wt 78.2 kg (172 lb 4.8 oz)   SpO2 96%   Breastfeeding No   BMI 28.24 kg/m²     Physical Exam  Vitals and nursing note reviewed.   Constitutional:       General: She is not in acute distress.     Appearance: Normal appearance. She is well-developed.      Comments: Mildly hoarse   HENT:      Head: Normocephalic.      Right Ear: Tympanic membrane, ear canal and external ear normal.      Left Ear: Tympanic membrane, ear canal and external ear normal.      Nose: Nose " normal.   Eyes:      Extraocular Movements: Extraocular movements intact.      Conjunctiva/sclera: Conjunctivae normal.      Pupils: Pupils are equal, round, and reactive to light.   Neck:      Vascular: No carotid bruit (bilaterally).   Cardiovascular:      Rate and Rhythm: Normal rate and regular rhythm.      Heart sounds: Normal heart sounds.   Pulmonary:      Effort: Pulmonary effort is normal. No respiratory distress.      Breath sounds: Normal breath sounds. No wheezing or rales.   Abdominal:      General: Bowel sounds are normal. There is no distension.      Palpations: Abdomen is soft. There is no mass.      Tenderness: There is no abdominal tenderness.   Musculoskeletal:      Cervical back: Normal range of motion and neck supple.      Right lower leg: No edema.      Left lower leg: No edema.   Lymphadenopathy:      Cervical: No cervical adenopathy.   Skin:     General: Skin is warm and dry.      Findings: No rash.   Neurological:      Mental Status: She is alert and oriented to person, place, and time.      Cranial Nerves: No cranial nerve deficit.      Gait: Gait normal.      Deep Tendon Reflexes: Reflexes normal.   Psychiatric:         Mood and Affect: Mood normal.         Behavior: Behavior normal.         Thought Content: Thought content normal.         LABS  Results for orders placed or performed in visit on 09/09/21   Comprehensive Metabolic Panel    Specimen: Blood   Result Value Ref Range    Glucose 78 65 - 99 mg/dL    BUN 11 8 - 23 mg/dL    Creatinine 0.78 0.57 - 1.00 mg/dL    Sodium 140 136 - 145 mmol/L    Potassium 4.2 3.5 - 5.2 mmol/L    Chloride 108 (H) 98 - 107 mmol/L    CO2 23.1 22.0 - 29.0 mmol/L    Calcium 9.1 8.6 - 10.5 mg/dL    Total Protein 6.5 6.0 - 8.5 g/dL    Albumin 4.00 3.50 - 5.20 g/dL    ALT (SGPT) 21 1 - 33 U/L    AST (SGOT) 24 1 - 32 U/L    Alkaline Phosphatase 93 39 - 117 U/L    Total Bilirubin 0.4 0.0 - 1.2 mg/dL    eGFR Non African Amer 72 >60 mL/min/1.73    Globulin 2.5  gm/dL    A/G Ratio 1.6 g/dL    BUN/Creatinine Ratio 14.1 7.0 - 25.0    Anion Gap 8.9 5.0 - 15.0 mmol/L   Hepatitis C Antibody    Specimen: Blood   Result Value Ref Range    Hepatitis C Ab Non-Reactive Non-Reactive   Lipid Panel    Specimen: Blood   Result Value Ref Range    Total Cholesterol 106 0 - 200 mg/dL    Triglycerides 69 0 - 150 mg/dL    HDL Cholesterol 31 (L) 40 - 60 mg/dL    LDL Cholesterol  60 0 - 100 mg/dL    VLDL Cholesterol 15 5 - 40 mg/dL    LDL/HDL Ratio 1.97    TSH    Specimen: Blood   Result Value Ref Range    TSH 2.230 0.270 - 4.200 uIU/mL   Vitamin D 25 Hydroxy    Specimen: Blood   Result Value Ref Range    25 Hydroxy, Vitamin D 37.6 30.0 - 100.0 ng/ml   T4, Free    Specimen: Blood   Result Value Ref Range    Free T4 1.31 0.93 - 1.70 ng/dL   CBC Auto Differential    Specimen: Blood   Result Value Ref Range    WBC 7.19 3.40 - 10.80 10*3/mm3    RBC 4.33 3.77 - 5.28 10*6/mm3    Hemoglobin 13.4 12.0 - 15.9 g/dL    Hematocrit 40.8 34.0 - 46.6 %    MCV 94.2 79.0 - 97.0 fL    MCH 30.9 26.6 - 33.0 pg    MCHC 32.8 31.5 - 35.7 g/dL    RDW 13.2 12.3 - 15.4 %    RDW-SD 45.3 37.0 - 54.0 fl    MPV 11.0 6.0 - 12.0 fL    Platelets 306 140 - 450 10*3/mm3    Neutrophil % 64.9 42.7 - 76.0 %    Lymphocyte % 23.4 19.6 - 45.3 %    Monocyte % 8.6 5.0 - 12.0 %    Eosinophil % 2.1 0.3 - 6.2 %    Basophil % 0.6 0.0 - 1.5 %    Immature Grans % 0.4 0.0 - 0.5 %    Neutrophils, Absolute 4.67 1.70 - 7.00 10*3/mm3    Lymphocytes, Absolute 1.68 0.70 - 3.10 10*3/mm3    Monocytes, Absolute 0.62 0.10 - 0.90 10*3/mm3    Eosinophils, Absolute 0.15 0.00 - 0.40 10*3/mm3    Basophils, Absolute 0.04 0.00 - 0.20 10*3/mm3    Immature Grans, Absolute 0.03 0.00 - 0.05 10*3/mm3    nRBC 0.1 0.0 - 0.2 /100 WBC   Urinalysis without microscopic (no culture) - Urine, Clean Catch    Specimen: Urine, Clean Catch   Result Value Ref Range    Color, UA Yellow Yellow, Straw    Appearance, UA Clear Clear    pH, UA 6.5 5.0 - 8.0    Specific Gravity, UA  1.009 1.005 - 1.030    Glucose, UA Negative Negative    Ketones, UA Negative Negative    Bilirubin, UA Negative Negative    Blood, UA Negative Negative    Protein, UA Negative Negative    Leuk Esterase, UA Moderate (2+) (A) Negative    Nitrite, UA Negative Negative    Urobilinogen, UA 0.2 E.U./dL 0.2 - 1.0 E.U./dL   Urinalysis, Microscopic Only - Urine, Clean Catch    Specimen: Urine, Clean Catch   Result Value Ref Range    RBC, UA 0-2 None Seen, 0-2 /HPF    WBC, UA 6-12 (A) None Seen, 0-2 /HPF    Bacteria, UA 3+ (A) None Seen /HPF    Squamous Epithelial Cells, UA 0-2 None Seen, 0-2 /HPF    Hyaline Casts, UA None Seen None Seen /LPF    Methodology Automated Microscopy          ASSESSMENT/PLAN    Diagnoses and all orders for this visit:    1. Medicare annual wellness visit, subsequent (Primary)  Assessment & Plan:  Health maintenance - COVID19 vacc done; rec high dose flu vacc (none available in the office today); Prevnar 5/15; PVX 4/14; Td 8/18 (next Td due 2028); Zostavax 2/08; HAV and Shingrix done; 7/20 breast MRI; overdue for bilat scr mammo (due 12/20); GYN/Pap w/ Dr. Bauer 8/21 (nl Pap per pt), f/u 2 years; DEXA 10/19, repeat after 10/8/21; colonosc 11/19, repeat 2024 per Dr. Kartik Gold; eye exam w/ Dr. Tate next Monday (9/27/21); dental exam q6 mos; (+) seat belt use    Consultants:  Patient Care Team:  Lorene Isaac MD as PCP - General  Lorene Isaac MD as PCP - Internal Medicine  [facility] Brittanie Su[facility] as Consulting Physician (Obstetrics and Gynecology)  Gigi Esparza MD as Consulting Physician (Gastroenterology)  Kartik Gold MD as Consulting Physician (General Surgery)  Tyrone Tate MD as Consulting Physician (Ophthalmology)  Arik Keane MD as Consulting Physician (Otolaryngology)  Anastacio Dickinson MD as Consulting Physician (Otolaryngology)  Mario Ding MD as Consulting Physician (Neurosurgery)  Travis Beckham MD as Consulting Physician (General  Surgery)  Mingo Cerrato MD as Consulting Physician (Gastroenterology)          2. Essential hypertension  Assessment & Plan:  BP __; rec low Na diet, increased aerobic exercise; home BP monitoring with goal BP < 130/80        3. Hyperlipidemia LDL goal <70  Assessment & Plan:  Lipids with LDL 60 at goal, cont atorvastatin 80mg QD #90, 3RF    Orders:  -     atorvastatin (LIPITOR) 80 MG tablet; Take 1 tablet by mouth Daily.  Dispense: 90 tablet; Refill: 3    4. Hypothyroidism  Assessment & Plan:  Euthyroid on levothyroxine 50mcg QD (RF'd x 1yr in 7/21)      5. Osteoporosis  Assessment & Plan:  Stable vit D 37.6; cont Prolia 60mg q6 mos; Calcium and vitamin D supplementation with weight-bearing exercise; DEXA due after 10/8/21       Orders:  -     DEXA Bone Density Axial; Future    6. Gastroesophageal reflux disease   Assessment & Plan:  Stable on omeprazole 40mg QD #90, 3RF; discussed inadequately tx'd reflux could cause hoarseness    Orders:  -     omeprazole (priLOSEC) 40 MG capsule; Take 1 capsule by mouth Daily. Take 30 minutes 1st meal of the day  Dispense: 90 capsule; Refill: 3    7. Mild cognitive disorder  Assessment & Plan:  SLUMS 30/30; note recent CVA; remains on plavix 75mg QD #90, 3RF      8. Mild intermittent asthma without complication  Assessment & Plan:  Unable to update PFTs due to COVID19 pandemic restrictions; stable on advair 100/50 BID and montelukast 10mg QD #90, 3RF    Orders:  -     montelukast (Singulair) 10 MG tablet; Take 1 tablet by mouth Every Night.  Dispense: 90 tablet; Refill: 3    9. Cerebrovascular accident (CVA) due to embolism of left middle cerebral artery (CMS/HCC)  Assessment & Plan:  Remains on clopidogrel 75mg QD and atorvastatin 80mg QD, both #90, 3RF; does not have side effects with high dose atorvastatin; next appt with neuro Dr. Bautista 10/6/21    Orders:  -     clopidogrel (PLAVIX) 75 MG tablet; Take 1 tablet by mouth Daily.  Dispense: 90 tablet; Refill: 3  -      atorvastatin (LIPITOR) 80 MG tablet; Take 1 tablet by mouth Daily.  Dispense: 90 tablet; Refill: 3    10. Hoarseness  Comments:  now persistent after CVA although cleared by speech therapy; ddx incl PND and GERD; referral to ENT as requested  Orders:  -     Ambulatory Referral to ENT (Otolaryngology)    11. Chronic laryngitis  Assessment & Plan:  Recent increased hoarseness after CVA; was cleared by speech therapy when she was hospitalized; referral to [facility] as requested by patient (she will send note via [employer] with name of ENT)    Orders:  -     Ambulatory Referral to ENT (Otolaryngology)    12. Emotional lability  Comments:  reviewed s/sxs of depression and she does not satisfy criteria; likely emotion s/p CVA but still functioning well, active, and social; follow clinically      FOLLOW-UP  RTC 6 mos for general f/u (no labs needed)      Electronically signed by:    Lorene Isaac MD, FACP  09/21/2021

## 2021-09-20 NOTE — ASSESSMENT & PLAN NOTE
Health maintenance - COVID19 vacc done; rec high dose flu vacc (none available in the office today); Prevnar 5/15; PVX 4/14; Td 8/18 (next Td due 2028); Zostavax 2/08; HAV and Shingrix done; 7/20 breast MRI; overdue for bilat scr mammo (due 12/20); GYN/Pap w/ Dr. Bauer 8/21 (nl Pap per pt), f/u 2 years; DEXA 10/19, repeat after 10/8/21; colonosc 11/19, repeat 2024 per Dr. Kartik Gold; eye exam w/ Dr. Tate next Monday (9/27/21); dental exam q6 mos; (+) seat belt use    Consultants:  Patient Care Team:  Lorene Isaac MD as PCP - General  Lorene Isaac MD as PCP - Internal Medicine  Brittanie Bauer MD as Consulting Physician (Obstetrics and Gynecology)  Gigi Esparza MD as Consulting Physician (Gastroenterology)  Kartik Gold MD as Consulting Physician (General Surgery)  Tyrone Tate MD as Consulting Physician (Ophthalmology)  Arik Keane MD as Consulting Physician (Otolaryngology)  Anastacio Dickinson MD as Consulting Physician (Otolaryngology)  Mario Ding MD as Consulting Physician (Neurosurgery)  Travis Beckham MD as Consulting Physician (General Surgery)  Mingo Cerrato MD as Consulting Physician (Gastroenterology)

## 2021-09-20 NOTE — ASSESSMENT & PLAN NOTE
Stable vit D 37.6; cont Prolia 60mg q6 mos; Calcium and vitamin D supplementation with weight-bearing exercise; DEXA due after 10/8/21

## 2021-09-20 NOTE — ASSESSMENT & PLAN NOTE
Unable to update PFTs due to COVID19 pandemic restrictions; stable on advair 100/50 BID and montelukast 10mg QD #90, 3RF

## 2021-09-21 ENCOUNTER — OFFICE VISIT (OUTPATIENT)
Dept: INTERNAL MEDICINE | Facility: CLINIC | Age: 76
End: 2021-09-21

## 2021-09-21 VITALS
BODY MASS INDEX: 27.69 KG/M2 | TEMPERATURE: 98.3 F | SYSTOLIC BLOOD PRESSURE: 138 MMHG | WEIGHT: 172.3 LBS | OXYGEN SATURATION: 96 % | RESPIRATION RATE: 16 BRPM | HEIGHT: 66 IN | DIASTOLIC BLOOD PRESSURE: 76 MMHG | HEART RATE: 80 BPM

## 2021-09-21 DIAGNOSIS — I10 ESSENTIAL HYPERTENSION: Chronic | ICD-10-CM

## 2021-09-21 DIAGNOSIS — R49.0 HOARSENESS: ICD-10-CM

## 2021-09-21 DIAGNOSIS — E78.5 HYPERLIPIDEMIA LDL GOAL <70: Chronic | ICD-10-CM

## 2021-09-21 DIAGNOSIS — Z00.00 MEDICARE ANNUAL WELLNESS VISIT, SUBSEQUENT: Primary | ICD-10-CM

## 2021-09-21 DIAGNOSIS — I63.412 CEREBROVASCULAR ACCIDENT (CVA) DUE TO EMBOLISM OF LEFT MIDDLE CEREBRAL ARTERY (HCC): ICD-10-CM

## 2021-09-21 DIAGNOSIS — F09 MILD COGNITIVE DISORDER: Chronic | ICD-10-CM

## 2021-09-21 DIAGNOSIS — M81.0 AGE-RELATED OSTEOPOROSIS WITHOUT CURRENT PATHOLOGICAL FRACTURE: Chronic | ICD-10-CM

## 2021-09-21 DIAGNOSIS — R45.86 EMOTIONAL LABILITY: ICD-10-CM

## 2021-09-21 DIAGNOSIS — J45.20 MILD INTERMITTENT ASTHMA WITHOUT COMPLICATION: Chronic | ICD-10-CM

## 2021-09-21 DIAGNOSIS — J37.0 CHRONIC LARYNGITIS: Chronic | ICD-10-CM

## 2021-09-21 DIAGNOSIS — K21.9 GASTROESOPHAGEAL REFLUX DISEASE WITHOUT ESOPHAGITIS: ICD-10-CM

## 2021-09-21 DIAGNOSIS — E03.9 ACQUIRED HYPOTHYROIDISM: Chronic | ICD-10-CM

## 2021-09-21 PROCEDURE — G0439 PPPS, SUBSEQ VISIT: HCPCS | Performed by: INTERNAL MEDICINE

## 2021-09-21 PROCEDURE — 99214 OFFICE O/P EST MOD 30 MIN: CPT | Performed by: INTERNAL MEDICINE

## 2021-09-21 PROCEDURE — 1170F FXNL STATUS ASSESSED: CPT | Performed by: INTERNAL MEDICINE

## 2021-09-21 PROCEDURE — 1160F RVW MEDS BY RX/DR IN RCRD: CPT | Performed by: INTERNAL MEDICINE

## 2021-09-21 PROCEDURE — 1126F AMNT PAIN NOTED NONE PRSNT: CPT | Performed by: INTERNAL MEDICINE

## 2021-09-21 PROCEDURE — G0444 DEPRESSION SCREEN ANNUAL: HCPCS | Performed by: INTERNAL MEDICINE

## 2021-09-21 PROCEDURE — 99497 ADVNCD CARE PLAN 30 MIN: CPT | Performed by: INTERNAL MEDICINE

## 2021-09-21 RX ORDER — ATORVASTATIN CALCIUM 80 MG/1
80 TABLET, FILM COATED ORAL DAILY
Qty: 90 TABLET | Refills: 3 | Status: SHIPPED | OUTPATIENT
Start: 2021-09-21 | End: 2021-10-06

## 2021-09-21 RX ORDER — CLOPIDOGREL BISULFATE 75 MG/1
75 TABLET ORAL DAILY
Qty: 90 TABLET | Refills: 3 | Status: SHIPPED | OUTPATIENT
Start: 2021-09-21 | End: 2022-09-06 | Stop reason: SDUPTHER

## 2021-09-21 RX ORDER — OMEPRAZOLE 40 MG/1
40 CAPSULE, DELAYED RELEASE ORAL DAILY
Qty: 90 CAPSULE | Refills: 3 | Status: SHIPPED | OUTPATIENT
Start: 2021-09-21 | End: 2022-09-24 | Stop reason: SDUPTHER

## 2021-09-21 RX ORDER — MONTELUKAST SODIUM 10 MG/1
10 TABLET ORAL NIGHTLY
Qty: 90 TABLET | Refills: 3 | Status: SHIPPED | OUTPATIENT
Start: 2021-09-21 | End: 2022-06-03

## 2021-09-22 ENCOUNTER — TRANSCRIBE ORDERS (OUTPATIENT)
Dept: ADMINISTRATIVE | Facility: HOSPITAL | Age: 76
End: 2021-09-22

## 2021-09-22 DIAGNOSIS — Z12.31 VISIT FOR SCREENING MAMMOGRAM: Primary | ICD-10-CM

## 2021-09-22 NOTE — ASSESSMENT & PLAN NOTE
Recent increased hoarseness after CVA; was cleared by speech therapy when she was hospitalized; referral to [facility] as requested by patient (she will send note via MyChart with name of ENT)

## 2021-09-28 PROCEDURE — G2066 INTER DEVC REMOTE 30D: HCPCS | Performed by: INTERNAL MEDICINE

## 2021-09-28 PROCEDURE — 93298 REM INTERROG DEV EVAL SCRMS: CPT | Performed by: INTERNAL MEDICINE

## 2021-10-06 ENCOUNTER — TELEPHONE (OUTPATIENT)
Dept: NEUROLOGY | Facility: CLINIC | Age: 76
End: 2021-10-06

## 2021-10-06 ENCOUNTER — OFFICE VISIT (OUTPATIENT)
Dept: NEUROLOGY | Facility: CLINIC | Age: 76
End: 2021-10-06

## 2021-10-06 VITALS
DIASTOLIC BLOOD PRESSURE: 84 MMHG | WEIGHT: 174 LBS | HEART RATE: 82 BPM | BODY MASS INDEX: 28.99 KG/M2 | OXYGEN SATURATION: 96 % | HEIGHT: 65 IN | SYSTOLIC BLOOD PRESSURE: 142 MMHG

## 2021-10-06 DIAGNOSIS — J45.20 MILD INTERMITTENT ASTHMA WITHOUT COMPLICATION: ICD-10-CM

## 2021-10-06 DIAGNOSIS — Z86.73 HISTORY OF STROKE: Primary | ICD-10-CM

## 2021-10-06 PROCEDURE — 99215 OFFICE O/P EST HI 40 MIN: CPT | Performed by: PSYCHIATRY & NEUROLOGY

## 2021-10-06 RX ORDER — ATORVASTATIN CALCIUM 40 MG/1
40 TABLET, FILM COATED ORAL DAILY
Qty: 30 TABLET | Refills: 11 | Status: SHIPPED | OUTPATIENT
Start: 2021-10-06 | End: 2021-10-06 | Stop reason: SDUPTHER

## 2021-10-06 RX ORDER — ATORVASTATIN CALCIUM 40 MG/1
40 TABLET, FILM COATED ORAL DAILY
Qty: 30 TABLET | Refills: 11 | Status: SHIPPED | OUTPATIENT
Start: 2021-10-06 | End: 2022-09-06 | Stop reason: SDUPTHER

## 2021-10-06 NOTE — TELEPHONE ENCOUNTER
Caller: Rebecca Sharma    Relationship: Self      Medication requested (name and dosage):    fluticasone-salmeterol (Advair Diskus) 100-50 MCG/DOSE DISKUS  1 puff, 2 Times Daily - RT         Pharmacy where request should be sent: [facility] - [facility] [address] - [phone]  - [phone] FX       Additional details provided by patient: THE PATIENT IS REQUESTING DOSAGE INCREASED FROM 1 PUFF 2 TIMES DAILY TO 2 PUFFS 2 TIMES DAILY. THE PATIENT REPORTS SHE USED TO BE ON THIS DOSAGE AND IS REQUESTING THE INCREASE AGAIN DUE TO HER ASTHMA. REQUESTING A 90 DAY SUPPLY     Best call back number: [phone]   Does the patient have less than a 3 day supply:  [] Yes  [x] No    Felicita Miranda Rep   10/06/21 15:53 EDT

## 2021-10-06 NOTE — TELEPHONE ENCOUNTER
PT IS CALLING AGAIN AND STATES [facility] HAS THE RX AND IT WAS SUPPOSED TO GO TO [facility]. MAIL ORDER . PLEASE ADVISE.

## 2021-10-06 NOTE — PROGRESS NOTES
Subjective:    CC: Rebecca Sharma is seen today in consultation at the request of hospital for Cerebrovascular Accident       HPI:  76-year-old female with a past medical history of hypertension, hyperlipidemia, hypothyroidism, ADEN on CPAP, venous insufficiency of legs presents as a hospital follow-up for stroke.  As per patient she had symptoms of right hand weakness, right facial droop and difficulty speaking on 7/12.  She initially went to [facility] where she had a CT scan of the head that was unremarkable.  Was given IV TPA and subsequently transferred to [facility].  She had a CT perfusion here that was normal, CT angiogram of the head and neck showed possible narrowing of the left P3 and mild dilatation of the left P-comm (aneurysm versus infundibulum) but no significant carotid stenosis.  MRI brain showed scattered infarcts in the left frontal, parietal and posterior temporal region.  Echocardiogram showed an EF of 56 to 60% with moderate tricuspid valve regurg but normal left atrial size and no PFO.  Patient was started on Plavix (allergic to aspirin) and Lipitor 80 mg.  Her most recent lipid panel was as follows-, TG 69, LDL 60, HDL 31.  Last A1c was 5.8.  She had a loop recorder placed in the hospital that has been interrogated a few times by Dr. Smith and does not show any atrial fibrillation till date.  After discharge patient received PT and speech therapy and right arm strength as well as speech have improved although she is still hesitant to talk at times.  Also has mild difficulties recalling names when passwords.  Of note-I personally reviewed her MRI brain and the hospital notes    The following portions of the patient's history were reviewed today and updated as of 10/06/2021  : allergies, current medications, past family history, past medical history, past social history, past surgical history and problem list  These document will be scanned to patient's chart.      Current  Outpatient Medications:   •  albuterol sulfate HFA (Ventolin HFA) 108 (90 Base) MCG/ACT inhaler, Inhale 2 puffs Every 6 (Six) Hours As Needed for Wheezing or Shortness of Air., Disp: 1 inhaler, Rfl: 1  •  calcium carbonate-cholecalciferol 500-400 MG-UNIT tablet tablet, Take 1 tablet by mouth Daily., Disp: , Rfl:   •  clopidogrel (PLAVIX) 75 MG tablet, Take 1 tablet by mouth Daily., Disp: 90 tablet, Rfl: 3  •  fluticasone-salmeterol (Advair Diskus) 100-50 MCG/DOSE DISKUS, Inhale 1 puff 2 (Two) Times a Day. Gargle and spit after puffs, Disp: 3 each, Rfl: 3  •  levothyroxine (SYNTHROID, LEVOTHROID) 50 MCG tablet, Take 1 tablet by mouth Every Morning., Disp: 90 tablet, Rfl: 3  •  Mirabegron ER (Myrbetriq) 50 MG tablet sustained-release 24 hour 24 hr tablet, Take 50 mg by mouth Daily., Disp: 90 tablet, Rfl: 3  •  montelukast (Singulair) 10 MG tablet, Take 1 tablet by mouth Every Night., Disp: 90 tablet, Rfl: 3  •  Omega-3 Fatty Acids (fish oil) 1000 MG capsule capsule, Take 1,000 mg by mouth Daily With Breakfast., Disp: , Rfl:   •  omeprazole (priLOSEC) 40 MG capsule, Take 1 capsule by mouth Daily. Take 30 minutes 1st meal of the day, Disp: 90 capsule, Rfl: 3  •  Prolia 60 MG/ML solution prefilled syringe syringe, INJECT 60MG SUBCUTANEOUSLY EVERY 6 MONTHS, Disp: 1 mL, Rfl: 0  •  psyllium (KONSYL) 28.3 % pack pack, Take 1 packet by mouth Daily As Needed., Disp: , Rfl:   •  TURMERIC CURCUMIN PO, Take 1 tablet by mouth Daily., Disp: , Rfl:   •  atorvastatin (Lipitor) 40 MG tablet, Take 1 tablet by mouth Daily., Disp: 30 tablet, Rfl: 11   Past Medical History:   Diagnosis Date   • Abnormal MRI, breast 01/03/2020    breast MRI (1/3/20): 1.1cm R. nipple mass sugg of nipple adenoma, indeterminate 0.5cm L. breast mass 12:00, rec surg excision for punch bx R. nipple mass and u/s for further eval L. breast mass   • Abnormal MRI, breast 07/10/2020    breast MRI (7/10/20): postsurg bx R. nipple with dx benign adenoma, post-bx clip  left breast adjacent to 0.4cm not worrisome mass, rec L. breast MRI in 12 mos (reg mammo due 12/20)   • Adverse reaction to drug 5/10/2016    Impression: 06/09/2014 - GI side effects to aricept; plan as above to change timing and dose of med;    • Anesthesia     SOB upon awakening from surgery.  one occurrence.   • Breast pain, right 06/14/2016 6/14/16 Notes f/u with Dr. Beckham with neg bx (evaluated at [facility]); bx site healing per patient; 5/10/16 Pain resolved but has residual R. nipple abnlity with erythema and nodular change, therefore dx mammo ordered for further eval; last reg mammo done 7/15; R. nipple mass - s/p bx to r/o Paget's disease (6/16); surg - Dr. Beckham   • Contact dermatitis due to poison ivy 07/30/2014    Impression: 07/30/2014 - Depo-medrol 40 mg IM given in office. Pt will start prednisone taper as ordered. Call or RTC if rash is not improving.;    • Dizziness 10/27/2016    10/27/16 Imbalanced sensation and not vertigo per patient; no focal neuro deficits on exam; will rec f/u eye exam and head imaging if sxs persist and ESR/CRP are normal; ddx also includes eust tube dysfunction   • Hx of bone density study 07/2013    DEXA (7/13) H -1.3   • Hx of bone density study 07/14/2016    DEXA (7/14/16) L 0.4, H -1.6 repeat 2-3 yrs   • Hx of bone density study 10/08/2019    DEXA (10/8/19): L 1.3, H -1.8, A -2.8, worsened, NEW osteoporosis, repeat 2 yrs   • Hx of colonoscopy 12/2015    surg - Dr. Kartik Gold   • Hx of colonoscopy 11/08/2019    colonosc (11/8/19): nl except diverticulosis, repeat 5 yrs due to h/o polyps; surg - Dr. Kartik Gold   • Hx of CT angio head/neck 07/13/2021    CT angio head/neck (7/13/21, hospitalized): No definite lg vessel occlusion, high-grade stenosis or dissection; indeterminate narrowing/occlusion of small P3 branch of the patient's left PCA; small outpouching at origin of left P-com may represent a small aneurysm versus infundibulum   • Hx of CT brain perfusion  07/13/2021    Normal brain perfusion CT (7/13/21, hospitalized)   • Hx of CT scan of head 07/13/2021    nl CT head (7/13/21): except mild atrophy with mild chronic small vessel ischemic disease   • Hx of CT scan of head 07/14/2021    CT head (7/14/21): acute subacute infarct left posterior temporal and parietal region, no hemorrhage   • Hx of echocardiogram 07/13/2021    ECHO (7/13/21, hosp): EF 56-60%, mild-mod TR, neg saline test   • Hx of esophagogastroduodenoscopy 05/05/2017    EGD (5/5/17): mod chronic gastritis, (+)H.pylori, no hiatal hernia; GI - Dr. Esparza   • Hx of esophagogastroduodenoscopy 04/28/2021    EGD (4/8/21): cricopharyngeal spasm, Schatzki's s/p dilation, nonerosive GERD with mod esophageal dysmotility, 2xm hiatal hernia, bile reflux gastropathy, mod chronic gastritis and (+) H. pylori - treated with abx; GI - Dr. Cerrato   • Hx of LLE venous duplex 11/13/2020    LLE venous duplex (11/13/20): no DVT; (+) valvular venous insufficiency deep and superficial with severe reflux   • Hx of MRI brain 07/14/2021    MRI brain (7/14/21): acute infarct left parietal and posterior temporal lobe w/ adjacent area c/w left posterior MCA CVA   • Hx of swallowing study (FEES) 07/15/2021    s/p nl swallow study/FEES (7/15/21): aspiration precautions; regular textures, thin liquids   • Lightheadedness 04/28/2014    Impression: 04/28/2014 - resolved; will let her know official carotid u/s results when available  Impression: 04/13/2014 - discussed adequate hydration; check CBC;    • Lip swelling 08/05/2015    Impression: 08/05/2015 - L. lower lip lesion significantly improved; complete course of abx as prescribed; if area does not resolve completely, call to make earlier derm f/u appt  Impression: 07/28/2015 - L. lower lip swelling already improving but concern for residual ongoing infection; ddx includes infected cold sore, infection from cryotherapy for AK, and less likely shingles; finishing course    • Pap smear  "for cervical cancer screening 08/16/2016    Pap done 8/16/16 per Dr. Brittanie Bauer   • Peripheral arterial occlusive disease (HCC) 5/10/2016    Description: abnl ALLEGRA at [facility] with nl arterial duplex (10/08)   • Wears glasses       Past Surgical History:   Procedure Laterality Date   • BILATERAL SALPINGO OOPHORECTOMY Bilateral 06/14/2017    GYN - Dr. Bauer; for benign L. ovarian cyst   • BREAST BIOPSY Right 5/12/2020    s/p R. breast excisional bx R. subsreaolar mass (5/12/20); path - nipple adenoma; surg - Dr. Beckham   • CARDIAC ELECTROPHYSIOLOGY PROCEDURE N/A 7/15/2021    Procedure: loop implant;  Surgeon: Bartolome Smith IV, MD;  Location: [facility] INVASIVE LOCATION;  Service: Cardiovascular;  Laterality: N/A;   • CATARACT EXTRACTION, BILATERAL      L. 4/09 (complicated by nerve injury; R. 6/10; L. corrected 6/15; ophtho - Dr. Hollis, Dr. Lange; neuro-ophtho Dr. Ferris   • CHOLECYSTECTOMY  2002    secondary to \"crystalized GB\" (''02); surg - Dr. Kartik Gold   • MAMMO CORE NEEDLE BIOPSY UNILATERAL Left 01/17/2020    L. breast u/s core needle bx (1/17/20): path - Focal proliferative fibrocystic changes including sclerosing adenosis   • OOPHORECTOMY     • SUBTOTAL HYSTERECTOMY  2017    Ovaries, Tubes      Family History   Problem Relation Age of Onset   • Heart failure Mother    • Lung cancer Mother    • Breast cancer Mother 86   • Cancer Mother         Breast   • Other Mother         Parkinson’s   • Lung cancer Father         tob use   • Cancer Father         Lung   • Depression Sister    • OCD Sister    • Hyperlipidemia Sister         No longer   • Stroke Maternal Grandmother    • Heart disease Maternal Grandfather    • Diabetes Paternal Grandmother    • Heart attack Paternal Grandfather    • Heart attack Paternal Uncle         2 pat uncles   • Cancer Sister         Brain Stem   • Diabetes Paternal Aunt    • Stroke Paternal Aunt    • Diabetes Paternal Uncle       Social History " "    Socioeconomic History   • Marital status: Single     Spouse name: Not on file   • Number of children: Not on file   • Years of education: Not on file   • Highest education level: Not on file   Tobacco Use   • Smoking status: Never Smoker   • Smokeless tobacco: Never Used   Vaping Use   • Vaping Use: Never used   Substance and Sexual Activity   • Alcohol use: Yes     Alcohol/week: 1.0 standard drinks     Types: 1 Glasses of wine per week     Comment: occas   • Drug use: No   • Sexual activity: Not Currently     Review of Systems   Neurological: Positive for speech difficulty and weakness.   All other systems reviewed and are negative.      Objective:    /84   Pulse 82   Ht 165.1 cm (65\")   Wt 78.9 kg (174 lb)   SpO2 96%   BMI 28.96 kg/m²     Neurology Exam:    General apperance: NAD.     Mental status: Alert, awake and oriented to time place and person.    Recent and Remote memory: Intact.    Attention span and Concentration: Normal.     Language and Speech: Intact- No dysarthria.  Slightly hesitant at times    Fluency, Naming , Repitition and Comprehension:  Intact    Cranial Nerves:   CN II: Visual fields are full. Intact. Fundi - Normal, No papillederma, Pupils - KHUSHBU  CN III, IV and VI: Extraocular movements are intact. Normal saccades.   CN V: Facial sensation is intact.   CN VII: Muscles of facial expression reveal no asymmetry. Intact.   CN VIII: Hearing is intact. Whispered voice intact.   CN IX and X: Palate elevates symmetrically. Intact  CN XI: Shoulder shrug is intact.   CN XII: Tongue is midline without evidence of atrophy or fasciculation.     Ophthalmoscopic exam of optic disc-normal    Motor:  Right UE muscle strength 5/5. Normal tone.     Left UE muscle strength 5/5. Normal tone.      Right LE muscle strength5/5. Normal tone.     Left LE muscle strength 5/5. Normal tone.      Sensory: Normal light touch, vibration and pinprick sensation bilaterally.    DTRs: 2+ bilaterally in upper " and lower extremities.    Babinski: Negative bilaterally.    Co-ordination: Normal finger-to-nose, heel to shin B/L.    Rhomberg: Negative.    Gait: Normal.    Cardiovascular: Regular rate and rhythm without murmur, gallop or rub.    Assessment and Plan:  1. History of stroke  Patient had scattered infarcts in the left hemisphere most likely cardioembolic in nature.  CT angiogram did not show any significant carotid stenosis  She does have a loop recorder in place.  I have told her to notify her cardiologist if she has any symptoms consistent with A. fib such as palpitations, chest pain or shortness of breath  I have asked her to continue Plavix 75 mg for now  I will reduce her Lipitor to 40 mg for goal LDL of less than 100.  Her LDL was 60  She is a prediabetic, A1c of 5.8.  I have told her to make dietary modifications  Goal blood pressure less than 130/90  I have also told her to carry out mental exercises such as reading, solving crossword puzzles etc. that will help with her speech and memory  She also complains of twitching in her left hand for which I have told her to take magnesium supplements.  She will let me know if the twitching becomes widespread       Return in about 5 months (around 3/6/2022).     I spent over 45 minutes with the patient face to face out of which over 50% (30 minutes) was spent in management, instructions and education.     Raquel Bautista MD

## 2021-10-07 NOTE — TELEPHONE ENCOUNTER
Informed patient that the Atorvastatin was sent to "[facility] [facility] [employer] mail delivery.

## 2021-10-08 NOTE — TELEPHONE ENCOUNTER
Last RX 7/29/21 #3, 3RF, therefore good until 7/29/22    Advair is never dosed 2 puffs BID, therefore if she used to do it that way, she was doing it incorrectly. It is always dosed 1 puff BID, gargle and spit after puffs.    If she is having more breathing sxs, she needs to make an office visit so that we can decide whether she needs a higher dose or needs something else.

## 2021-10-08 NOTE — TELEPHONE ENCOUNTER
Called and spoke with patient regarding medication.  Advised patient that per Dr. Isaac, she should have not taken 2 puffs bid.  Informed patient that she would need to schedule appointment for evaluation for higher dose or alternate mediation if she was having breathing issues.  Patient states she was originally was told to take this with these directions by [place].  Does not want to make appointment at this time.

## 2021-10-25 ENCOUNTER — TELEPHONE (OUTPATIENT)
Dept: CARDIOLOGY | Facility: CLINIC | Age: 76
End: 2021-10-25

## 2021-10-25 NOTE — TELEPHONE ENCOUNTER
[facility] called to request cardiac clearance with Dr. Erasmo Lau at [facility] for a microscopic direct laroscopy and left vocal cord injection. Asking to hold Plavix 5 days. OK to clear and hold Plavix?

## 2021-10-26 ENCOUNTER — TELEPHONE (OUTPATIENT)
Dept: INTERNAL MEDICINE | Facility: CLINIC | Age: 76
End: 2021-10-26

## 2021-10-26 NOTE — TELEPHONE ENCOUNTER
Caller: Rebecca Sharma    Relationship to patient: Self    PATIENT NEEDS HER PFIZER COVID BOOSTER BEFORE HER MAMMOGRAM APPOINTMENT. PLEASE CALL AND ADVISE.

## 2021-10-26 NOTE — TELEPHONE ENCOUNTER
Pt notified ok to go ahead with covid booster shot. She will try a pharmacy in [place], because the pharmacies in [place] don't have them per the patient.

## 2021-11-05 ENCOUNTER — TELEPHONE (OUTPATIENT)
Dept: INTERNAL MEDICINE | Facility: CLINIC | Age: 76
End: 2021-11-05

## 2021-11-05 DIAGNOSIS — M81.0 AGE-RELATED OSTEOPOROSIS WITHOUT CURRENT PATHOLOGICAL FRACTURE: ICD-10-CM

## 2021-11-05 RX ORDER — DENOSUMAB 60 MG/ML
60 INJECTION SUBCUTANEOUS ONCE
Qty: 1 ML | Refills: 0 | OUTPATIENT
Start: 2021-11-05 | End: 2022-05-10 | Stop reason: SDUPTHER

## 2021-11-05 NOTE — TELEPHONE ENCOUNTER
----- Message from Allie Duggan sent at 11/5/2021  8:55 AM EDT -----  New prolia order needed, thank you

## 2021-11-11 ENCOUNTER — INFUSION (OUTPATIENT)
Dept: ONCOLOGY | Facility: HOSPITAL | Age: 76
End: 2021-11-11

## 2021-11-11 VITALS
SYSTOLIC BLOOD PRESSURE: 142 MMHG | HEART RATE: 78 BPM | RESPIRATION RATE: 20 BRPM | DIASTOLIC BLOOD PRESSURE: 70 MMHG | TEMPERATURE: 97.1 F

## 2021-11-11 DIAGNOSIS — M81.0 AGE-RELATED OSTEOPOROSIS WITHOUT CURRENT PATHOLOGICAL FRACTURE: Primary | ICD-10-CM

## 2021-11-11 PROCEDURE — 25010000002 DENOSUMAB 60 MG/ML SOLUTION PREFILLED SYRINGE: Performed by: INTERNAL MEDICINE

## 2021-11-11 PROCEDURE — 96372 THER/PROPH/DIAG INJ SC/IM: CPT

## 2021-11-11 RX ADMIN — DENOSUMAB 60 MG: 60 INJECTION SUBCUTANEOUS at 11:27

## 2021-12-17 ENCOUNTER — HOSPITAL ENCOUNTER (OUTPATIENT)
Dept: BONE DENSITY | Facility: HOSPITAL | Age: 76
Discharge: HOME | End: 2021-12-17

## 2021-12-17 ENCOUNTER — HOSPITAL ENCOUNTER (OUTPATIENT)
Dept: MAMMOGRAPHY | Facility: HOSPITAL | Age: 76
Discharge: HOME | End: 2021-12-17

## 2021-12-17 DIAGNOSIS — Z12.31 VISIT FOR SCREENING MAMMOGRAM: ICD-10-CM

## 2021-12-17 DIAGNOSIS — M81.0 AGE-RELATED OSTEOPOROSIS WITHOUT CURRENT PATHOLOGICAL FRACTURE: Chronic | ICD-10-CM

## 2021-12-17 PROCEDURE — 77080 DXA BONE DENSITY AXIAL: CPT

## 2021-12-17 PROCEDURE — 77067 SCR MAMMO BI INCL CAD: CPT | Performed by: RADIOLOGY

## 2021-12-17 PROCEDURE — 77063 BREAST TOMOSYNTHESIS BI: CPT

## 2021-12-17 PROCEDURE — 77063 BREAST TOMOSYNTHESIS BI: CPT | Performed by: RADIOLOGY

## 2021-12-17 PROCEDURE — 77067 SCR MAMMO BI INCL CAD: CPT

## 2021-12-21 ENCOUNTER — TELEPHONE (OUTPATIENT)
Dept: INTERNAL MEDICINE | Facility: CLINIC | Age: 76
End: 2021-12-21

## 2021-12-21 NOTE — TELEPHONE ENCOUNTER
----- Message from Lorene Isaac MD sent at 12/20/2021  1:59 PM EST -----  Bone density remains in osteoporosis range, mildly worsened at the worst part (in her forearm) compared to the last DEXA in 2019. Please confirm that she got her prolia shot in 11/21. Will need to get these every 6 mos. Needs to cont daily calcium and vitamin D supplementation. Repeat DEXA in 2 yrs

## 2021-12-21 NOTE — TELEPHONE ENCOUNTER
Left VM to call office, but also left detailed message about DEXA results and to call with any questions.

## 2022-01-30 PROCEDURE — 93298 REM INTERROG DEV EVAL SCRMS: CPT | Performed by: INTERNAL MEDICINE

## 2022-01-30 PROCEDURE — G2066 INTER DEVC REMOTE 30D: HCPCS | Performed by: INTERNAL MEDICINE

## 2022-02-21 ENCOUNTER — TELEPHONE (OUTPATIENT)
Dept: INTERNAL MEDICINE | Facility: CLINIC | Age: 77
End: 2022-02-21

## 2022-02-21 DIAGNOSIS — E78.5 HYPERLIPIDEMIA LDL GOAL <70: Primary | ICD-10-CM

## 2022-02-21 NOTE — TELEPHONE ENCOUNTER
She is welcome to get fasting labs prior to her next appt so we can review results together - lipids escribed

## 2022-02-21 NOTE — TELEPHONE ENCOUNTER
Pt called because she wanted to see if she needed to have labs done before her next appointment on 03/14/2022.  She had her last labs drawn in September at her annual, but she thought dr mosqueda wanted to have her hdl checked again since this is a cva follow up appointment.   Please contact her back and advise at [phone]

## 2022-03-07 ENCOUNTER — LAB (OUTPATIENT)
Dept: LAB | Facility: HOSPITAL | Age: 77
End: 2022-03-07

## 2022-03-07 ENCOUNTER — OFFICE VISIT (OUTPATIENT)
Dept: NEUROLOGY | Facility: CLINIC | Age: 77
End: 2022-03-07

## 2022-03-07 ENCOUNTER — OFFICE (OUTPATIENT)
Dept: URBAN - METROPOLITAN AREA CLINIC 4 | Facility: CLINIC | Age: 77
End: 2022-03-07
Payer: COMMERCIAL

## 2022-03-07 VITALS
DIASTOLIC BLOOD PRESSURE: 76 MMHG | HEART RATE: 76 BPM | WEIGHT: 174 LBS | OXYGEN SATURATION: 96 % | HEIGHT: 65 IN | SYSTOLIC BLOOD PRESSURE: 130 MMHG | BODY MASS INDEX: 28.99 KG/M2

## 2022-03-07 VITALS — HEIGHT: 65 IN | SYSTOLIC BLOOD PRESSURE: 126 MMHG | WEIGHT: 174 LBS | DIASTOLIC BLOOD PRESSURE: 72 MMHG

## 2022-03-07 DIAGNOSIS — Z86.73 HISTORY OF STROKE: Primary | ICD-10-CM

## 2022-03-07 DIAGNOSIS — E78.5 HYPERLIPIDEMIA LDL GOAL <70: ICD-10-CM

## 2022-03-07 DIAGNOSIS — R14.3 FLATULENCE: ICD-10-CM

## 2022-03-07 DIAGNOSIS — K30 FUNCTIONAL DYSPEPSIA: ICD-10-CM

## 2022-03-07 LAB
CHOLEST SERPL-MCNC: 117 MG/DL (ref 0–200)
HDLC SERPL-MCNC: 38 MG/DL (ref 40–60)
LDLC SERPL CALC-MCNC: 63 MG/DL (ref 0–100)
LDLC/HDLC SERPL: 1.65 {RATIO}
TRIGL SERPL-MCNC: 82 MG/DL (ref 0–150)
VLDLC SERPL-MCNC: 16 MG/DL (ref 5–40)

## 2022-03-07 PROCEDURE — 80061 LIPID PANEL: CPT

## 2022-03-07 PROCEDURE — 99213 OFFICE O/P EST LOW 20 MIN: CPT | Performed by: PSYCHIATRY & NEUROLOGY

## 2022-03-07 PROCEDURE — 99214 OFFICE O/P EST MOD 30 MIN: CPT | Performed by: NURSE PRACTITIONER

## 2022-03-07 NOTE — PROGRESS NOTES
Subjective:    CC: Rebecca Sharma is seen today for History of stroke       Current visit-patient denies having any new strokelike symptoms since she last saw me.  She continues to have occasional word finding difficulties.  She is still taking Plavix and Lipitor 40 mg daily.  She had a repeat lipid panel done today.  Her loop recorder thus far has failed to show any atrial fibrillation.  It did show one episode of SVT.  She occasionally gets palpitations after eating dinner but denies getting any chest pain or shortness of breath.     Initial dcsgl-47-mlnt-old female with a past medical history of hypertension, hyperlipidemia, hypothyroidism, ADEN on CPAP, venous insufficiency of legs presents as a hospital follow-up for stroke.  As per patient she had symptoms of right hand weakness, right facial droop and difficulty speaking on 7/12.  She initially went to [facility] where she had a CT scan of the head that was unremarkable.  Was given IV TPA and subsequently transferred to [facility].  She had a CT perfusion here that was normal, CT angiogram of the head and neck showed possible narrowing of the left P3 and mild dilatation of the left P-comm (aneurysm versus infundibulum) but no significant carotid stenosis.  MRI brain showed scattered infarcts in the left frontal, parietal and posterior temporal region.  Echocardiogram showed an EF of 56 to 60% with moderate tricuspid valve regurg but normal left atrial size and no PFO.  Patient was started on Plavix (allergic to aspirin) and Lipitor 80 mg.  Her most recent lipid panel was as follows-, TG 69, LDL 60, HDL 31.  Last A1c was 5.8.  She had a loop recorder placed in the hospital that has been interrogated a few times by Dr. Smith and does not show any atrial fibrillation till date.  After discharge patient received PT and speech therapy and right arm strength as well as speech have improved although she is still hesitant to talk at times.  Also  has mild difficulties recalling names when passwords.  Of note-I personally reviewed her MRI brain and the hospital notes    The following portions of the patient's history were reviewed today and updated as of 10/06/2021  : allergies, current medications, past family history, past medical history, past social history, past surgical history and problem list  These document will be scanned to patient's chart.      Current Outpatient Medications:   •  albuterol sulfate HFA (Ventolin HFA) 108 (90 Base) MCG/ACT inhaler, Inhale 2 puffs Every 6 (Six) Hours As Needed for Wheezing or Shortness of Air., Disp: 1 inhaler, Rfl: 1  •  atorvastatin (Lipitor) 40 MG tablet, Take 1 tablet by mouth Daily., Disp: 30 tablet, Rfl: 11  •  calcium carbonate-cholecalciferol 500-400 MG-UNIT tablet tablet, Take 1 tablet by mouth Daily., Disp: , Rfl:   •  clopidogrel (PLAVIX) 75 MG tablet, Take 1 tablet by mouth Daily., Disp: 90 tablet, Rfl: 3  •  denosumab (Prolia) 60 MG/ML solution prefilled syringe syringe, Inject 1 mL under the skin into the appropriate area as directed 1 (One) Time for 1 dose., Disp: 1 mL, Rfl: 0  •  fluticasone-salmeterol (Advair Diskus) 100-50 MCG/DOSE DISKUS, Inhale 1 puff 2 (Two) Times a Day. Gargle and spit after puffs, Disp: 3 each, Rfl: 3  •  levothyroxine (SYNTHROID, LEVOTHROID) 50 MCG tablet, Take 1 tablet by mouth Every Morning., Disp: 90 tablet, Rfl: 3  •  Mirabegron ER (Myrbetriq) 50 MG tablet sustained-release 24 hour 24 hr tablet, Take 50 mg by mouth Daily., Disp: 90 tablet, Rfl: 3  •  montelukast (Singulair) 10 MG tablet, Take 1 tablet by mouth Every Night., Disp: 90 tablet, Rfl: 3  •  Omega-3 Fatty Acids (fish oil) 1000 MG capsule capsule, Take 1,000 mg by mouth Daily With Breakfast., Disp: , Rfl:   •  omeprazole (priLOSEC) 40 MG capsule, Take 1 capsule by mouth Daily. Take 30 minutes 1st meal of the day, Disp: 90 capsule, Rfl: 3  •  psyllium (KONSYL) 28.3 % pack pack, Take 1 packet by mouth Daily As  Needed., Disp: , Rfl:   •  TURMERIC CURCUMIN PO, Take 1 tablet by mouth Daily., Disp: , Rfl:    Past Medical History:   Diagnosis Date   • Abnormal MRI, breast 01/03/2020    breast MRI (1/3/20): 1.1cm R. nipple mass sugg of nipple adenoma, indeterminate 0.5cm L. breast mass 12:00, rec surg excision for punch bx R. nipple mass and u/s for further eval L. breast mass   • Abnormal MRI, breast 07/10/2020    breast MRI (7/10/20): postsurg bx R. nipple with dx benign adenoma, post-bx clip left breast adjacent to 0.4cm not worrisome mass, rec L. breast MRI in 12 mos (reg mammo due 12/20)   • Adverse reaction to drug 5/10/2016    Impression: 06/09/2014 - GI side effects to aricept; plan as above to change timing and dose of med;    • Anesthesia     SOB upon awakening from surgery.  one occurrence.   • Breast pain, right 06/14/2016 6/14/16 Notes f/u with Dr. Beckham with neg bx (evaluated at [facility]); bx site healing per patient; 5/10/16 Pain resolved but has residual R. nipple abnlity with erythema and nodular change, therefore dx mammo ordered for further eval; last reg mammo done 7/15; R. nipple mass - s/p bx to r/o Paget's disease (6/16); surg - Dr. Beckham   • Contact dermatitis due to poison ivy 07/30/2014    Impression: 07/30/2014 - Depo-medrol 40 mg IM given in office. Pt will start prednisone taper as ordered. Call or RTC if rash is not improving.;    • Dizziness 10/27/2016    10/27/16 Imbalanced sensation and not vertigo per patient; no focal neuro deficits on exam; will rec f/u eye exam and head imaging if sxs persist and ESR/CRP are normal; ddx also includes eust tube dysfunction   • Hx of bone density study 07/2013    DEXA (7/13) H -1.3   • Hx of bone density study 07/14/2016    DEXA (7/14/16) L 0.4, H -1.6 repeat 2-3 yrs   • Hx of bone density study 10/08/2019    DEXA (10/8/19): L 1.3, H -1.8, A -2.8, worsened, NEW osteoporosis, repeat 2 yrs   • Hx of bone density study 12/17/2021    DEXA (12/17/21): L  3.6,H -1.6, A -3.0   • Hx of colonoscopy 12/2015    surg - Dr. Kartik Gold   • Hx of colonoscopy 11/08/2019    colonosc (11/8/19): nl except diverticulosis, repeat 5 yrs due to h/o polyps; surg - Dr. Kartik Gold   • Hx of CT angio head/neck 07/13/2021    CT angio head/neck (7/13/21, hospitalized): No definite lg vessel occlusion, high-grade stenosis or dissection; indeterminate narrowing/occlusion of small P3 branch of the patient's left PCA; small outpouching at origin of left P-com may represent a small aneurysm versus infundibulum   • Hx of CT brain perfusion 07/13/2021    Normal brain perfusion CT (7/13/21, hospitalized)   • Hx of CT scan of head 07/13/2021    nl CT head (7/13/21): except mild atrophy with mild chronic small vessel ischemic disease   • Hx of CT scan of head 07/14/2021    CT head (7/14/21): acute subacute infarct left posterior temporal and parietal region, no hemorrhage   • Hx of echocardiogram 07/13/2021    ECHO (7/13/21, hosp): EF 56-60%, mild-mod TR, neg saline test   • Hx of esophagogastroduodenoscopy 05/05/2017    EGD (5/5/17): mod chronic gastritis, (+)H.pylori, no hiatal hernia; GI - Dr. Esparza   • Hx of esophagogastroduodenoscopy 04/28/2021    EGD (4/8/21): cricopharyngeal spasm, Schatzki's s/p dilation, nonerosive GERD with mod esophageal dysmotility, 2xm hiatal hernia, bile reflux gastropathy, mod chronic gastritis and (+) H. pylori - treated with abx; GI - Dr. Cerrato   • Hx of LLE venous duplex 11/13/2020    LLE venous duplex (11/13/20): no DVT; (+) valvular venous insufficiency deep and superficial with severe reflux   • Hx of MRI brain 07/14/2021    MRI brain (7/14/21): acute infarct left parietal and posterior temporal lobe w/ adjacent area c/w left posterior MCA CVA   • Hx of swallowing study (FEES) 07/15/2021    s/p nl swallow study/FEES (7/15/21): aspiration precautions; regular textures, thin liquids   • Lightheadedness 04/28/2014    Impression: 04/28/2014 - resolved; will let  "her know official carotid u/s results when available  Impression: 04/13/2014 - discussed adequate hydration; check CBC;    • Lip swelling 08/05/2015    Impression: 08/05/2015 - L. lower lip lesion significantly improved; complete course of abx as prescribed; if area does not resolve completely, call to make earlier derm f/u appt  Impression: 07/28/2015 - L. lower lip swelling already improving but concern for residual ongoing infection; ddx includes infected cold sore, infection from cryotherapy for AK, and less likely shingles; finishing course    • Pap smear for cervical cancer screening 08/16/2016    Pap done 8/16/16 per Dr. Brittanie Bauer   • Peripheral arterial occlusive disease (HCC) 5/10/2016    Description: abnl ALLEGRA at [facility] with nl arterial duplex (10/08)   • Wears glasses       Past Surgical History:   Procedure Laterality Date   • BILATERAL SALPINGO OOPHORECTOMY Bilateral 06/14/2017    GYN - Dr. Bauer; for benign L. ovarian cyst   • BREAST BIOPSY Right 5/12/2020    s/p R. breast excisional bx R. subsreaolar mass (5/12/20); path - nipple adenoma; surg - Dr. Beckham   • CARDIAC ELECTROPHYSIOLOGY PROCEDURE N/A 7/15/2021    Procedure: loop implant;  Surgeon: Bartolome Smith IV, MD;  Location: [facility] INVASIVE LOCATION;  Service: Cardiovascular;  Laterality: N/A;   • CATARACT EXTRACTION, BILATERAL      L. 4/09 (complicated by nerve injury; R. 6/10; L. corrected 6/15; ophtho - Dr. Hollis, Dr. Lange; neuro-ophtho Dr. eFrris   • CHOLECYSTECTOMY  2002    secondary to \"crystalized GB\" (''02); surg - Dr. Kartik Gold   • MAMMO CORE NEEDLE BIOPSY UNILATERAL Left 01/17/2020    L. breast u/s core needle bx (1/17/20): path - Focal proliferative fibrocystic changes including sclerosing adenosis   • OOPHORECTOMY     • SUBTOTAL HYSTERECTOMY  2017    Ovaries, Tubes      Family History   Problem Relation Age of Onset   • Heart failure Mother    • Lung cancer Mother    • Breast cancer Mother 86   • Cancer " "Mother         Breast   • Other Mother         Parkinson’s   • Lung cancer Father         tob use   • Cancer Father         Lung   • Depression Sister    • OCD Sister    • Hyperlipidemia Sister         No longer   • Stroke Maternal Grandmother    • Heart disease Maternal Grandfather    • Diabetes Paternal Grandmother    • Heart attack Paternal Grandfather    • Heart attack Paternal Uncle         2 pat uncles   • Cancer Sister         Brain Stem   • Diabetes Paternal Aunt    • Stroke Paternal Aunt    • Diabetes Paternal Uncle       Social History     Socioeconomic History   • Marital status: Single   Tobacco Use   • Smoking status: Never Smoker   • Smokeless tobacco: Never Used   Vaping Use   • Vaping Use: Never used   Substance and Sexual Activity   • Alcohol use: Yes     Alcohol/week: 1.0 standard drink     Types: 1 Glasses of wine per week     Comment: occas   • Drug use: No   • Sexual activity: Not Currently     Review of Systems   All other systems reviewed and are negative.      Objective:    /76   Pulse 76   Ht 165.1 cm (65\")   Wt 78.9 kg (174 lb)   SpO2 96%   BMI 28.96 kg/m²     Neurology Exam:    General apperance: NAD.     Mental status: Alert, awake and oriented to time place and person.    Recent and Remote memory: Intact.    Attention span and Concentration: Normal.     Language and Speech: Intact- No dysarthria.  Slightly hesitant at times    Fluency, Naming , Repitition and Comprehension:  Intact    Cranial Nerves:   CN II: Visual fields are full. Intact. Fundi - Normal, No papillederma, Pupils - KHUSHBU  CN III, IV and VI: Extraocular movements are intact. Normal saccades.   CN V: Facial sensation is intact.   CN VII: Muscles of facial expression reveal no asymmetry. Intact.   CN VIII: Hearing is intact. Whispered voice intact.   CN IX and X: Palate elevates symmetrically. Intact  CN XI: Shoulder shrug is intact.   CN XII: Tongue is midline without evidence of atrophy or fasciculation. "     Ophthalmoscopic exam of optic disc-normal    Motor:  Right UE muscle strength 5/5. Normal tone.     Left UE muscle strength 5/5. Normal tone.      Right LE muscle strength5/5. Normal tone.     Left LE muscle strength 5/5. Normal tone.      Sensory: Normal light touch, vibration and pinprick sensation bilaterally.    DTRs: 2+ bilaterally in upper and lower extremities.    Babinski: Negative bilaterally.    Co-ordination: Normal finger-to-nose, heel to shin B/L.    Rhomberg: Negative.    Gait: Slightly cautious while walking but otherwise normal gait.  (has spinal stenosis and scoliosis).    Cardiovascular: Regular rate and rhythm without murmur, gallop or rub.    Assessment and Plan:  1. History of stroke  Patient had scattered infarcts in the left hemisphere most likely cardioembolic in nature.  CT angiogram did not show any significant carotid stenosis  Loop recorder thus far has shown SVT but no atrial fibrillation  I have asked her to continue Plavix 75 mg for now and Lipitor 40 mg for goal LDL of less than 100.  Her LDL was 60.  Repeat lipid panel pending  She is a prediabetic, A1c of 5.8.  I had told her to make dietary modifications  Goal blood pressure less than 130/90  I have once again told her to carry out mental exercises such as reading, solving crossword puzzles etc. that will help with her speech and memory         Return in about 1 year (around 3/7/2023).         Raquel Bautista MD

## 2022-03-14 ENCOUNTER — OFFICE VISIT (OUTPATIENT)
Dept: INTERNAL MEDICINE | Facility: CLINIC | Age: 77
End: 2022-03-14

## 2022-03-14 VITALS
DIASTOLIC BLOOD PRESSURE: 64 MMHG | WEIGHT: 174 LBS | OXYGEN SATURATION: 96 % | HEART RATE: 75 BPM | BODY MASS INDEX: 28.96 KG/M2 | SYSTOLIC BLOOD PRESSURE: 126 MMHG

## 2022-03-14 DIAGNOSIS — I63.412 CEREBROVASCULAR ACCIDENT (CVA) DUE TO EMBOLISM OF LEFT MIDDLE CEREBRAL ARTERY: ICD-10-CM

## 2022-03-14 DIAGNOSIS — I10 ESSENTIAL HYPERTENSION: Chronic | ICD-10-CM

## 2022-03-14 DIAGNOSIS — E78.5 HYPERLIPIDEMIA LDL GOAL <70: Primary | Chronic | ICD-10-CM

## 2022-03-14 PROCEDURE — 99214 OFFICE O/P EST MOD 30 MIN: CPT | Performed by: INTERNAL MEDICINE

## 2022-03-14 NOTE — ASSESSMENT & PLAN NOTE
Remains on clopidogrel, therefore rec no concomitant NSAIDs - ok to take prn tylenol; loop recorder has not shown any afib

## 2022-03-14 NOTE — PROGRESS NOTES
Chief Complaint   Patient presents with   • Hyperlipidemia     FOLLOW UP   • Hypertension       History of Present Illness  76 y.o.  woman presents for cholesterol, BP, and stroke f/u. BPs at other doctors' offices have been 120s/70s.     Wants to confirm she cannot take NSAIDs at this time. Wants to know whether she can resume chiropractic care for chronic LBP with spinal stenosis. States MCR will only cover the manipulation component, no other adjunct modalities.    States inability to do any exercise due to back pain but wondering about using an exercise bike. Uses massager every AM and PM and does her stretching exercises from PT daily.    Saw something about 50% increase of CVA after COVID19 vaccine. States so far no etiology for her CVA.     Review of Systems  Denies CP, palpitations, SOB, falls.  ROS (+)for chronic LBP.All other ROS reviewed and negative.    Current Outpatient Medications:   •  albuterol sulfate HFA (Ventolin HFA) 108 (90 Base) MCG/ACT inhale prn  •  atorvastatin (Lipitor) 40 MG QD  •  calcium carbonate-cholecalciferol 500-400 MG-UNIT QD  •  clopidogrel (PLAVIX) 75 MG QD  •  denosumab (Prolia) 60 MG/ML soln q6 mos  •  fluticasone-salmeterol (Advair Diskus) 100-50 MCG/DOSE DISKUS 1 puff BID  •  levothyroxine 50 MCG QD  •  Mirabegron ER (Myrbetriq) 50 MG QD  •  montelukast (Singulair) 10 MG QD  •  Omega-3 Fatty Acids (fish oil) 1000 MG QD  •  omeprazole (priLOSEC) 40 MG QD  •  psyllium (KONSYL) 28.3 % pack AD   •  TURMERIC CURCUMIN QD      VITALS:  /64 (BP Location: Left arm, Patient Position: Sitting)   Pulse 75   Wt 78.9 kg (174 lb)   SpO2 96%   BMI 28.96 kg/m²     Physical Exam  Vitals and nursing note reviewed.   Constitutional:       General: She is not in acute distress.     Appearance: Normal appearance. She is not ill-appearing.   Eyes:      Extraocular Movements: Extraocular movements intact.      Conjunctiva/sclera: Conjunctivae normal.   Pulmonary:      Effort:  Pulmonary effort is normal. No respiratory distress.   Neurological:      Mental Status: She is alert and oriented to person, place, and time. Mental status is at baseline.   Psychiatric:         Mood and Affect: Mood normal.         Behavior: Behavior normal.         LABS  Results for orders placed or performed in visit on 03/07/22   Lipid Panel    Specimen: Blood   Result Value Ref Range    Total Cholesterol 117 0 - 200 mg/dL    Triglycerides 82 0 - 150 mg/dL    HDL Cholesterol 38 (L) 40 - 60 mg/dL    LDL Cholesterol  63 0 - 100 mg/dL    VLDL Cholesterol 16 5 - 40 mg/dL    LDL/HDL Ratio 1.65      9/212 LDL 60    ASSESSMENT/PLAN    Diagnoses and all orders for this visit:    1. Hyperlipidemia LDL goal <70 (Primary)  Assessment & Plan:  Lipids remain at goal with LDL 63 and goal < 70; cont atorvastatin 40mg QD      2. Essential hypertension  Assessment & Plan:  BP elevated but then much improved on repeat; strongly rec home monitoring 2-3x/week and send MyChart message in 2-3 wks with readings; goal < 130/80; remains on no meds      3. Cerebrovascular accident (CVA) due to embolism of left middle cerebral artery (HCC)  Assessment & Plan:  Remains on clopidogrel, therefore rec no concomitant NSAIDs - ok to take prn tylenol; loop recorder has not shown any afib        FOLLOW-UP  1. Health maintenance - COVID19 vacc done, incl 3rd dose Pfizer  2. Send MyChart message in 2-3 wks re: home BP readings  3. RTC for next wellness 9/26/22; fasting labs prior to appt (CBC, CMP, TSH, lipids, UA/micr, FT4, vit D, CPK)     Electronically signed by:    Lorene Isaac MD, FACP  03/14/2022

## 2022-03-14 NOTE — ASSESSMENT & PLAN NOTE
BP elevated but then much improved on repeat; strongly rec home monitoring 2-3x/week and send MyChart message in 2-3 wks with readings; goal < 130/80; remains on no meds

## 2022-04-21 DIAGNOSIS — J45.20 MILD INTERMITTENT ASTHMA WITHOUT COMPLICATION: ICD-10-CM

## 2022-04-21 DIAGNOSIS — E03.9 ACQUIRED HYPOTHYROIDISM: ICD-10-CM

## 2022-04-22 RX ORDER — LEVOTHYROXINE SODIUM 0.05 MG/1
TABLET ORAL
Qty: 90 TABLET | Refills: 3 | OUTPATIENT
Start: 2022-04-22

## 2022-04-27 DIAGNOSIS — E03.9 ACQUIRED HYPOTHYROIDISM: ICD-10-CM

## 2022-04-27 RX ORDER — LEVOTHYROXINE SODIUM 0.05 MG/1
50 TABLET ORAL EVERY MORNING
Qty: 90 TABLET | Refills: 3 | Status: SHIPPED | OUTPATIENT
Start: 2022-04-27 | End: 2022-09-26 | Stop reason: SDUPTHER

## 2022-04-27 NOTE — TELEPHONE ENCOUNTER
Caller: Rebecca Sharma    Relationship: Self    Best call back number: [phone]    Requested Prescriptions:   Requested Prescriptions     Pending Prescriptions Disp Refills   • levothyroxine (SYNTHROID, LEVOTHROID) 50 MCG tablet 90 tablet 3     Sig: Take 1 tablet by mouth Every Morning.       fluticasone-salmeterol (Advair Diskus) 100-50 MCG/DOSE DISKUS       Pharmacy where request should be sent: [facility] - [address] - [phone] [facility] [phone] FX     Additional details provided by patient:   Does the patient have less than a 3 day supply:  [] Yes  [x] No    Felicita Monge Rep   04/27/22 09:49 EDT

## 2022-04-27 NOTE — TELEPHONE ENCOUNTER
Rx Refill Note  Requested Prescriptions     Pending Prescriptions Disp Refills   • levothyroxine (SYNTHROID, LEVOTHROID) 50 MCG tablet 90 tablet 3     Sig: Take 1 tablet by mouth Every Morning.      Last office visit with prescribing clinician: 3/14/2022      Next office visit with prescribing clinician: 9/26/2022 9/9/2021    Luzmaria Daugherty MA  04/27/22, 13:48 EDT

## 2022-05-10 ENCOUNTER — TELEPHONE (OUTPATIENT)
Dept: INTERNAL MEDICINE | Facility: CLINIC | Age: 77
End: 2022-05-10

## 2022-05-10 DIAGNOSIS — M81.0 AGE-RELATED OSTEOPOROSIS WITHOUT CURRENT PATHOLOGICAL FRACTURE: ICD-10-CM

## 2022-05-10 RX ORDER — DENOSUMAB 60 MG/ML
60 INJECTION SUBCUTANEOUS ONCE
Qty: 1 ML | Refills: 0
Start: 2022-05-10 | End: 2022-09-26 | Stop reason: SDUPTHER

## 2022-05-10 NOTE — TELEPHONE ENCOUNTER
----- Message from Allie Duggan sent at 5/10/2022  6:03 AM EDT -----  New prolia order needed, thank you

## 2022-05-11 ENCOUNTER — INFUSION (OUTPATIENT)
Dept: ONCOLOGY | Facility: HOSPITAL | Age: 77
End: 2022-05-11

## 2022-05-11 VITALS
HEART RATE: 81 BPM | TEMPERATURE: 97.2 F | SYSTOLIC BLOOD PRESSURE: 154 MMHG | DIASTOLIC BLOOD PRESSURE: 94 MMHG | RESPIRATION RATE: 18 BRPM

## 2022-05-11 DIAGNOSIS — M81.0 AGE-RELATED OSTEOPOROSIS WITHOUT CURRENT PATHOLOGICAL FRACTURE: Primary | ICD-10-CM

## 2022-05-11 DIAGNOSIS — N39.46 MIXED STRESS AND URGE URINARY INCONTINENCE: ICD-10-CM

## 2022-05-11 PROCEDURE — 96372 THER/PROPH/DIAG INJ SC/IM: CPT

## 2022-05-11 PROCEDURE — 25010000002 DENOSUMAB 60 MG/ML SOLUTION PREFILLED SYRINGE: Performed by: INTERNAL MEDICINE

## 2022-05-11 RX ORDER — MIRABEGRON 50 MG/1
TABLET, FILM COATED, EXTENDED RELEASE ORAL
Qty: 90 TABLET | Refills: 3 | OUTPATIENT
Start: 2022-05-11

## 2022-05-11 RX ADMIN — DENOSUMAB 60 MG: 60 INJECTION SUBCUTANEOUS at 11:38

## 2022-06-03 DIAGNOSIS — J45.20 MILD INTERMITTENT ASTHMA WITHOUT COMPLICATION: Chronic | ICD-10-CM

## 2022-06-03 RX ORDER — MONTELUKAST SODIUM 10 MG/1
TABLET ORAL
Qty: 90 TABLET | Refills: 3 | Status: SHIPPED | OUTPATIENT
Start: 2022-06-03 | End: 2022-09-26 | Stop reason: SDUPTHER

## 2022-08-08 DIAGNOSIS — N39.46 MIXED STRESS AND URGE URINARY INCONTINENCE: ICD-10-CM

## 2022-08-09 ENCOUNTER — TELEPHONE (OUTPATIENT)
Dept: INTERNAL MEDICINE | Facility: CLINIC | Age: 77
End: 2022-08-09

## 2022-08-09 RX ORDER — FLUTICASONE PROPIONATE AND SALMETEROL 100; 50 UG/1; UG/1
1 POWDER RESPIRATORY (INHALATION)
Qty: 60 EACH | Refills: 3 | Status: SHIPPED | OUTPATIENT
Start: 2022-08-09 | End: 2022-09-06

## 2022-08-09 RX ORDER — MIRABEGRON 50 MG/1
TABLET, FILM COATED, EXTENDED RELEASE ORAL
Qty: 30 TABLET | Refills: 0 | Status: SHIPPED | OUTPATIENT
Start: 2022-08-09 | End: 2022-09-24 | Stop reason: SDUPTHER

## 2022-08-09 NOTE — TELEPHONE ENCOUNTER
PATIENT IS NEEDING A REFILL OF THE FOLLOWING RX:  fluticasone-salmeterol (Advair Diskus) 100-50 MCG/DOSE DISKUS    LOV: 03/14/2022  NOV: 09/26/2022    LAST FILLED ON: 04/21/2022    SHE WOULD LIKE A PHONE CALL ONCE IT HAS BEEN SENT.    SHE WOULD LIKE IT SENT TO THE [facility] IN [place], [place]:  [phone]  F: [phone]    JORI CLARK  [phone]

## 2022-08-29 ENCOUNTER — LAB (OUTPATIENT)
Dept: LAB | Facility: HOSPITAL | Age: 77
End: 2022-08-29

## 2022-08-29 DIAGNOSIS — E78.5 HYPERLIPIDEMIA LDL GOAL <70: Primary | ICD-10-CM

## 2022-08-29 DIAGNOSIS — I63.412 CEREBROVASCULAR ACCIDENT (CVA) DUE TO EMBOLISM OF LEFT MIDDLE CEREBRAL ARTERY: ICD-10-CM

## 2022-08-29 DIAGNOSIS — E78.5 HYPERLIPIDEMIA LDL GOAL <70: ICD-10-CM

## 2022-08-29 PROCEDURE — 80053 COMPREHEN METABOLIC PANEL: CPT

## 2022-08-29 PROCEDURE — 80061 LIPID PANEL: CPT

## 2022-08-30 LAB
ALBUMIN SERPL-MCNC: 3.9 G/DL (ref 3.5–5.2)
ALBUMIN/GLOB SERPL: 1.5 G/DL
ALP SERPL-CCNC: 74 U/L (ref 39–117)
ALT SERPL W P-5'-P-CCNC: 15 U/L (ref 1–33)
ANION GAP SERPL CALCULATED.3IONS-SCNC: 7.5 MMOL/L (ref 5–15)
AST SERPL-CCNC: 19 U/L (ref 1–32)
BILIRUB SERPL-MCNC: 0.5 MG/DL (ref 0–1.2)
BUN SERPL-MCNC: 15 MG/DL (ref 8–23)
BUN/CREAT SERPL: 14.3 (ref 7–25)
CALCIUM SPEC-SCNC: 9.8 MG/DL (ref 8.6–10.5)
CHLORIDE SERPL-SCNC: 109 MMOL/L (ref 98–107)
CHOLEST SERPL-MCNC: 109 MG/DL (ref 0–200)
CO2 SERPL-SCNC: 23.5 MMOL/L (ref 22–29)
CREAT SERPL-MCNC: 1.05 MG/DL (ref 0.57–1)
EGFRCR SERPLBLD CKD-EPI 2021: 54.5 ML/MIN/1.73
GLOBULIN UR ELPH-MCNC: 2.6 GM/DL
GLUCOSE SERPL-MCNC: 77 MG/DL (ref 65–99)
HDLC SERPL-MCNC: 38 MG/DL (ref 40–60)
LDLC SERPL CALC-MCNC: 53 MG/DL (ref 0–100)
LDLC/HDLC SERPL: 1.39 {RATIO}
POTASSIUM SERPL-SCNC: 5.1 MMOL/L (ref 3.5–5.2)
PROT SERPL-MCNC: 6.5 G/DL (ref 6–8.5)
SODIUM SERPL-SCNC: 140 MMOL/L (ref 136–145)
TRIGL SERPL-MCNC: 90 MG/DL (ref 0–150)
VLDLC SERPL-MCNC: 18 MG/DL (ref 5–40)

## 2022-08-31 PROCEDURE — 93298 REM INTERROG DEV EVAL SCRMS: CPT | Performed by: INTERNAL MEDICINE

## 2022-08-31 PROCEDURE — G2066 INTER DEVC REMOTE 30D: HCPCS | Performed by: INTERNAL MEDICINE

## 2022-09-06 ENCOUNTER — OFFICE VISIT (OUTPATIENT)
Dept: CARDIOLOGY | Facility: CLINIC | Age: 77
End: 2022-09-06

## 2022-09-06 VITALS
OXYGEN SATURATION: 98 % | HEIGHT: 65 IN | SYSTOLIC BLOOD PRESSURE: 121 MMHG | HEART RATE: 73 BPM | DIASTOLIC BLOOD PRESSURE: 78 MMHG | BODY MASS INDEX: 29.69 KG/M2 | WEIGHT: 178.2 LBS

## 2022-09-06 DIAGNOSIS — I63.412 CEREBROVASCULAR ACCIDENT (CVA) DUE TO EMBOLISM OF LEFT MIDDLE CEREBRAL ARTERY: ICD-10-CM

## 2022-09-06 DIAGNOSIS — Z95.818 STATUS POST PLACEMENT OF IMPLANTABLE LOOP RECORDER: Primary | ICD-10-CM

## 2022-09-06 DIAGNOSIS — E78.5 HYPERLIPIDEMIA LDL GOAL <70: ICD-10-CM

## 2022-09-06 PROCEDURE — 99213 OFFICE O/P EST LOW 20 MIN: CPT | Performed by: INTERNAL MEDICINE

## 2022-09-06 RX ORDER — CLOPIDOGREL BISULFATE 75 MG/1
75 TABLET ORAL DAILY
Qty: 90 TABLET | Refills: 3 | Status: SHIPPED | OUTPATIENT
Start: 2022-09-06

## 2022-09-06 RX ORDER — FLUTICASONE PROPIONATE AND SALMETEROL 100; 50 UG/1; UG/1
POWDER RESPIRATORY (INHALATION)
COMMUNITY
End: 2022-09-26 | Stop reason: SDUPTHER

## 2022-09-06 RX ORDER — ATORVASTATIN CALCIUM 80 MG/1
80 TABLET, FILM COATED ORAL DAILY
Qty: 90 TABLET | Refills: 3 | Status: SHIPPED | OUTPATIENT
Start: 2022-09-06

## 2022-09-06 NOTE — PROGRESS NOTES
[facility] Cardiology      Identification: Rebecca Sharma is a 77 y.o. female who resides in [place], [place]    Reason for visit:  · Loop recorder  · History of CVA    Assessment     Problem List Items Addressed This Visit        Cardiology Problems    Hyperlipidemia LDL goal <70 (Chronic)    Overview     • High intensity statin therapy indicated given previous CVA         Current Assessment & Plan     · Well-controlled  · Continue atorvastatin 80 mg daily         Relevant Medications    atorvastatin (Lipitor) 80 MG tablet    Cerebrovascular accident (CVA) due to embolism of left middle cerebral artery (HCC)    Overview     · Transferred from [facility] to Psychiatric ED for stroke status post TPA on 7/13/2021.  Symptoms were expressive aphasia and dysarthria.   · Imaging revealed left MCA CVA. Normal brain perfusion CT (7/13/21)  · CT angio head/neck (7/13/21, hospitalized): No definite lg vessel occlusion, high-grade stenosis or dissection; indeterminate narrowing/occlusion of small P3 branch of the patient's left PCA; small outpouching at origin of left P-com may represent a small aneurysm versus infundibulum.    · Echo (7/14/2021): LVEF 56 - 60%.  Negative bubble study.   · MRI brain (7/14/21): acute infarct left parietal and posterior temporal lobe w/ adjacent area c/w left posterior MCA CVA  · Loop recorder (BSC) implanted 7/15/2021  · 7/15/21 passed FEES with dysphagia, no pharyngeal impairment, rec reg textures and thin liquids, aspiration precautions  · 7/16/21 discharge on atorvastatin 80mg and plavix 75mg QD, f/u neuro 6-8 wks  · 3/7/22 neuro/Dr. Bautista - likely cardioembolic but loop recorder only showing SVT, no afib; cont clopidogrel and atorvastatin RTC 1 yr;          Current Assessment & Plan     · No atrial fibrillation detected on loop recorder  · Continue clopidogrel per Dr. Bautista         Relevant Medications    clopidogrel (PLAVIX) 75 MG tablet       Other    Status post  "placement of implantable loop recorder - Primary    Current Assessment & Plan     · No arrhythmia               Plan   • Continue present medical therapy      Follow-up   Return in about 1 year (around 9/6/2023) for Follow-up with cardiology APRN/PA.        Wilfredo Fernandez returns to the office today.  She is doing well has had no recurrence of TIA or stroke symptoms.  She remains on high intensity statin and clopidogrel for previous stroke.  She states her blood pressure is always well controlled and does not require medicine.    Review of Systems   Cardiovascular: Negative.    Respiratory: Negative.        Objective     /78 (BP Location: Right arm, Patient Position: Sitting, Cuff Size: Adult)   Pulse 73   Ht 165.1 cm (65\")   Wt 80.8 kg (178 lb 3.2 oz)   SpO2 98%   BMI 29.65 kg/m²       Constitutional:       Appearance: Healthy appearance. Well-developed.   Eyes:      General: Lids are normal. No scleral icterus.  HENT:      Head: Normocephalic and atraumatic.   Neck:      Thyroid: No thyroid mass.      Vascular: No carotid bruit or JVD. JVD normal.   Pulmonary:      Effort: Pulmonary effort is normal.      Breath sounds: Normal breath sounds.   Cardiovascular:      Normal rate. Regular rhythm.      Murmurs: There is no murmur.      No gallop.   Musculoskeletal:      Extremities: No clubbing present.Skin:     General: Skin is warm and dry. There is no cyanosis.   Neurological:      General: No focal deficit present.      Mental Status: Alert.   Psychiatric:         Attention and Perception: Attention normal.         Behavior: Behavior normal. Behavior is cooperative.         Result Review  (reviewed with patient):    Loop recorder device transmission from 8/24/2020 reviewed.  No atrial fibrillation detected.            Lab Results   Component Value Date    GLUCOSE 77 08/29/2022    BUN 15 08/29/2022    CREATININE 1.05 (H) 08/29/2022    EGFRIFNONA 72 09/09/2021    BCR 14.3 08/29/2022    K 5.1 " 08/29/2022    CO2 23.5 08/29/2022    CALCIUM 9.8 08/29/2022    ALBUMIN 3.90 08/29/2022    AST 19 08/29/2022    ALT 15 08/29/2022     Lab Results   Component Value Date    WBC 7.19 09/09/2021    HGB 13.4 09/09/2021    HCT 40.8 09/09/2021    MCV 94.2 09/09/2021     09/09/2021     Lab Results   Component Value Date    CHOL 109 08/29/2022    TRIG 90 08/29/2022    HDL 38 (L) 08/29/2022    LDL 53 08/29/2022     Lab Results   Component Value Date    HGBA1C 5.80 (H) 07/14/2021         Geronimo Smith MD, [facility], [facility]  9/6/2022

## 2022-09-16 ENCOUNTER — TELEPHONE (OUTPATIENT)
Dept: INTERNAL MEDICINE | Facility: CLINIC | Age: 77
End: 2022-09-16

## 2022-09-16 DIAGNOSIS — E78.5 HYPERLIPIDEMIA LDL GOAL <70: ICD-10-CM

## 2022-09-16 DIAGNOSIS — E03.9 ACQUIRED HYPOTHYROIDISM: ICD-10-CM

## 2022-09-16 DIAGNOSIS — M81.0 AGE-RELATED OSTEOPOROSIS WITHOUT CURRENT PATHOLOGICAL FRACTURE: ICD-10-CM

## 2022-09-16 DIAGNOSIS — Z00.00 MEDICARE ANNUAL WELLNESS VISIT, SUBSEQUENT: Primary | ICD-10-CM

## 2022-09-16 NOTE — TELEPHONE ENCOUNTER
Caller: Rebecca Sharma    Relationship: Self    Best call back number: [phone]    What orders are you requesting (i.e. lab or imaging): LABS PRIOR TO MEDICARE VISIT    In what timeframe would the patient need to come in: BEFORE THE APPOINTMENT ON THE 26TH    Where will you receive your lab/imaging services: [facility]    Additional notes: WOULD LIKE THESE PUT IN TO BE DONE NEXT WEEK FOR THE APPOINTMENT ON THE 26TH

## 2022-09-20 ENCOUNTER — LAB (OUTPATIENT)
Dept: LAB | Facility: HOSPITAL | Age: 77
End: 2022-09-20

## 2022-09-20 ENCOUNTER — TELEPHONE (OUTPATIENT)
Dept: CARDIOLOGY | Facility: CLINIC | Age: 77
End: 2022-09-20

## 2022-09-20 DIAGNOSIS — E78.5 HYPERLIPIDEMIA LDL GOAL <70: ICD-10-CM

## 2022-09-20 DIAGNOSIS — Z00.00 MEDICARE ANNUAL WELLNESS VISIT, SUBSEQUENT: ICD-10-CM

## 2022-09-20 DIAGNOSIS — E03.9 ACQUIRED HYPOTHYROIDISM: ICD-10-CM

## 2022-09-20 DIAGNOSIS — M81.0 AGE-RELATED OSTEOPOROSIS WITHOUT CURRENT PATHOLOGICAL FRACTURE: ICD-10-CM

## 2022-09-20 LAB
BACTERIA UR QL AUTO: ABNORMAL /HPF
BASOPHILS # BLD AUTO: 0.07 10*3/MM3 (ref 0–0.2)
BASOPHILS NFR BLD AUTO: 0.9 % (ref 0–1.5)
BILIRUB UR QL STRIP: NEGATIVE
CLARITY UR: CLEAR
COLOR UR: YELLOW
DEPRECATED RDW RBC AUTO: 43.6 FL (ref 37–54)
EOSINOPHIL # BLD AUTO: 0.12 10*3/MM3 (ref 0–0.4)
EOSINOPHIL NFR BLD AUTO: 1.5 % (ref 0.3–6.2)
ERYTHROCYTE [DISTWIDTH] IN BLOOD BY AUTOMATED COUNT: 12.8 % (ref 12.3–15.4)
GLUCOSE UR STRIP-MCNC: NEGATIVE MG/DL
HCT VFR BLD AUTO: 41.5 % (ref 34–46.6)
HGB BLD-MCNC: 13.9 G/DL (ref 12–15.9)
HGB UR QL STRIP.AUTO: NEGATIVE
HYALINE CASTS UR QL AUTO: ABNORMAL /LPF
IMM GRANULOCYTES # BLD AUTO: 0.03 10*3/MM3 (ref 0–0.05)
IMM GRANULOCYTES NFR BLD AUTO: 0.4 % (ref 0–0.5)
KETONES UR QL STRIP: NEGATIVE
LEUKOCYTE ESTERASE UR QL STRIP.AUTO: ABNORMAL
LYMPHOCYTES # BLD AUTO: 2.07 10*3/MM3 (ref 0.7–3.1)
LYMPHOCYTES NFR BLD AUTO: 26.7 % (ref 19.6–45.3)
MCH RBC QN AUTO: 30.8 PG (ref 26.6–33)
MCHC RBC AUTO-ENTMCNC: 33.5 G/DL (ref 31.5–35.7)
MCV RBC AUTO: 91.8 FL (ref 79–97)
MONOCYTES # BLD AUTO: 0.59 10*3/MM3 (ref 0.1–0.9)
MONOCYTES NFR BLD AUTO: 7.6 % (ref 5–12)
NEUTROPHILS NFR BLD AUTO: 4.88 10*3/MM3 (ref 1.7–7)
NEUTROPHILS NFR BLD AUTO: 62.9 % (ref 42.7–76)
NITRITE UR QL STRIP: NEGATIVE
NRBC BLD AUTO-RTO: 0 /100 WBC (ref 0–0.2)
PH UR STRIP.AUTO: 7.5 [PH] (ref 5–8)
PLATELET # BLD AUTO: 299 10*3/MM3 (ref 140–450)
PMV BLD AUTO: 10.8 FL (ref 6–12)
PROT UR QL STRIP: NEGATIVE
RBC # BLD AUTO: 4.52 10*6/MM3 (ref 3.77–5.28)
RBC # UR STRIP: ABNORMAL /HPF
REF LAB TEST METHOD: ABNORMAL
SP GR UR STRIP: <=1.005 (ref 1–1.03)
SQUAMOUS #/AREA URNS HPF: ABNORMAL /HPF
UROBILINOGEN UR QL STRIP: ABNORMAL
WBC # UR STRIP: ABNORMAL /HPF
WBC NRBC COR # BLD: 7.76 10*3/MM3 (ref 3.4–10.8)

## 2022-09-20 PROCEDURE — 80061 LIPID PANEL: CPT

## 2022-09-20 PROCEDURE — 85025 COMPLETE CBC W/AUTO DIFF WBC: CPT

## 2022-09-20 PROCEDURE — 82306 VITAMIN D 25 HYDROXY: CPT

## 2022-09-20 PROCEDURE — 82550 ASSAY OF CK (CPK): CPT

## 2022-09-20 PROCEDURE — 84439 ASSAY OF FREE THYROXINE: CPT

## 2022-09-20 PROCEDURE — 81001 URINALYSIS AUTO W/SCOPE: CPT

## 2022-09-20 PROCEDURE — 84443 ASSAY THYROID STIM HORMONE: CPT

## 2022-09-20 PROCEDURE — 80053 COMPREHEN METABOLIC PANEL: CPT

## 2022-09-20 NOTE — TELEPHONE ENCOUNTER
Per [facility] Clarity web site, message received that pt's home monitor for loop recorder hasn't communicated with web site since 9/4/2022.     Assisted pt w/reset of home monitor.

## 2022-09-21 LAB
25(OH)D3 SERPL-MCNC: 40.6 NG/ML (ref 30–100)
ALBUMIN SERPL-MCNC: 4.1 G/DL (ref 3.5–5.2)
ALBUMIN/GLOB SERPL: 1.7 G/DL
ALP SERPL-CCNC: 82 U/L (ref 39–117)
ALT SERPL W P-5'-P-CCNC: 15 U/L (ref 1–33)
ANION GAP SERPL CALCULATED.3IONS-SCNC: 8 MMOL/L (ref 5–15)
AST SERPL-CCNC: 22 U/L (ref 1–32)
BILIRUB SERPL-MCNC: 0.6 MG/DL (ref 0–1.2)
BUN SERPL-MCNC: 17 MG/DL (ref 8–23)
BUN/CREAT SERPL: 18.1 (ref 7–25)
CALCIUM SPEC-SCNC: 10.1 MG/DL (ref 8.6–10.5)
CHLORIDE SERPL-SCNC: 104 MMOL/L (ref 98–107)
CHOLEST SERPL-MCNC: 113 MG/DL (ref 0–200)
CK SERPL-CCNC: 78 U/L (ref 20–180)
CO2 SERPL-SCNC: 25 MMOL/L (ref 22–29)
CREAT SERPL-MCNC: 0.94 MG/DL (ref 0.57–1)
EGFRCR SERPLBLD CKD-EPI 2021: 62.2 ML/MIN/1.73
GLOBULIN UR ELPH-MCNC: 2.4 GM/DL
GLUCOSE SERPL-MCNC: 75 MG/DL (ref 65–99)
HDLC SERPL-MCNC: 37 MG/DL (ref 40–60)
LDLC SERPL CALC-MCNC: 59 MG/DL (ref 0–100)
LDLC/HDLC SERPL: 1.59 {RATIO}
POTASSIUM SERPL-SCNC: 4.8 MMOL/L (ref 3.5–5.2)
PROT SERPL-MCNC: 6.5 G/DL (ref 6–8.5)
SODIUM SERPL-SCNC: 137 MMOL/L (ref 136–145)
T4 FREE SERPL-MCNC: 1.17 NG/DL (ref 0.93–1.7)
TRIGL SERPL-MCNC: 85 MG/DL (ref 0–150)
TSH SERPL DL<=0.05 MIU/L-ACNC: 2.82 UIU/ML (ref 0.27–4.2)
VLDLC SERPL-MCNC: 17 MG/DL (ref 5–40)

## 2022-09-24 NOTE — ASSESSMENT & PLAN NOTE
Calcium and vitamin D supplementation with weight-bearing exercise; also on Prolia injections q6 mos (last 5/22); DEXA ordered

## 2022-09-24 NOTE — PROGRESS NOTES
ANNUAL WELLNESS VISIT    DRUG AND ALCOHOL USE      no tobacco use, no caffeine use and alcohol intake:2 beers per week    DIET AND PHYSICAL ACTIVITY     Diet: general    Exercise: frequently   Exercise Details: walking with rollator, stretches QD (walks 3 miles per week)    MOOD DISORDER AND COGNITIVE SCREENING   Depression Screening Tool Used yes - see PHQ-9; components reviewed with patient; Denies feeling blue, depressed, hopeless, or not having pleasure in doing things. Screening is NEG for active depression.   Anxiety Screening Tool Used yes     AUDIT screening 3     Mini-Cog Performed   Yes    1. Tell Patient 3 Words car apple pen    2. Administer Clock Test normal    3. Recall 3 words  car apple pen    4. Number Correct Items 3    FUNCTIONAL ABILITY AND LEVEL OF SAFETY   Hearing mild hearing loss     Wears Hearing Aids No       Current Activities Independent      none  - see Funct/Cog Status Intake     Fall Risk Assessment       Has difficulty with walking or balance  Yes         Timed Up and Go (TUG) Test  8 sec.       If >12 sec, normal    ADVANCED DIRECTIVE  Advance Care Planning   ACP discussion was held with the patient during this visit. Patient has an advance directive in EMR which is still valid.   Advance Care Planning Discussion:  16 min or more spent on counseling; patient has advanced directive and living will.  Reviewed desires for end of life care, which is to have comfort care.  Patient does not want extraordinary life-sustaining measures, including no prolonged artificial life support. Encouraged patient to ensure family is aware of desires/preferences. Confirmed friend Charles Mayne is her healthcare surrogate.    PAIN SCREENING Do you have pain right now? yes      If so, 1-10 scale: 2     Intermittent     Do you have pain every day? Yes      Probable chronic pain: Yes     Recent Hospitalizations:  No recent hospitalization(s)..     MEDICATION REVIEW   - updated and reviewed (see Medication  "List).   - reviewed for potentially harmful drug-disease interactions in the elderly.   - reviewed for high risk medications in the elderly.   - aspirin use: No    BMI  Body mass index is 29.98 kg/m².  BMI is >= 25 and <30. (Overweight) The following options were offered after discussion;: exercise counseling/recommendations and nutrition counseling/recommendations       _________________________________________________________    Chief Complaint   Patient presents with   • Medicare Wellness-subsequent   • Hyperlipidemia       History of Present Illness  77 y.o.  woman presents for updated wellness visit and f/u on cholesterol and hypothyroidism.    Reports needing RX for prolia (review of chart shows last injection 5/22).    Reports issues with word finding since her stroke. Wonders whether word search puzzles would help.    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 Has questions re: turmeric 500mg QD supplement, which has helped her joints.    Reports loop recorder has been in place since 7/21 and Dr. Smiht has indicated at 9/22 OV there have been no arrhythmias.    Review of Systems  Denies headaches, visual changes, CP, palpitations, SOB, cough, abd pain, n/v/d, difficulty with urination, numbness/tingling, falls, mood changes, lightheadedness, hearing changes, rashes.    ROS (+) for word finding difficulty as noted.     ROS (+) for urin incontinence, but notes significant improvement w/ myrbetriq.    Denies vaginal discharge or bleeding (no periods) or breast concerns.   All other ROS reviewed and negative.    Current Outpatient Medications:   •  atorvastatin (Lipitor) 80 MG QD  •  calcium carbonate-cholecalciferol 500-400 MG-UNIT QD  •  clopidogrel (PLAVIX) 75 MG QD  •  denosumab (Prolia) 60 MG/ML q6 mos  •  fluticasone (VERAMYST) 27.5 MCG/SPRAY nasal spray AD  •  Fluticasone-Salmeterol (ADVAIR/WIXELA) 100-50 " "MCG/ACT DISKUS 1 puff BID  •  levothyroxine  50 MCG QD  •  montelukast (SINGULAIR) 10 MG QD  •  Myrbetriq 50 MG tablet QD  •  Omega-3 Fatty Acids (fish oil) 1000 MG QD  •  omeprazole (priLOSEC) 40 MG QD  •  psyllium (KONSYL) 28.3 % pack prn  •  TURMERIC CURCUMIN qD    VITALS:  /82   Pulse 72   Ht 163.2 cm (64.25\")   Wt 79.8 kg (176 lb)   SpO2 95%   BMI 29.98 kg/m²     Physical Exam  Vitals and nursing note reviewed.   Constitutional:       General: She is not in acute distress.     Appearance: Normal appearance. She is well-developed.   HENT:      Head: Normocephalic.      Right Ear: Tympanic membrane, ear canal and external ear normal.      Left Ear: Tympanic membrane, ear canal and external ear normal.      Nose: Nose normal.   Eyes:      Extraocular Movements: Extraocular movements intact.      Conjunctiva/sclera: Conjunctivae normal.      Pupils: Pupils are equal, round, and reactive to light.   Neck:      Vascular: No carotid bruit (bilaterally).   Cardiovascular:      Rate and Rhythm: Normal rate and regular rhythm.      Heart sounds: Normal heart sounds.   Pulmonary:      Effort: Pulmonary effort is normal. No respiratory distress.      Breath sounds: Normal breath sounds. No wheezing or rales.   Abdominal:      General: Bowel sounds are normal. There is no distension.      Palpations: Abdomen is soft. There is no mass.      Tenderness: There is no abdominal tenderness.   Genitourinary:     Comments: Breast/pelvic exams deferred to GYN    Musculoskeletal:      Cervical back: Normal range of motion and neck supple.      Right lower leg: No edema.      Left lower leg: No edema.   Lymphadenopathy:      Cervical: No cervical adenopathy.   Skin:     General: Skin is warm and dry.      Findings: No rash.   Neurological:      Mental Status: She is alert and oriented to person, place, and time.      Cranial Nerves: No cranial nerve deficit.      Deep Tendon Reflexes: Reflexes normal.   Psychiatric:         " Mood and Affect: Mood normal.         Behavior: Behavior normal.           LABS  Results for orders placed or performed in visit on 09/20/22   Comprehensive Metabolic Panel    Specimen: Blood   Result Value Ref Range    Glucose 75 65 - 99 mg/dL    BUN 17 8 - 23 mg/dL    Creatinine 0.94 0.57 - 1.00 mg/dL    Sodium 137 136 - 145 mmol/L    Potassium 4.8 3.5 - 5.2 mmol/L    Chloride 104 98 - 107 mmol/L    CO2 25.0 22.0 - 29.0 mmol/L    Calcium 10.1 8.6 - 10.5 mg/dL    Total Protein 6.5 6.0 - 8.5 g/dL    Albumin 4.10 3.50 - 5.20 g/dL    ALT (SGPT) 15 1 - 33 U/L    AST (SGOT) 22 1 - 32 U/L    Alkaline Phosphatase 82 39 - 117 U/L    Total Bilirubin 0.6 0.0 - 1.2 mg/dL    Globulin 2.4 gm/dL    A/G Ratio 1.7 g/dL    BUN/Creatinine Ratio 18.1 7.0 - 25.0    Anion Gap 8.0 5.0 - 15.0 mmol/L    eGFR 62.2 >60.0 mL/min/1.73   Lipid Panel    Specimen: Blood   Result Value Ref Range    Total Cholesterol 113 0 - 200 mg/dL    Triglycerides 85 0 - 150 mg/dL    HDL Cholesterol 37 (L) 40 - 60 mg/dL    LDL Cholesterol  59 0 - 100 mg/dL    VLDL Cholesterol 17 5 - 40 mg/dL    LDL/HDL Ratio 1.59    TSH    Specimen: Blood   Result Value Ref Range    TSH 2.820 0.270 - 4.200 uIU/mL   Vitamin D 25 Hydroxy    Specimen: Blood   Result Value Ref Range    25 Hydroxy, Vitamin D 40.6 30.0 - 100.0 ng/ml   T4, Free    Specimen: Blood   Result Value Ref Range    Free T4 1.17 0.93 - 1.70 ng/dL   CK    Specimen: Blood   Result Value Ref Range    Creatine Kinase 78 20 - 180 U/L   CBC Auto Differential    Specimen: Blood   Result Value Ref Range    WBC 7.76 3.40 - 10.80 10*3/mm3    RBC 4.52 3.77 - 5.28 10*6/mm3    Hemoglobin 13.9 12.0 - 15.9 g/dL    Hematocrit 41.5 34.0 - 46.6 %    MCV 91.8 79.0 - 97.0 fL    MCH 30.8 26.6 - 33.0 pg    MCHC 33.5 31.5 - 35.7 g/dL    RDW 12.8 12.3 - 15.4 %    RDW-SD 43.6 37.0 - 54.0 fl    MPV 10.8 6.0 - 12.0 fL    Platelets 299 140 - 450 10*3/mm3    Neutrophil % 62.9 42.7 - 76.0 %    Lymphocyte % 26.7 19.6 - 45.3 %    Monocyte %  7.6 5.0 - 12.0 %    Eosinophil % 1.5 0.3 - 6.2 %    Basophil % 0.9 0.0 - 1.5 %    Immature Grans % 0.4 0.0 - 0.5 %    Neutrophils, Absolute 4.88 1.70 - 7.00 10*3/mm3    Lymphocytes, Absolute 2.07 0.70 - 3.10 10*3/mm3    Monocytes, Absolute 0.59 0.10 - 0.90 10*3/mm3    Eosinophils, Absolute 0.12 0.00 - 0.40 10*3/mm3    Basophils, Absolute 0.07 0.00 - 0.20 10*3/mm3    Immature Grans, Absolute 0.03 0.00 - 0.05 10*3/mm3    nRBC 0.0 0.0 - 0.2 /100 WBC   Urinalysis without microscopic (no culture) - Urine, Clean Catch    Specimen: Urine, Clean Catch   Result Value Ref Range    Color, UA Yellow Yellow, Straw    Appearance, UA Clear Clear    pH, UA 7.5 5.0 - 8.0    Specific Gravity, UA <=1.005 1.005 - 1.030    Glucose, UA Negative Negative    Ketones, UA Negative Negative    Bilirubin, UA Negative Negative    Blood, UA Negative Negative    Protein, UA Negative Negative    Leuk Esterase, UA Moderate (2+) (A) Negative    Nitrite, UA Negative Negative    Urobilinogen, UA 0.2 E.U./dL 0.2 - 1.0 E.U./dL   Urinalysis, Microscopic Only - Urine, Clean Catch    Specimen: Urine, Clean Catch   Result Value Ref Range    RBC, UA 0-2 None Seen, 0-2 /HPF    WBC, UA 3-5 (A) None Seen, 0-2 /HPF    Bacteria, UA None Seen None Seen /HPF    Squamous Epithelial Cells, UA 0-2 None Seen, 0-2 /HPF    Hyaline Casts, UA None Seen None Seen /LPF    Methodology Automated Microscopy        ECG 12 Lead    Date/Time: 9/26/2022 10:30 AM  Performed by: Lorene Isaac MD  Authorized by: Lorene Isaac MD   Comparison: compared with previous ECG from 5/10/2020  Similar to previous ECG  Rhythm: sinus rhythm  Rate: normal  BPM: 63  Conduction: conduction normal  ST Segments: ST segments normal  T Waves: T waves normal  QRS axis: normal  Other findings: low voltage  Clinical impression comment: stable EKG              ASSESSMENT/PLAN    Diagnoses and all orders for this visit:    1. Medicare annual wellness visit, subsequent (Primary)  Assessment & Plan:  Health  maintenance - COVID19 vacc done, incl 3rd dose Pfizer; rec flu vaccine and proceeding with new COVID19 vacc (BOTH GIVEN TODAY); Prevnar 5/15; PVX 4/14; Td 8/18 (next Td due 2028); HAV and Shingrix done; mammo 12/17/21, L. breast MRI 7/20; no further GYN/Paps per Dr. Bauer per pt; DEXA ordered (last 10/19); colonosc 11/19, repeat 2024 per Dr. Kartik Gold; eye exam w/ Dr. Tate pending this afternoon; dental exam q6 mos; (+) seat belt use    Consultants:  Patient Care Team:  Lorene Isaac MD as PCP - General  Lorene Isaac MD as PCP - Internal Medicine  Gigi Esparza MD as Consulting Physician (Gastroenterology)  Kartik Gold MD as Consulting Physician (General Surgery)  Tyrone Tate MD as Consulting Physician (Ophthalmology)  Arik Keane MD as Consulting Physician (Otolaryngology)  Anastacio Dickinson MD as Consulting Physician (Otolaryngology)  Mario Ding MD as Consulting Physician (Neurosurgery)  Travis Beckham MD as Consulting Physician (General Surgery)  Mingo Cerrato MD as Consulting Physician (Gastroenterology)  Bartolome Smith IV, MD as Consulting Physician (Interventional Cardiology)  Vicky Del Cid APRN as Nurse Practitioner (Interventional Cardiology)          2. Hyperlipidemia LDL goal <70  Assessment & Plan:  Lipids at goal with LDL 59; cont atorvastatin 80mg QD per cards    Orders:  -     ECG 12 Lead    3. Hypothyroidism  Assessment & Plan:  Euthyroid; cont levothyroxine 50mcg QD (1-yr RX given in 4/22)    Orders:  -     levothyroxine (SYNTHROID, LEVOTHROID) 50 MCG tablet; Take 1 tablet by mouth Every Morning.  Dispense: 90 tablet; Refill: 3    4. Osteoporosis  Assessment & Plan:  Calcium and vitamin D supplementation with weight-bearing exercise; also on Prolia injections q6 mos (last 5/22); DEXA ordered       Orders:  -     DEXA Bone Density Axial; Future  -     denosumab (Prolia) 60 MG/ML solution prefilled syringe syringe; Inject 1 mL under  "the skin into the appropriate area as directed 1 (One) Time for 1 dose.  Dispense: 1 mL; Refill: 0    5. Gastroesophageal reflux disease   Assessment & Plan:  Stable on omeprazol 40mg QD #90, 3RF    Orders:  -     omeprazole (priLOSEC) 40 MG capsule; Take 1 capsule by mouth Daily. Take 30 minutes 1st meal of the day  Dispense: 90 capsule; Refill: 3    6. Mixed stress and urge urinary incontinence  Assessment & Plan:  Stable on myrbetriq 50mg QD #90, 3RF    Orders:  -     Mirabegron ER (Myrbetriq) 50 MG tablet sustained-release 24 hour 24 hr tablet; Take 50 mg by mouth Daily.  Dispense: 90 tablet; Refill: 3    7. Mild intermittent asthma without complication  Assessment & Plan:  Unable to update PFTs due to COVID19 pandemic; resp status stable on advair 100/50 1 puff BID #3, 3RF and prn abl #1, 0RF      Orders:  -     montelukast (SINGULAIR) 10 MG tablet; Take 1 tablet by mouth Every Night.  Dispense: 90 tablet; Refill: 3  -     Fluticasone-Salmeterol (ADVAIR/WIXELA) 100-50 MCG/ACT DISKUS; Inhale 1 puff 2 (Two) Times a Day. Gargle/spit after puffs  Dispense: 180 each; Refill: 3  -     albuterol sulfate HFA (Ventolin HFA) 108 (90 Base) MCG/ACT inhaler; Inhale 2 puffs Every 6 (Six) Hours As Needed for Wheezing or Shortness of Air.  Dispense: 18 g; Refill: 0    8. Mild cognitive disorder  Assessment & Plan:  Word finding concerns per patient; discussed \"brain exercises\", including puzzles, music, art, social networking, reading; agree with word search books; discussed option to see Speech Therapy again for cognitive therapy; plan on repeat SLUMS with f/u in 6 mos and can review role of meds at that time if she is still interested      9. Need for vaccination  -     COVID-19 Bivalent Booster (Pfizer) 12+yrs    10. Need for influenza vaccination  -     Fluzone High-Dose 65+yrs      FOLLOW-UP  RTC 6 mos (no labs needed) with SLUMS    Electronically signed by:    Lorene Isaac MD, FACP  09/26/2022                     "

## 2022-09-24 NOTE — ASSESSMENT & PLAN NOTE
Health maintenance - COVID19 vacc done, incl 3rd dose Pfizer; rec flu vaccine and proceeding with new COVID19 vacc (BOTH GIVEN TODAY); Prevnar 5/15; PVX 4/14; Td 8/18 (next Td due 2028); HAV and Shingrix done; mammo 12/17/21, L. breast MRI 7/20; no further GYN/Paps per Dr. Bauer per pt; DEXA ordered (last 10/19); colonosc 11/19, repeat 2024 per Dr. Kartik Gold; eye exam w/ Dr. Tate pending this afternoon; dental exam q6 mos; (+) seat belt use    Consultants:  Patient Care Team:  Lorene Isaac MD as PCP - General  Lorene Isaac MD as PCP - Internal Medicine  Gigi Esparza MD as Consulting Physician (Gastroenterology)  Kartik Gold MD as Consulting Physician (General Surgery)  Tyrone Tate MD as Consulting Physician (Ophthalmology)  Arik Keane MD as Consulting Physician (Otolaryngology)  Anastacio Dickinson MD as Consulting Physician (Otolaryngology)  Mario Ding MD as Consulting Physician (Neurosurgery)  Travis Beckham MD as Consulting Physician (General Surgery)  Mingo Cerrato MD as Consulting Physician (Gastroenterology)  Bartolome Smith IV, MD as Consulting Physician (Interventional Cardiology)  Vicky Del Cid APRN as Nurse Practitioner (Interventional Cardiology)

## 2022-09-26 ENCOUNTER — OFFICE VISIT (OUTPATIENT)
Dept: INTERNAL MEDICINE | Facility: CLINIC | Age: 77
End: 2022-09-26

## 2022-09-26 VITALS
WEIGHT: 176 LBS | OXYGEN SATURATION: 95 % | HEIGHT: 64 IN | BODY MASS INDEX: 30.05 KG/M2 | HEART RATE: 72 BPM | DIASTOLIC BLOOD PRESSURE: 82 MMHG | SYSTOLIC BLOOD PRESSURE: 130 MMHG

## 2022-09-26 DIAGNOSIS — Z23 NEED FOR INFLUENZA VACCINATION: ICD-10-CM

## 2022-09-26 DIAGNOSIS — F09 MILD COGNITIVE DISORDER: Chronic | ICD-10-CM

## 2022-09-26 DIAGNOSIS — E03.9 ACQUIRED HYPOTHYROIDISM: Chronic | ICD-10-CM

## 2022-09-26 DIAGNOSIS — E78.5 HYPERLIPIDEMIA LDL GOAL <70: Chronic | ICD-10-CM

## 2022-09-26 DIAGNOSIS — K21.9 GASTROESOPHAGEAL REFLUX DISEASE WITHOUT ESOPHAGITIS: ICD-10-CM

## 2022-09-26 DIAGNOSIS — Z23 NEED FOR VACCINATION: ICD-10-CM

## 2022-09-26 DIAGNOSIS — J45.20 MILD INTERMITTENT ASTHMA WITHOUT COMPLICATION: Chronic | ICD-10-CM

## 2022-09-26 DIAGNOSIS — N39.46 MIXED STRESS AND URGE URINARY INCONTINENCE: ICD-10-CM

## 2022-09-26 DIAGNOSIS — Z00.00 MEDICARE ANNUAL WELLNESS VISIT, SUBSEQUENT: Primary | Chronic | ICD-10-CM

## 2022-09-26 DIAGNOSIS — M81.0 AGE-RELATED OSTEOPOROSIS WITHOUT CURRENT PATHOLOGICAL FRACTURE: Chronic | ICD-10-CM

## 2022-09-26 PROCEDURE — 1159F MED LIST DOCD IN RCRD: CPT | Performed by: INTERNAL MEDICINE

## 2022-09-26 PROCEDURE — 90662 IIV NO PRSV INCREASED AG IM: CPT | Performed by: INTERNAL MEDICINE

## 2022-09-26 PROCEDURE — 1170F FXNL STATUS ASSESSED: CPT | Performed by: INTERNAL MEDICINE

## 2022-09-26 PROCEDURE — 91312 COVID-19 (PFIZER) BIVALENT BOOSTER 12+YRS: CPT | Performed by: INTERNAL MEDICINE

## 2022-09-26 PROCEDURE — 99497 ADVNCD CARE PLAN 30 MIN: CPT | Performed by: INTERNAL MEDICINE

## 2022-09-26 PROCEDURE — G0008 ADMIN INFLUENZA VIRUS VAC: HCPCS | Performed by: INTERNAL MEDICINE

## 2022-09-26 PROCEDURE — 99214 OFFICE O/P EST MOD 30 MIN: CPT | Performed by: INTERNAL MEDICINE

## 2022-09-26 PROCEDURE — 0124A PR ADM SARSCOV2 30MCG/0.3ML BST: CPT | Performed by: INTERNAL MEDICINE

## 2022-09-26 PROCEDURE — G0439 PPPS, SUBSEQ VISIT: HCPCS | Performed by: INTERNAL MEDICINE

## 2022-09-26 PROCEDURE — 93000 ELECTROCARDIOGRAM COMPLETE: CPT | Performed by: INTERNAL MEDICINE

## 2022-09-26 RX ORDER — DENOSUMAB 60 MG/ML
60 INJECTION SUBCUTANEOUS ONCE
Qty: 1 ML | Refills: 0
Start: 2022-09-26 | End: 2022-11-04 | Stop reason: SDUPTHER

## 2022-09-26 RX ORDER — LEVOTHYROXINE SODIUM 0.05 MG/1
50 TABLET ORAL EVERY MORNING
Qty: 90 TABLET | Refills: 3 | Status: SHIPPED | OUTPATIENT
Start: 2022-09-26

## 2022-09-26 RX ORDER — FLUTICASONE PROPIONATE AND SALMETEROL 100; 50 UG/1; UG/1
1 POWDER RESPIRATORY (INHALATION)
Qty: 180 EACH | Refills: 3 | Status: SHIPPED | OUTPATIENT
Start: 2022-09-26

## 2022-09-26 RX ORDER — ALBUTEROL SULFATE 90 UG/1
2 AEROSOL, METERED RESPIRATORY (INHALATION) EVERY 6 HOURS PRN
Qty: 18 G | Refills: 0 | Status: SHIPPED | OUTPATIENT
Start: 2022-09-26

## 2022-09-26 RX ORDER — MONTELUKAST SODIUM 10 MG/1
10 TABLET ORAL NIGHTLY
Qty: 90 TABLET | Refills: 3 | Status: SHIPPED | OUTPATIENT
Start: 2022-09-26

## 2022-09-26 RX ORDER — OMEPRAZOLE 40 MG/1
40 CAPSULE, DELAYED RELEASE ORAL DAILY
Qty: 90 CAPSULE | Refills: 3 | Status: SHIPPED | OUTPATIENT
Start: 2022-09-26

## 2022-09-26 NOTE — PROGRESS NOTES
Immunization  Immunization performed in left and right deltoid by Cleo Haywood MA. Patient tolerated the procedure well without complications.  09/26/22   Cleo Haywood MA

## 2022-09-27 NOTE — ASSESSMENT & PLAN NOTE
Unable to update PFTs due to COVID19 pandemic; resp status stable on advair 100/50 1 puff BID #3, 3RF and prn abl #1, 0RF

## 2022-09-27 NOTE — ASSESSMENT & PLAN NOTE
"Word finding concerns per patient; discussed \"brain exercises\", including puzzles, music, art, social networking, reading; agree with word search books; discussed option to see Speech Therapy again for cognitive therapy; plan on repeat SLUMS with f/u in 6 mos and can review role of meds at that time if she is still interested  "

## 2022-09-28 ENCOUNTER — TRANSCRIBE ORDERS (OUTPATIENT)
Dept: ADMINISTRATIVE | Facility: HOSPITAL | Age: 77
End: 2022-09-28

## 2022-09-28 DIAGNOSIS — Z12.31 SCREENING MAMMOGRAM FOR BREAST CANCER: Primary | ICD-10-CM

## 2022-10-01 PROCEDURE — G2066 INTER DEVC REMOTE 30D: HCPCS | Performed by: INTERNAL MEDICINE

## 2022-10-01 PROCEDURE — 93298 REM INTERROG DEV EVAL SCRMS: CPT | Performed by: INTERNAL MEDICINE

## 2022-10-12 ENCOUNTER — TELEPHONE (OUTPATIENT)
Dept: INTERNAL MEDICINE | Facility: CLINIC | Age: 77
End: 2022-10-12

## 2022-10-12 NOTE — TELEPHONE ENCOUNTER
Caller: Rebecca Sharma JAS    Relationship: Self    Best call back number: [phone]    What medications are you currently taking:   Current Outpatient Medications on File Prior to Visit   Medication Sig Dispense Refill   • albuterol sulfate HFA (Ventolin HFA) 108 (90 Base) MCG/ACT inhaler Inhale 2 puffs Every 6 (Six) Hours As Needed for Wheezing or Shortness of Air. 18 g 0   • atorvastatin (Lipitor) 80 MG tablet Take 1 tablet by mouth Daily. 90 tablet 3   • calcium carbonate-cholecalciferol 500-400 MG-UNIT tablet tablet Take 1 tablet by mouth Daily.     • clopidogrel (PLAVIX) 75 MG tablet Take 1 tablet by mouth Daily. 90 tablet 3   • denosumab (Prolia) 60 MG/ML solution prefilled syringe syringe Inject 1 mL under the skin into the appropriate area as directed 1 (One) Time for 1 dose. 1 mL 0   • fluticasone (VERAMYST) 27.5 MCG/SPRAY nasal spray 2 sprays into the nostril(s) as directed by provider Daily.     • Fluticasone-Salmeterol (ADVAIR/WIXELA) 100-50 MCG/ACT DISKUS Inhale 1 puff 2 (Two) Times a Day. Gargle/spit after puffs 180 each 3   • levothyroxine (SYNTHROID, LEVOTHROID) 50 MCG tablet Take 1 tablet by mouth Every Morning. 90 tablet 3   • Mirabegron ER (Myrbetriq) 50 MG tablet sustained-release 24 hour 24 hr tablet Take 50 mg by mouth Daily. 90 tablet 3   • montelukast (SINGULAIR) 10 MG tablet Take 1 tablet by mouth Every Night. 90 tablet 3   • Omega-3 Fatty Acids (fish oil) 1000 MG capsule capsule Take 1,000 mg by mouth Daily With Breakfast.     • omeprazole (priLOSEC) 40 MG capsule Take 1 capsule by mouth Daily. Take 30 minutes 1st meal of the day 90 capsule 3   • psyllium (KONSYL) 28.3 % pack pack Take 1 packet by mouth Daily As Needed.     • TURMERIC CURCUMIN PO Take 1 tablet by mouth Daily.       No current facility-administered medications on file prior to visit.            Which medication are you concerned about: KEISHA     Who prescribed you this medication: DOCTOR MARCELA     What are your  concerns:THE PATIENT WOULD LIKE TO KNOW IF THERE IS ANOTHER MEDICATION THAT SHE CAN TAKE THAT WOULD BE BETTER THAN MERBETRIQ

## 2022-10-12 NOTE — TELEPHONE ENCOUNTER
She has tried multiple bladder medications, none of which have been overwhelming effective. Review of chart shows at least 4 different meds in the last 3 years. All the medications work similarly so unlikely another one would make much difference.    Options at this point are urology consultation vs pelvic PT referral.

## 2022-11-04 ENCOUNTER — TELEPHONE (OUTPATIENT)
Dept: INTERNAL MEDICINE | Facility: CLINIC | Age: 77
End: 2022-11-04

## 2022-11-04 DIAGNOSIS — M81.0 AGE-RELATED OSTEOPOROSIS WITHOUT CURRENT PATHOLOGICAL FRACTURE: Chronic | ICD-10-CM

## 2022-11-04 RX ORDER — DENOSUMAB 60 MG/ML
60 INJECTION SUBCUTANEOUS ONCE
Qty: 1 ML | Refills: 0
Start: 2022-11-04 | End: 2022-11-04

## 2022-11-04 NOTE — TELEPHONE ENCOUNTER
----- Message from Lyn Fang RN sent at 11/4/2022 11:32 AM EDT -----  Regarding: Prolia  Please place order for Prolia on 11/11.  Thank you!

## 2022-11-11 ENCOUNTER — INFUSION (OUTPATIENT)
Dept: ONCOLOGY | Facility: HOSPITAL | Age: 77
End: 2022-11-11

## 2022-11-11 VITALS
TEMPERATURE: 97.5 F | HEART RATE: 78 BPM | SYSTOLIC BLOOD PRESSURE: 147 MMHG | RESPIRATION RATE: 20 BRPM | DIASTOLIC BLOOD PRESSURE: 76 MMHG

## 2022-11-11 DIAGNOSIS — M81.0 AGE-RELATED OSTEOPOROSIS WITHOUT CURRENT PATHOLOGICAL FRACTURE: Primary | ICD-10-CM

## 2022-11-11 PROCEDURE — 25010000002 DENOSUMAB 60 MG/ML SOLUTION PREFILLED SYRINGE: Performed by: INTERNAL MEDICINE

## 2022-11-11 PROCEDURE — 96372 THER/PROPH/DIAG INJ SC/IM: CPT

## 2022-11-11 RX ADMIN — DENOSUMAB 60 MG: 60 INJECTION SUBCUTANEOUS at 11:29

## 2022-11-28 ENCOUNTER — APPOINTMENT (OUTPATIENT)
Dept: MAMMOGRAPHY | Facility: HOSPITAL | Age: 77
End: 2022-11-28

## 2022-11-28 ENCOUNTER — APPOINTMENT (OUTPATIENT)
Dept: BONE DENSITY | Facility: HOSPITAL | Age: 77
End: 2022-11-28

## 2022-12-19 ENCOUNTER — HOSPITAL ENCOUNTER (OUTPATIENT)
Dept: MAMMOGRAPHY | Facility: HOSPITAL | Age: 77
Discharge: HOME | End: 2022-12-19

## 2022-12-19 ENCOUNTER — HOSPITAL ENCOUNTER (OUTPATIENT)
Dept: BONE DENSITY | Facility: HOSPITAL | Age: 77
Discharge: HOME | End: 2022-12-19

## 2022-12-19 ENCOUNTER — APPOINTMENT (OUTPATIENT)
Dept: MAMMOGRAPHY | Facility: HOSPITAL | Age: 77
End: 2022-12-19

## 2022-12-19 DIAGNOSIS — Z12.31 SCREENING MAMMOGRAM FOR BREAST CANCER: ICD-10-CM

## 2022-12-19 DIAGNOSIS — M81.0 AGE-RELATED OSTEOPOROSIS WITHOUT CURRENT PATHOLOGICAL FRACTURE: Chronic | ICD-10-CM

## 2022-12-19 PROCEDURE — 77063 BREAST TOMOSYNTHESIS BI: CPT

## 2022-12-19 PROCEDURE — 77067 SCR MAMMO BI INCL CAD: CPT | Performed by: RADIOLOGY

## 2022-12-19 PROCEDURE — 77067 SCR MAMMO BI INCL CAD: CPT

## 2022-12-19 PROCEDURE — 77063 BREAST TOMOSYNTHESIS BI: CPT | Performed by: RADIOLOGY

## 2022-12-23 ENCOUNTER — TELEPHONE (OUTPATIENT)
Dept: CARDIOLOGY | Facility: CLINIC | Age: 77
End: 2022-12-23

## 2022-12-23 NOTE — TELEPHONE ENCOUNTER
Called Mrs Sharma due to [email or web address] loop recorder monitor isn't reading.  Left message to check all connections and to power off/on.  Left my name and number to call for questions.

## 2023-01-02 PROCEDURE — G2066 INTER DEVC REMOTE 30D: HCPCS | Performed by: INTERNAL MEDICINE

## 2023-01-02 PROCEDURE — 93298 REM INTERROG DEV EVAL SCRMS: CPT | Performed by: INTERNAL MEDICINE

## 2023-02-08 PROCEDURE — 93298 REM INTERROG DEV EVAL SCRMS: CPT | Performed by: INTERNAL MEDICINE

## 2023-02-08 PROCEDURE — G2066 INTER DEVC REMOTE 30D: HCPCS | Performed by: INTERNAL MEDICINE

## 2023-02-15 NOTE — PROGRESS NOTES
Francia Henley is a 69 y.o. male who presents today for Finger Swelling (RM21// pt presenting today with an infected (L) middle finger and end of third toe on (L) foot pain)  . 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 Today patient is here for an acute visit. Problem visit:  Francia Henley is here for complaint of L middle finger swelling. Problem began several day(s) ago. Severity is moderate  Character of problem: pain after removing cuticle. Denies any streaking below knuckle or systemic signs of infection. L middle ingrown toenail. Seems to recurrently get painful. Hypertension  Hypertension ROS: taking medications as instructed, no medication side effects noted, no TIA's, no chest pain on exertion, no dyspnea on exertion, no swelling of ankles     reports that he has never smoked. He has never used smokeless tobacco.    reports current alcohol use of about 6.0 standard drinks per week. BP Readings from Last 2 Encounters:   02/15/23 132/82   11/29/21 132/84       ROS  Review of Systems   Constitutional:  Negative for chills, fever and weight loss. HENT:  Negative for congestion and sore throat. Eyes:  Negative for blurred vision, double vision and photophobia. Respiratory:  Negative for cough and shortness of breath. Cardiovascular:  Negative for chest pain, palpitations and leg swelling. Gastrointestinal:  Negative for abdominal pain, constipation, diarrhea, heartburn, nausea and vomiting. Genitourinary:  Negative for dysuria, frequency and urgency. Musculoskeletal:  Positive for joint pain. Negative for myalgias.    Skin: Subjective   Rebecca Sharma is a 72 y.o. female.   Chief Complaint   Patient presents with   • Knee Pain     Right knee injury while sliding out of a mckay in a restaurant; x4 wks ago. Pt has trouble standing on knee, has a brace and also tried anti-inflammatories and pain killers.    • Med Refill     Celebrex 200 mg . Old Rx but would like it renewed.       History of Present Illness   She has been going to a chiropractor. She states the chiropractor told her he thought the meniscus was torn.  She has been undergoing treatment for 4 weeks.  Knee is improving but hurts to stand.  Also, hurts upon arising.  Taking Celebrex with some relief.     Patient is going on a cruise in 4 weeks and hopes to be pain free.    The following portions of the patient's history were reviewed and updated as appropriate: allergies, current medications, past family history, past medical history, past social history, past surgical history and problem list.    Current Outpatient Prescriptions:   •  celecoxib (CeleBREX) 200 MG capsule, Take 1 capsule by mouth Daily As Needed for Moderate Pain ., Disp: 30 capsule, Rfl: 1  •  esomeprazole (NEXIUM 24HR) 20 MG capsule, Take 2 capsules by mouth daily., Disp: 180 capsule, Rfl: 1  •  fluticasone-salmeterol (ADVAIR DISKUS) 100-50 MCG/DOSE DISKUS, Inhale 1 puff 2 (Two) Times a Day. Gargle and spit after puffs, Disp: 3 each, Rfl: 3  •  guaiFENesin (MUCINEX) 600 MG 12 hr tablet, Take 1,200 mg by mouth At Night As Needed., Disp: , Rfl:   •  levothyroxine (SYNTHROID, LEVOTHROID) 50 MCG tablet, Take 1 tablet by mouth Every Morning., Disp: 90 tablet, Rfl: 3  •  montelukast (SINGULAIR) 10 MG tablet, Take 1 tablet by mouth Daily., Disp: , Rfl:   •  solifenacin (VESICARE) 5 MG tablet, Take 1 tablet by mouth Daily., Disp: 90 tablet, Rfl: 3  •  traMADol (ULTRAM) 50 MG tablet, 50 mg daily as needed., Disp: , Rfl:     Review of Systems   Constitutional: Negative for activity change and appetite change.   HENT:  "Negative for ear discharge and sore throat.    Eyes: Negative.    Respiratory: Negative for cough and shortness of breath.    Cardiovascular: Negative for chest pain.   Gastrointestinal: Negative for abdominal pain, blood in stool, diarrhea, nausea and vomiting.   Genitourinary: Negative for difficulty urinating.   Musculoskeletal: Positive for arthralgias and joint swelling.        See HPI   Skin: Negative.    Neurological: Negative for headaches.     /74   Pulse 77   Resp 16   Ht 165.1 cm (65\")   Wt 79.8 kg (176 lb)   SpO2 98%   BMI 29.29 kg/m²     Objective   Allergies   Allergen Reactions   • Aspirin Swelling     Lip and hand swelling   • Donepezil      Nightmares at the 10mg dose   • Fluticasone      epistaxis   • Naproxen      ulcer   • Codeine Rash       Physical Exam   Constitutional: She is oriented to person, place, and time. She appears well-developed and well-nourished.   HENT:   Head: Atraumatic.   Eyes: Right eye exhibits no discharge. Left eye exhibits no discharge.   Musculoskeletal:   Right knee is without deformity or edema or ecchymosis.  Hurts to flex and extend.  Negative drawer sign.  There is some popping and grinding with flexion and extension.   Neurological: She is alert and oriented to person, place, and time.   Skin: Skin is warm and dry.   Pink       Procedures    Assessment/Plan   Rebecca was seen today for knee pain and med refill.    Diagnoses and all orders for this visit:    Acute pain of right knee  -     XR Knee 1 or 2 View Right  -     Ambulatory Referral to Orthopedic Surgery    Other orders  -     celecoxib (CeleBREX) 200 MG capsule; Take 1 capsule by mouth Daily As Needed for Moderate Pain .          Patient Instructions   Knee Pain, Adult  Knee pain in adults is common. It can be caused by many things, including:  · Arthritis.  · A fluid-filled sac (cyst) or growth in your knee.  · An infection in your knee.  · An injury that will not heal.  · Damage, swelling, or " Negative for rash. Neurological: Negative. Negative for headaches. Endo/Heme/Allergies:  Does not bruise/bleed easily. Psychiatric/Behavioral:  Negative for memory loss and suicidal ideas. Visit Vitals  /82   Pulse (!) 56   Temp 98.1 °F (36.7 °C) (Oral)   Resp 18   Ht 5' 7.5\" (1.715 m)   Wt 176 lb 3.2 oz (79.9 kg)   SpO2 95%   BMI 27.19 kg/m²       Physical Exam  Constitutional:       Appearance: He is well-developed. He is not diaphoretic. HENT:      Head: Normocephalic and atraumatic. Eyes:      Pupils: Pupils are equal, round, and reactive to light. Cardiovascular:      Rate and Rhythm: Normal rate and regular rhythm. Heart sounds: No murmur heard. Pulmonary:      Effort: Pulmonary effort is normal. No respiratory distress. Breath sounds: Normal breath sounds. Musculoskeletal:         General: Normal range of motion. Comments: Ingrown toenail noted on left foot. Swelling and signs of small abscess of left middle finger. No active drainage. Skin:     General: Skin is warm and dry. Neurological:      Mental Status: He is alert and oriented to person, place, and time. Psychiatric:         Behavior: Behavior normal.         Current Outpatient Medications   Medication Sig    doxycycline (ADOXA) 100 mg tablet Take 1 Tablet by mouth two (2) times a day for 5 days. bacitracin zinc (BACITRACIN) ointment Apply  to affected area two (2) times a day. hydroCHLOROthiazide (HYDRODIURIL) 25 mg tablet TAKE ONE TABLET BY MOUTH DAILY    atenoloL (TENORMIN) 50 mg tablet TAKE ONE TABLET BY MOUTH DAILY    amLODIPine (NORVASC) 5 mg tablet TAKE ONE TABLET BY MOUTH DAILY    potassium chloride (Klor-Con M20) 20 mEq tablet TAKE ONE TABLET BY MOUTH THREE TIMES A DAY    guaiFENesin ER (MUCINEX) 600 mg ER tablet Take 600 mg by mouth two (2) times a day.     diclofenac (VOLTAREN) 1 % gel Apply  to affected area daily as needed for Pain.    loratadine (CLARITIN) 10 mg tablet Take 10 mg by irritation of the tissues that support your knee.  Knee pain is usually not a sign of a serious problem. The pain may go away on its own with time and rest. If it does not, a health care provider may order tests to find the cause of the pain. These may include:  · Imaging tests, such as an X-ray, MRI, or ultrasound.  · Joint aspiration. In this test, fluid is removed from the knee.  · Arthroscopy. In this test, a lighted tube is inserted into knee and an image is projected onto a TV screen.  · A biopsy. In this test, a sample of tissue is removed from the body and studied under a microscope.  Follow these instructions at home:  Pay attention to any changes in your symptoms. Take these actions to relieve your pain.  Activity   · Rest your knee.  · Do not do things that cause pain or make pain worse.  · Avoid high-impact activities or exercises, such as running, jumping rope, or doing jumping jacks.  General instructions   · Take over-the-counter and prescription medicines only as told by your health care provider.  · Raise (elevate) your knee above the level of your heart when you are sitting or lying down.  · Sleep with a pillow under your knee.  · If directed, apply ice to the knee:  ¨ Put ice in a plastic bag.  ¨ Place a towel between your skin and the bag.  ¨ Leave the ice on for 20 minutes, 2-3 times a day.  · Ask your health care provider if you should wear an elastic knee support.  · Lose weight if you are overweight. Extra weight can put pressure on your knee.  · Do not use any products that contain nicotine or tobacco, such as cigarettes and e-cigarettes. Smoking may slow the healing of any bone and joint problems that you may have. If you need help quitting, ask your health care provider.  Contact a health care provider if:  · Your knee pain continues, changes, or gets worse.  · You have a fever along with knee pain.  · Your knee adalberto or locks up.  · Your knee swells, and the swelling becomes worse.  Get  mouth daily. No current facility-administered medications for this visit. Past Medical History:   Diagnosis Date    Arthritis     Chronic low back pain     History of seasonal allergies     Hypertension     Lumbar disc disorder     Scoliosis       Past Surgical History:   Procedure Laterality Date    HX APPENDECTOMY  1992    HX BUNIONECTOMY Left     HX HERNIA REPAIR Left     inguinal    HX ORTHOPAEDIC  12/21/2020    left hip surgery    HX ORTHOPAEDIC Left 2021    foot    HX VASECTOMY        Social History     Tobacco Use    Smoking status: Never    Smokeless tobacco: Never   Substance Use Topics    Alcohol use: Yes     Alcohol/week: 6.0 standard drinks     Types: 4 Glasses of wine, 1 Cans of beer, 1 Shots of liquor per week      Family History   Problem Relation Age of Onset    Hypertension Mother     Cancer Mother         ? intestinal tumor? OSTEOARTHRITIS Father     Psychiatric Disorder Father         ? bipolar    Depression Sister     Diabetes Son         type 1    Bipolar Disorder Daughter     Anesth Problems Neg Hx         No Known Allergies     Assessment/Plan  Diagnoses and all orders for this visit:    1. Finger infection-given location and signs of the small abscess will treat with oral doxycycline. Patient to soak finger. Let us know if things do not improve.  -     doxycycline (ADOXA) 100 mg tablet; Take 1 Tablet by mouth two (2) times a day for 5 days. -     bacitracin zinc (BACITRACIN) ointment; Apply  to affected area two (2) times a day. 2. Essential hypertension-better on repeat    3. Ingrown toenail-chronic issue, suggest podiatry  -     REFERRAL TO PODIATRY          Mary Squires MD  2/15/2023    This note was created with the help of speech recognition software [employer]) and may contain some 'sound alike' errors. help right away if:  · Your knee feels warm to the touch.  · You cannot move your knee.  · You have severe pain in your knee.  · You have chest pain.  · You have trouble breathing.  Summary  · Knee pain in adults is common. It can be caused by many things, including, arthritis, infection, cysts, or injury.  · Knee pain is usually not a sign of a serious problem, but if it does not go away, a health care provider may perform tests to know the cause of the pain.  · Pay attention to any changes in your symptoms. Relieve your pain with rest, medicines, light activity, and use of ice.  · Get help if your pain continues or becomes very severe, or if your knee adalberto or locks up, or if you have chest pain or trouble breathing.  This information is not intended to replace advice given to you by your health care provider. Make sure you discuss any questions you have with your health care provider.  Document Released: 10/14/2008 Document Revised: 12/08/2017 Document Reviewed: 12/08/2017  [employer] Patient Education © 2017 [employer]      X-ray right knee.  Referral to orthopedics.  I have refilled her Celebrex.  Have fun on your cruise        SEVEN Granados

## 2023-04-04 ENCOUNTER — TELEPHONE (OUTPATIENT)
Dept: INTERNAL MEDICINE | Facility: CLINIC | Age: 78
End: 2023-04-04

## 2023-04-04 NOTE — TELEPHONE ENCOUNTER
Caller: Rebecca Sharma    Relationship: Self    Best call back number: [phone]    What is the best time to reach you: ANY    Who are you requesting to speak with (clinical staff, provider,  specific staff member): CLINICAL    Do you know the name of the person who called: SELF    What was the call regarding: PATIENT WOULD LIKE TO FIND OUT IF BLOOD WORK IS DUE AT APPOINTMENT.    Do you require a callback: YES

## 2023-04-18 ENCOUNTER — OFFICE VISIT (OUTPATIENT)
Dept: INTERNAL MEDICINE | Facility: CLINIC | Age: 78
End: 2023-04-18
Payer: MEDICARE

## 2023-04-18 VITALS
BODY MASS INDEX: 30.56 KG/M2 | WEIGHT: 179 LBS | OXYGEN SATURATION: 98 % | SYSTOLIC BLOOD PRESSURE: 118 MMHG | HEART RATE: 77 BPM | DIASTOLIC BLOOD PRESSURE: 72 MMHG | HEIGHT: 64 IN

## 2023-04-18 DIAGNOSIS — M48.061 SPINAL STENOSIS OF LUMBAR REGION WITHOUT NEUROGENIC CLAUDICATION: Chronic | ICD-10-CM

## 2023-04-18 DIAGNOSIS — R60.0 LOWER EXTREMITY EDEMA: ICD-10-CM

## 2023-04-18 DIAGNOSIS — F09 MILD COGNITIVE DISORDER: Primary | Chronic | ICD-10-CM

## 2023-04-18 DIAGNOSIS — M81.0 AGE-RELATED OSTEOPOROSIS WITHOUT CURRENT PATHOLOGICAL FRACTURE: Chronic | ICD-10-CM

## 2023-04-18 RX ORDER — DENOSUMAB 60 MG/ML
60 INJECTION SUBCUTANEOUS ONCE
Qty: 1 ML | Refills: 0
Start: 2023-04-18 | End: 2023-04-18

## 2023-04-18 NOTE — PROGRESS NOTES
"Chief Complaint   Patient presents with   • Mild Cognitive Disorder   • Back Pain   • Foot Swelling       History of Present Illness  77 y.o.  woman presents for f/u on memory concerns. Previously with word-finding concerns.  Notes memory is just not quite the same after she had stroke.  No new changes since last visit.  States that she is socially active.    Complains of chronic low back pain with spinal stenosis, scoliosis, as well as arthritis.  Does utilize cane when walking out and about or sometimes even around the house.  Denies any recent falls.  Is wondering about repeat MRI for further evaluation.  Has been recommended by neurosurgery surgery would be a last resort.  Complains of low back pain that radiates to the right hip, leg, knee.    Also complains of swelling of both feet, worsened at the end of the day, worsened even with just sitting in the office, resolved first thing in the morning as she sleeps with her feet propped up.  Notes symmetry of leg swelling.    Review of Systems  ROS (+) for chronic back pain, now with secondary right hip and leg pain. ROS (+) for leg and feet swelling as noted. all other ROS reviewed and negative.    Current Outpatient Medications:   •  albuterol sulfate HFA (Ventolin HFA) 108 (90 Base) MCG/ACT inhaler prn  •  atorvastatin (Lipitor) 80 MG QD  •  calcium carbonate-cholecalciferol 500-400 MG-UNIT QD  •  clopidogrel (PLAVIX) 75 MG QD  •  denosumab (Prolia) 60 MG/ML q6 mos  •  fluticasone (VERAMYST) 27.5 MCG/SPRAY nasal spray AD  •  Fluticasone-Salmeterol (ADVAIR) 100-50 MCG/ACT DISKUS 1 puff BID  •  levothyroxine 50 MCG QD  •  Mirabegron ER (Myrbetriq) 50 MG QD  •  montelukast (SINGULAIR) 10 MG QD  •  Omega-3 Fatty Acids (fish oil) 1000 MG QD  •  omeprazole (priLOSEC) 40 MG QD  •  psyllium (KONSYL) 28.3 % pack  QD   •  TURMERIC CURCUMIN QD      VITALS:  /72   Pulse 77   Ht 163.2 cm (64.25\")   Wt 81.2 kg (179 lb)   SpO2 98%   BMI 30.49 kg/m² "     Physical Exam  Vitals and nursing note reviewed.   Constitutional:       General: She is not in acute distress.     Appearance: Normal appearance. She is not ill-appearing.   Eyes:      Extraocular Movements: Extraocular movements intact.      Conjunctiva/sclera: Conjunctivae normal.   Cardiovascular:      Comments: Diffuse spider veins and some varicose veins BLE   Pulmonary:      Effort: Pulmonary effort is normal. No respiratory distress.   Chest:      Chest wall: Tenderness: 25.   Musculoskeletal:      Right lower leg: Edema (1+ edema around the ankles bilaterally, only tr pedal edema bilaterally, symmetric) present.      Left lower leg: Edema present.   Neurological:      Mental Status: She is alert and oriented to person, place, and time. Mental status is at baseline.      Comments: Not walking with cane today   Psychiatric:         Mood and Affect: Mood normal.         Behavior: Behavior normal.         LABS  Results for orders placed or performed in visit on 09/20/22   Comprehensive Metabolic Panel    Specimen: Blood   Result Value Ref Range    Glucose 75 65 - 99 mg/dL    BUN 17 8 - 23 mg/dL    Creatinine 0.94 0.57 - 1.00 mg/dL    Sodium 137 136 - 145 mmol/L    Potassium 4.8 3.5 - 5.2 mmol/L    Chloride 104 98 - 107 mmol/L    CO2 25.0 22.0 - 29.0 mmol/L    Calcium 10.1 8.6 - 10.5 mg/dL    Total Protein 6.5 6.0 - 8.5 g/dL    Albumin 4.10 3.50 - 5.20 g/dL    ALT (SGPT) 15 1 - 33 U/L    AST (SGOT) 22 1 - 32 U/L    Alkaline Phosphatase 82 39 - 117 U/L    Total Bilirubin 0.6 0.0 - 1.2 mg/dL    Globulin 2.4 gm/dL    A/G Ratio 1.7 g/dL    BUN/Creatinine Ratio 18.1 7.0 - 25.0    Anion Gap 8.0 5.0 - 15.0 mmol/L    eGFR 62.2 >60.0 mL/min/1.73   Lipid Panel    Specimen: Blood   Result Value Ref Range    Total Cholesterol 113 0 - 200 mg/dL    Triglycerides 85 0 - 150 mg/dL    HDL Cholesterol 37 (L) 40 - 60 mg/dL    LDL Cholesterol  59 0 - 100 mg/dL    VLDL Cholesterol 17 5 - 40 mg/dL    LDL/HDL Ratio 1.59    TSH     Specimen: Blood   Result Value Ref Range    TSH 2.820 0.270 - 4.200 uIU/mL   Vitamin D 25 Hydroxy    Specimen: Blood   Result Value Ref Range    25 Hydroxy, Vitamin D 40.6 30.0 - 100.0 ng/ml   T4, Free    Specimen: Blood   Result Value Ref Range    Free T4 1.17 0.93 - 1.70 ng/dL   CK    Specimen: Blood   Result Value Ref Range    Creatine Kinase 78 20 - 180 U/L   CBC Auto Differential    Specimen: Blood   Result Value Ref Range    WBC 7.76 3.40 - 10.80 10*3/mm3    RBC 4.52 3.77 - 5.28 10*6/mm3    Hemoglobin 13.9 12.0 - 15.9 g/dL    Hematocrit 41.5 34.0 - 46.6 %    MCV 91.8 79.0 - 97.0 fL    MCH 30.8 26.6 - 33.0 pg    MCHC 33.5 31.5 - 35.7 g/dL    RDW 12.8 12.3 - 15.4 %    RDW-SD 43.6 37.0 - 54.0 fl    MPV 10.8 6.0 - 12.0 fL    Platelets 299 140 - 450 10*3/mm3    Neutrophil % 62.9 42.7 - 76.0 %    Lymphocyte % 26.7 19.6 - 45.3 %    Monocyte % 7.6 5.0 - 12.0 %    Eosinophil % 1.5 0.3 - 6.2 %    Basophil % 0.9 0.0 - 1.5 %    Immature Grans % 0.4 0.0 - 0.5 %    Neutrophils, Absolute 4.88 1.70 - 7.00 10*3/mm3    Lymphocytes, Absolute 2.07 0.70 - 3.10 10*3/mm3    Monocytes, Absolute 0.59 0.10 - 0.90 10*3/mm3    Eosinophils, Absolute 0.12 0.00 - 0.40 10*3/mm3    Basophils, Absolute 0.07 0.00 - 0.20 10*3/mm3    Immature Grans, Absolute 0.03 0.00 - 0.05 10*3/mm3    nRBC 0.0 0.0 - 0.2 /100 WBC   Urinalysis without microscopic (no culture) - Urine, Clean Catch    Specimen: Urine, Clean Catch   Result Value Ref Range    Color, UA Yellow Yellow, Straw    Appearance, UA Clear Clear    pH, UA 7.5 5.0 - 8.0    Specific Gravity, UA <=1.005 1.005 - 1.030    Glucose, UA Negative Negative    Ketones, UA Negative Negative    Bilirubin, UA Negative Negative    Blood, UA Negative Negative    Protein, UA Negative Negative    Leuk Esterase, UA Moderate (2+) (A) Negative    Nitrite, UA Negative Negative    Urobilinogen, UA 0.2 E.U./dL 0.2 - 1.0 E.U./dL   Urinalysis, Microscopic Only - Urine, Clean Catch    Specimen: Urine, Clean Catch  "  Result Value Ref Range    RBC, UA 0-2 None Seen, 0-2 /HPF    WBC, UA 3-5 (A) None Seen, 0-2 /HPF    Bacteria, UA None Seen None Seen /HPF    Squamous Epithelial Cells, UA 0-2 None Seen, 0-2 /HPF    Hyaline Casts, UA None Seen None Seen /LPF    Methodology Automated Microscopy          ASSESSMENT/PLAN    Diagnoses and all orders for this visit:    1. Mild cognitive disorder (Primary)  Assessment & Plan:  SLUMS 28/30; cont to encourage \"brain exercises\" with puzzles, reading, memorization, socialization, learning new activities/hobbies, and role of medications; discussed option of Speech Therapy consult for \"memory clinic\"; 1st step is to address mild hearing loss, then can consider proactive meds to decrease risk of progression to dementaion f/u in 6 mos with repeat SLUMS        2. Osteoporosis  Assessment & Plan:  Next Prolia injection due in 5/23 - ordered    Orders:  -     denosumab (Prolia) 60 MG/ML solution prefilled syringe syringe; Inject 1 mL under the skin into the appropriate area as directed 1 (One) Time for 1 dose.  Dispense: 1 mL; Refill: 0    3. Spinal stenosis of lumbar region without neurogenic claudication  Assessment & Plan:  Chronic back pain; fall precautions - walks with cane when going out and sometimes around the house; rec maintenance with yoga/pilates program, rec proceeding with PT for back pain, balance, flexibility, and strengthening; if no better, can repeat MRI L-spine for f/u evaluation    Orders:  -     Ambulatory Referral to Physical Therapy Evaluate and treat, Aquatics; Electrotherapy (all modalities indicated), Heat; Strengthening, ROM, Stretching    4. Lower extremity edema  Assessment & Plan:  Attributed to venous insufficiency; patient has extensive varicose and spider veins; she does note swelling completely goes down first thing in the morning; recommend low-sodium diet, leg elevation, and compression stockings when having to stand or sit for prolonged periods of " time      Time Documentation    Counseled patient  I spent 28 minutes face to face and 25 minutes non-face to face on today's office visit. Time was spent reviewing patient's previous notes, lab results, test results, vitals, and/or other records as well as examining the patient, ordering tests/medicines/procedures, providing counseling, coordinating care, answering questions, discussing evaluation and treatment plans, and writing this note.     Time spent performing and interpreting SLUMS was not included in the time reported for the E/M service for this visit.    Total time: 53 minutes   (Level 5 40-69 minutes)    FOLLOW-UP  1. Health maintenance - COVID19 vacc done, including new COVID19 vacc  2. RTC for next wellness 10/3/23; fasting labs prior to appt (CBC, CMP, TSH, lipids, UA/micr, FT4, vit D, CPK, B12) + SLUMS    Electronically signed by:    Lorene Isaac MD, FACP  04/18/2023

## 2023-04-18 NOTE — Clinical Note
Not sure if you are the one to contact, but patient asked me to go ahead and write order for Prolia, next shot will be due in May 2023; thanks

## 2023-04-18 NOTE — ASSESSMENT & PLAN NOTE
Chronic back pain; fall precautions - walks with cane when going out and sometimes around the house; rec maintenance with yoga/pilates program, rec proceeding with PT for back pain, balance, flexibility, and strengthening; if no better, can repeat MRI L-spine for f/u evaluation

## 2023-04-18 NOTE — ASSESSMENT & PLAN NOTE
Attributed to venous insufficiency; patient has extensive varicose and spider veins; she does note swelling completely goes down first thing in the morning; recommend low-sodium diet, leg elevation, and compression stockings when having to stand or sit for prolonged periods of time

## 2023-04-18 NOTE — ASSESSMENT & PLAN NOTE
"SLUMS 28/30; cont to encourage \"brain exercises\" with puzzles, reading, memorization, socialization, learning new activities/hobbies, and role of medications; discussed option of Speech Therapy consult for \"memory clinic\"; 1st step is to address mild hearing loss, then can consider proactive meds to decrease risk of progression to dementaion f/u in 6 mos with repeat SLUMS    "

## 2023-05-02 ENCOUNTER — TELEPHONE (OUTPATIENT)
Dept: INTERNAL MEDICINE | Facility: CLINIC | Age: 78
End: 2023-05-02
Payer: MEDICARE

## 2023-05-02 DIAGNOSIS — M81.0 AGE-RELATED OSTEOPOROSIS WITHOUT CURRENT PATHOLOGICAL FRACTURE: Chronic | ICD-10-CM

## 2023-05-02 RX ORDER — DENOSUMAB 60 MG/ML
60 INJECTION SUBCUTANEOUS ONCE
Qty: 1 ML | Refills: 0
Start: 2023-05-11 | End: 2023-05-11

## 2023-05-02 NOTE — TELEPHONE ENCOUNTER
----- Message from Lyn Fang RN sent at 5/2/2023  3:04 PM EDT -----  Regarding: Prolia  Please place order for Prolia on 5/11.  Thank you!

## 2023-05-08 ENCOUNTER — TELEPHONE (OUTPATIENT)
Dept: INTERNAL MEDICINE | Facility: CLINIC | Age: 78
End: 2023-05-08

## 2023-05-08 NOTE — TELEPHONE ENCOUNTER
Pt notified that Prolia was ordered to be done at the infusion center, not the medication, the infusion center will supply the med. Pt verbalized understanding.

## 2023-05-08 NOTE — TELEPHONE ENCOUNTER
Caller: Rebecca Sharma JAS    Relationship: Self    Best call back number: [phone]    What medications are you currently taking:   Current Outpatient Medications on File Prior to Visit   Medication Sig Dispense Refill   • albuterol sulfate HFA (Ventolin HFA) 108 (90 Base) MCG/ACT inhaler Inhale 2 puffs Every 6 (Six) Hours As Needed for Wheezing or Shortness of Air. 18 g 0   • atorvastatin (Lipitor) 80 MG tablet Take 1 tablet by mouth Daily. 90 tablet 3   • calcium carbonate-cholecalciferol 500-400 MG-UNIT tablet tablet Take 1 tablet by mouth Daily.     • clopidogrel (PLAVIX) 75 MG tablet Take 1 tablet by mouth Daily. 90 tablet 3   • [START ON 5/11/2023] denosumab (Prolia) 60 MG/ML solution prefilled syringe syringe Inject 1 mL under the skin into the appropriate area as directed 1 (One) Time for 1 dose. 1 mL 0   • fluticasone (VERAMYST) 27.5 MCG/SPRAY nasal spray 2 sprays into the nostril(s) as directed by provider Daily.     • Fluticasone-Salmeterol (ADVAIR/WIXELA) 100-50 MCG/ACT DISKUS Inhale 1 puff 2 (Two) Times a Day. Gargle/spit after puffs 180 each 3   • levothyroxine (SYNTHROID, LEVOTHROID) 50 MCG tablet Take 1 tablet by mouth Every Morning. 90 tablet 3   • Mirabegron ER (Myrbetriq) 50 MG tablet sustained-release 24 hour 24 hr tablet Take 50 mg by mouth Daily. 90 tablet 3   • montelukast (SINGULAIR) 10 MG tablet Take 1 tablet by mouth Every Night. 90 tablet 3   • Omega-3 Fatty Acids (fish oil) 1000 MG capsule capsule Take 1 capsule by mouth Daily With Breakfast.     • omeprazole (priLOSEC) 40 MG capsule Take 1 capsule by mouth Daily. Take 30 minutes 1st meal of the day 90 capsule 3   • psyllium (KONSYL) 28.3 % pack pack Take 1 packet by mouth Daily As Needed.     • TURMERIC CURCUMIN PO Take 1 tablet by mouth Daily.       No current facility-administered medications on file prior to visit.          Which medication are you concerned about: denosumab (Prolia) 60 MG/ML solution prefilled syringe  syringe    Who prescribed you this medication: PCP    What are your concerns: PATIENT WOULD LIKE TO CONFIRM THE denosumab (Prolia) 60 MG/ML solution prefilled syringe syringe MEDICATION THAT IS DUE FOR PICK-UP ON 5/11/23 DID NOT INCLUDE THE SERUM. PATIENT STATES MEDICARE PAYS FOR THE SERUM. PATIENT STATES PCP SHOULD ONLY ORDER THE INJECTION. PATIENT WOULD LIKE A CALLBACK FROM [facility] TO DISCUSS.

## 2023-05-11 ENCOUNTER — INFUSION (OUTPATIENT)
Dept: ONCOLOGY | Facility: HOSPITAL | Age: 78
End: 2023-05-11
Payer: MEDICARE

## 2023-05-11 VITALS
RESPIRATION RATE: 18 BRPM | TEMPERATURE: 97.9 F | HEART RATE: 77 BPM | SYSTOLIC BLOOD PRESSURE: 137 MMHG | DIASTOLIC BLOOD PRESSURE: 80 MMHG

## 2023-05-11 DIAGNOSIS — M81.0 AGE-RELATED OSTEOPOROSIS WITHOUT CURRENT PATHOLOGICAL FRACTURE: Primary | ICD-10-CM

## 2023-05-11 PROCEDURE — 96372 THER/PROPH/DIAG INJ SC/IM: CPT

## 2023-05-11 PROCEDURE — 25010000002 DENOSUMAB 60 MG/ML SOLUTION PREFILLED SYRINGE: Performed by: INTERNAL MEDICINE

## 2023-05-11 RX ADMIN — DENOSUMAB 60 MG: 60 INJECTION SUBCUTANEOUS at 13:12

## 2023-05-12 PROCEDURE — 93298 REM INTERROG DEV EVAL SCRMS: CPT | Performed by: INTERNAL MEDICINE

## 2023-05-12 PROCEDURE — G2066 INTER DEVC REMOTE 30D: HCPCS | Performed by: INTERNAL MEDICINE

## 2023-06-08 ENCOUNTER — OFFICE VISIT (OUTPATIENT)
Dept: NEUROLOGY | Facility: CLINIC | Age: 78
End: 2023-06-08
Payer: MEDICARE

## 2023-06-08 VITALS
BODY MASS INDEX: 30.56 KG/M2 | OXYGEN SATURATION: 95 % | HEIGHT: 64 IN | DIASTOLIC BLOOD PRESSURE: 90 MMHG | HEART RATE: 72 BPM | SYSTOLIC BLOOD PRESSURE: 156 MMHG | WEIGHT: 179 LBS

## 2023-06-08 DIAGNOSIS — Z86.73 HISTORY OF STROKE: Primary | ICD-10-CM

## 2023-06-08 RX ORDER — ATORVASTATIN CALCIUM 40 MG/1
40 TABLET, FILM COATED ORAL DAILY
Qty: 30 TABLET | Refills: 11 | Status: SHIPPED | OUTPATIENT
Start: 2023-06-08 | End: 2024-06-07

## 2023-06-08 NOTE — PROGRESS NOTES
Subjective:    CC: Rebecca Sharma is seen today for History of stroke       Current visit-patient continues to have mild word finding difficulties but no new symptoms.  Her loop recorder thus far has shown only SVT but no atrial fibrillation.  She continues to be on Plavix 75 mg and Lipitor 80 mg.  Her last lipid panel was as follows-, TG 85, LDL 59, HDL 37.  Her muscle enzyme level was normal.  Her blood pressure today is elevated but at home when she checks it it is usually between 1 20-1 30 systolic.     Last visit-patient denies having any new strokelike symptoms since she last saw me.  She continues to have occasional word finding difficulties.  She is still taking Plavix and Lipitor 40 mg daily.  She had a repeat lipid panel done today.  Her loop recorder thus far has failed to show any atrial fibrillation.  It did show one episode of SVT.  She occasionally gets palpitations after eating dinner but denies getting any chest pain or shortness of breath.     Initial mkhce-12-lsbg-old female with a past medical history of hypertension, hyperlipidemia, hypothyroidism, ADEN on CPAP, venous insufficiency of legs presents as a hospital follow-up for stroke.  As per patient she had symptoms of right hand weakness, right facial droop and difficulty speaking on 7/12.  She initially went to [facility] where she had a CT scan of the head that was unremarkable.  Was given IV TPA and subsequently transferred to [facility].  She had a CT perfusion here that was normal, CT angiogram of the head and neck showed possible narrowing of the left P3 and mild dilatation of the left P-comm (aneurysm versus infundibulum) but no significant carotid stenosis.  MRI brain showed scattered infarcts in the left frontal, parietal and posterior temporal region.  Echocardiogram showed an EF of 56 to 60% with moderate tricuspid valve regurg but normal left atrial size and no PFO.  Patient was started on Plavix (allergic to  aspirin) and Lipitor 80 mg.  Her most recent lipid panel was as follows-, TG 69, LDL 60, HDL 31.  Last A1c was 5.8.  She had a loop recorder placed in the hospital that has been interrogated a few times by Dr. Smith and does not show any atrial fibrillation till date.  After discharge patient received PT and speech therapy and right arm strength as well as speech have improved although she is still hesitant to talk at times.  Also has mild difficulties recalling names when passwords.  Of note-I personally reviewed her MRI brain and the hospital notes    The following portions of the patient's history were reviewed today and updated as of 10/06/2021  : allergies, current medications, past family history, past medical history, past social history, past surgical history and problem list  These document will be scanned to patient's chart.      Current Outpatient Medications:     albuterol sulfate HFA (Ventolin HFA) 108 (90 Base) MCG/ACT inhaler, Inhale 2 puffs Every 6 (Six) Hours As Needed for Wheezing or Shortness of Air., Disp: 18 g, Rfl: 0    calcium carbonate-cholecalciferol 500-400 MG-UNIT tablet tablet, Take 1 tablet by mouth Daily., Disp: , Rfl:     clopidogrel (PLAVIX) 75 MG tablet, Take 1 tablet by mouth Daily., Disp: 90 tablet, Rfl: 3    fluticasone (VERAMYST) 27.5 MCG/SPRAY nasal spray, 2 sprays into the nostril(s) as directed by provider Daily., Disp: , Rfl:     Fluticasone-Salmeterol (ADVAIR/WIXELA) 100-50 MCG/ACT DISKUS, Inhale 1 puff 2 (Two) Times a Day. Gargle/spit after puffs, Disp: 180 each, Rfl: 3    levothyroxine (SYNTHROID, LEVOTHROID) 50 MCG tablet, Take 1 tablet by mouth Every Morning., Disp: 90 tablet, Rfl: 3    Mirabegron ER (Myrbetriq) 50 MG tablet sustained-release 24 hour 24 hr tablet, Take 50 mg by mouth Daily., Disp: 90 tablet, Rfl: 3    montelukast (SINGULAIR) 10 MG tablet, Take 1 tablet by mouth Every Night., Disp: 90 tablet, Rfl: 3    Omega-3 Fatty Acids (fish oil) 1000 MG  capsule capsule, Take 1 capsule by mouth Daily With Breakfast., Disp: , Rfl:     omeprazole (priLOSEC) 40 MG capsule, Take 1 capsule by mouth Daily. Take 30 minutes 1st meal of the day, Disp: 90 capsule, Rfl: 3    psyllium (KONSYL) 28.3 % pack pack, Take 1 packet by mouth Daily As Needed., Disp: , Rfl:     TURMERIC CURCUMIN PO, Take 1 tablet by mouth Daily., Disp: , Rfl:     atorvastatin (Lipitor) 40 MG tablet, Take 1 tablet by mouth Daily., Disp: 30 tablet, Rfl: 11    denosumab (Prolia) 60 MG/ML solution prefilled syringe syringe, Inject 1 mL under the skin into the appropriate area as directed 1 (One) Time for 1 dose., Disp: 1 mL, Rfl: 0   Past Medical History:   Diagnosis Date    Abnormal MRI, breast 01/03/2020    breast MRI (1/3/20): 1.1cm R. nipple mass sugg of nipple adenoma, indeterminate 0.5cm L. breast mass 12:00, rec surg excision for punch bx R. nipple mass and u/s for further eval L. breast mass    Abnormal MRI, breast 07/10/2020    breast MRI (7/10/20): postsurg bx R. nipple with dx benign adenoma, post-bx clip left breast adjacent to 0.4cm not worrisome mass, rec L. breast MRI in 12 mos (reg mammo due 12/20)    Adverse reaction to drug 05/10/2016    Impression: 06/09/2014 - GI side effects to aricept; plan as above to change timing and dose of med;     Anesthesia     SOB upon awakening from surgery.  one occurrence.    Asthma 1995    mild    Breast pain, right 06/14/2016 6/14/16 Notes f/u with Dr. Beckham with neg bx (evaluated at [facility]); bx site healing per patient; 5/10/16 Pain resolved but has residual R. nipple abnlity with erythema and nodular change, therefore dx mammo ordered for further eval; last reg mammo done 7/15; R. nipple mass - s/p bx to r/o Paget's disease (6/16); surg - Dr. Beckham    Contact dermatitis due to poison ivy 07/30/2014    Impression: 07/30/2014 - Depo-medrol 40 mg IM given in office. Pt will start prednisone taper as ordered. Call or RTC if rash is not  improving.;     Dizziness 10/27/2016    10/27/16 Imbalanced sensation and not vertigo per patient; no focal neuro deficits on exam; will rec f/u eye exam and head imaging if sxs persist and ESR/CRP are normal; ddx also includes eust tube dysfunction    Hx of bone density study 07/2013    DEXA (7/13) H -1.3    Hx of bone density study 07/14/2016    DEXA (7/14/16) L 0.4, H -1.6 repeat 2-3 yrs    Hx of bone density study 10/08/2019    DEXA (10/8/19): L 1.3, H -1.8, A -2.8, worsened, NEW osteoporosis, repeat 2 yrs    Hx of bone density study 12/17/2021    DEXA (12/17/21): L 3.6,H -1.6, A -3.0    Hx of colonoscopy 12/2015    surg - Dr. Kartik Gold    Hx of colonoscopy 11/08/2019    colonosc (11/8/19): nl except diverticulosis, repeat 5 yrs due to h/o polyps; surg - Dr. Kartik Gold    Hx of CT angio head/neck 07/13/2021    CT angio head/neck (7/13/21, hospitalized): No definite lg vessel occlusion, high-grade stenosis or dissection; indeterminate narrowing/occlusion of small P3 branch of the patient's left PCA; small outpouching at origin of left P-com may represent a small aneurysm versus infundibulum    Hx of CT brain perfusion 07/13/2021    Normal brain perfusion CT (7/13/21, hospitalized)    Hx of CT scan of head 07/13/2021    nl CT head (7/13/21): except mild atrophy with mild chronic small vessel ischemic disease    Hx of CT scan of head 07/14/2021    CT head (7/14/21): acute subacute infarct left posterior temporal and parietal region, no hemorrhage    Hx of echocardiogram 07/13/2021    ECHO (7/13/21, hosp): EF 56-60%, mild-mod TR, neg saline test    Hx of esophagogastroduodenoscopy 05/05/2017    EGD (5/5/17): mod chronic gastritis, (+)H.pylori, no hiatal hernia; GI - Dr. Esparza    Hx of esophagogastroduodenoscopy 04/28/2021    EGD (4/8/21): cricopharyngeal spasm, Schatzki's s/p dilation, nonerosive GERD with mod esophageal dysmotility, 2xm hiatal hernia, bile reflux gastropathy, mod chronic gastritis and (+) H.  pylori - treated with abx; GI - Dr. Cerrato    Hx of LLE venous duplex 11/13/2020    LLE venous duplex (11/13/20): no DVT; (+) valvular venous insufficiency deep and superficial with severe reflux    Hx of MRI brain 07/14/2021    MRI brain (7/14/21): acute infarct left parietal and posterior temporal lobe w/ adjacent area c/w left posterior MCA CVA    Hx of swallowing study (FEES) 07/15/2021    s/p nl swallow study/FEES (7/15/21): aspiration precautions; regular textures, thin liquids    Lightheadedness 04/28/2014    Impression: 04/28/2014 - resolved; will let her know official carotid u/s results when available  Impression: 04/13/2014 - discussed adequate hydration; check CBC;     Lip swelling 08/05/2015    Impression: 08/05/2015 - L. lower lip lesion significantly improved; complete course of abx as prescribed; if area does not resolve completely, call to make earlier derm f/u appt  Impression: 07/28/2015 - L. lower lip swelling already improving but concern for residual ongoing infection; ddx includes infected cold sore, infection from cryotherapy for AK, and less likely shingles; finishing course     Pap smear for cervical cancer screening 08/16/2016    Pap done 8/16/16 per Dr. Brittanie Bauer    Peripheral arterial occlusive disease 05/10/2016    Description: abnl ALLEGRA at [facility] with nl arterial duplex (10/08)    Sleep apnea 2019    Wears glasses       Past Surgical History:   Procedure Laterality Date    BILATERAL SALPINGO OOPHORECTOMY Bilateral 06/14/2017    GYN - Dr. Bauer; for benign L. ovarian cyst    BREAST BIOPSY Right 05/12/2020    s/p R. breast excisional bx R. subsreaolar mass (5/12/20); path - nipple adenoma; surg - Dr. Beckham    CARDIAC ELECTROPHYSIOLOGY PROCEDURE N/A 07/15/2021    Procedure: loop implant;  Surgeon: Bartolome Smith IV, MD;  Location: [facility] INVASIVE LOCATION;  Service: Cardiovascular;  Laterality: N/A;    CATARACT EXTRACTION, BILATERAL      L. 4/09 (complicated by  "nerve injury; R. 6/10; L. corrected 6/15; ophtho - Dr. Hollis, Dr. Lange; neuro-ophtho Dr. Ferris    CHOLECYSTECTOMY  2002    secondary to \"crystalized GB\" (''02); surg - Dr. Kartik Gold    MAMMO CORE NEEDLE BIOPSY UNILATERAL Left 01/17/2020    L. breast u/s core needle bx (1/17/20): path - Focal proliferative fibrocystic changes including sclerosing adenosis    SUBTOTAL HYSTERECTOMY  2017    Ovaries, Tubes      Family History   Problem Relation Age of Onset    Heart failure Mother         Congestive Heart Failure    Lung cancer Mother     Breast cancer Mother 86    Cancer Mother         Breast    Other Mother         Parkinson’s    Lung cancer Father         tob use    Cancer Father         Lung    Depression Sister     OCD Sister     Hyperlipidemia Sister         No longer    Hypertension Sister     Cancer Sister         Brain Stem    Stroke Maternal Grandmother     Diabetes Paternal Grandmother     Diabetes Paternal Aunt     Stroke Paternal Aunt     Heart disease Maternal Grandfather     Heart attack Paternal Grandfather     Heart attack Paternal Uncle         Twins    Diabetes Paternal Uncle     Ovarian cancer Neg Hx       Social History     Socioeconomic History    Marital status: Single   Tobacco Use    Smoking status: Never    Smokeless tobacco: Never   Vaping Use    Vaping Use: Never used   Substance and Sexual Activity    Alcohol use: Yes     Alcohol/week: 1.0 standard drink     Types: 1 Cans of beer per week     Comment: occas    Drug use: Never    Sexual activity: Not Currently     Partners: Male     Review of Systems   All other systems reviewed and are negative.    Objective:    /90   Pulse 72   Ht 163.2 cm (64.25\")   Wt 81.2 kg (179 lb)   SpO2 95%   BMI 30.49 kg/m²     Neurology Exam:    General apperance: NAD.     Mental status: Alert, awake and oriented to time place and person.    Recent and Remote memory: Intact.    Attention span and Concentration: Normal.     Language and Speech: " Intact- No dysarthria.  Slightly hesitant at times    Fluency, Naming , Repitition and Comprehension:  Intact    Cranial Nerves:   CN II: Visual fields are full. Intact. Fundi - Normal, No papillederma, Pupils - KHUSHBU  CN III, IV and VI: Extraocular movements are intact. Normal saccades.   CN V: Facial sensation is intact.   CN VII: Muscles of facial expression reveal no asymmetry. Intact.   CN VIII: Hearing is intact. Whispered voice intact.   CN IX and X: Palate elevates symmetrically. Intact  CN XI: Shoulder shrug is intact.   CN XII: Tongue is midline without evidence of atrophy or fasciculation.     Ophthalmoscopic exam of optic disc-normal    Motor:  Right UE muscle strength 5/5. Normal tone.     Left UE muscle strength 5/5. Normal tone.      Right LE muscle strength5/5. Normal tone.     Left LE muscle strength 5/5. Normal tone.      Sensory: Normal light touch, vibration and pinprick sensation bilaterally.    DTRs: 2+ bilaterally in upper and lower extremities.    Babinski: Negative bilaterally.    Co-ordination: Normal finger-to-nose, heel to shin B/L.    Rhomberg: Negative.    Gait: Slightly cautious while walking but otherwise normal gait.  (has spinal stenosis and scoliosis).    Cardiovascular: Regular rate and rhythm without murmur, gallop or rub.    Assessment and Plan:  1. History of stroke  Patient had scattered infarcts in the left hemisphere most likely cardioembolic in nature.  CT angiogram did not show any significant carotid stenosis  Loop recorder thus far has shown SVT but no atrial fibrillation  I have asked her to continue Plavix 75 mg for now.   I will reduce her Lipitor to 40 mg daily.  Goal LDL of less than 100.  Her LDL was 59  Goal blood pressure less than 130/90.  Her blood pressure is elevated today and I have told her to check it regularly at home.  I have once again told her to carry out mental exercises such as reading, solving crossword puzzles etc. that will help with her speech  and memory         Return in about 1 year (around 6/8/2024).         Raquel Bautista MD

## 2023-06-13 DIAGNOSIS — J45.20 MILD INTERMITTENT ASTHMA WITHOUT COMPLICATION: Chronic | ICD-10-CM

## 2023-06-13 RX ORDER — FLUTICASONE PROPIONATE AND SALMETEROL 100; 50 UG/1; UG/1
1 POWDER RESPIRATORY (INHALATION)
Qty: 180 EACH | Refills: 3 | OUTPATIENT
Start: 2023-06-13

## 2023-06-13 NOTE — TELEPHONE ENCOUNTER
Caller: Rebecca Sharma    Relationship: Self    Best call back number:     [phone]       Requested Prescriptions:   Requested Prescriptions     Pending Prescriptions Disp Refills    Fluticasone-Salmeterol (ADVAIR/WIXELA) 100-50 MCG/ACT DISKUS 180 each 3     Sig: Inhale 1 puff 2 (Two) Times a Day. Gargle/spit after puffs        Pharmacy where request should be sent: [facility] - [facility] [address] - [phone]  - [phone]      Last office visit with prescribing clinician: 4/18/2023   Last telemedicine visit with prescribing clinician: Visit date not found   Next office visit with prescribing clinician: 10/3/2023     Additional details provided by patient: PATIENT CALLED IN REQUESTING THE ABOVE MEDICATION SHE STATES SHE WOULD LIKE A 3 MONTH SUPPLY SO ALL OF HER MEDICATIONS CAN BE FILLED AT THE SAME TIME MOVING FORWARD    Does the patient have less than a 3 day supply:  [] Yes  [x] No    Would you like a call back once the refill request has been completed: [x] Yes [] No    If the office needs to give you a call back, can they leave a voicemail: [x] Yes [] No    Felicita Nieto Rep   06/13/23 10:06 EDT

## 2023-06-16 NOTE — TELEPHONE ENCOUNTER
Patient called and is requesting to get a 3month RX ON Fluticasone-Salmeterol (ADVAIR/WIXELA) 100-50 MCG   PATIENTS STATS IT WOULD BE MORE EASIER FOR HER.

## 2023-06-16 NOTE — TELEPHONE ENCOUNTER
Left VM to let patient know that she should have enough refills at her pharmacy to last her until 10/23 when she comes in for her appt. They were sent in as 3 month supply. If she calls back I just need to know if she has contacted the pharmacy and if they are saying she doesn't have any refills or if she has not contacted the pharmacy about refills yet.

## 2023-08-25 ENCOUNTER — TELEPHONE (OUTPATIENT)
Dept: INTERNAL MEDICINE | Facility: CLINIC | Age: 78
End: 2023-08-25
Payer: MEDICARE

## 2023-08-25 NOTE — TELEPHONE ENCOUNTER
That order was given more than 4 months ago. It is not clinically valid. She should be finished with PT by now.     Since she didn't go, she needs OV for updated evaluation and clinical documentation that there is still clinical need.

## 2023-08-25 NOTE — TELEPHONE ENCOUNTER
Pt states she has orders already but it states aquatic. She has changed her mind on doing the aquatic and just needs the order redone w/o that on there

## 2023-08-25 NOTE — TELEPHONE ENCOUNTER
Caller: Rebecca Sharma    Relationship: Self    Best call back number: [phone]     What is the medical concern/diagnosis: BACK ISSUES     What specialty or service is being requested: PHYSICAL THERAPY     What is the provider, practice or medical service name: [facility]     What is the office location: [address] [place] [address] [place] [address]     What is the office phone number: [phone]     Any additional details: PATIENT IS NEEDING A REFERRAL FOR PHYSICAL THERAPY FAXED TO THIS OFFICE AND THE FAX NUMBER [phone].     PATIENT STATES SHE RECEIVED A REFERRAL FOR AQUATIC THERAPY BUT SHE NO LONGER WANTS TO DO THAT AND WANTS REGULAR PHYSICAL THERAPY.

## 2023-08-26 NOTE — TELEPHONE ENCOUNTER
FYI  Patient was read message below regarding physical therapy appointment. An Office visit was made for 08/28/2023 for clinical evaluation.  Phone: [phone]

## 2023-08-26 NOTE — TELEPHONE ENCOUNTER
HUB to read: LM to return call, please give message below. Per Dr. Isaac    That order was given more than 4 months ago. It is not clinically valid. She should be finished with PT by now.     Since she didn't go, she needs OV for updated evaluation and clinical documentation that there is still clinical need.

## 2023-08-28 ENCOUNTER — OFFICE VISIT (OUTPATIENT)
Dept: INTERNAL MEDICINE | Facility: CLINIC | Age: 78
End: 2023-08-28
Payer: MEDICARE

## 2023-08-28 VITALS
HEIGHT: 64 IN | HEART RATE: 80 BPM | DIASTOLIC BLOOD PRESSURE: 72 MMHG | OXYGEN SATURATION: 99 % | SYSTOLIC BLOOD PRESSURE: 124 MMHG | BODY MASS INDEX: 30.56 KG/M2 | WEIGHT: 179 LBS

## 2023-08-28 DIAGNOSIS — R26.89 IMBALANCE: ICD-10-CM

## 2023-08-28 DIAGNOSIS — M79.604 RIGHT LEG PAIN: ICD-10-CM

## 2023-08-28 DIAGNOSIS — R29.898 WEAKNESS OF BOTH LOWER EXTREMITIES: ICD-10-CM

## 2023-08-28 DIAGNOSIS — M19.042 PRIMARY OSTEOARTHRITIS OF BOTH HANDS: Chronic | ICD-10-CM

## 2023-08-28 DIAGNOSIS — R25.1 SHAKINESS: ICD-10-CM

## 2023-08-28 DIAGNOSIS — Z71.85 VACCINE COUNSELING: ICD-10-CM

## 2023-08-28 DIAGNOSIS — M19.041 PRIMARY OSTEOARTHRITIS OF BOTH HANDS: Chronic | ICD-10-CM

## 2023-08-28 DIAGNOSIS — M48.061 SPINAL STENOSIS OF LUMBAR REGION WITHOUT NEUROGENIC CLAUDICATION: Primary | Chronic | ICD-10-CM

## 2023-08-28 PROCEDURE — 1159F MED LIST DOCD IN RCRD: CPT | Performed by: INTERNAL MEDICINE

## 2023-08-28 PROCEDURE — 1160F RVW MEDS BY RX/DR IN RCRD: CPT | Performed by: INTERNAL MEDICINE

## 2023-08-28 PROCEDURE — 99214 OFFICE O/P EST MOD 30 MIN: CPT | Performed by: INTERNAL MEDICINE

## 2023-08-28 NOTE — PROGRESS NOTES
"Chief Complaint   Patient presents with    Back Pain     HPI  78 y.o.  woman presents for follow-up re: LBP. Did not go to PT as prev prescribed in 4/23 due to life and obligations. Has chronic LBP as well as outer lateral right thigh pain. Has been doing piriformis muscle stretches QAM. Now has time to go to PT; reuquesting new order because she does not want to do water therapy.    Reports some swelling R 4th finger so that she cannot put her rings on. Has arthritis changes in fingers, jo-ann R index finger.    Reports sensation of shakiness on her hands that comes and goes, but there is no visible tremor when she looks at her hand; it is just a sensation \"on the inside.\" Episodes occur a few times per week, lasts for only seconds, and has been present for last 2-3 months.  Pt is R-handed.    ROS  ROS (+) for chronic LBP with leg weakness R>L, and pain at the R lat mid-thigh; no falls. ROS (+) for shakiness sensation in hands without assoc'd tremor, short-lived/self-limited. All other ROS reviewed and negative.    Current Outpatient Medications:     albuterol sulfate HFA (Ventolin HFA) 108 (90 Base) MCG/ACT inhaler, prn    atorvastatin (Lipitor) 40 MG QD    calcium carbonate-cholecalciferol 500-400 MG-UNIT QD    clopidogrel (PLAVIX) 75 MG QD    denosumab (Prolia) 60 MG/ML q6 mos    fluticasone (VERAMYST) 27.5 MCG/SPRAY nasal spray AD    Fluticasone-Salmeterol (ADVAIR/WIXELA) 100-50 MCG/ACT 1 puff BID    levothyroxine 50 MCG QD    Mirabegron ER (Myrbetriq) 50 MG QD    montelukast (SINGULAIR) 10 MG QD    Omega-3 Fatty Acids (fish oil) 1000 MG QD     omeprazole (priLOSEC) 40 MG QD    psyllium (KONSYL) 28.3 % pack QD    TURMERIC CURCUMIN QD    VITALS:  /72   Pulse 80   Ht 163.2 cm (64.25\")   Wt 81.2 kg (179 lb)   SpO2 99%   BMI 30.49 kg/mý     Physical Exam  Vitals and nursing note reviewed.   Constitutional:       General: She is not in acute distress.     Appearance: Normal appearance. She is not " ill-appearing.   Eyes:      Extraocular Movements: Extraocular movements intact.      Conjunctiva/sclera: Conjunctivae normal.   Pulmonary:      Effort: Pulmonary effort is normal. No respiratory distress.   Musculoskeletal:         General: Deformity (bony arthritis changes in hands, mild swelling PIPs bilaterlly without erythema or increased warmth; deviated changes PIP/DIP R index finger) present.      Comments: Can make fist bilat hands but not tight fistst   Neurological:      Mental Status: She is alert and oriented to person, place, and time. Mental status is at baseline.      Gait: Gait abnormal (bord unsteady; does not have cane with her today).      Comments: No tremor of outstretched hands; no asterixis   Psychiatric:         Mood and Affect: Mood normal.         Behavior: Behavior normal.       LABS  Results for orders placed or performed in visit on 09/20/22   Comprehensive Metabolic Panel    Specimen: Blood   Result Value Ref Range    Glucose 75 65 - 99 mg/dL    BUN 17 8 - 23 mg/dL    Creatinine 0.94 0.57 - 1.00 mg/dL    Sodium 137 136 - 145 mmol/L    Potassium 4.8 3.5 - 5.2 mmol/L    Chloride 104 98 - 107 mmol/L    CO2 25.0 22.0 - 29.0 mmol/L    Calcium 10.1 8.6 - 10.5 mg/dL    Total Protein 6.5 6.0 - 8.5 g/dL    Albumin 4.10 3.50 - 5.20 g/dL    ALT (SGPT) 15 1 - 33 U/L    AST (SGOT) 22 1 - 32 U/L    Alkaline Phosphatase 82 39 - 117 U/L    Total Bilirubin 0.6 0.0 - 1.2 mg/dL    Globulin 2.4 gm/dL    A/G Ratio 1.7 g/dL    BUN/Creatinine Ratio 18.1 7.0 - 25.0    Anion Gap 8.0 5.0 - 15.0 mmol/L    eGFR 62.2 >60.0 mL/min/1.73   Lipid Panel    Specimen: Blood   Result Value Ref Range    Total Cholesterol 113 0 - 200 mg/dL    Triglycerides 85 0 - 150 mg/dL    HDL Cholesterol 37 (L) 40 - 60 mg/dL    LDL Cholesterol  59 0 - 100 mg/dL    VLDL Cholesterol 17 5 - 40 mg/dL    LDL/HDL Ratio 1.59    TSH    Specimen: Blood   Result Value Ref Range    TSH 2.820 0.270 - 4.200 uIU/mL   Vitamin D 25 Hydroxy    Specimen:  Blood   Result Value Ref Range    25 Hydroxy, Vitamin D 40.6 30.0 - 100.0 ng/ml   T4, Free    Specimen: Blood   Result Value Ref Range    Free T4 1.17 0.93 - 1.70 ng/dL   CK    Specimen: Blood   Result Value Ref Range    Creatine Kinase 78 20 - 180 U/L   CBC Auto Differential    Specimen: Blood   Result Value Ref Range    WBC 7.76 3.40 - 10.80 10*3/mm3    RBC 4.52 3.77 - 5.28 10*6/mm3    Hemoglobin 13.9 12.0 - 15.9 g/dL    Hematocrit 41.5 34.0 - 46.6 %    MCV 91.8 79.0 - 97.0 fL    MCH 30.8 26.6 - 33.0 pg    MCHC 33.5 31.5 - 35.7 g/dL    RDW 12.8 12.3 - 15.4 %    RDW-SD 43.6 37.0 - 54.0 fl    MPV 10.8 6.0 - 12.0 fL    Platelets 299 140 - 450 10*3/mm3    Neutrophil % 62.9 42.7 - 76.0 %    Lymphocyte % 26.7 19.6 - 45.3 %    Monocyte % 7.6 5.0 - 12.0 %    Eosinophil % 1.5 0.3 - 6.2 %    Basophil % 0.9 0.0 - 1.5 %    Immature Grans % 0.4 0.0 - 0.5 %    Neutrophils, Absolute 4.88 1.70 - 7.00 10*3/mm3    Lymphocytes, Absolute 2.07 0.70 - 3.10 10*3/mm3    Monocytes, Absolute 0.59 0.10 - 0.90 10*3/mm3    Eosinophils, Absolute 0.12 0.00 - 0.40 10*3/mm3    Basophils, Absolute 0.07 0.00 - 0.20 10*3/mm3    Immature Grans, Absolute 0.03 0.00 - 0.05 10*3/mm3    nRBC 0.0 0.0 - 0.2 /100 WBC   Urinalysis without microscopic (no culture) - Urine, Clean Catch    Specimen: Urine, Clean Catch   Result Value Ref Range    Color, UA Yellow Yellow, Straw    Appearance, UA Clear Clear    pH, UA 7.5 5.0 - 8.0    Specific Gravity, UA <=1.005 1.005 - 1.030    Glucose, UA Negative Negative    Ketones, UA Negative Negative    Bilirubin, UA Negative Negative    Blood, UA Negative Negative    Protein, UA Negative Negative    Leuk Esterase, UA Moderate (2+) (A) Negative    Nitrite, UA Negative Negative    Urobilinogen, UA 0.2 E.U./dL 0.2 - 1.0 E.U./dL   Urinalysis, Microscopic Only - Urine, Clean Catch    Specimen: Urine, Clean Catch   Result Value Ref Range    RBC, UA 0-2 None Seen, 0-2 /HPF    WBC, UA 3-5 (A) None Seen, 0-2 /HPF    Bacteria, UA  None Seen None Seen /HPF    Squamous Epithelial Cells, UA 0-2 None Seen, 0-2 /HPF    Hyaline Casts, UA None Seen None Seen /LPF    Methodology Automated Microscopy          ASSESSMENT/PLAN    Diagnoses and all orders for this visit:    1. Spinal stenosis of lumbar region without neurogenic claudication (Primary)  Assessment & Plan:  PT referral given to patient as she already has appt; reviewed importance of HEP; discussed big picture to consider yoga/pilates for core strengthening, balance, and flexibility; pt has already been informed previously that surgery would not help her current situation    Orders:  -     Ambulatory Referral to Physical Therapy Evaluate and treat (and HEP); Heat (all modalities), Electrotherapy; Stretching, Strengthening, ROM    2. Right leg pain  Comments:  already does stretches for piriformis syndrome; likely also has IT band syndrome as well; PT order given to pt and advised HEP  Orders:  -     Ambulatory Referral to Physical Therapy Evaluate and treat (and HEP); Heat (all modalities), Electrotherapy; Stretching, Strengthening, ROM    3. Imbalance  Comments:  fall precautions; PT ordered  Orders:  -     Ambulatory Referral to Physical Therapy Evaluate and treat (and HEP); Heat (all modalities), Electrotherapy; Stretching, Strengthening, ROM    4. Weakness of both lower extremities  Comments:  has lumbar stenosis; fall precautions; PT order given to pt  Orders:  -     Ambulatory Referral to Physical Therapy Evaluate and treat (and HEP); Heat (all modalities), Electrotherapy; Stretching, Strengthening, ROM    5. Osteoarthritis of both hands  Assessment & Plan:  -mild swelling PIPs attributed to OA; no signs of active inflammation; reviewed hand exercises and discussed importance of maintaining function, not of resolving all pain      6. Vaccine counseling  Comments:  rec flu vacc, new COVID19 vacc, and RSV vacc in Sept/Oct, counseling given    7. Shakiness  Comments:  hands, without  assoc'd tremor; could be due to fasciculations; follow clinically        FOLLOW-UP  Health maintenance - rec flu vacc, new COVID19 vacc, and RSV vacc in the fall   RTC for next wellness 10/16/23; fasting labs prior to appt (CBC, CMP, TSH, lipids, UA/micr, FT4, vit D, CPK) +PFTs + SLUMS    Electronically signed by:    Lorene Isaac MD, FACP  08/28/2023

## 2023-08-29 NOTE — ASSESSMENT & PLAN NOTE
-mild swelling PIPs attributed to OA; no signs of active inflammation; reviewed hand exercises and discussed importance of maintaining function, not of resolving all pain

## 2023-08-29 NOTE — ASSESSMENT & PLAN NOTE
PT referral given to patient as she already has appt; reviewed importance of HEP; discussed big picture to consider yoga/pilates for core strengthening, balance, and flexibility; pt has already been informed previously that surgery would not help her current situation

## 2023-09-06 NOTE — PROGRESS NOTES
Cardiology Outpatient Visit      Identification: Rebecca Sharma is a 78 y.o. female who resides in [place], [place]    Reason for visit:  No chief complaint on file.      Subjective      Patient is 78-year-old female who returns today for follow-up of her cryptogenic stroke and loop recorder implant.  The patient suffered a left MCA a CVA back in July 2021.  She had a loop recorder planted at that time.  Thus far no atrial fibrillation has been detected.  She has been maintained on Plavix and statin therapy.  Current loop recorder interrogation shows no A-fib.  She has not had any further TIA or CVA symptoms.  She denies chest pain, dyspnea, orthopnea, palpitations or syncope.  She is on statin therapy and tolerating without myalgias.  At her last visit with Dr. Isaac she decreased her Lipitor from 80 to 40 mg daily.  She has not had a lipid panel this year.    Review of Systems   Constitutional: Negative for malaise/fatigue.   Eyes:  Negative for vision loss in left eye and vision loss in right eye.   Cardiovascular:  Negative for chest pain, dyspnea on exertion, near-syncope, orthopnea, palpitations, paroxysmal nocturnal dyspnea and syncope.   Musculoskeletal:  Negative for myalgias.   Neurological:  Negative for brief paralysis, excessive daytime sleepiness, focal weakness, numbness, paresthesias and weakness.   All other systems reviewed and are negative.    Allergies   Allergen Reactions    Aspirin Swelling     Lip and hand swelling    Naproxen Other (See Comments)     ulcer    Codeine Rash    Hydrocodone Rash    Montelukast Sodium Other (See Comments)     Morning drowsiness even with QHS dosing         Current Outpatient Medications   Medication Instructions    albuterol sulfate HFA (Ventolin HFA) 108 (90 Base) MCG/ACT inhaler 2 puffs, Inhalation, Every 6 Hours PRN    atorvastatin (LIPITOR) 40 mg, Oral, Daily    calcium carbonate-cholecalciferol 500-400 MG-UNIT tablet tablet 1 tablet, Oral, Daily     "clopidogrel (PLAVIX) 75 mg, Oral, Daily    fish oil 1,000 mg, Oral, Daily With Breakfast    fluticasone (VERAMYST) 27.5 MCG/SPRAY nasal spray 2 sprays, Nasal, Daily    Fluticasone-Salmeterol (ADVAIR/WIXELA) 100-50 MCG/ACT DISKUS 1 puff, Inhalation, 2 Times Daily - RT, Gargle/spit after puffs    levothyroxine (SYNTHROID, LEVOTHROID) 50 mcg, Oral, Every Morning    Mirabegron ER (MYRBETRIQ) 50 mg, Oral, Daily    montelukast (SINGULAIR) 10 mg, Oral, Nightly    omeprazole (PRILOSEC) 40 mg, Oral, Daily, Take 30 minutes 1st meal of the day    Prolia 60 mg, Subcutaneous, Once    psyllium (KONSYL) 28.3 % pack pack 1 packet, Oral, Daily PRN    TURMERIC CURCUMIN PO 1 tablet, Oral, Daily         Objective     /80 (BP Location: Left arm, Patient Position: Sitting, Cuff Size: Adult)   Pulse 76   Ht 165.1 cm (65\")   Wt 81.5 kg (179 lb 9.6 oz)   SpO2 97%   BMI 29.89 kg/m²       Constitutional:       Appearance: Healthy appearance. Well-developed.   Eyes:      General: Lids are normal. No scleral icterus.     Conjunctiva/sclera: Conjunctivae normal.   HENT:      Head: Normocephalic and atraumatic.   Neck:      Thyroid: No thyromegaly.      Vascular: No carotid bruit or JVD.   Pulmonary:      Effort: Pulmonary effort is normal.      Breath sounds: Normal breath sounds. No wheezing. No rhonchi. No rales.   Cardiovascular:      Normal rate. Regular rhythm.      Murmurs: There is no murmur.      No gallop.  No rub.   Pulses:     Intact distal pulses.   Edema:     Peripheral edema absent.   Abdominal:      General: There is no distension.      Palpations: Abdomen is soft. There is no abdominal mass.   Musculoskeletal:      Cervical back: Normal range of motion. Skin:     General: Skin is warm and dry.      Findings: No rash.   Neurological:      General: No focal deficit present.      Mental Status: Alert and oriented to person, place, and time.      Gait: Gait is intact.   Psychiatric:         Attention and Perception: " Attention normal.         Mood and Affect: Mood normal.         Behavior: Behavior normal.       Result Review  (reviewed with patient):            Lab Results   Component Value Date    GLUCOSE 75 09/20/2022    BUN 17 09/20/2022    CREATININE 0.94 09/20/2022    EGFR 62.2 09/20/2022    BCR 18.1 09/20/2022    K 4.8 09/20/2022    CO2 25.0 09/20/2022    CALCIUM 10.1 09/20/2022    ALBUMIN 4.10 09/20/2022    BILITOT 0.6 09/20/2022    AST 22 09/20/2022    ALT 15 09/20/2022     Lab Results   Component Value Date    WBC 7.76 09/20/2022    HGB 13.9 09/20/2022    HCT 41.5 09/20/2022    MCV 91.8 09/20/2022     09/20/2022     Lab Results   Component Value Date    CHOL 113 09/20/2022    CHLPL 151 06/07/2016    TRIG 85 09/20/2022    HDL 37 (L) 09/20/2022    LDL 59 09/20/2022     Lab Results   Component Value Date    HGBA1C 5.80 (H) 07/14/2021           Assessment     Diagnoses and all orders for this visit:    1. Cerebrovascular accident (CVA) due to embolism of left middle cerebral artery (Primary)  Overview:  Transferred [facility] to [facility] ED for stroke status post TPA on 7/13/2021.  Symptoms were expressive aphasia and dysarthria.   Imaging revealed left MCA CVA. Normal brain perfusion CT (7/13/21)  CT angio head/neck (7/13/21, hospitalized): No definite lg vessel occlusion, high-grade stenosis or dissection; indeterminate narrowing/occlusion of small P3 branch of the patient's left PCA; small outpouching at origin of left P-com may represent a small aneurysm versus infundibulum.    Echo (7/14/2021): LVEF 56 - 60%.  Negative bubble study.   MRI brain (7/14/21): acute infarct left parietal and posterior temporal lobe w/ adjacent area c/w left posterior MCA CVA  Loop recorder (BSC) implanted 7/15/2021  7/15/21 passed FEES with dysphagia, no pharyngeal impairment, rec reg textures and thin liquids, aspiration precautions  7/16/21 discharge on atorvastatin 80mg and plavix 75mg QD, f/u neuro 6-8  wks  3/7/22 neuro/Dr. Bautista - likely cardioembolic but loop recorder only showing SVT, no afib; cont clopidogrel and atorvastatin RTC 1 yr;   9/6/22 cards/Dr. Smith - no afib on loop recorder, cont clopidogrel per Dr. Bautista; RTC 1 yr    Assessment & Plan:  Continue Plavix 75 mg daily  No TIA or CVA symptoms  No A-fib detected on loop recorder    Orders:  -     clopidogrel (PLAVIX) 75 MG tablet; Take 1 tablet by mouth Daily.  Dispense: 90 tablet; Refill: 3    2. Status post placement of implantable loop recorder  Assessment & Plan:  No atrial fibrillation noted on loop recorder      3. Hyperlipidemia LDL goal <70  Overview:  9/20/22 , TG 85, HDL 37, LDL 59; cont atorvastatin 80mg QD  High intensity statin therapy indicated given previous CVA; goal LDL < 70    Assessment & Plan:  Continue Lipitor 40 mg daily    Orders:  -     Lipid Panel; Future  -     Comprehensive Metabolic Panel; Future    Other orders  -     atorvastatin (Lipitor) 40 MG tablet; Take 1 tablet by mouth Daily.  Dispense: 30 tablet; Refill: 11          Plan   Repeat lipid panel and CMP  Continue Plavix and statin therapy      Follow-up   Return in about 1 year (around 9/12/2024), or if symptoms worsen or fail to improve, for Follow-up with Dr. Smith next visit.    Vicky Del Cid, SEVEN  9/12/2023

## 2023-09-12 ENCOUNTER — OFFICE VISIT (OUTPATIENT)
Dept: CARDIOLOGY | Facility: CLINIC | Age: 78
End: 2023-09-12
Payer: MEDICARE

## 2023-09-12 VITALS
SYSTOLIC BLOOD PRESSURE: 122 MMHG | DIASTOLIC BLOOD PRESSURE: 80 MMHG | WEIGHT: 179.6 LBS | HEIGHT: 65 IN | BODY MASS INDEX: 29.92 KG/M2 | HEART RATE: 76 BPM | OXYGEN SATURATION: 97 %

## 2023-09-12 DIAGNOSIS — I63.412 CEREBROVASCULAR ACCIDENT (CVA) DUE TO EMBOLISM OF LEFT MIDDLE CEREBRAL ARTERY: Primary | ICD-10-CM

## 2023-09-12 DIAGNOSIS — E78.5 HYPERLIPIDEMIA LDL GOAL <70: Chronic | ICD-10-CM

## 2023-09-12 DIAGNOSIS — Z95.818 STATUS POST PLACEMENT OF IMPLANTABLE LOOP RECORDER: ICD-10-CM

## 2023-09-12 PROCEDURE — 1159F MED LIST DOCD IN RCRD: CPT | Performed by: NURSE PRACTITIONER

## 2023-09-12 PROCEDURE — 1160F RVW MEDS BY RX/DR IN RCRD: CPT | Performed by: NURSE PRACTITIONER

## 2023-09-12 PROCEDURE — 99214 OFFICE O/P EST MOD 30 MIN: CPT | Performed by: NURSE PRACTITIONER

## 2023-09-12 RX ORDER — CLOPIDOGREL BISULFATE 75 MG/1
75 TABLET ORAL DAILY
Qty: 90 TABLET | Refills: 3 | Status: SHIPPED | OUTPATIENT
Start: 2023-09-12

## 2023-09-12 RX ORDER — ATORVASTATIN CALCIUM 40 MG/1
40 TABLET, FILM COATED ORAL DAILY
Qty: 30 TABLET | Refills: 11 | Status: SHIPPED | OUTPATIENT
Start: 2023-09-12 | End: 2024-09-11

## 2023-10-02 DIAGNOSIS — E78.5 HYPERLIPIDEMIA LDL GOAL <70: Chronic | ICD-10-CM

## 2023-10-02 DIAGNOSIS — E03.9 ACQUIRED HYPOTHYROIDISM: Chronic | ICD-10-CM

## 2023-10-02 DIAGNOSIS — Z00.00 MEDICARE ANNUAL WELLNESS VISIT, SUBSEQUENT: Primary | Chronic | ICD-10-CM

## 2023-10-02 DIAGNOSIS — M81.0 AGE-RELATED OSTEOPOROSIS WITHOUT CURRENT PATHOLOGICAL FRACTURE: Chronic | ICD-10-CM

## 2023-10-09 ENCOUNTER — LAB (OUTPATIENT)
Dept: LAB | Facility: HOSPITAL | Age: 78
End: 2023-10-09
Payer: MEDICARE

## 2023-10-09 DIAGNOSIS — Z00.00 MEDICARE ANNUAL WELLNESS VISIT, SUBSEQUENT: ICD-10-CM

## 2023-10-09 DIAGNOSIS — E78.5 HYPERLIPIDEMIA LDL GOAL <70: Chronic | ICD-10-CM

## 2023-10-09 DIAGNOSIS — E03.9 ACQUIRED HYPOTHYROIDISM: Chronic | ICD-10-CM

## 2023-10-09 DIAGNOSIS — M81.0 AGE-RELATED OSTEOPOROSIS WITHOUT CURRENT PATHOLOGICAL FRACTURE: Chronic | ICD-10-CM

## 2023-10-09 LAB
25(OH)D3 SERPL-MCNC: 32.7 NG/ML (ref 30–100)
BASOPHILS # BLD AUTO: 0.06 10*3/MM3 (ref 0–0.2)
BASOPHILS NFR BLD AUTO: 0.8 % (ref 0–1.5)
BILIRUB UR QL STRIP: NEGATIVE
CLARITY UR: CLEAR
COLOR UR: YELLOW
DEPRECATED RDW RBC AUTO: 43.8 FL (ref 37–54)
EOSINOPHIL # BLD AUTO: 0.15 10*3/MM3 (ref 0–0.4)
EOSINOPHIL NFR BLD AUTO: 2.1 % (ref 0.3–6.2)
ERYTHROCYTE [DISTWIDTH] IN BLOOD BY AUTOMATED COUNT: 13 % (ref 12.3–15.4)
GLUCOSE UR STRIP-MCNC: NEGATIVE MG/DL
HCT VFR BLD AUTO: 42.3 % (ref 34–46.6)
HGB BLD-MCNC: 14.3 G/DL (ref 12–15.9)
HGB UR QL STRIP.AUTO: NEGATIVE
IMM GRANULOCYTES # BLD AUTO: 0.01 10*3/MM3 (ref 0–0.05)
IMM GRANULOCYTES NFR BLD AUTO: 0.1 % (ref 0–0.5)
KETONES UR QL STRIP: NEGATIVE
LEUKOCYTE ESTERASE UR QL STRIP.AUTO: ABNORMAL
LYMPHOCYTES # BLD AUTO: 1.53 10*3/MM3 (ref 0.7–3.1)
LYMPHOCYTES NFR BLD AUTO: 21.6 % (ref 19.6–45.3)
MCH RBC QN AUTO: 31.1 PG (ref 26.6–33)
MCHC RBC AUTO-ENTMCNC: 33.8 G/DL (ref 31.5–35.7)
MCV RBC AUTO: 92 FL (ref 79–97)
MONOCYTES # BLD AUTO: 0.68 10*3/MM3 (ref 0.1–0.9)
MONOCYTES NFR BLD AUTO: 9.6 % (ref 5–12)
NEUTROPHILS NFR BLD AUTO: 4.65 10*3/MM3 (ref 1.7–7)
NEUTROPHILS NFR BLD AUTO: 65.8 % (ref 42.7–76)
NITRITE UR QL STRIP: NEGATIVE
NRBC BLD AUTO-RTO: 0 /100 WBC (ref 0–0.2)
PH UR STRIP.AUTO: 6.5 [PH] (ref 5–8)
PLATELET # BLD AUTO: 344 10*3/MM3 (ref 140–450)
PMV BLD AUTO: 10.8 FL (ref 6–12)
PROT UR QL STRIP: NEGATIVE
RBC # BLD AUTO: 4.6 10*6/MM3 (ref 3.77–5.28)
SP GR UR STRIP: 1.01 (ref 1–1.03)
UROBILINOGEN UR QL STRIP: ABNORMAL
WBC NRBC COR # BLD: 7.08 10*3/MM3 (ref 3.4–10.8)

## 2023-10-09 PROCEDURE — 36415 COLL VENOUS BLD VENIPUNCTURE: CPT

## 2023-10-09 PROCEDURE — 84439 ASSAY OF FREE THYROXINE: CPT

## 2023-10-09 PROCEDURE — 80061 LIPID PANEL: CPT

## 2023-10-09 PROCEDURE — 84443 ASSAY THYROID STIM HORMONE: CPT

## 2023-10-09 PROCEDURE — 80053 COMPREHEN METABOLIC PANEL: CPT

## 2023-10-09 PROCEDURE — 85025 COMPLETE CBC W/AUTO DIFF WBC: CPT

## 2023-10-09 PROCEDURE — 81001 URINALYSIS AUTO W/SCOPE: CPT

## 2023-10-09 PROCEDURE — 82306 VITAMIN D 25 HYDROXY: CPT

## 2023-10-09 PROCEDURE — 82550 ASSAY OF CK (CPK): CPT

## 2023-10-10 LAB
ALBUMIN SERPL-MCNC: 4.3 G/DL (ref 3.5–5.2)
ALBUMIN/GLOB SERPL: 1.5 G/DL
ALP SERPL-CCNC: 78 U/L (ref 39–117)
ALT SERPL W P-5'-P-CCNC: 20 U/L (ref 1–33)
ANION GAP SERPL CALCULATED.3IONS-SCNC: 10 MMOL/L (ref 5–15)
AST SERPL-CCNC: 21 U/L (ref 1–32)
BACTERIA UR QL AUTO: ABNORMAL /HPF
BILIRUB SERPL-MCNC: 0.4 MG/DL (ref 0–1.2)
BUN SERPL-MCNC: 17 MG/DL (ref 8–23)
BUN/CREAT SERPL: 18.3 (ref 7–25)
CALCIUM SPEC-SCNC: 10.7 MG/DL (ref 8.6–10.5)
CHLORIDE SERPL-SCNC: 107 MMOL/L (ref 98–107)
CHOLEST SERPL-MCNC: 109 MG/DL (ref 0–200)
CK SERPL-CCNC: 157 U/L (ref 20–180)
CO2 SERPL-SCNC: 24 MMOL/L (ref 22–29)
CREAT SERPL-MCNC: 0.93 MG/DL (ref 0.57–1)
EGFRCR SERPLBLD CKD-EPI 2021: 62.6 ML/MIN/1.73
GLOBULIN UR ELPH-MCNC: 2.8 GM/DL
GLUCOSE SERPL-MCNC: 77 MG/DL (ref 65–99)
HDLC SERPL-MCNC: 40 MG/DL (ref 40–60)
HYALINE CASTS UR QL AUTO: ABNORMAL /LPF
LDLC SERPL CALC-MCNC: 53 MG/DL (ref 0–100)
LDLC/HDLC SERPL: 1.33 {RATIO}
POTASSIUM SERPL-SCNC: 5.1 MMOL/L (ref 3.5–5.2)
PROT SERPL-MCNC: 7.1 G/DL (ref 6–8.5)
RBC # UR STRIP: ABNORMAL /HPF
REF LAB TEST METHOD: ABNORMAL
SODIUM SERPL-SCNC: 141 MMOL/L (ref 136–145)
SQUAMOUS #/AREA URNS HPF: ABNORMAL /HPF
T4 FREE SERPL-MCNC: 1.64 NG/DL (ref 0.93–1.7)
TRIGL SERPL-MCNC: 79 MG/DL (ref 0–150)
TSH SERPL DL<=0.05 MIU/L-ACNC: 2.51 UIU/ML (ref 0.27–4.2)
VLDLC SERPL-MCNC: 16 MG/DL (ref 5–40)
WBC # UR STRIP: ABNORMAL /HPF

## 2023-10-14 PROBLEM — Z95.818 STATUS POST PLACEMENT OF IMPLANTABLE LOOP RECORDER: Status: RESOLVED | Noted: 2021-08-26 | Resolved: 2023-10-14

## 2023-10-14 PROBLEM — I63.412 CEREBROVASCULAR ACCIDENT (CVA) DUE TO EMBOLISM OF LEFT MIDDLE CEREBRAL ARTERY: Chronic | Status: ACTIVE | Noted: 2021-07-14

## 2023-10-15 NOTE — ASSESSMENT & PLAN NOTE
"FAITHUMS 29/30; reviewed again \"brain exercises\" with puzzles, reading, memorization, socialization, networking, learning new activities/hobbies, and role of medications; note also decrease function as noted on hearing evaluation; patient would like to proceed with medication intervention; reviwed options, goals, and potential side effects; she will proceed with donepezil 5mg QD #30, 0RF; if tolerates med, send MyChart in 1 month to increase to maintenance 10mg QD dose; f/u in 6 mos    "

## 2023-10-15 NOTE — ASSESSMENT & PLAN NOTE
Health maintenance - COVID19 and flu vacc 10/23; rec RSV - get at pharmacy; Prevnar 5/15; PVX 4/14; Td 8/18 (next Td due 2028); HAV and Shingrix done; mammo 12/19/22, L. breast MRI 7/20; no further GYN/Paps unless abnlities; DEXA 12/21 repeat 2023; colonosc 11/19, repeat 2024 per Dr. Kartik Gold; eye exam w/ Dr. Tate 2023 per pt; dental exam q6 mos; (+) seat belt use    Consultants:  Patient Care Team:  Lorene Isaac MD as PCP - General  Lorene Isaac MD as PCP - Internal Medicine  Gigi Esparza MD as Consulting Physician (Gastroenterology)  Kartik Gold MD as Consulting Physician (General Surgery)  Tyrone Tate MD as Consulting Physician (Ophthalmology)  Arik Keane MD as Consulting Physician (Otolaryngology)  Anastacio Dickinson MD as Consulting Physician (Otolaryngology)  Mario Ding MD as Consulting Physician (Neurosurgery)  Travis Beckham MD as Consulting Physician (General Surgery)  Mingo Cerrato MD as Consulting Physician (Gastroenterology)  Bartolome Smith IV, MD as Consulting Physician (Interventional Cardiology)  Vicky Del Cid APRN as Nurse Practitioner (Interventional Cardiology)

## 2023-10-15 NOTE — ASSESSMENT & PLAN NOTE
Calcium and vitamin D supplementation with weight-bearing exercise; next Prolia injection pending 11/9/23 - escribed #1, 0RF; DEXA due after 12/17/23

## 2023-10-15 NOTE — ASSESSMENT & PLAN NOTE
Unable to update PFTs due to lack of supplies after COVID19 pandemic; resp status stable on advair 100/50 1 puff BID #3, 3RF, montelukast 10mg QD #90, 3RF, and prn abl #1, 0RF

## 2023-10-15 NOTE — PROGRESS NOTES
ANNUAL WELLNESS VISIT    DRUG AND ALCOHOL USE      no tobacco use, alcohol intake:3 beers per week, and caffeine intake: 1 cups of caffeinated coffee per week    DIET AND PHYSICAL ACTIVITY     Diet: general    Exercise: daily   Exercise Details: stretching, strength training, bicycle    MOOD DISORDER AND COGNITIVE SCREENING     PHQ-2 Depression Screening - components reviewed with patient; screening is negative for active depression.    Little interest or pleasure in doing things? 0-->not at all   Feeling down, depressed, or hopeless? 0-->not at all   PHQ-2 Total Score 0      Anxiety Screening Tool Used YES     AUDIT screening 2     Mini-Cog Performed   Yes    1. Tell Patient 3 Words Car table house    2. Administer Clock Test normal    3. Recall 3 words  Car table house    4. Number Correct Items 3    FUNCTIONAL ABILITY AND LEVEL OF SAFETY   Hearing moderate hearing loss     Wears Hearing Aids Yes       Current Activities Independent      none  - see Funct/Cog Status Intake     Fall Risk Assessment       Has difficulty with walking or balance  No         Timed Up and Go (TUG) Test  8 sec.       If >12 sec, normal    ADVANCED DIRECTIVE  Advance Care Planning   ACP discussion was held with the patient during this visit. Patient has an advance directive in EMR which is still valid.   Advance Care Planning Discussion:  Patient has advanced directive and living will.  Reviewed desires for end of life care, which is to have comfort care.  Patient does not want extraordinary life-sustaining measures, including no prolonged artificial life support. Encouraged patient to ensure family is aware of desires/preferences. Confirmed friend Moncho is her healthcare surrogate.    PAIN SCREENING Do you have pain right now? no      If so, 1-10 scale: 0     Intermittent     Do you have pain every day? No      Probable chronic pain: No     Recent Hospitalizations:  No recent hospitalization(s)..     MEDICATION REVIEW   - updated and  "reviewed (see Medication List).   - reviewed for potentially harmful drug-disease interactions in the elderly.   - reviewed for high risk medications in the elderly.   - aspirin use: No, Not Indicated, taking clopidogrel 75mg QD    BMI  Body mass index is 29.64 kg/m².          _________________________________________________________    Chief Complaint   Patient presents with    Medicare Wellness-subsequent    Hyperlipidemia    Hypothyroidism       History of Present Illness  78 y.o.  female presents for updated wellness visit and f/u on cholesterol, thyroid, and memory.  Has gotten $6500 hearing aids since last visit; they are working well.    Notes rare palpitations with fluttering sensation; still has loop recorder intact and it has not shown any arrhythmias.    Remains concerned about her memory but has not seen any changes in her day-to-day life.  Interesting in being proactive about treatment.    Review of Systems  Denies headaches, visual changes, CP, SOB, cough, abd pain, n/v/d, difficulty with urination, numbness/tingling, falls, mood changes, lightheadedness, hearing changes, rashes.    Denies vaginal discharge or bleeding (no periods) or breast concerns.   All other ROS reviewed and negative.    Current Outpatient Medications:     albuterol sulfate HFA (Ventolin HFA) 108 (90 Base) MCG/ACT inh prn    atorvastatin (Lipitor) 40 MG QD    calcium carbonate-cholecalciferol 500-400 MG-UNIT QD    clopidogrel (PLAVIX) 75 MG  QD    denosumab (Prolia) 60 MG/ML Q6 MOS (LAST 5/23)    fluticasone (VERAMYST) 27.5 MCG/SPRAY nasal spray QD    Fluticasone-Salmeterol 100-50 MCG/ACT 1 puff BID    levothyroxine 50 MCG QD    Mirabegron ER (Myrbetriq) 50 MG QD    montelukast (SINGULAIR) 10 MG QD    Omega-3 Fatty Acids (fish oil) 1000 MG QD    omeprazole (priLOSEC) 40 MG D    psyllium (KONSYL) 28.3 % pack Ad    TURMERIC CURCUMIN QD    VITALS:  /80   Pulse 79   Ht 163.2 cm (64.25\")   Wt 78.9 kg (174 lb)   SpO2 " 100%   BMI 29.64 kg/m²   Repeat /70 left arm    Physical Exam  Vitals and nursing note reviewed.   Constitutional:       General: She is not in acute distress.     Appearance: Normal appearance. She is well-developed.   HENT:      Head: Normocephalic.      Right Ear: Ear canal and external ear normal.      Left Ear: Ear canal and external ear normal.      Ears:      Comments: Hearing aids, bilaterally     Nose: Nose normal.   Eyes:      Extraocular Movements: Extraocular movements intact.      Conjunctiva/sclera: Conjunctivae normal.      Pupils: Pupils are equal, round, and reactive to light.   Neck:      Vascular: No carotid bruit (bilaterally).   Cardiovascular:      Rate and Rhythm: Normal rate and regular rhythm.      Heart sounds: Normal heart sounds.   Pulmonary:      Effort: Pulmonary effort is normal. No respiratory distress.      Breath sounds: Normal breath sounds. No wheezing or rales.   Abdominal:      General: Bowel sounds are normal. There is no distension.      Palpations: Abdomen is soft. There is no mass.      Tenderness: There is no abdominal tenderness.   Genitourinary:     Comments: Chaperone declined by patient.    Breast exam unremarkable without masses, skin changes, nipple discharge, or axillary adenopathy.      Musculoskeletal:      Cervical back: Normal range of motion and neck supple.      Right lower leg: No edema.      Left lower leg: No edema.   Lymphadenopathy:      Cervical: No cervical adenopathy.   Skin:     General: Skin is warm and dry.      Findings: No rash.   Neurological:      Mental Status: She is alert and oriented to person, place, and time.   Psychiatric:         Mood and Affect: Mood normal.         Behavior: Behavior normal.         LABS  Results for orders placed or performed in visit on 10/09/23   Lipid Panel    Specimen: Blood   Result Value Ref Range    Total Cholesterol 109 0 - 200 mg/dL    Triglycerides 79 0 - 150 mg/dL    HDL Cholesterol 40 40 - 60 mg/dL     LDL Cholesterol  53 0 - 100 mg/dL    VLDL Cholesterol 16 5 - 40 mg/dL    LDL/HDL Ratio 1.33    Comprehensive Metabolic Panel    Specimen: Blood   Result Value Ref Range    Glucose 77 65 - 99 mg/dL    BUN 17 8 - 23 mg/dL    Creatinine 0.93 0.57 - 1.00 mg/dL    Sodium 141 136 - 145 mmol/L    Potassium 5.1 3.5 - 5.2 mmol/L    Chloride 107 98 - 107 mmol/L    CO2 24.0 22.0 - 29.0 mmol/L    Calcium 10.7 (H) 8.6 - 10.5 mg/dL    Total Protein 7.1 6.0 - 8.5 g/dL    Albumin 4.3 3.5 - 5.2 g/dL    ALT (SGPT) 20 1 - 33 U/L    AST (SGOT) 21 1 - 32 U/L    Alkaline Phosphatase 78 39 - 117 U/L    Total Bilirubin 0.4 0.0 - 1.2 mg/dL    Globulin 2.8 gm/dL    A/G Ratio 1.5 g/dL    BUN/Creatinine Ratio 18.3 7.0 - 25.0    Anion Gap 10.0 5.0 - 15.0 mmol/L    eGFR 62.6 >60.0 mL/min/1.73   TSH    Specimen: Blood   Result Value Ref Range    TSH 2.510 0.270 - 4.200 uIU/mL   T4, Free    Specimen: Blood   Result Value Ref Range    Free T4 1.64 0.93 - 1.70 ng/dL   CK    Specimen: Blood   Result Value Ref Range    Creatine Kinase 157 20 - 180 U/L   Vitamin D,25-Hydroxy    Specimen: Blood   Result Value Ref Range    25 Hydroxy, Vitamin D 32.7 30.0 - 100.0 ng/ml   CBC Auto Differential    Specimen: Blood   Result Value Ref Range    WBC 7.08 3.40 - 10.80 10*3/mm3    RBC 4.60 3.77 - 5.28 10*6/mm3    Hemoglobin 14.3 12.0 - 15.9 g/dL    Hematocrit 42.3 34.0 - 46.6 %    MCV 92.0 79.0 - 97.0 fL    MCH 31.1 26.6 - 33.0 pg    MCHC 33.8 31.5 - 35.7 g/dL    RDW 13.0 12.3 - 15.4 %    RDW-SD 43.8 37.0 - 54.0 fl    MPV 10.8 6.0 - 12.0 fL    Platelets 344 140 - 450 10*3/mm3    Neutrophil % 65.8 42.7 - 76.0 %    Lymphocyte % 21.6 19.6 - 45.3 %    Monocyte % 9.6 5.0 - 12.0 %    Eosinophil % 2.1 0.3 - 6.2 %    Basophil % 0.8 0.0 - 1.5 %    Immature Grans % 0.1 0.0 - 0.5 %    Neutrophils, Absolute 4.65 1.70 - 7.00 10*3/mm3    Lymphocytes, Absolute 1.53 0.70 - 3.10 10*3/mm3    Monocytes, Absolute 0.68 0.10 - 0.90 10*3/mm3    Eosinophils, Absolute 0.15 0.00 - 0.40  10*3/mm3    Basophils, Absolute 0.06 0.00 - 0.20 10*3/mm3    Immature Grans, Absolute 0.01 0.00 - 0.05 10*3/mm3    nRBC 0.0 0.0 - 0.2 /100 WBC   Urinalysis without microscopic (no culture) - Urine, Clean Catch    Specimen: Urine, Clean Catch   Result Value Ref Range    Color, UA Yellow Yellow, Straw    Appearance, UA Clear Clear    pH, UA 6.5 5.0 - 8.0    Specific Gravity, UA 1.011 1.005 - 1.030    Glucose, UA Negative Negative    Ketones, UA Negative Negative    Bilirubin, UA Negative Negative    Blood, UA Negative Negative    Protein, UA Negative Negative    Leuk Esterase, UA Small (1+) (A) Negative    Nitrite, UA Negative Negative    Urobilinogen, UA 0.2 E.U./dL 0.2 - 1.0 E.U./dL   Urinalysis, Microscopic Only - Urine, Clean Catch    Specimen: Urine, Clean Catch   Result Value Ref Range    RBC, UA None Seen None Seen, 0-2 /HPF    WBC, UA 3-5 (A) None Seen, 0-2 /HPF    Bacteria, UA Trace (A) None Seen /HPF    Squamous Epithelial Cells, UA 0-2 None Seen, 0-2 /HPF    Hyaline Casts, UA None Seen None Seen /LPF    Methodology Manual Light Microscopy        ECG 12 Lead    Date/Time: 10/16/2023 9:28 AM  Performed by: Lorene Isaac MD    Authorized by: Lorene Isaac MD  Comparison: compared with previous ECG from 9/26/2022  Similar to previous ECG  Rhythm: sinus rhythm  Rate: normal  BPM: 75  Conduction: conduction normal  ST Segments: ST segments normal  T Waves: T waves normal  QRS axis: left  Other findings: low voltage  Clinical impression comment: stable EKG            ASSESSMENT/PLAN    Diagnoses and all orders for this visit:    1. Medicare annual wellness visit, subsequent (Primary)  Assessment & Plan:  Health maintenance - COVID19 and flu vacc 10/23; rec RSV - get at pharmacy; Prevnar 5/15; PVX 4/14; Td 8/18 (next Td due 2028); HAV and Shingrix done; mammo 12/19/22, L. breast MRI 7/20; no further GYN/Paps unless abnlities; DEXA 12/21 repeat 2023; colonosc 11/19, repeat 2024 per Dr. Kartik Gold; eye exam w/   "Bebeto 2023 per pt; dental exam q6 mos; (+) seat belt use    Consultants:  Patient Care Team:  Lorene Isaac MD as PCP - General  Lorene Isaac MD as PCP - Internal Medicine  Gigi Esparza MD as Consulting Physician (Gastroenterology)  Kartik Gold MD as Consulting Physician (General Surgery)  Tyrone Tate MD as Consulting Physician (Ophthalmology)  Arik Keane MD as Consulting Physician (Otolaryngology)  Anastacio Dickinson MD as Consulting Physician (Otolaryngology)  Mario Ding MD as Consulting Physician (Neurosurgery)  Travis Beckham MD as Consulting Physician (General Surgery)  Mingo Cerrato MD as Consulting Physician (Gastroenterology)  Bartolome Smith IV, MD as Consulting Physician (Interventional Cardiology)  Vicyk Del Cid APRN as Nurse Practitioner (Interventional Cardiology)        2. Hyperlipidemia LDL goal <70  Assessment & Plan:  Lipids at goal with LDL 59; cont atorvastatin 40mg QD per cards    Orders:  -     ECG 12 Lead    3. Hypothyroidism  Assessment & Plan:  Euthyroid; cont levothyroxine 50mcg QD#90, 3RF    Orders:  -     levothyroxine (SYNTHROID, LEVOTHROID) 50 MCG tablet; Take 1 tablet by mouth Every Morning.  Dispense: 90 tablet; Refill: 3    4. Osteoporosis  Assessment & Plan:  Calcium and vitamin D supplementation with weight-bearing exercise; next Prolia injection pending 11/9/23 - escribed #1, 0RF; DEXA due after 12/17/23       Orders:  -     denosumab (Prolia) 60 MG/ML solution prefilled syringe syringe; Inject 1 mL under the skin into the appropriate area as directed 1 (One) Time for 1 dose.    5. Mild cognitive impairment  Assessment & Plan:  SLUMS 29/30; reviewed again \"brain exercises\" with puzzles, reading, memorization, socialization, networking, learning new activities/hobbies, and role of medications; note also decrease function as noted on hearing evaluation; patient would like to proceed with medication intervention; reviwed " options, goals, and potential side effects; she will proceed with donepezil 5mg QD #30, 0RF; if tolerates med, send MyChart in 1 month to increase to maintenance 10mg QD dose; f/u in 6 mos      Orders:  -     donepezil (Aricept) 5 MG tablet; Take 1 tablet by mouth Every Night for 31 days.  Dispense: 30 tablet; Refill: 0    6. Gastroesophageal reflux disease   Assessment & Plan:  Stable on omeprazole 40mg QD #90, 3RF    Orders:  -     omeprazole (priLOSEC) 40 MG capsule; Take 1 capsule by mouth Daily. Take 30 minutes 1st meal of the day  Dispense: 90 capsule; Refill: 3    7. Mild intermittent asthma without complication  Assessment & Plan:  Unable to update PFTs due to lack of supplies after COVID19 pandemic; resp status stable on advair 100/50 1 puff BID #3, 3RF, montelukast 10mg QD #90, 3RF, and prn abl #1, 0RF      Orders:  -     Fluticasone-Salmeterol (ADVAIR/WIXELA) 100-50 MCG/ACT DISKUS; Inhale 1 puff 2 (Two) Times a Day. Gargle/spit after puffs  Dispense: 180 each; Refill: 3  -     montelukast (SINGULAIR) 10 MG tablet; Take 1 tablet by mouth Every Night.  Dispense: 90 tablet; Refill: 3  -     albuterol sulfate HFA (Ventolin HFA) 108 (90 Base) MCG/ACT inhaler; Inhale 2 puffs Every 6 (Six) Hours As Needed for Wheezing or Shortness of Air.  Dispense: 18 g; Refill: 0    8. Mixed stress and urge urinary incontinence  Assessment & Plan:  Stable on myrbetriq 50mg QD #90, 3RF    Orders:  -     Mirabegron ER (Myrbetriq) 50 MG tablet sustained-release 24 hour 24 hr tablet; Take 50 mg by mouth Daily.  Dispense: 90 tablet; Refill: 3    9. Elevated blood pressure reading without diagnosis of hypertension  Assessment & Plan:  NEW dx with /80; repeat BP even worsened, but she reports normotensive readings at home when she rests and relaxes;rec low Na diet, increased aerobic exercise; home BP monitoring with goal BP < 130/80; f/u in 6 mos, earlier if persistently > 140/90        10. Hypercalcemia  Comments:  f/u BMP and  ioniza when well-hydrated/nonfasting  Orders:  -     Basic Metabolic Panel; Future  -     Calcium, Ionized; Future        FOLLOW-UP  Send [employer] message in 1 month re: donepezil tolerability; if doing ok, will send in RX for 10mg dose at that time  RTC 6 mos with SLUMS and BP check    Electronically signed by:    Lorene Isaac MD, FACP  10/16/2023

## 2023-10-16 ENCOUNTER — OFFICE VISIT (OUTPATIENT)
Dept: INTERNAL MEDICINE | Facility: CLINIC | Age: 78
End: 2023-10-16
Payer: MEDICARE

## 2023-10-16 ENCOUNTER — TELEPHONE (OUTPATIENT)
Dept: INTERNAL MEDICINE | Facility: CLINIC | Age: 78
End: 2023-10-16

## 2023-10-16 VITALS
HEIGHT: 64 IN | OXYGEN SATURATION: 100 % | HEART RATE: 79 BPM | DIASTOLIC BLOOD PRESSURE: 80 MMHG | WEIGHT: 174 LBS | BODY MASS INDEX: 29.71 KG/M2 | SYSTOLIC BLOOD PRESSURE: 142 MMHG

## 2023-10-16 DIAGNOSIS — E83.52 HYPERCALCEMIA: ICD-10-CM

## 2023-10-16 DIAGNOSIS — M81.0 AGE-RELATED OSTEOPOROSIS WITHOUT CURRENT PATHOLOGICAL FRACTURE: Chronic | ICD-10-CM

## 2023-10-16 DIAGNOSIS — K21.9 GASTROESOPHAGEAL REFLUX DISEASE WITHOUT ESOPHAGITIS: ICD-10-CM

## 2023-10-16 DIAGNOSIS — Z00.00 MEDICARE ANNUAL WELLNESS VISIT, SUBSEQUENT: Primary | Chronic | ICD-10-CM

## 2023-10-16 DIAGNOSIS — G31.84 MILD COGNITIVE IMPAIRMENT: ICD-10-CM

## 2023-10-16 DIAGNOSIS — E03.9 ACQUIRED HYPOTHYROIDISM: Chronic | ICD-10-CM

## 2023-10-16 DIAGNOSIS — J45.20 MILD INTERMITTENT ASTHMA WITHOUT COMPLICATION: Chronic | ICD-10-CM

## 2023-10-16 DIAGNOSIS — R03.0 ELEVATED BLOOD PRESSURE READING WITHOUT DIAGNOSIS OF HYPERTENSION: ICD-10-CM

## 2023-10-16 DIAGNOSIS — E78.5 HYPERLIPIDEMIA LDL GOAL <70: Chronic | ICD-10-CM

## 2023-10-16 DIAGNOSIS — N39.46 MIXED STRESS AND URGE URINARY INCONTINENCE: ICD-10-CM

## 2023-10-16 PROCEDURE — 99214 OFFICE O/P EST MOD 30 MIN: CPT | Performed by: INTERNAL MEDICINE

## 2023-10-16 PROCEDURE — 1170F FXNL STATUS ASSESSED: CPT | Performed by: INTERNAL MEDICINE

## 2023-10-16 PROCEDURE — 93000 ELECTROCARDIOGRAM COMPLETE: CPT | Performed by: INTERNAL MEDICINE

## 2023-10-16 PROCEDURE — G0439 PPPS, SUBSEQ VISIT: HCPCS | Performed by: INTERNAL MEDICINE

## 2023-10-16 PROCEDURE — 1160F RVW MEDS BY RX/DR IN RCRD: CPT | Performed by: INTERNAL MEDICINE

## 2023-10-16 PROCEDURE — 1159F MED LIST DOCD IN RCRD: CPT | Performed by: INTERNAL MEDICINE

## 2023-10-16 RX ORDER — BIOTIN 10000 MCG
CAPSULE ORAL
COMMUNITY

## 2023-10-16 RX ORDER — ALBUTEROL SULFATE 90 UG/1
2 AEROSOL, METERED RESPIRATORY (INHALATION) EVERY 6 HOURS PRN
Qty: 18 G | Refills: 0 | Status: SHIPPED | OUTPATIENT
Start: 2023-10-16

## 2023-10-16 RX ORDER — DENOSUMAB 60 MG/ML
60 INJECTION SUBCUTANEOUS ONCE
Start: 2023-11-09 | End: 2023-11-09

## 2023-10-16 RX ORDER — LEVOTHYROXINE SODIUM 0.05 MG/1
50 TABLET ORAL EVERY MORNING
Qty: 90 TABLET | Refills: 3 | Status: SHIPPED | OUTPATIENT
Start: 2023-10-16

## 2023-10-16 RX ORDER — MONTELUKAST SODIUM 10 MG/1
10 TABLET ORAL NIGHTLY
Qty: 90 TABLET | Refills: 3 | Status: SHIPPED | OUTPATIENT
Start: 2023-10-16

## 2023-10-16 RX ORDER — OMEPRAZOLE 40 MG/1
40 CAPSULE, DELAYED RELEASE ORAL DAILY
Qty: 90 CAPSULE | Refills: 3 | Status: SHIPPED | OUTPATIENT
Start: 2023-10-16

## 2023-10-16 RX ORDER — DONEPEZIL HYDROCHLORIDE 5 MG/1
5 TABLET, FILM COATED ORAL NIGHTLY
Qty: 30 TABLET | Refills: 0 | Status: SHIPPED | OUTPATIENT
Start: 2023-10-16 | End: 2023-11-16

## 2023-10-16 RX ORDER — FLUTICASONE PROPIONATE AND SALMETEROL 100; 50 UG/1; UG/1
1 POWDER RESPIRATORY (INHALATION)
Qty: 180 EACH | Refills: 3 | Status: SHIPPED | OUTPATIENT
Start: 2023-10-16

## 2023-10-16 NOTE — TELEPHONE ENCOUNTER
PATIENT CALLED STATING SHE IS HAVING HIGH BLOOD PRESSURE 178/100  PATIENT WAS JUST SEEN BY DR MEDRANO THIS MORNING @8:45. PATIENT WOULD LIKE TO KNOW WHAT SHE NEEDS TO DO PATIENT CALL BACK NUMBER [phone].

## 2023-10-16 NOTE — TELEPHONE ENCOUNTER
Pt notified of Dr. Isaac's message and verbalized understanding. She is getting better and her numbers have come down. She will continue to do what Dr. Isaac told her to do and go from there.

## 2023-10-16 NOTE — ASSESSMENT & PLAN NOTE
NEW dx with /80; repeat BP even worsened, but she reports normotensive readings at home when she rests and relaxes;rec low Na diet, increased aerobic exercise; home BP monitoring with goal BP < 130/80; f/u in 6 mos, earlier if persistently > 140/90

## 2023-10-16 NOTE — TELEPHONE ENCOUNTER
It was high when I checked it on repeat, but she said it was normally better at home.    Deep breathing exercises, low sodium, and take it easy tonight.    Continue monitoring BPs at home, checking after resting for 30 min.     If they still stay elevated, she will need f/u to discuss medication.    If she has assoc'd severe headaches, visual changes, or CP, she will need to go to ER

## 2023-10-18 ENCOUNTER — LAB (OUTPATIENT)
Dept: LAB | Facility: HOSPITAL | Age: 78
End: 2023-10-18
Payer: MEDICARE

## 2023-10-18 DIAGNOSIS — E83.52 HYPERCALCEMIA: ICD-10-CM

## 2023-10-18 LAB
ANION GAP SERPL CALCULATED.3IONS-SCNC: 12.9 MMOL/L (ref 5–15)
BUN SERPL-MCNC: 20 MG/DL (ref 8–23)
BUN/CREAT SERPL: 23 (ref 7–25)
CALCIUM SPEC-SCNC: 10.1 MG/DL (ref 8.6–10.5)
CHLORIDE SERPL-SCNC: 105 MMOL/L (ref 98–107)
CO2 SERPL-SCNC: 21.1 MMOL/L (ref 22–29)
CREAT SERPL-MCNC: 0.87 MG/DL (ref 0.57–1)
EGFRCR SERPLBLD CKD-EPI 2021: 67.9 ML/MIN/1.73
GLUCOSE SERPL-MCNC: 77 MG/DL (ref 65–99)
POTASSIUM SERPL-SCNC: 4.6 MMOL/L (ref 3.5–5.2)
SODIUM SERPL-SCNC: 139 MMOL/L (ref 136–145)

## 2023-10-18 PROCEDURE — 80048 BASIC METABOLIC PNL TOTAL CA: CPT

## 2023-10-19 ENCOUNTER — TELEPHONE (OUTPATIENT)
Dept: INTERNAL MEDICINE | Facility: CLINIC | Age: 78
End: 2023-10-19
Payer: COMMERCIAL

## 2023-10-19 NOTE — TELEPHONE ENCOUNTER
----- Message from Lorene Isaac MD sent at 10/19/2023  1:06 AM EDT -----  Calcium level back to normal.  No further eval needed at this time

## 2023-10-23 ENCOUNTER — TELEPHONE (OUTPATIENT)
Dept: INTERNAL MEDICINE | Facility: CLINIC | Age: 78
End: 2023-10-23
Payer: COMMERCIAL

## 2023-10-23 NOTE — TELEPHONE ENCOUNTER
PATIENT IS CALLING BECAUSE SHE PASSED SOME BLOOD TODAY AND SHE STATES SHE IS TOO OLD TO BE DOING THAT. PATIENT DENIED ANY BURNING WHILE URINATING AND ALSO DENIED URGENCY. PATIENT WOULD LIKE TO WAIT AND SEE DR. MEDRANO. SCHEDULED FOR FIRST AVAILABLE APPOINTMENT ON 11/10. PATIENT DOES NOT WANT TO WAIT THAT LONG IF SHE DOESN'T HAVE TO BUT WANTED TO SPEAK WITH MARYSOL OR DR. MEDRANO BEFORE SCHEDULING WITH SOMEONE ELSE. PLEASE CALL THE PATIENT TO DISCUSS.

## 2023-10-24 NOTE — TELEPHONE ENCOUNTER
Pt states that she woke up yesterday with blood in her underwear. She is certain it is vaginal bleeding. She does not have any pain and has not had anymore bleeding since then. She states that she does still have her uterus and ovaries. She has not seen gyn in 3 years. She has an appt scheduled with Ko on 11/10/23 for this. Do you want her to go ahead and get a referral to gyn or keep 11/10/23 appt with you? She is also ok to come in earlier with someone else if you think that is necessary.

## 2023-10-24 NOTE — TELEPHONE ENCOUNTER
Ok to come in as scheduled but pls advise her that we will be doing pelvic exam and Pap at her appt for further eval of the bleeding.    She can get on cancellation list if she is worried and wants to come in earlier but ok to leave as scheduled

## 2023-10-31 ENCOUNTER — TELEPHONE (OUTPATIENT)
Dept: INTERNAL MEDICINE | Facility: CLINIC | Age: 78
End: 2023-10-31
Payer: COMMERCIAL

## 2023-10-31 NOTE — TELEPHONE ENCOUNTER
----- Message from Lyn Fang RN sent at 10/31/2023  9:02 AM EDT -----  Regarding: Prolia  Please place order for Prolia on 11/9 and have any labs the patient may require drawn prior to appt.  Thank you!

## 2023-11-01 ENCOUNTER — PATIENT MESSAGE (OUTPATIENT)
Dept: INTERNAL MEDICINE | Facility: CLINIC | Age: 78
End: 2023-11-01
Payer: COMMERCIAL

## 2023-11-01 DIAGNOSIS — G31.84 MILD COGNITIVE IMPAIRMENT: ICD-10-CM

## 2023-11-01 DIAGNOSIS — Z78.0 MENOPAUSE: Primary | Chronic | ICD-10-CM

## 2023-11-01 RX ORDER — DONEPEZIL HYDROCHLORIDE 10 MG/1
10 TABLET, FILM COATED ORAL NIGHTLY
Qty: 90 TABLET | Refills: 3 | Status: SHIPPED | OUTPATIENT
Start: 2023-11-01

## 2023-11-06 ENCOUNTER — TELEPHONE (OUTPATIENT)
Dept: INTERNAL MEDICINE | Facility: CLINIC | Age: 78
End: 2023-11-06
Payer: COMMERCIAL

## 2023-11-06 NOTE — TELEPHONE ENCOUNTER
----- Message from Allie Duggan sent at 11/6/2023  9:35 AM EST -----  New prolia order needed, thank you

## 2023-11-09 ENCOUNTER — INFUSION (OUTPATIENT)
Dept: ONCOLOGY | Facility: HOSPITAL | Age: 78
End: 2023-11-09
Payer: MEDICARE

## 2023-11-09 VITALS
HEART RATE: 71 BPM | RESPIRATION RATE: 18 BRPM | DIASTOLIC BLOOD PRESSURE: 84 MMHG | TEMPERATURE: 97.9 F | SYSTOLIC BLOOD PRESSURE: 143 MMHG

## 2023-11-09 DIAGNOSIS — M81.0 AGE-RELATED OSTEOPOROSIS WITHOUT CURRENT PATHOLOGICAL FRACTURE: Primary | ICD-10-CM

## 2023-11-09 PROCEDURE — 96372 THER/PROPH/DIAG INJ SC/IM: CPT

## 2023-11-09 PROCEDURE — 25010000002 DENOSUMAB 60 MG/ML SOLUTION PREFILLED SYRINGE: Performed by: INTERNAL MEDICINE

## 2023-11-09 RX ADMIN — DENOSUMAB 60 MG: 60 INJECTION SUBCUTANEOUS at 14:46

## 2023-11-09 NOTE — PROGRESS NOTES
Chief Complaint   Patient presents with    Vaginal Bleeding       History of Present Illness  78 y.o.  female presents for further eval of vaginal bleeding, vaginal itching, and vaginal odor. Thinks increased odor has been present on/off for 3 or more weeks. Odor comes and goes, is not daily, and is not assoc'd with discharge.    Had 1 isolated episode of bleeding 2 weeks ago. Thinks it is vaginal since it was at the front of her underwear pad. Has not noticed bleeding with wiping and has not had recurrent bleeding. Denies bleeding clots.    Reports itching at a specific spot at the top of the private areas at the very top adjacent to her pubic hairs. Notes getting up in the morning, going to the bathroom, and then doing morning stretches, all the time asymptomatic. However, when she steps into the shower, there is a specific spot that itches. Thinks it may be exposure to soap and not just the water hitting the area. Has significant itching, gets out of the shower, puts vaseline on the area, and then remains asx for the rest of the day. Wears pantyliner and thinks the edges rubs the spot that itches; however, does not have recurrent itching in the daytime. Only itchy in the morning in the shower. Again, no assoc'd vaginal discharge. Is not sexually active.    Tolerating donepezil 10mg QD    Review of Systems  ROS (+) for vaginal irritation with isolated spot of itching, only symptomatic when she goes into the shower. ROS (+) for on/off vaginal odor x 3 weeks; no assoc'd vaginal discharge. Is not assoc'd with any specific foods that she eats. ROS (+) for isolated episode of poss vaginal bleeding about 2 weeks ago; no recurrence.Denies dysuria. Denies melena or hematochezia. All other ROS reviewed and negative.    Current Outpatient Medications:     albuterol sulfate HFA (Ventolin HFA) 108 (90 Base) MCG/ACT inhaler prn    atorvastatin (Lipitor) 40 MG QD    Biotin 10 MG QD    calcium carbonate-cholecalciferol  "500-400 MG-UNIT QD    clopidogrel 75 MG QD    Denosumab (Prolia) 60 MG/ML q6 mos    donepezil (Aricept) 10 MG QD    fluticasone (VERAMYST) 27.5 MCG/SPRAY nasal spray AD    Fluticasone-Salmeterol (ADVAIR/WIXELA) 100-50 MCG/ACT 1 puff BID    levothyroxine 50 MCG QD    Mirabegron ER (Myrbetriq) 50 MG QD    [id number] MG- QD    Omega-3 Fatty Acids (fish oil) 1000 MG QD    omeprazole (40 MG QD    psyllium (KONSYL) 28.3 % prn    TURMERIC CURCUMIN QD     VITALS:  /72   Pulse 79   Ht 163.2 cm (64.25\")   Wt 77.6 kg (171 lb)   SpO2 98%   BMI 29.12 kg/m²     Physical Exam  Vitals and nursing note reviewed.   Constitutional:       General: She is not in acute distress.     Appearance: Normal appearance. She is not ill-appearing.   Eyes:      Extraocular Movements: Extraocular movements intact.      Conjunctiva/sclera: Conjunctivae normal.   Pulmonary:      Effort: Pulmonary effort is normal. No respiratory distress.   Genitourinary:     Comments: Chaperone declined by patient    Pelvic exam performed with normal external genitalia; normal urethral meatus and urethral exam; atrophic vagina mucosa, no discharge in the vaginal vault; no masses palpated/seen and no CMT or adnexal tenderness with palpation; no bladder abnormality noted; perineum intact.    Area of itching is at the superior aspect of the clitoral gross, no erythema, no flakiness, no folliculitis      Neurological:      Mental Status: She is alert. Mental status is at baseline.   Psychiatric:         Mood and Affect: Mood normal.         Behavior: Behavior normal.         LABS  Results for orders placed or performed in visit on 10/18/23   Basic Metabolic Panel    Specimen: Blood   Result Value Ref Range    Glucose 77 65 - 99 mg/dL    BUN 20 8 - 23 mg/dL    Creatinine 0.87 0.57 - 1.00 mg/dL    Sodium 139 136 - 145 mmol/L    Potassium 4.6 3.5 - 5.2 mmol/L    Chloride 105 98 - 107 mmol/L    CO2 21.1 (L) 22.0 - 29.0 mmol/L    Calcium 10.1 8.6 - 10.5 mg/dL    " BUN/Creatinine Ratio 23.0 7.0 - 25.0    Anion Gap 12.9 5.0 - 15.0 mmol/L    eGFR 67.9 >60.0 mL/min/1.73         ASSESSMENT/PLAN    Diagnoses and all orders for this visit:    1. Vaginal bleeding (Primary)  Comments:  x 1 episode; Pap taken; if recurs, will check vaginal u/s for further eval  Orders:  -     LIQUID-BASED PAP SMEAR WITH HPV GENOTYPING REGARDLESS OF INTERPRETATION ([facility]); Future  -     LIQUID-BASED PAP SMEAR WITH HPV GENOTYPING REGARDLESS OF INTERPRETATION ([facility])    2. Vaginal itching  Comments:  superior aspect at jxn of mons pubis and clitoral gross; ddx pad irritation vs sensitivity to soap; rec changing to Dove or Dial soap, non-fraganced    3. Vaginal odor  Comments:  no assoc'd discharge; exam unremarkable; advise against douching; follow clinically      - vaginal itching cont'd: if no better with change in soap, then 1 week trial HC 1% cream thin layer QAM when she 1st gets up to the itchy area; if no better with these 2 interventions, f/u for further evaluation      FOLLOW-UP  Health maintenance - flu vacc, COVID19 vacc, and RSV vacc all completed for this season  RTC prn; next f/u scheduled 4/18/24 with SLUMS and BP check    Electronically signed by:    Lorene Isaac MD, FACP  11/10/2023

## 2023-11-10 ENCOUNTER — OFFICE VISIT (OUTPATIENT)
Dept: INTERNAL MEDICINE | Facility: CLINIC | Age: 78
End: 2023-11-10
Payer: MEDICARE

## 2023-11-10 VITALS
OXYGEN SATURATION: 98 % | HEART RATE: 79 BPM | DIASTOLIC BLOOD PRESSURE: 72 MMHG | SYSTOLIC BLOOD PRESSURE: 134 MMHG | BODY MASS INDEX: 29.19 KG/M2 | HEIGHT: 64 IN | WEIGHT: 171 LBS

## 2023-11-10 DIAGNOSIS — N89.8 VAGINAL ITCHING: ICD-10-CM

## 2023-11-10 DIAGNOSIS — N89.8 VAGINAL ODOR: ICD-10-CM

## 2023-11-10 DIAGNOSIS — N93.9 VAGINAL BLEEDING: Primary | ICD-10-CM

## 2023-11-14 LAB — REF LAB TEST METHOD: NORMAL

## 2023-11-15 ENCOUNTER — TELEPHONE (OUTPATIENT)
Dept: INTERNAL MEDICINE | Facility: CLINIC | Age: 78
End: 2023-11-15
Payer: MEDICARE

## 2023-11-15 ENCOUNTER — TRANSCRIBE ORDERS (OUTPATIENT)
Dept: ADMINISTRATIVE | Facility: HOSPITAL | Age: 78
End: 2023-11-15
Payer: MEDICARE

## 2023-11-15 DIAGNOSIS — Z12.31 VISIT FOR SCREENING MAMMOGRAM: Primary | ICD-10-CM

## 2023-11-15 NOTE — TELEPHONE ENCOUNTER
----- Message from Lorene Isaac MD sent at 11/14/2023  4:30 PM EST -----  Pap is normal. No further eval at this time unless bleeding recurs.

## 2024-01-10 ENCOUNTER — HOSPITAL ENCOUNTER (OUTPATIENT)
Dept: BONE DENSITY | Facility: HOSPITAL | Age: 79
Discharge: HOME | End: 2024-01-10
Payer: MEDICARE

## 2024-01-10 ENCOUNTER — HOSPITAL ENCOUNTER (OUTPATIENT)
Dept: MAMMOGRAPHY | Facility: HOSPITAL | Age: 79
Discharge: HOME | End: 2024-01-10
Payer: MEDICARE

## 2024-01-10 DIAGNOSIS — Z78.0 MENOPAUSE: Chronic | ICD-10-CM

## 2024-01-10 DIAGNOSIS — Z12.31 VISIT FOR SCREENING MAMMOGRAM: ICD-10-CM

## 2024-01-10 PROCEDURE — 77067 SCR MAMMO BI INCL CAD: CPT

## 2024-01-10 PROCEDURE — 77080 DXA BONE DENSITY AXIAL: CPT

## 2024-01-10 PROCEDURE — 77063 BREAST TOMOSYNTHESIS BI: CPT

## 2024-01-12 ENCOUNTER — TELEPHONE (OUTPATIENT)
Dept: INTERNAL MEDICINE | Facility: CLINIC | Age: 79
End: 2024-01-12
Payer: MEDICARE

## 2024-01-12 NOTE — TELEPHONE ENCOUNTER
----- Message from Lorene Isaac MD sent at 1/11/2024  1:07 AM EST -----  Dear Rebecca,    Thank you for obtaining your DEXA (bone density) scan.  I have received those results and would like to review them with you.      Your bone density remains in the osteoporosis range but is basically stable compared to your last DEXA in 2021.     Please maintain regular calcium and vitamin D supplementation.  Calcium intake should be 1000mg per day between diet and supplements.  Weight bearing exercise is good for bone health as well. We should continue the Prolia injections every 6 months.    We should plan to repeat your DEXA in 2-3 years.  Please let me know if you have any questions or concerns regarding these results.         Sincerely,  Lorene Isaac MD, FACP

## 2024-01-15 PROCEDURE — 77067 SCR MAMMO BI INCL CAD: CPT | Performed by: RADIOLOGY

## 2024-01-15 PROCEDURE — 77063 BREAST TOMOSYNTHESIS BI: CPT | Performed by: RADIOLOGY

## 2024-01-16 ENCOUNTER — OFFICE VISIT (OUTPATIENT)
Dept: NEUROLOGY | Facility: CLINIC | Age: 79
End: 2024-01-16
Payer: MEDICARE

## 2024-01-16 VITALS
OXYGEN SATURATION: 96 % | DIASTOLIC BLOOD PRESSURE: 84 MMHG | HEIGHT: 64 IN | HEART RATE: 74 BPM | WEIGHT: 171 LBS | SYSTOLIC BLOOD PRESSURE: 126 MMHG | BODY MASS INDEX: 29.19 KG/M2

## 2024-01-16 DIAGNOSIS — G60.9 IDIOPATHIC PERIPHERAL NEUROPATHY: ICD-10-CM

## 2024-01-16 DIAGNOSIS — Z86.73 HISTORY OF STROKE: Primary | ICD-10-CM

## 2024-01-16 PROCEDURE — 99214 OFFICE O/P EST MOD 30 MIN: CPT | Performed by: PSYCHIATRY & NEUROLOGY

## 2024-01-16 PROCEDURE — 1159F MED LIST DOCD IN RCRD: CPT | Performed by: PSYCHIATRY & NEUROLOGY

## 2024-01-16 PROCEDURE — 1160F RVW MEDS BY RX/DR IN RCRD: CPT | Performed by: PSYCHIATRY & NEUROLOGY

## 2024-01-16 NOTE — PROGRESS NOTES
Subjective:    CC: Rebecca Sharma is seen today for History of stroke       Current visit-patient denies having any new strokelike symptoms since she last saw me in June.  She continues to have occasional word finding difficulties.  Lives at home by herself and is able to carry out all of her ADLs including driving without any difficulties.  Is compliant with Plavix 75 mg and Lipitor 40 mg daily.  Her last lipid panel was as follows-, TG 79, LDL 53, HDL 40.  Her loop recorder has not shown any A-fib till date.  She was started on donepezil gradually increasing to 10 mg daily by her PCP after she had a hearing test which showed hearing loss as well as delayed comprehension time (as per patient).  Since starting donepezil she has noted an improvement in her memory however she feels that it causes her to have sleep disturbances at night as well as mild mood problems where she gets agitated easily.  She also complains of some numbness in the balls of her feet.  Saw chiropractor who ordered an EMG/NCS that showed sensorimotor/demyelinating peripheral neuropathy.  Denies having any pain or tingling.  Also has mild balance problems for which she is currently undergoing PT.  Has just started taking B12/B6/alpha lipoic acid supplement    Last visit-patient continues to have mild word finding difficulties but no new symptoms.  Her loop recorder thus far has shown only SVT but no atrial fibrillation.  She continues to be on Plavix 75 mg and Lipitor 80 mg.  Her last lipid panel was as follows-, TG 85, LDL 59, HDL 37.  Her muscle enzyme level was normal.  Her blood pressure today is elevated but at home when she checks it it is usually between 1 20-1 30 systolic.     Last visit-patient denies having any new strokelike symptoms since she last saw me.  She continues to have occasional word finding difficulties.  She is still taking Plavix and Lipitor 40 mg daily.  She had a repeat lipid panel done today.  Her loop  recorder thus far has failed to show any atrial fibrillation.  It did show one episode of SVT.  She occasionally gets palpitations after eating dinner but denies getting any chest pain or shortness of breath.     Initial yrxde-23-ypid-old female with a past medical history of hypertension, hyperlipidemia, hypothyroidism, ADEN on CPAP, venous insufficiency of legs presents as a hospital follow-up for stroke.  As per patient she had symptoms of right hand weakness, right facial droop and difficulty speaking on 7/12.  She initially went to [facility] where she had a CT scan of the head that was unremarkable.  Was given IV TPA and subsequently transferred to [facility].  She had a CT perfusion here that was normal, CT angiogram of the head and neck showed possible narrowing of the left P3 and mild dilatation of the left P-comm (aneurysm versus infundibulum) but no significant carotid stenosis.  MRI brain showed scattered infarcts in the left frontal, parietal and posterior temporal region.  Echocardiogram showed an EF of 56 to 60% with moderate tricuspid valve regurg but normal left atrial size and no PFO.  Patient was started on Plavix (allergic to aspirin) and Lipitor 80 mg.  Her most recent lipid panel was as follows-, TG 69, LDL 60, HDL 31.  Last A1c was 5.8.  She had a loop recorder placed in the hospital that has been interrogated a few times by Dr. Smith and does not show any atrial fibrillation till date.  After discharge patient received PT and speech therapy and right arm strength as well as speech have improved although she is still hesitant to talk at times.  Also has mild difficulties recalling names when passwords.  Of note-I personally reviewed her MRI brain and the hospital notes    The following portions of the patient's history were reviewed today and updated as of 10/06/2021  : allergies, current medications, past family history, past medical history, past social history, past  surgical history and problem list  These document will be scanned to patient's chart.      Current Outpatient Medications:     albuterol sulfate HFA (Ventolin HFA) 108 (90 Base) MCG/ACT inhaler, Inhale 2 puffs Every 6 (Six) Hours As Needed for Wheezing or Shortness of Air., Disp: 18 g, Rfl: 0    atorvastatin (Lipitor) 40 MG tablet, Take 1 tablet by mouth Daily., Disp: 30 tablet, Rfl: 11    Biotin 10 MG capsule, Take  by mouth., Disp: , Rfl:     calcium carbonate-cholecalciferol 500-400 MG-UNIT tablet tablet, Take 1 tablet by mouth Daily., Disp: , Rfl:     clopidogrel (PLAVIX) 75 MG tablet, Take 1 tablet by mouth Daily., Disp: 90 tablet, Rfl: 3    donepezil (Aricept) 10 MG tablet, Take 1 tablet by mouth Every Night., Disp: 90 tablet, Rfl: 3    fluticasone (VERAMYST) 27.5 MCG/SPRAY nasal spray, 2 sprays into the nostril(s) as directed by provider Daily., Disp: , Rfl:     Fluticasone-Salmeterol (ADVAIR/WIXELA) 100-50 MCG/ACT DISKUS, Inhale 1 puff 2 (Two) Times a Day. Gargle/spit after puffs, Disp: 180 each, Rfl: 3    levothyroxine (SYNTHROID, LEVOTHROID) 50 MCG tablet, Take 1 tablet by mouth Every Morning., Disp: 90 tablet, Rfl: 3    Mirabegron ER (Myrbetriq) 50 MG tablet sustained-release 24 hour 24 hr tablet, Take 50 mg by mouth Daily., Disp: 90 tablet, Rfl: 3    montelukast (SINGULAIR) 10 MG tablet, Take 1 tablet by mouth Every Night., Disp: 90 tablet, Rfl: 3    Omega-3 Fatty Acids (fish oil) 1000 MG capsule capsule, Take 1 capsule by mouth Daily With Breakfast., Disp: , Rfl:     omeprazole (priLOSEC) 40 MG capsule, Take 1 capsule by mouth Daily. Take 30 minutes 1st meal of the day, Disp: 90 capsule, Rfl: 3    psyllium (KONSYL) 28.3 % pack pack, Take 1 packet by mouth Daily As Needed., Disp: , Rfl:     TURMERIC CURCUMIN PO, Take 1 tablet by mouth Daily., Disp: , Rfl:     denosumab (Prolia) 60 MG/ML solution prefilled syringe syringe, Inject 1 mL under the skin into the appropriate area as directed 1 (One) Time for 1  dose., Disp: , Rfl:    Past Medical History:   Diagnosis Date    Abnormal MRI, breast 01/03/2020    breast MRI (1/3/20): 1.1cm R. nipple mass sugg of nipple adenoma, indeterminate 0.5cm L. breast mass 12:00, rec surg excision for punch bx R. nipple mass and u/s for further eval L. breast mass    Abnormal MRI, breast 07/10/2020    breast MRI (7/10/20): postsurg bx R. nipple with dx benign adenoma, post-bx clip left breast adjacent to 0.4cm not worrisome mass, rec L. breast MRI in 12 mos (reg mammo due 12/20)    Adverse reaction to drug 05/10/2016    Impression: 06/09/2014 - GI side effects to aricept; plan as above to change timing and dose of med;     Anesthesia     SOB upon awakening from surgery.  one occurrence.    Breast pain, right 06/14/2016 6/14/16 Notes f/u with Dr. Beckham with neg bx (evaluated at [facility]); bx site healing per patient; 5/10/16 Pain resolved but has residual R. nipple abnlity with erythema and nodular change, therefore dx mammo ordered for further eval; last reg mammo done 7/15; R. nipple mass - s/p bx to r/o Paget's disease (6/16); surg - Dr. Beckham    Cerebrovascular accident (CVA) due to embolism of left middle cerebral artery 07/14/2021    Contact dermatitis due to poison ivy 07/30/2014    Impression: 07/30/2014 - Depo-medrol 40 mg IM given in office. Pt will start prednisone taper as ordered. Call or RTC if rash is not improving.;     Difficulty walking 2021    Balance    HL (hearing loss) 2018    Hearing Aids    Hx of bone density study 07/2013    DEXA (7/13) H -1.3    Hx of bone density study 07/14/2016    DEXA (7/14/16) L 0.4, H -1.6 repeat 2-3 yrs    Hx of bone density study 10/08/2019    DEXA (10/8/19): L 1.3, H -1.8, A -2.8, worsened, NEW osteoporosis, repeat 2 yrs    Hx of bone density study 12/17/2021    DEXA (12/17/21): L 3.6,H -1.6, A -3.0    Hx of bone density study 01/10/2024    DEXA (1/10/24) L 5.4, H -1.3, A -2.9    Hx of colonoscopy 12/2015    surg - Dr. Verdugo  Asif    Hx of colonoscopy 11/08/2019    colonosc (11/8/19): nl except diverticulosis, repeat 5 yrs due to h/o polyps; surg - Dr. Kartik Gold    Hx of CT angio head/neck 07/13/2021    CT angio head/neck (7/13/21, hospitalized): No definite lg vessel occlusion, high-grade stenosis or dissection; indeterminate narrowing/occlusion of small P3 branch of the patient's left PCA; small outpouching at origin of left P-com may represent a small aneurysm versus infundibulum    Hx of CT brain perfusion 07/13/2021    Normal brain perfusion CT (7/13/21, hospitalized)    Hx of CT scan of head 07/13/2021    nl CT head (7/13/21): except mild atrophy with mild chronic small vessel ischemic disease    Hx of CT scan of head 07/14/2021    CT head (7/14/21): acute subacute infarct left posterior temporal and parietal region, no hemorrhage    Hx of echocardiogram 07/13/2021    ECHO (7/13/21, hosp): EF 56-60%, mild-mod TR, neg saline test    Hx of esophagogastroduodenoscopy 05/05/2017    EGD (5/5/17): mod chronic gastritis, (+)H.pylori, no hiatal hernia; GI - Dr. Esparza    Hx of esophagogastroduodenoscopy 04/28/2021    EGD (4/8/21): cricopharyngeal spasm, Schatzki's s/p dilation, nonerosive GERD with mod esophageal dysmotility, 2xm hiatal hernia, bile reflux gastropathy, mod chronic gastritis and (+) H. pylori - treated with abx; GI - Dr. Cerrato    Hx of LLE venous duplex 11/13/2020    LLE venous duplex (11/13/20): no DVT; (+) valvular venous insufficiency deep and superficial with severe reflux    Hx of loop recorder 09/12/2023    no afib per cards [facility]    Hx of MRI brain 07/14/2021    MRI brain (7/14/21): acute infarct left parietal and posterior temporal lobe w/ adjacent area c/w left posterior MCA CVA    Hx of swallowing study (FEES) 07/15/2021    s/p nl swallow study/FEES (7/15/21): aspiration precautions; regular textures, thin liquids    Hyperlipidemia LDL goal <70 05/10/2016    Lip swelling 08/05/2015    Impression:  "2015 - L. lower lip lesion significantly improved; complete course of abx as prescribed; if area does not resolve completely, call to make earlier derm f/u appt  Impression: 2015 - L. lower lip swelling already improving but concern for residual ongoing infection; ddx includes infected cold sore, infection from cryotherapy for AK, and less likely shingles; finishing course     Pap smear for cervical cancer screening 2016    Pap done 16 per Dr. Brittanie Bauer    Peripheral arterial occlusive disease 05/10/2016    Description: abnl ALLEGRA at [facility] with nl arterial duplex (10/08)    Sleep apnea     CPAP    Wears glasses       Past Surgical History:   Procedure Laterality Date    BILATERAL SALPINGO OOPHORECTOMY Bilateral 2017    GYN - Dr. Bauer; for benign L. ovarian cyst    BREAST BIOPSY Right 2020    s/p R. breast excisional bx R. subsreaolar mass (20); path - nipple adenoma; surg - Dr. Beckham    CARDIAC ELECTROPHYSIOLOGY PROCEDURE N/A 07/15/2021    Procedure: loop implant;  Surgeon: Bartolome Smith IV, MD;  Location:  [facility] INVASIVE LOCATION;  Service: Cardiovascular;  Laterality: N/A;    CATARACT EXTRACTION, BILATERAL      L.  (complicated by nerve injury; R. 6/10; L. corrected 6/15; ophtho - Dr. Hollis, Dr. Lange; neuro-ophtho Dr. Ferris    CHOLECYSTECTOMY      secondary to \"crystalized GB\" (''02); surg - Dr. Kartik Gold    MAMMO CORE NEEDLE BIOPSY UNILATERAL Left 2020    L. breast u/s core needle bx (20): path - Focal proliferative fibrocystic changes including sclerosing adenosis    OOPHORECTOMY Bilateral       Family History   Problem Relation Age of Onset    Heart failure Mother         Congestive Heart Failure    Lung cancer Mother     Breast cancer Mother 86         88    Other Mother         Parkinson’s    Parkinsonism Mother          2006    Lung cancer Father         tob use    Cancer Father         Lung    " "Depression Sister     OCD Sister     Hyperlipidemia Sister         No longer    Hypertension Sister     Cancer Sister         Brain Stem    Stroke Maternal Grandmother     Diabetes Paternal Grandmother     Ovarian cancer Maternal Aunt     Diabetes Paternal Aunt     Stroke Paternal Aunt     Heart disease Maternal Grandfather     Heart attack Paternal Grandfather     Heart attack Paternal Uncle         Twins    Diabetes Paternal Uncle       Social History     Socioeconomic History    Marital status: Single   Tobacco Use    Smoking status: Never     Passive exposure: Never    Smokeless tobacco: Never   Vaping Use    Vaping Use: Never used   Substance and Sexual Activity    Alcohol use: Yes     Alcohol/week: 2.0 standard drinks of alcohol     Types: 1 Glasses of wine, 1 Cans of beer per week     Comment: occas    Drug use: Never    Sexual activity: Not Currently     Partners: Male     Birth control/protection: None     Review of Systems   Neurological:  Positive for memory problem.   All other systems reviewed and are negative.      Objective:    /84   Pulse 74   Ht 163.2 cm (64.25\")   Wt 77.6 kg (171 lb)   SpO2 96%   BMI 29.12 kg/m²     Neurology Exam:    General apperance: NAD.     Mental status: Alert, awake and oriented to time place and person.  Could tell me the month, year and her ZIP Code    Recent and Remote memory: Intact.  Delayed recall of 3/3 today    Attention span and Concentration: Normal.     Language and Speech: Intact- No dysarthria.  Slightly hesitant at times    Fluency, Naming , Repitition and Comprehension:  Intact    Cranial Nerves:   CN II: Visual fields are full. Intact. Fundi - Normal, No papillederma, Pupils - KHUSHBU  CN III, IV and VI: Extraocular movements are intact. Normal saccades.   CN V: Facial sensation is intact.   CN VII: Muscles of facial expression reveal no asymmetry. Intact.   CN VIII: Hearing is intact. Whispered voice intact.   CN IX and X: Palate elevates " symmetrically. Intact  CN XI: Shoulder shrug is intact.   CN XII: Tongue is midline without evidence of atrophy or fasciculation.     Ophthalmoscopic exam of optic disc-normal    Motor:  Right UE muscle strength 5/5. Normal tone.     Left UE muscle strength 5/5. Normal tone.      Right LE muscle strength5/5. Normal tone.     Left LE muscle strength 5/5. Normal tone.      Sensory: Reduced to pinprick in feet to the midfoot level as well as markedly reduced vibration/temperature sensation in lower extremities    DTRs: 2+ bilaterally in upper and lower extremities.    Babinski: Negative bilaterally.    Co-ordination: Normal finger-to-nose, heel to shin B/L.    Rhomberg: Negative.    Gait: Slightly cautious while walking but otherwise normal gait.  (has spinal stenosis and scoliosis).    Cardiovascular: Regular rate and rhythm without murmur, gallop or rub.    Assessment and Plan:  1. History of stroke  Patient had scattered infarcts in the left hemisphere most likely cardioembolic in nature.  CT angiogram did not show any significant carotid stenosis  Loop recorder thus far has shown SVT but no atrial fibrillation  I have asked her to continue Plavix 75 mg for now and Lipitor 40 mg daily for now.  Last LDL was 53  I will reduce her Lipitor to 40 mg daily.  Goal LDL of less than 100.  Her LDL was 59  Goal blood pressure less than 130/90.  Her blood pressure is elevated today and I have told her to check it regularly at home.    2.  Idiopathic peripheral neuropathy  She does have symptoms and signs of mild neuropathy  I will defer starting her on any medications as she does not complain of pain  She can continue the B12/B6/alpha lipoic acid supplements    3.  Mild cognitive problems  Could be due to her stroke and hearing loss  I have told her to continue Aricept 10 mg daily but take it during the daytime as she is having sleep disturbances  Counseling also given to carry out mental exercises such as reading, solving  crossword puzzles etc.         Return in about 6 months (around 7/16/2024).       I spent 25 minutes out of it 20 minutes were spent face-to-face  Raquel Bautista MD

## 2024-02-11 PROBLEM — R29.898 WEAKNESS OF BOTH LOWER EXTREMITIES: Status: ACTIVE | Noted: 2024-02-11

## 2024-03-01 ENCOUNTER — TELEPHONE (OUTPATIENT)
Dept: INTERNAL MEDICINE | Facility: CLINIC | Age: 79
End: 2024-03-01
Payer: MEDICARE

## 2024-03-01 NOTE — TELEPHONE ENCOUNTER
Let pt know will need appt. Offered video visit, declined. States she would get OTC meds and treat sxs

## 2024-03-01 NOTE — TELEPHONE ENCOUNTER
Caller: Rebecca Sharma    Relationship: Self    Best call back number: [phone]     What medication are you requesting: ANTIBIOTIC    What are your current symptoms: HEAD CONGESTION    How long have you been experiencing symptoms: 1 DAY    Have you had these symptoms before:    [x] Yes  [] No    Have you been treated for these symptoms before:   [x] Yes  [] No    If a prescription is needed, what is your preferred pharmacy and phone number: [facility] [address] [place] [address] [phone] [facility] [phone]      Additional notes:  PATIENT HAS CALLED REQUESTING IF ANTIBIOTIC CAN BE CALLED IN TO TREAT HER HEAD CONGESTION. PATIENT STATES SHE LIVES OUT OF TOWN AND DOES NOT FEEL LIKE DRIVING INTO OFFICE. PATIENT IS REQUESTING A CALL BACK EITHER WAY TO LET HER KNOW IS SOMETHING WILL BE CALLED IN.

## 2024-04-08 ENCOUNTER — TELEPHONE (OUTPATIENT)
Dept: INTERNAL MEDICINE | Facility: CLINIC | Age: 79
End: 2024-04-08

## 2024-04-16 NOTE — PROGRESS NOTES
Chief Complaint   Patient presents with    Hypertension       History of Present Illness  78 y.o.  female presents for f/u on BP and memory.  Breo's nerve conduction report performed by podiatry.  Reports pain in her feet and notes some improvement with a vitamin B base supplement recommended by podiatry.    Reports inability to tolerate donepezil, noting side effects of insomnia, leg cramps, and not feeling well in general.  She discussed this with Dr. Bautista and even a lower dose was symptomatic.  She has been off of it for a month and most but not all symptoms have resolved.    She reports successful PT working on back pain as well as balance.  Would like a new referral to have PT prolonged/continued.    Complains of a headache at the left temple that has been on and off for the last year.  It occurs on a daily basis, sometimes more than once per day, lasting only 5 minutes.  She is more symptomatic when she is upset or worried about something.  She does admit to increased stress in her life.  No associated visual changes, confusion, nausea, numbness/tingling.  Right temple area remains unaffected.    Is interested in treatment for the wrinkles on her face.  Has discussed with was with dermatologist Dr. White, who has recommended laser treatment.  Patient does have some interest in plastic surgery as well.    Has not been checking BPs at home.  .    Review of Systems  Denies CP, SOB, falls. ROS (+) for intermittent left temporal area headache lasting for only 5 minutes, occurring on a daily basis, ongoing for 1 year, not changing in frequency, duration, or quality.  No associated visual changes.   ROS (+) for improved but persistent concerns with back pain and balance.  Requesting further PT.  ROS (+) for insomnia. All other ROS reviewed and negative.      Current Outpatient Medications:     albuterol sulfate HFA (Ventolin HFA) 108 (90 Base) MCG/ACT inhaler prn    atorvastatin (Lipitor) 40 MG  QD     "Biotin 10 MG capsule, Take  by mouth., Disp: , Rfl:     calcium carbonate-cholecalciferol 500-400 MG-UNIT QD    clopidogrel (PLAVIX) 75 MG QD    denosumab (Prolia) 60 MG/ML q6 mos    fluticasone (VERAMYST) 27.5 MCG/SPRAY nasal spray AD    Fluticasone-Salmeterol (ADVAIR/WIXELA) 100-50 MCG/ACT 1 puff BID    levothyroxine 50 MCG QD    Mirabegron ER 50 MG QD    montelukast 10 MG QD    Omega-3 Fatty Acids (fish oil) 1000 MG QD    omeprazole 40 MG QD    psyllium (KONSYL) 28.3 % pack QD    TURMERIC CURCUMIN QD    VITALS:  /74   Pulse 80   Temp 97 °F (36.1 °C)   Ht 163.2 cm (64.25\")   Wt 76.6 kg (168 lb 12.8 oz)   SpO2 97%   BMI 28.75 kg/m²     Physical Exam  Vitals and nursing note reviewed.   Constitutional:       General: She is not in acute distress.     Appearance: Normal appearance. She is not ill-appearing.   HENT:      Head:      Comments: No tenderness to palpation of the temporal areas bilaterally; temporal artery is not palpable  Eyes:      Extraocular Movements: Extraocular movements intact.      Conjunctiva/sclera: Conjunctivae normal.   Pulmonary:      Effort: Pulmonary effort is normal. No respiratory distress.   Neurological:      Mental Status: She is alert. Mental status is at baseline.   Psychiatric:         Mood and Affect: Mood normal.         Behavior: Behavior normal.         LABS  12/20/23 EMG/NCV - BLE sensorimotor axonal and demyelinating polyneuropathy    10/18/23 BMP with Cr 0.87, GFR 67.9    10/9/23 TSH 2.51    ASSESSMENT/PLAN    Diagnoses and all orders for this visit:    1. Elevated blood pressure reading without diagnosis of hypertension (Primary)  Assessment & Plan:  BP is mildly elevated, improved but bord on repeat; rec low Na diet, increased aerobic exercise; home BP monitoring with goal BP < 130/80; f/u in 6 mos      2. Mild cognitive impairment  Assessment & Plan:  Unable to tolerate donepezil due to side effects; rec f/u Dr. Bautista as needed; otherwise plan to repeat SLUMs " in 6 mos with next wellness      3. Need for COVID-19 vaccine  -     COVID-19 F23 (Pfizer) 12yrs+ (COMIRNATY)    4. Peripheral polyneuropathy  Assessment & Plan:  Reviewed EMG/NCV results indicating BLE polyneuropathy; she notes some improvement with Paige-RX-TF supplement (B6, B12, Ca, alpha-lipoic acid) that she started 1 month ago; reviewed RX options but note s/e including sedation and dizziness      5. Spinal stenosis of lumbar region without neurogenic claudication  Assessment & Plan:  Requesting to cont PT with OSPT; discussed importance of HEP    Orders:  -     Ambulatory Referral to Physical Therapy Evaluate and treat (pls give patient HEP); (all modalities indicated); Soft Tissue Mobilizaton; Stretching, Strengthening    6. Weakness of both lower extremities  Assessment & Plan:  Requesting to cont PT with OSPT    Orders:  -     Ambulatory Referral to Physical Therapy Evaluate and treat (pls give patient HEP); (all modalities indicated); Soft Tissue Mobilizaton; Stretching, Strengthening    7. Imbalance  -     Ambulatory Referral to Physical Therapy Evaluate and treat (pls give patient HEP); (all modalities indicated); Soft Tissue Mobilizaton; Stretching, Strengthening    8. Insomnia  Assessment & Plan:  Reviewed good sleep hygiene      9. Episodic tension-type headache, not intractable  Assessment & Plan:  Left temple headaches lasting only 5 mins, daily x 1 yr; unlikely temporal arteritis; more likely tension headache, jo-ann since she is more symptomatic when upset or worried; f/u if character changes      10. Age-related facial wrinkles  Comments:  patient has already consulted Dr. Bales, who recommend laser tx; pt also interested in plastic surgery - rec Dr. Delgado or Dr. Osman        FOLLOW-UP  Health maintenance - flu vacc, COVID19 vacc, and RSV vacc completed; rec consideration for 2nd dose COVID19 vacc (given today)  RTC for next wellness 10/18/24; fasting labs prior to appt (CBC, CMP, TSH, lipids,  UA/micr, FT4, vit D, CPK) + SLUMS    Electronically signed by:    Lorene Isaac MD, FACP  04/18/2024    EMR Dragon/Transcription Utilization  Please note that portions of this note were completed with a voice recognition program.

## 2024-04-16 NOTE — ASSESSMENT & PLAN NOTE
BP is mildly elevated, improved but bord on repeat; rec low Na diet, increased aerobic exercise; home BP monitoring with goal BP < 130/80; f/u in 6 mos

## 2024-04-16 NOTE — ASSESSMENT & PLAN NOTE
Unable to tolerate donepezil due to side effects; rec f/u Dr. Bautista as needed; otherwise plan to repeat SLUMs in 6 mos with next wellness

## 2024-04-18 ENCOUNTER — OFFICE VISIT (OUTPATIENT)
Dept: INTERNAL MEDICINE | Facility: CLINIC | Age: 79
End: 2024-04-18
Payer: MEDICARE

## 2024-04-18 ENCOUNTER — TELEPHONE (OUTPATIENT)
Dept: INTERNAL MEDICINE | Facility: CLINIC | Age: 79
End: 2024-04-18
Payer: MEDICARE

## 2024-04-18 VITALS
HEIGHT: 64 IN | SYSTOLIC BLOOD PRESSURE: 136 MMHG | BODY MASS INDEX: 28.82 KG/M2 | HEART RATE: 80 BPM | OXYGEN SATURATION: 97 % | TEMPERATURE: 97 F | WEIGHT: 168.8 LBS | DIASTOLIC BLOOD PRESSURE: 74 MMHG

## 2024-04-18 DIAGNOSIS — G31.84 MILD COGNITIVE IMPAIRMENT: Chronic | ICD-10-CM

## 2024-04-18 DIAGNOSIS — F51.04 PSYCHOPHYSIOLOGICAL INSOMNIA: ICD-10-CM

## 2024-04-18 DIAGNOSIS — R26.89 IMBALANCE: ICD-10-CM

## 2024-04-18 DIAGNOSIS — Z23 NEED FOR COVID-19 VACCINE: ICD-10-CM

## 2024-04-18 DIAGNOSIS — G62.9 PERIPHERAL POLYNEUROPATHY: ICD-10-CM

## 2024-04-18 DIAGNOSIS — R29.898 WEAKNESS OF BOTH LOWER EXTREMITIES: ICD-10-CM

## 2024-04-18 DIAGNOSIS — R03.0 ELEVATED BLOOD PRESSURE READING WITHOUT DIAGNOSIS OF HYPERTENSION: Primary | ICD-10-CM

## 2024-04-18 DIAGNOSIS — L98.8 AGE-RELATED FACIAL WRINKLES: ICD-10-CM

## 2024-04-18 DIAGNOSIS — G44.219 EPISODIC TENSION-TYPE HEADACHE, NOT INTRACTABLE: Chronic | ICD-10-CM

## 2024-04-18 DIAGNOSIS — M48.061 SPINAL STENOSIS OF LUMBAR REGION WITHOUT NEUROGENIC CLAUDICATION: Chronic | ICD-10-CM

## 2024-04-18 RX ORDER — FLUTICASONE PROPIONATE 50 MCG
1 SPRAY, SUSPENSION (ML) NASAL DAILY
COMMUNITY
Start: 2024-02-14

## 2024-04-18 NOTE — ASSESSMENT & PLAN NOTE
Left temple headaches lasting only 5 mins, daily x 1 yr; unlikely temporal arteritis; more likely tension headache, jo-ann since she is more symptomatic when upset or worried; f/u if character changes

## 2024-04-18 NOTE — ASSESSMENT & PLAN NOTE
Reviewed EMG/NCV results indicating BLE polyneuropathy; she notes some improvement with Paige-RX-TF supplement (B6, B12, Ca, alpha-lipoic acid) that she started 1 month ago; reviewed RX options but note s/e including sedation and dizziness

## 2024-04-18 NOTE — TELEPHONE ENCOUNTER
Left detailed message with both of these names. OK for HUB to relay Dr. MEDRANO's message if she calls back.

## 2024-04-18 NOTE — TELEPHONE ENCOUNTER
----- Message from Lorene Isaac MD sent at 4/18/2024  1:57 PM EDT -----  Patient requested recommendations for plastic surgery - pls call with recs for Dr. Bartolome Delgado and Dr. Dave Osman

## 2024-04-18 NOTE — Clinical Note
Patient requested recommendations for plastic surgery - pls call with recs for Dr. Bartolome Delgado and Dr. Dave Osman

## 2024-04-18 NOTE — PROGRESS NOTES
Immunization  Immunization performed in left deltoid by Cleo Haywood MA. Patient tolerated the procedure well without complications.  04/18/24   Cleo Haywood MA

## 2024-04-23 NOTE — TELEPHONE ENCOUNTER
HOME HEALTH REPRESENTATIVE BY THE NAME OF ARIS PALAFOX CALLED IN REGARDS TO A REFERRAL FOR MS. JORI CLARK.     PLEASE ADVISE.  [phone]  
Pt notified will need appt then can have referral.   
See 9/2/21 telephone note. Already advised that she will need OV for ENT referral  
Speech Therapist Arminda states that she would like the patient to get a referral to ENT because she is having some changes with her voice. She has an appt next week. She would like to see someone in [place].   
VIEW ALL

## 2024-05-02 ENCOUNTER — TELEPHONE (OUTPATIENT)
Dept: INTERNAL MEDICINE | Facility: CLINIC | Age: 79
End: 2024-05-02
Payer: MEDICARE

## 2024-05-02 DIAGNOSIS — M81.0 AGE-RELATED OSTEOPOROSIS WITHOUT CURRENT PATHOLOGICAL FRACTURE: Chronic | ICD-10-CM

## 2024-05-02 RX ORDER — DENOSUMAB 60 MG/ML
60 INJECTION SUBCUTANEOUS ONCE
Start: 2024-05-02 | End: 2024-05-02

## 2024-05-02 NOTE — TELEPHONE ENCOUNTER
----- Message from Lorene Isaac sent at 5/2/2024  7:58 AM EDT -----  New prolia order needed, thank you

## 2024-05-09 ENCOUNTER — INFUSION (OUTPATIENT)
Dept: ONCOLOGY | Facility: HOSPITAL | Age: 79
End: 2024-05-09
Payer: MEDICARE

## 2024-05-09 VITALS
RESPIRATION RATE: 16 BRPM | SYSTOLIC BLOOD PRESSURE: 137 MMHG | HEART RATE: 78 BPM | DIASTOLIC BLOOD PRESSURE: 76 MMHG | TEMPERATURE: 98.7 F

## 2024-05-09 DIAGNOSIS — M81.0 AGE-RELATED OSTEOPOROSIS WITHOUT CURRENT PATHOLOGICAL FRACTURE: Primary | ICD-10-CM

## 2024-05-09 PROCEDURE — 96372 THER/PROPH/DIAG INJ SC/IM: CPT

## 2024-05-09 PROCEDURE — 25010000002 DENOSUMAB 60 MG/ML SOLUTION PREFILLED SYRINGE: Performed by: INTERNAL MEDICINE

## 2024-05-09 RX ADMIN — DENOSUMAB 60 MG: 60 INJECTION SUBCUTANEOUS at 15:04

## 2024-05-13 DIAGNOSIS — I63.412 CEREBROVASCULAR ACCIDENT (CVA) DUE TO EMBOLISM OF LEFT MIDDLE CEREBRAL ARTERY: ICD-10-CM

## 2024-05-13 RX ORDER — CLOPIDOGREL BISULFATE 75 MG/1
75 TABLET ORAL DAILY
Qty: 90 TABLET | Refills: 3 | Status: SHIPPED | OUTPATIENT
Start: 2024-05-13

## 2024-07-14 DIAGNOSIS — J45.20 MILD INTERMITTENT ASTHMA WITHOUT COMPLICATION: Chronic | ICD-10-CM

## 2024-07-15 RX ORDER — FLUTICASONE PROPIONATE AND SALMETEROL 100; 50 UG/1; UG/1
POWDER RESPIRATORY (INHALATION)
Qty: 180 EACH | Refills: 3 | OUTPATIENT
Start: 2024-07-15

## 2024-07-18 ENCOUNTER — OFFICE VISIT (OUTPATIENT)
Dept: NEUROLOGY | Facility: CLINIC | Age: 79
End: 2024-07-18
Payer: MEDICARE

## 2024-07-18 VITALS
OXYGEN SATURATION: 94 % | SYSTOLIC BLOOD PRESSURE: 134 MMHG | HEIGHT: 64 IN | DIASTOLIC BLOOD PRESSURE: 90 MMHG | BODY MASS INDEX: 29.06 KG/M2 | HEART RATE: 80 BPM | WEIGHT: 170.2 LBS

## 2024-07-18 DIAGNOSIS — G60.9 IDIOPATHIC PERIPHERAL NEUROPATHY: ICD-10-CM

## 2024-07-18 DIAGNOSIS — Z86.73 HISTORY OF STROKE: Primary | ICD-10-CM

## 2024-07-18 DIAGNOSIS — G31.84 MCI (MILD COGNITIVE IMPAIRMENT): ICD-10-CM

## 2024-07-18 RX ORDER — MEMANTINE HYDROCHLORIDE 5 MG/1
5 TABLET ORAL 2 TIMES DAILY
Qty: 60 TABLET | Refills: 6 | Status: SHIPPED | OUTPATIENT
Start: 2024-07-18 | End: 2025-07-18

## 2024-07-18 RX ORDER — SODIUM FLUORIDE 6.1 MG/ML
GEL, DENTIFRICE DENTAL
COMMUNITY
Start: 2024-07-15

## 2024-07-18 NOTE — PROGRESS NOTES
Subjective:    CC: Rebecca Sharma is seen today for history of stroke (6 mo)       Current visit-patient stopped taking donepezil since her last visit because even after reducing the dose she continues to have agitation.  Since stopping the medicine 3 months ago her symptoms have improved.  She still has short-term memory problems marked by forgetfulness that started after her stroke although she lives at home by herself and carry out all of her ADLs including taking her medications, cooking and driving.  Does have a male friend who is 88 whom she interacts with frequently.  She is concerned about having Parkinson's as her mother had it when she was 88.  Her neuropathy symptoms including numbness are well-controlled with supplements.  She takes a combination of B12/B6/alpha lipoic acid twice a day.  She also denies any new strokelike symptoms.  Continues to take Plavix and Lipitor 40 mg daily.     Last visit-patient denies having any new strokelike symptoms since she last saw me in June.  She continues to have occasional word finding difficulties.  Lives at home by herself and is able to carry out all of her ADLs including driving without any difficulties.  Is compliant with Plavix 75 mg and Lipitor 40 mg daily.  Her last lipid panel was as follows-, TG 79, LDL 53, HDL 40.  Her loop recorder has not shown any A-fib till date.  She was started on donepezil gradually increasing to 10 mg daily by her PCP after she had a hearing test which showed hearing loss as well as delayed comprehension time (as per patient).  Since starting donepezil she has noted an improvement in her memory however she feels that it causes her to have sleep disturbances at night as well as mild mood problems where she gets agitated easily.  She also complains of some numbness in the balls of her feet.  Saw chiropractor who ordered an EMG/NCS that showed sensorimotor/demyelinating peripheral neuropathy.  Denies having any pain or tingling.   Also has mild balance problems for which she is currently undergoing PT.  Has just started taking B12/B6/alpha lipoic acid supplement    Last visit-patient continues to have mild word finding difficulties but no new symptoms.  Her loop recorder thus far has shown only SVT but no atrial fibrillation.  She continues to be on Plavix 75 mg and Lipitor 80 mg.  Her last lipid panel was as follows-, TG 85, LDL 59, HDL 37.  Her muscle enzyme level was normal.  Her blood pressure today is elevated but at home when she checks it it is usually between 1 20-1 30 systolic.     Last visit-patient denies having any new strokelike symptoms since she last saw me.  She continues to have occasional word finding difficulties.  She is still taking Plavix and Lipitor 40 mg daily.  She had a repeat lipid panel done today.  Her loop recorder thus far has failed to show any atrial fibrillation.  It did show one episode of SVT.  She occasionally gets palpitations after eating dinner but denies getting any chest pain or shortness of breath.     Initial orguz-89-fldv-old female with a past medical history of hypertension, hyperlipidemia, hypothyroidism, ADEN on CPAP, venous insufficiency of legs presents as a hospital follow-up for stroke.  As per patient she had symptoms of right hand weakness, right facial droop and difficulty speaking on 7/12.  She initially went to [facility] where she had a CT scan of the head that was unremarkable.  Was given IV TPA and subsequently transferred to [facility].  She had a CT perfusion here that was normal, CT angiogram of the head and neck showed possible narrowing of the left P3 and mild dilatation of the left P-comm (aneurysm versus infundibulum) but no significant carotid stenosis.  MRI brain showed scattered infarcts in the left frontal, parietal and posterior temporal region.  Echocardiogram showed an EF of 56 to 60% with moderate tricuspid valve regurg but normal left atrial size and  no PFO.  Patient was started on Plavix (allergic to aspirin) and Lipitor 80 mg.  Her most recent lipid panel was as follows-, TG 69, LDL 60, HDL 31.  Last A1c was 5.8.  She had a loop recorder placed in the hospital that has been interrogated a few times by Dr. Smith and does not show any atrial fibrillation till date.  After discharge patient received PT and speech therapy and right arm strength as well as speech have improved although she is still hesitant to talk at times.  Also has mild difficulties recalling names when passwords.  Of note-I personally reviewed her MRI brain and the hospital notes    The following portions of the patient's history were reviewed today and updated as of 10/06/2021  : allergies, current medications, past family history, past medical history, past social history, past surgical history and problem list  These document will be scanned to patient's chart.      Current Outpatient Medications:     albuterol sulfate HFA (Ventolin HFA) 108 (90 Base) MCG/ACT inhaler, Inhale 2 puffs Every 6 (Six) Hours As Needed for Wheezing or Shortness of Air., Disp: 18 g, Rfl: 0    atorvastatin (Lipitor) 40 MG tablet, Take 1 tablet by mouth Daily., Disp: 30 tablet, Rfl: 11    Biotin 10 MG capsule, Take  by mouth., Disp: , Rfl:     calcium carbonate-cholecalciferol 500-400 MG-UNIT tablet tablet, Take 1 tablet by mouth Daily., Disp: , Rfl:     clopidogrel (PLAVIX) 75 MG tablet, TAKE 1 TABLET EVERY DAY, Disp: 90 tablet, Rfl: 3    fluticasone (FLONASE) 50 MCG/ACT nasal spray, 1 spray into the nostril(s) as directed by provider Daily., Disp: , Rfl:     Fluticasone-Salmeterol (ADVAIR/WIXELA) 100-50 MCG/ACT DISKUS, Inhale 1 puff 2 (Two) Times a Day. Gargle/spit after puffs, Disp: 180 each, Rfl: 3    levothyroxine (SYNTHROID, LEVOTHROID) 50 MCG tablet, Take 1 tablet by mouth Every Morning., Disp: 90 tablet, Rfl: 3    Mirabegron ER (Myrbetriq) 50 MG tablet sustained-release 24 hour 24 hr tablet, Take 50  mg by mouth Daily., Disp: 90 tablet, Rfl: 3    montelukast (SINGULAIR) 10 MG tablet, Take 1 tablet by mouth Every Night., Disp: 90 tablet, Rfl: 3    NON FORMULARY, Take  by mouth 2 (Two) Times a Day. NeuRX-TF, Disp: , Rfl:     Omega-3 Fatty Acids (fish oil) 1000 MG capsule capsule, Take 1 capsule by mouth Daily With Breakfast., Disp: , Rfl:     omeprazole (priLOSEC) 40 MG capsule, Take 1 capsule by mouth Daily. Take 30 minutes 1st meal of the day, Disp: 90 capsule, Rfl: 3    PreviDent 5000 Dry Mouth 1.1 % gel, , Disp: , Rfl:     TURMERIC CURCUMIN PO, Take 1 tablet by mouth Daily., Disp: , Rfl:     denosumab (Prolia) 60 MG/ML solution prefilled syringe syringe, Inject 1 mL under the skin into the appropriate area as directed 1 (One) Time for 1 dose., Disp: , Rfl:     fluticasone (VERAMYST) 27.5 MCG/SPRAY nasal spray, 2 sprays into the nostril(s) as directed by provider Daily. (Patient not taking: Reported on 7/18/2024), Disp: , Rfl:     memantine (NAMENDA) 5 MG tablet, Take 1 tablet by mouth 2 (Two) Times a Day., Disp: 60 tablet, Rfl: 6    psyllium (KONSYL) 28.3 % pack pack, Take 1 packet by mouth Daily As Needed. (Patient not taking: Reported on 7/18/2024), Disp: , Rfl:    Past Medical History:   Diagnosis Date    Abnormal MRI, breast 01/03/2020    breast MRI (1/3/20): 1.1cm R. nipple mass sugg of nipple adenoma, indeterminate 0.5cm L. breast mass 12:00, rec surg excision for punch bx R. nipple mass and u/s for further eval L. breast mass    Abnormal MRI, breast 07/10/2020    breast MRI (7/10/20): postsurg bx R. nipple with dx benign adenoma, post-bx clip left breast adjacent to 0.4cm not worrisome mass, rec L. breast MRI in 12 mos (reg mammo due 12/20)    Adverse reaction to drug 05/10/2016    Impression: 06/09/2014 - GI side effects to aricept; plan as above to change timing and dose of med;     Anesthesia     SOB upon awakening from surgery.  one occurrence.    Breast pain, right 06/14/2016 6/14/16 Notes f/u  with Dr. Beckham with neg bx (evaluated at [facility]); bx site healing per patient; 5/10/16 Pain resolved but has residual R. nipple abnlity with erythema and nodular change, therefore dx mammo ordered for further eval; last reg mammo done 7/15; R. nipple mass - s/p bx to r/o Paget's disease (6/16); surg - Dr. Beckham    Contact dermatitis due to poison ivy 07/30/2014    Impression: 07/30/2014 - Depo-medrol 40 mg IM given in office. Pt will start prednisone taper as ordered. Call or RTC if rash is not improving.;     Hx of bone density study 07/2013    DEXA (7/13) H -1.3    Hx of bone density study 07/14/2016    DEXA (7/14/16) L 0.4, H -1.6 repeat 2-3 yrs    Hx of bone density study 10/08/2019    DEXA (10/8/19): L 1.3, H -1.8, A -2.8, worsened, NEW osteoporosis, repeat 2 yrs    Hx of bone density study 12/17/2021    DEXA (12/17/21): L 3.6,H -1.6, A -3.0    Hx of bone density study 01/10/2024    DEXA (1/10/24) L 5.4, H -1.3, A -2.9    Hx of colonoscopy 12/2015    surg - Dr. Kartik Gold    Hx of colonoscopy 11/08/2019    colonosc (11/8/19): nl except diverticulosis, repeat 5 yrs due to h/o polyps; surg - Dr. Kartik Gold    Hx of CT angio head/neck 07/13/2021    CT angio head/neck (7/13/21, hospitalized): No definite lg vessel occlusion, high-grade stenosis or dissection; indeterminate narrowing/occlusion of small P3 branch of the patient's left PCA; small outpouching at origin of left P-com may represent a small aneurysm versus infundibulum    Hx of CT brain perfusion 07/13/2021    Normal brain perfusion CT (7/13/21, hospitalized)    Hx of CT scan of head 07/13/2021    nl CT head (7/13/21): except mild atrophy with mild chronic small vessel ischemic disease    Hx of CT scan of head 07/14/2021    CT head (7/14/21): acute subacute infarct left posterior temporal and parietal region, no hemorrhage    Hx of echocardiogram 07/13/2021    ECHO (7/13/21, hosp): EF 56-60%, mild-mod TR, neg saline test    Hx of EMG/NCV  12/20/2023    EMG/NCV (12/20/23): BLE sensorimotor axonal and demyelinating polyneuropathy    Hx of esophagogastroduodenoscopy 05/05/2017    EGD (5/5/17): mod chronic gastritis, (+)H.pylori, no hiatal hernia; GI - Dr. Esparza    Hx of esophagogastroduodenoscopy 04/28/2021    EGD (4/8/21): cricopharyngeal spasm, Schatzki's s/p dilation, nonerosive GERD with mod esophageal dysmotility, 2xm hiatal hernia, bile reflux gastropathy, mod chronic gastritis and (+) H. pylori - treated with abx; GI - Dr. 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 lower lip lesion significantly improved; complete course of abx as prescribed; if area does not resolve completely, call to make earlier derm f/u appt  Impression: 07/28/2015 - L. lower lip swelling already improving but concern for residual ongoing infection; ddx includes infected cold sore, infection from cryotherapy for AK, and less likely shingles; finishing course     Peripheral arterial occlusive disease 05/10/2016    Description: abnl ALLEGRA at [facility] with nl arterial duplex (10/08)    Peripheral polyneuropathy 04/18/2024    Wears glasses       Past Surgical History:   Procedure Laterality Date    BILATERAL SALPINGO OOPHORECTOMY Bilateral 06/14/2017    GYN - Dr. Bauer; for benign L. ovarian cyst    BREAST BIOPSY Right 05/12/2020    s/p R. breast excisional bx R. subsreaolar mass (5/12/20); path - nipple adenoma; surg - Dr. Beckham    CARDIAC  "ELECTROPHYSIOLOGY PROCEDURE N/A 07/15/2021    Procedure: loop implant;  Surgeon: Bartolome Smith IV, MD;  Location: [facility] CATH INVASIVE LOCATION;  Service: Cardiovascular;  Laterality: N/A;    CATARACT EXTRACTION, BILATERAL      L.  (complicated by nerve injury; R. 6/10; L. corrected 6/15; ophtho - Dr. Hollis, Dr. Lange; neuro-ophtho Dr. Ferris    CHOLECYSTECTOMY      secondary to \"crystalized GB\" (''02); surg - Dr. Kartik Gold    MAMMO CORE NEEDLE BIOPSY UNILATERAL Left 2020    L. breast u/s core needle bx (20): path - Focal proliferative fibrocystic changes including sclerosing adenosis    OOPHORECTOMY Bilateral       Family History   Problem Relation Age of Onset    Heart failure Mother         Congestive Heart Failure    Lung cancer Mother     Breast cancer Mother 86         88    Other Mother         Parkinson’s    Parkinsonism Mother              Lung cancer Father         tob use    Cancer Father         Lung    Depression Sister     OCD Sister     Hyperlipidemia Sister         No longer    Hypertension Sister     Cancer Sister         Brain Stem    Stroke Maternal Grandmother     Diabetes Paternal Grandmother     Ovarian cancer Maternal Aunt     Diabetes Paternal Aunt     Stroke Paternal Aunt     Heart disease Maternal Grandfather     Heart attack Paternal Grandfather     Heart attack Paternal Uncle         Twins    Diabetes Paternal Uncle       Social History     Socioeconomic History    Marital status: Single   Tobacco Use    Smoking status: Never     Passive exposure: Never    Smokeless tobacco: Never   Vaping Use    Vaping status: Never Used   Substance and Sexual Activity    Alcohol use: Yes     Alcohol/week: 2.0 standard drinks of alcohol     Types: 1 Glasses of wine, 1 Cans of beer per week     Comment: occas    Drug use: Never    Sexual activity: Not Currently     Partners: Male     Birth control/protection: None     Review of Systems   Neurological:  " "Positive for memory problem.   All other systems reviewed and are negative.      Objective:    /90   Pulse 80   Ht 163.2 cm (64.25\")   Wt 77.2 kg (170 lb 3.2 oz)   SpO2 94%   BMI 28.99 kg/m²     Neurology Exam:    General apperance: NAD.     Mental status: Alert, awake and oriented to time place and person.  Could tell me the month, year and her ZIP Code    Recent and Remote memory: Intact.  Immediate and delayed recall of 3/3 today    Attention span and Concentration: Normal.     Language and Speech: Intact- No dysarthria.  Slightly hesitant at times    Fluency, Naming , Repitition and Comprehension:  Intact    Cranial Nerves:   CN II: Visual fields are full. Intact. Fundi - Normal, No papillederma, Pupils - KHUSHBU  CN III, IV and VI: Extraocular movements are intact. Normal saccades.   CN V: Facial sensation is intact.   CN VII: Muscles of facial expression reveal no asymmetry. Intact.   CN VIII: Hearing is intact. Whispered voice intact.   CN IX and X: Palate elevates symmetrically. Intact  CN XI: Shoulder shrug is intact.   CN XII: Tongue is midline without evidence of atrophy or fasciculation.     Ophthalmoscopic exam of optic disc-normal    Motor:  Right UE muscle strength 5/5. Normal tone.     Left UE muscle strength 5/5. Normal tone.      Right LE muscle strength5/5. Normal tone.     Left LE muscle strength 5/5. Normal tone.      Sensory: Reduced to pinprick in feet to the midfoot level as well as markedly reduced vibration/temperature sensation in lower extremities    DTRs: 2+ bilaterally in upper and lower extremities.    Babinski: Negative bilaterally.    Co-ordination: Normal finger-to-nose, heel to shin B/L.    Rhomberg: Negative.    Gait: Slightly cautious while walking but otherwise normal gait.  (has spinal stenosis and scoliosis).    Cardiovascular: Regular rate and rhythm without murmur, gallop or rub.    Assessment and Plan:  1. History of stroke  Patient had scattered infarcts in the " left hemisphere most likely cardioembolic in nature.  CT angiogram did not show any significant carotid stenosis  Loop recorder thus far has shown SVT but no atrial fibrillation  I have asked her to continue Plavix 75 mg for now and Lipitor 40 mg daily for now.  Last LDL was 53  Goal blood pressure less than 130/90.      2.  Idiopathic peripheral neuropathy  She does have symptoms and signs of mild neuropathy  I will defer starting her on any medications as she does not complain of pain  She can continue the B12/B6/alpha lipoic acid supplements but reduce it to once a day    3.  Mild cognitive problems  Could be due to her stroke and hearing loss  She had stopped donepezil due to side effects of agitation  I will start her on Namenda 5 mg twice daily  Counseling also given to carry out mental exercises such as reading, solving crossword puzzles etc.  Of note-I do not see any features of Parkinson's today       Return in about 6 months (around 1/18/2025).       I spent 25 minutes out of it 20 minutes were spent face-to-face  Raquel Bautista MD

## 2024-09-11 ENCOUNTER — TELEPHONE (OUTPATIENT)
Dept: CARDIOLOGY | Facility: CLINIC | Age: 79
End: 2024-09-11
Payer: COMMERCIAL

## 2024-09-11 NOTE — TELEPHONE ENCOUNTER
Remote monitor reading received with ROSALIO reached on 9/10/24.  Called and made Mrs Sharma aware of end of battery life.  Explained we will no longer monitor her loop recorder.  She verbalized understanding.

## 2024-09-30 DIAGNOSIS — E78.5 HYPERLIPIDEMIA LDL GOAL <70: Chronic | ICD-10-CM

## 2024-09-30 DIAGNOSIS — E03.9 ACQUIRED HYPOTHYROIDISM: Chronic | ICD-10-CM

## 2024-09-30 DIAGNOSIS — M81.0 AGE-RELATED OSTEOPOROSIS WITHOUT CURRENT PATHOLOGICAL FRACTURE: Chronic | ICD-10-CM

## 2024-09-30 DIAGNOSIS — Z00.00 MEDICARE ANNUAL WELLNESS VISIT, SUBSEQUENT: Primary | Chronic | ICD-10-CM

## 2024-10-01 ENCOUNTER — OFFICE VISIT (OUTPATIENT)
Dept: ORTHOPEDIC SURGERY | Facility: CLINIC | Age: 79
End: 2024-10-01
Payer: MEDICARE

## 2024-10-01 VITALS
DIASTOLIC BLOOD PRESSURE: 90 MMHG | SYSTOLIC BLOOD PRESSURE: 140 MMHG | BODY MASS INDEX: 28.41 KG/M2 | HEIGHT: 64 IN | WEIGHT: 166.4 LBS

## 2024-10-01 DIAGNOSIS — M25.561 PAIN IN BOTH KNEES, UNSPECIFIED CHRONICITY: Primary | ICD-10-CM

## 2024-10-01 DIAGNOSIS — M25.562 PAIN IN BOTH KNEES, UNSPECIFIED CHRONICITY: Primary | ICD-10-CM

## 2024-10-01 DIAGNOSIS — M17.0 PRIMARY OSTEOARTHRITIS OF BOTH KNEES: ICD-10-CM

## 2024-10-01 PROCEDURE — 99204 OFFICE O/P NEW MOD 45 MIN: CPT | Performed by: ORTHOPAEDIC SURGERY

## 2024-10-01 NOTE — PROGRESS NOTES
[facility] Note        Subjective     Pain of the Right Knee and Pain of the Left Knee      HPI    Rebecca Sharma is a 79 y.o. female.  Patient seeing me for the first time today for evaluation of right greater than left knee pain.  She says she has a history of a torn meniscus in the right knee.  She says on occasion both knees want to give way.  She has not fallen.  Uses a cane occasionally when she is walking long distances.  [place] herself [place].  No pain at rest.  She has had corticosteroid injections in 2018 which did not help her.  She is here for further evaluation and treatment.  Has a history of low back troubles.        Past Medical History:   Diagnosis Date    Abnormal MRI, breast 01/03/2020    breast MRI (1/3/20): 1.1cm R. nipple mass sugg of nipple adenoma, indeterminate 0.5cm L. breast mass 12:00, rec surg excision for punch bx R. nipple mass and u/s for further eval L. breast mass    Abnormal MRI, breast 07/10/2020    breast MRI (7/10/20): postsurg bx R. nipple with dx benign adenoma, post-bx clip left breast adjacent to 0.4cm not worrisome mass, rec L. breast MRI in 12 mos (reg mammo due 12/20)    Adverse reaction to drug 05/10/2016    Impression: 06/09/2014 - GI side effects to aricept; plan as above to change timing and dose of med;     Anesthesia     SOB upon awakening from surgery.  one occurrence.    Ankle sprain 1985    Arthritis of back 2012    Breast pain, right 06/14/2016 6/14/16 Notes f/u with Dr. Beckham with neg bx (evaluated at [facility]); bx site healing per patient; 5/10/16 Pain resolved but has residual R. nipple abnlity with erythema and nodular change, therefore dx mammo ordered for further eval; last reg mammo done 7/15; R. nipple mass - s/p bx to r/o Paget's disease (6/16); surg - Dr. Beckham    Contact dermatitis due to poison ivy 07/30/2014    Impression: 07/30/2014 - Depo-medrol 40 mg IM given in office. Pt will start prednisone  taper as ordered. Call or RTC if rash is not improving.;     Fracture, foot 2021    Hip arthrosis ?    Hx of bone density study 07/2013    DEXA (7/13) H -1.3    Hx of bone density study 07/14/2016    DEXA (7/14/16) L 0.4, H -1.6 repeat 2-3 yrs    Hx of bone density study 10/08/2019    DEXA (10/8/19): L 1.3, H -1.8, A -2.8, worsened, NEW osteoporosis, repeat 2 yrs    Hx of bone density study 12/17/2021    DEXA (12/17/21): L 3.6,H -1.6, A -3.0    Hx of bone density study 01/10/2024    DEXA (1/10/24) L 5.4, H -1.3, A -2.9    Hx of colonoscopy 12/2015    surg - Dr. Kartik Gold    Hx of colonoscopy 11/08/2019    colonosc (11/8/19): nl except diverticulosis, repeat 5 yrs due to h/o polyps; surg - Dr. Kartik Gold    Hx of CT angio head/neck 07/13/2021    CT angio head/neck (7/13/21, hospitalized): No definite lg vessel occlusion, high-grade stenosis or dissection; indeterminate narrowing/occlusion of small P3 branch of the patient's left PCA; small outpouching at origin of left P-com may represent a small aneurysm versus infundibulum    Hx of CT brain perfusion 07/13/2021    Normal brain perfusion CT (7/13/21, hospitalized)    Hx of CT scan of head 07/13/2021    nl CT head (7/13/21): except mild atrophy with mild chronic small vessel ischemic disease    Hx of CT scan of head 07/14/2021    CT head (7/14/21): acute subacute infarct left posterior temporal and parietal region, no hemorrhage    Hx of echocardiogram 07/13/2021    ECHO (7/13/21, hosp): EF 56-60%, mild-mod TR, neg saline test    Hx of EMG/NCV 12/20/2023    EMG/NCV (12/20/23): BLE sensorimotor axonal and demyelinating polyneuropathy    Hx of esophagogastroduodenoscopy 05/05/2017    EGD (5/5/17): mod chronic gastritis, (+)H.pylori, no hiatal hernia; GI - Dr. Esparza    Hx of esophagogastroduodenoscopy 04/28/2021    EGD (4/8/21): cricopharyngeal spasm, Schatzki's s/p dilation, nonerosive GERD with mod esophageal dysmotility, 2xm hiatal hernia, bile reflux  gastropathy, mod chronic gastritis and (+) H. pylori - treated with abx; GI - Dr. 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 lower lip lesion significantly improved; complete course of abx as prescribed; if area does not resolve completely, call to make earlier derm f/u appt  Impression: 07/28/2015 - L. lower lip swelling already improving but concern for residual ongoing infection; ddx includes infected cold sore, infection from cryotherapy for AK, and less likely shingles; finishing course     Lumbosacral disc disease 1990    Peripheral arterial occlusive disease 05/10/2016    Description: abnl ALLEGRA at [facility] with nl arterial duplex (10/08)    Peripheral polyneuropathy 04/18/2024    Scoliosis 2000    33 - 34 degrees    Tear of meniscus of knee ?    Wears glasses       Past Surgical History:   Procedure Laterality Date    BILATERAL SALPINGO OOPHORECTOMY Bilateral 06/14/2017    GYN - Dr. Bauer; for benign L. ovarian cyst    BREAST BIOPSY Right 05/12/2020    s/p R. breast excisional bx R. subsreaolar mass (5/12/20); path - nipple adenoma; surg - Dr. Beckham    CARDIAC ELECTROPHYSIOLOGY PROCEDURE N/A 07/15/2021    Procedure: loop implant;  Surgeon: Bartolome Smith IV, MD;  Location: [facility] INVASIVE LOCATION;  Service: Cardiovascular;  Laterality: N/A;    CATARACT EXTRACTION, BILATERAL      L. 4/09 (complicated by nerve injury; R. 6/10; L. corrected  "6/15; ophtho - Dr. Hollis, Dr. Lange; neuro-ophtho Dr. Ferris    CHOLECYSTECTOMY      secondary to \"crystalized GB\" (''02); surg - Dr. Kartik Gold    MAMMO CORE NEEDLE BIOPSY UNILATERAL Left 2020    L. breast u/s core needle bx (20): path - Focal proliferative fibrocystic changes including sclerosing adenosis    OOPHORECTOMY Bilateral       Family History   Problem Relation Age of Onset    Heart failure Mother         Congestive Heart Failure    Lung cancer Mother     Breast cancer Mother 86         88    Parkinsonism Mother          2006    Cancer Mother         Breast    Lung cancer Father         tob use    Cancer Father         Lung    Depression Sister     OCD Sister     Hyperlipidemia Sister         No longer    Hypertension Sister     Cancer Sister         Brain Stem    Stroke Maternal Grandmother     Diabetes Paternal Grandmother     Ovarian cancer Maternal Aunt     Diabetes Paternal Aunt     Stroke Paternal Aunt     Heart disease Maternal Grandfather     Heart attack Paternal Grandfather     Heart attack Paternal Uncle         Twins    Diabetes Paternal Uncle      Social History     Socioeconomic History    Marital status: Single   Tobacco Use    Smoking status: Never     Passive exposure: Never    Smokeless tobacco: Never   Vaping Use    Vaping status: Never Used   Substance and Sexual Activity    Alcohol use: Yes     Alcohol/week: 2.0 standard drinks of alcohol     Types: 1 Glasses of wine, 1 Cans of beer per week     Comment: occas    Drug use: Never    Sexual activity: Not Currently     Partners: Male     Birth control/protection: None      Current Outpatient Medications on File Prior to Visit   Medication Sig Dispense Refill    albuterol sulfate HFA (Ventolin HFA) 108 (90 Base) MCG/ACT inhaler Inhale 2 puffs Every 6 (Six) Hours As Needed for Wheezing or Shortness of Air. 18 g 0    Biotin 10 MG capsule Take  by mouth.      calcium carbonate-cholecalciferol 500-400 MG-UNIT tablet " tablet Take 1 tablet by mouth Daily.      clopidogrel (PLAVIX) 75 MG tablet TAKE 1 TABLET EVERY DAY 90 tablet 3    fluticasone (FLONASE) 50 MCG/ACT nasal spray Administer 1 spray into the nostril(s) as directed by provider Daily.      fluticasone (VERAMYST) 27.5 MCG/SPRAY nasal spray Administer 2 sprays into the nostril(s) as directed by provider Daily.      Fluticasone-Salmeterol (ADVAIR/WIXELA) 100-50 MCG/ACT DISKUS Inhale 1 puff 2 (Two) Times a Day. Gargle/spit after puffs 180 each 3    levothyroxine (SYNTHROID, LEVOTHROID) 50 MCG tablet Take 1 tablet by mouth Every Morning. 90 tablet 3    memantine (NAMENDA) 5 MG tablet Take 1 tablet by mouth 2 (Two) Times a Day. 60 tablet 6    Mirabegron ER (Myrbetriq) 50 MG tablet sustained-release 24 hour 24 hr tablet Take 50 mg by mouth Daily. 90 tablet 3    montelukast (SINGULAIR) 10 MG tablet Take 1 tablet by mouth Every Night. 90 tablet 3    NON FORMULARY Take  by mouth 2 (Two) Times a Day. NeuRX-TF      Omega-3 Fatty Acids (fish oil) 1000 MG capsule capsule Take 1 capsule by mouth Daily With Breakfast.      omeprazole (priLOSEC) 40 MG capsule Take 1 capsule by mouth Daily. Take 30 minutes 1st meal of the day 90 capsule 3    PreviDent 5000 Dry Mouth 1.1 % gel       TURMERIC CURCUMIN PO Take 1 tablet by mouth Daily.      denosumab (Prolia) 60 MG/ML solution prefilled syringe syringe Inject 1 mL under the skin into the appropriate area as directed 1 (One) Time for 1 dose.      [DISCONTINUED] psyllium (KONSYL) 28.3 % pack pack Take 1 packet by mouth Daily As Needed. (Patient not taking: Reported on 7/18/2024)       No current facility-administered medications on file prior to visit.      Allergies   Allergen Reactions    Aspirin Swelling     Lip and hand swelling    Donepezil Other (See Comments)     Nightmares, cramps, irritability    Naproxen Other (See Comments)     ulcer    Codeine Rash    Hydrocodone Rash    Montelukast Sodium Other (See Comments)     Morning drowsiness  even with QHS dosing          Review of Systems   Constitutional:  Negative for activity change, appetite change, chills, diaphoresis, fatigue, fever and unexpected weight change.   HENT:  Negative for congestion, dental problem, drooling, ear discharge, ear pain, facial swelling, hearing loss, mouth sores, nosebleeds, postnasal drip, rhinorrhea, sinus pressure, sneezing, sore throat, tinnitus, trouble swallowing and voice change.    Eyes:  Negative for photophobia, pain, discharge, redness, itching and visual disturbance.   Respiratory:  Negative for apnea, cough, choking, chest tightness, shortness of breath, wheezing and stridor.    Cardiovascular:  Negative for chest pain, palpitations and leg swelling.   Gastrointestinal:  Negative for abdominal distention, abdominal pain, anal bleeding, blood in stool, constipation, diarrhea, nausea, rectal pain and vomiting.   Endocrine: Negative for cold intolerance, heat intolerance, polydipsia, polyphagia and polyuria.   Genitourinary:  Negative for decreased urine volume, difficulty urinating, dysuria, enuresis, flank pain, frequency, genital sores, hematuria and urgency.   Musculoskeletal:  Positive for arthralgias. Negative for back pain, gait problem, joint swelling, myalgias, neck pain and neck stiffness.   Skin:  Negative for color change, pallor, rash and wound.   Allergic/Immunologic: Negative for environmental allergies, food allergies and immunocompromised state.   Neurological:  Negative for dizziness, tremors, seizures, syncope, facial asymmetry, speech difficulty, weakness, light-headedness, numbness and headaches.   Hematological:  Negative for adenopathy. Does not bruise/bleed easily.   Psychiatric/Behavioral:  Negative for agitation, behavioral problems, confusion, decreased concentration, dysphoric mood, hallucinations, self-injury, sleep disturbance and suicidal ideas. The patient is not nervous/anxious and is not hyperactive.         I reviewed the  "patient's chief complaint, history of present illness, review of systems, past medical history, surgical history, family history, social history, medications and allergy list.        Objective      Physical Exam  /90   Ht 163.2 cm (64.25\")   Wt 75.5 kg (166 lb 6.4 oz)   BMI 28.34 kg/m²     Body mass index is 28.34 kg/m².    General  Mental Status - alert  General Appearance - cooperative, well groomed, not in acute distress  Orientation - Oriented X3  Build & Nutrition - well developed and well nourished  Posture - normal posture  Gait - normal gait       Ortho Exam  Peripheral Vascular:    Upper Extremity:   Inspection:  Left--no cyanotic nail beds Right--no cyanotic nail beds   Bilateral:  Pink nail beds with brisk capillary refill   Palpation:  Bilateral radial pulse normal    Musculoskeletal:  Global Assessment:  Overall assessment of Lower Extremity Muscle Strength and Tone:  Left quadriceps--5/5   Left hamstrings--5/5       Left tibialis anterior--5/5  Left gastroc-soleus--5/5  Left EHL--5/5    Right quadriceps--5/5  Right hamstrings--5/5  Right tibialis anterior--5/5  Right gastroc soleus--5/5  Right EHL--5/5    Lower Extremity:  Knee/Patella:  No digital clubbing or cyanosis.    Examination of left and right knees reveals:  Normal deep tendon reflexes, coordination, strength, tone, sensation.  No known fractures or deformities.    Inspection and Palpation:    Left knee:  Tenderness:  Over the medial joint line and moderate severity  Effusion:  1+  Crepitus:  Positive  Pulses:  2+  Ecchymosis:  None  Warmth:  None     Right knee:  Tenderness:  Over the lateral joint line and moderate severity  Effusion:  1+  Crepitus:  Positive  Pulses:  2+  Ecchymosis:  None  Warmth:  None     ROM:  Right:  Extension:5    Flexion:120  Left:  Extension:5     Flexion:120    Instability:    Left:  Lachman Test:  Negative, Varus stress test negative, Valgus stress test negative  Right:  Lachman Test:  Negative, Varus " stress test negative, Valgus stress test negative    Deformities/Malalignments/Discrepancies:    Left: Neutral  Right: Neutral    Functional Testing:  Right:  Sarthak's test:  Negative  Patella grind test:  Positive  Q-angle:  Normal    Left:  Sarthak's test:  Negative  Patella grind test:  Positive  Q-angle:  normal      Imaging/Studies Reviewed and Interpreted:  Imaging Results (Last 24 Hours)       Procedure Component Value Units Date/Time    XR Knee 4+ View Bilateral [[id number]] Resulted: 10/01/24 1100     Updated: 10/01/24 1102    Narrative:      Knee X-Ray    Indication: Pain    Study:  Upright AP, Skiers, Lateral, and Sunrise views of Bilateral   knee(s)    Comparison: Right knee 1/25/2019    Findings:    Right Knee:  Patient appears to have mild degenerative changes in the   medial compartment.  There are moderate to early severe degenerative   changes in the lateral compartment.  There are moderate to early severe   changes in the patellofemoral compartment.  Patient has overall neutral to   slight valgus alignment.      Left Knee:  Patient appears to have mild early moderate degenerative   changes in the medial compartment.  There are mild degenerative changes in   the lateral compartment.  There are moderate to early severe changes in   the patellofemoral compartment.  Patient has overall neutral to slight   varus alignment.     Impression:   Right Knee: Moderate to early severe lateral and patellofemoral   compartment osteoarthritis.  Left Knee: Mild to early moderate medial compartment and moderate to early   severe patellofemoral compartment osteoarthritis.                   Assessment    Assessment:  1. Pain in both knees, unspecified chronicity    2. Primary osteoarthritis of both knees        Plan:  Continue over-the-counter medication as needed for discomfort  Bilateral knee arthritis, right with lateral compartment disease, left with medial compartment disease.  Moderate to severe bilateral  patellofemoral disease which is the likely explanation for her instability.  Patient also has a history of low back troubles which is likely contributing to her overall weakness in her lower extremities.  I recommended a course of viscosupplementation to help with the patellofemoral arthritis and also a course of close chain VMO quadricep and core strengthening to help reduce the risk of falling and also help with her subjective weakness.  See her back for injection #1.  She will do the physical therapy at [facility] in [place].        Dawood Carmona MD  10/01/24  13:07 EDT      Dictated Utilizing Dragon Dictation.

## 2024-10-02 ENCOUNTER — TELEPHONE (OUTPATIENT)
Dept: INTERNAL MEDICINE | Facility: CLINIC | Age: 79
End: 2024-10-02

## 2024-10-02 NOTE — TELEPHONE ENCOUNTER
Caller: Rebecca Sharma    Relationship: Self    Best call back number: [phone]     What was the call regarding: PATIENT HAS LAB ORDERS. SHE WOULD LIKE TO KNOW IF SHE NEEDS TO HAVE THOSE DONE PRIOR TO HER VISIT ON 10/18/24.    Is it okay if the provider responds through MyChart: NO

## 2024-10-02 NOTE — TELEPHONE ENCOUNTER
Called and advised patient to go ahead and have labs done before upcoming appointment, patient verbalized understanding.

## 2024-10-06 DIAGNOSIS — K21.9 GASTROESOPHAGEAL REFLUX DISEASE WITHOUT ESOPHAGITIS: ICD-10-CM

## 2024-10-07 RX ORDER — MEMANTINE HYDROCHLORIDE 5 MG/1
5 TABLET ORAL 2 TIMES DAILY
Qty: 180 TABLET | Refills: 3 | Status: SHIPPED | OUTPATIENT
Start: 2024-10-07

## 2024-10-07 NOTE — TELEPHONE ENCOUNTER
Lvm for patient to return call and let us know if she will have enough until her appointment on 10/18

## 2024-10-07 NOTE — TELEPHONE ENCOUNTER
Rx Refill Note  Requested Prescriptions     Pending Prescriptions Disp Refills    memantine (NAMENDA) 5 MG tablet [Pharmacy Med Name: Memantine HCl Oral Tablet 5 MG] 180 tablet 3     Sig: TAKE 1 TABLET TWICE DAILY      Last filled:7/18/24 6 REFILL  Last office visit with prescribing clinician: 7/18/2024      Next office visit with prescribing clinician: 1/20/2025     BRADLEY BECKFORD  10/07/24, 15:05 EDT      PHARMACY REQUESTING 90DS W/3 REFILL

## 2024-10-08 ENCOUNTER — TELEPHONE (OUTPATIENT)
Dept: INTERNAL MEDICINE | Facility: CLINIC | Age: 79
End: 2024-10-08
Payer: MEDICARE

## 2024-10-08 NOTE — TELEPHONE ENCOUNTER
Patient stated that she did have enough medication until her upcoming appt on 10/18. Patient verbalized understanding that this request would be denied and she would get them filled during her upcoming appt.

## 2024-10-08 NOTE — TELEPHONE ENCOUNTER
Patient returned Cleo's call. Patient states that she does have enough omeprazole to last until her appt on 10/18.

## 2024-10-09 ENCOUNTER — TELEPHONE (OUTPATIENT)
Dept: ORTHOPEDIC SURGERY | Facility: CLINIC | Age: 79
End: 2024-10-09

## 2024-10-09 NOTE — TELEPHONE ENCOUNTER
Caller: Rebecca Sharma    Relationship: Self    Best call back number: [phone]    What is the best time to reach you: ANYTIME    Who are you requesting to speak with (clinical staff, provider,  specific staff member): MAAME    Do you know the name of the person who called: MAAME    What was the call regarding: GEL INJ SCHEDULING FOR SHARLENE KNEES    Is it okay if the provider responds through MyChart: NO

## 2024-10-10 ENCOUNTER — LAB (OUTPATIENT)
Dept: LAB | Facility: HOSPITAL | Age: 79
End: 2024-10-10
Payer: MEDICARE

## 2024-10-10 DIAGNOSIS — M81.0 AGE-RELATED OSTEOPOROSIS WITHOUT CURRENT PATHOLOGICAL FRACTURE: Chronic | ICD-10-CM

## 2024-10-10 DIAGNOSIS — Z00.00 MEDICARE ANNUAL WELLNESS VISIT, SUBSEQUENT: ICD-10-CM

## 2024-10-10 DIAGNOSIS — E78.5 HYPERLIPIDEMIA LDL GOAL <70: Chronic | ICD-10-CM

## 2024-10-10 DIAGNOSIS — E03.9 ACQUIRED HYPOTHYROIDISM: Chronic | ICD-10-CM

## 2024-10-10 LAB
25(OH)D3 SERPL-MCNC: 38.4 NG/ML (ref 30–100)
BASOPHILS # BLD AUTO: 0.06 10*3/MM3 (ref 0–0.2)
BASOPHILS NFR BLD AUTO: 0.9 % (ref 0–1.5)
DEPRECATED RDW RBC AUTO: 45 FL (ref 37–54)
EOSINOPHIL # BLD AUTO: 0.12 10*3/MM3 (ref 0–0.4)
EOSINOPHIL NFR BLD AUTO: 1.8 % (ref 0.3–6.2)
ERYTHROCYTE [DISTWIDTH] IN BLOOD BY AUTOMATED COUNT: 12.8 % (ref 12.3–15.4)
HCT VFR BLD AUTO: 43.3 % (ref 34–46.6)
HGB BLD-MCNC: 14.2 G/DL (ref 12–15.9)
IMM GRANULOCYTES # BLD AUTO: 0.02 10*3/MM3 (ref 0–0.05)
IMM GRANULOCYTES NFR BLD AUTO: 0.3 % (ref 0–0.5)
LYMPHOCYTES # BLD AUTO: 1.41 10*3/MM3 (ref 0.7–3.1)
LYMPHOCYTES NFR BLD AUTO: 21.7 % (ref 19.6–45.3)
MCH RBC QN AUTO: 31.3 PG (ref 26.6–33)
MCHC RBC AUTO-ENTMCNC: 32.8 G/DL (ref 31.5–35.7)
MCV RBC AUTO: 95.4 FL (ref 79–97)
MONOCYTES # BLD AUTO: 0.57 10*3/MM3 (ref 0.1–0.9)
MONOCYTES NFR BLD AUTO: 8.8 % (ref 5–12)
NEUTROPHILS NFR BLD AUTO: 4.31 10*3/MM3 (ref 1.7–7)
NEUTROPHILS NFR BLD AUTO: 66.5 % (ref 42.7–76)
NRBC BLD AUTO-RTO: 0 /100 WBC (ref 0–0.2)
PLATELET # BLD AUTO: 298 10*3/MM3 (ref 140–450)
PMV BLD AUTO: 10.3 FL (ref 6–12)
RBC # BLD AUTO: 4.54 10*6/MM3 (ref 3.77–5.28)
WBC NRBC COR # BLD AUTO: 6.49 10*3/MM3 (ref 3.4–10.8)

## 2024-10-10 PROCEDURE — 80061 LIPID PANEL: CPT

## 2024-10-10 PROCEDURE — 82306 VITAMIN D 25 HYDROXY: CPT

## 2024-10-10 PROCEDURE — 85025 COMPLETE CBC W/AUTO DIFF WBC: CPT

## 2024-10-10 PROCEDURE — 82550 ASSAY OF CK (CPK): CPT

## 2024-10-10 PROCEDURE — 80053 COMPREHEN METABOLIC PANEL: CPT

## 2024-10-10 PROCEDURE — 84439 ASSAY OF FREE THYROXINE: CPT

## 2024-10-10 PROCEDURE — 84443 ASSAY THYROID STIM HORMONE: CPT

## 2024-10-10 PROCEDURE — 81001 URINALYSIS AUTO W/SCOPE: CPT

## 2024-10-11 LAB
ALBUMIN SERPL-MCNC: 4 G/DL (ref 3.5–5.2)
ALBUMIN/GLOB SERPL: 1.4 G/DL
ALP SERPL-CCNC: 97 U/L (ref 39–117)
ALT SERPL W P-5'-P-CCNC: 14 U/L (ref 1–33)
ANION GAP SERPL CALCULATED.3IONS-SCNC: 13 MMOL/L (ref 5–15)
AST SERPL-CCNC: 18 U/L (ref 1–32)
BACTERIA UR QL AUTO: ABNORMAL /HPF
BILIRUB SERPL-MCNC: 0.4 MG/DL (ref 0–1.2)
BILIRUB UR QL STRIP: NEGATIVE
BUN SERPL-MCNC: 22 MG/DL (ref 8–23)
BUN/CREAT SERPL: 26.2 (ref 7–25)
CALCIUM SPEC-SCNC: 10.2 MG/DL (ref 8.6–10.5)
CHLORIDE SERPL-SCNC: 105 MMOL/L (ref 98–107)
CHOLEST SERPL-MCNC: 130 MG/DL (ref 0–200)
CK SERPL-CCNC: 56 U/L (ref 20–180)
CLARITY UR: CLEAR
CO2 SERPL-SCNC: 21 MMOL/L (ref 22–29)
COLOR UR: YELLOW
CREAT SERPL-MCNC: 0.84 MG/DL (ref 0.57–1)
EGFRCR SERPLBLD CKD-EPI 2021: 70.4 ML/MIN/1.73
GLOBULIN UR ELPH-MCNC: 2.9 GM/DL
GLUCOSE SERPL-MCNC: 75 MG/DL (ref 65–99)
GLUCOSE UR STRIP-MCNC: NEGATIVE MG/DL
HDLC SERPL-MCNC: 39 MG/DL (ref 40–60)
HGB UR QL STRIP.AUTO: NEGATIVE
HYALINE CASTS UR QL AUTO: ABNORMAL /LPF
KETONES UR QL STRIP: NEGATIVE
LDLC SERPL CALC-MCNC: 74 MG/DL (ref 0–100)
LDLC/HDLC SERPL: 1.87 {RATIO}
LEUKOCYTE ESTERASE UR QL STRIP.AUTO: ABNORMAL
NITRITE UR QL STRIP: NEGATIVE
PH UR STRIP.AUTO: 6.5 [PH] (ref 5–8)
POTASSIUM SERPL-SCNC: 4.2 MMOL/L (ref 3.5–5.2)
PROT SERPL-MCNC: 6.9 G/DL (ref 6–8.5)
PROT UR QL STRIP: NEGATIVE
RBC # UR STRIP: ABNORMAL /HPF
REF LAB TEST METHOD: ABNORMAL
SODIUM SERPL-SCNC: 139 MMOL/L (ref 136–145)
SP GR UR STRIP: 1.01 (ref 1–1.03)
SQUAMOUS #/AREA URNS HPF: ABNORMAL /HPF
T4 FREE SERPL-MCNC: 1.46 NG/DL (ref 0.92–1.68)
TRIGL SERPL-MCNC: 91 MG/DL (ref 0–150)
TSH SERPL DL<=0.05 MIU/L-ACNC: 2.29 UIU/ML (ref 0.27–4.2)
UROBILINOGEN UR QL STRIP: ABNORMAL
VLDLC SERPL-MCNC: 17 MG/DL (ref 5–40)
WBC # UR STRIP: ABNORMAL /HPF

## 2024-10-18 ENCOUNTER — OFFICE VISIT (OUTPATIENT)
Dept: INTERNAL MEDICINE | Facility: CLINIC | Age: 79
End: 2024-10-18
Payer: MEDICARE

## 2024-10-18 VITALS
SYSTOLIC BLOOD PRESSURE: 142 MMHG | OXYGEN SATURATION: 97 % | BODY MASS INDEX: 28.38 KG/M2 | WEIGHT: 166.2 LBS | HEART RATE: 76 BPM | HEIGHT: 64 IN | DIASTOLIC BLOOD PRESSURE: 74 MMHG

## 2024-10-18 DIAGNOSIS — E78.5 HYPERLIPIDEMIA LDL GOAL <70: Chronic | ICD-10-CM

## 2024-10-18 DIAGNOSIS — R35.0 URINARY FREQUENCY: ICD-10-CM

## 2024-10-18 DIAGNOSIS — E03.9 ACQUIRED HYPOTHYROIDISM: Chronic | ICD-10-CM

## 2024-10-18 DIAGNOSIS — G31.84 MILD COGNITIVE IMPAIRMENT: Chronic | ICD-10-CM

## 2024-10-18 DIAGNOSIS — J45.20 MILD INTERMITTENT ASTHMA WITHOUT COMPLICATION: Chronic | ICD-10-CM

## 2024-10-18 DIAGNOSIS — M81.0 AGE-RELATED OSTEOPOROSIS WITHOUT CURRENT PATHOLOGICAL FRACTURE: Chronic | ICD-10-CM

## 2024-10-18 DIAGNOSIS — K21.9 GASTROESOPHAGEAL REFLUX DISEASE WITHOUT ESOPHAGITIS: ICD-10-CM

## 2024-10-18 DIAGNOSIS — N39.46 MIXED STRESS AND URGE URINARY INCONTINENCE: ICD-10-CM

## 2024-10-18 DIAGNOSIS — Z00.00 MEDICARE ANNUAL WELLNESS VISIT, SUBSEQUENT: Primary | ICD-10-CM

## 2024-10-18 DIAGNOSIS — I10 PRIMARY HYPERTENSION: ICD-10-CM

## 2024-10-18 DIAGNOSIS — Z23 NEED FOR COVID-19 VACCINE: ICD-10-CM

## 2024-10-18 DIAGNOSIS — Z23 NEED FOR INFLUENZA VACCINATION: ICD-10-CM

## 2024-10-18 DIAGNOSIS — Z12.11 SCREENING FOR COLON CANCER: ICD-10-CM

## 2024-10-18 DIAGNOSIS — R39.15 URINARY URGENCY: ICD-10-CM

## 2024-10-18 DIAGNOSIS — F51.04 PSYCHOPHYSIOLOGICAL INSOMNIA: ICD-10-CM

## 2024-10-18 RX ORDER — MONTELUKAST SODIUM 10 MG/1
10 TABLET ORAL NIGHTLY
Qty: 90 TABLET | Refills: 3 | Status: SHIPPED | OUTPATIENT
Start: 2024-10-18

## 2024-10-18 RX ORDER — OXYMETAZOLINE HCL 0.05 %
SPRAY, NON-AEROSOL (ML) NASAL NIGHTLY
COMMUNITY

## 2024-10-18 RX ORDER — OMEPRAZOLE 40 MG/1
CAPSULE, DELAYED RELEASE ORAL
Qty: 90 CAPSULE | Refills: 3 | OUTPATIENT
Start: 2024-10-18

## 2024-10-18 RX ORDER — OMEPRAZOLE 40 MG/1
40 CAPSULE, DELAYED RELEASE ORAL DAILY
Qty: 90 CAPSULE | Refills: 3 | Status: CANCELLED | OUTPATIENT
Start: 2024-10-18

## 2024-10-18 RX ORDER — PANTOPRAZOLE SODIUM 40 MG/1
40 TABLET, DELAYED RELEASE ORAL DAILY
Qty: 90 TABLET | Refills: 3 | Status: SHIPPED | OUTPATIENT
Start: 2024-10-18

## 2024-10-18 RX ORDER — LEVOTHYROXINE SODIUM 50 UG/1
50 TABLET ORAL EVERY MORNING
Qty: 90 TABLET | Refills: 3 | Status: SHIPPED | OUTPATIENT
Start: 2024-10-18

## 2024-10-18 RX ORDER — PHENAZOPYRIDINE HYDROCHLORIDE 200 MG/1
200 TABLET, FILM COATED ORAL 3 TIMES DAILY PRN
Qty: 20 TABLET | Refills: 0 | Status: SHIPPED | OUTPATIENT
Start: 2024-10-18 | End: 2024-10-25

## 2024-10-18 RX ORDER — FLUTICASONE PROPIONATE AND SALMETEROL 100; 50 UG/1; UG/1
1 POWDER RESPIRATORY (INHALATION)
Qty: 180 EACH | Refills: 3 | Status: SHIPPED | OUTPATIENT
Start: 2024-10-18

## 2024-10-18 RX ORDER — ALBUTEROL SULFATE 90 UG/1
2 INHALANT RESPIRATORY (INHALATION) EVERY 6 HOURS PRN
Qty: 18 G | Refills: 0 | Status: SHIPPED | OUTPATIENT
Start: 2024-10-18

## 2024-10-18 RX ORDER — ATORVASTATIN CALCIUM 40 MG/1
40 TABLET, FILM COATED ORAL DAILY
Qty: 90 TABLET | Refills: 3 | Status: SHIPPED | OUTPATIENT
Start: 2024-10-18

## 2024-10-18 RX ORDER — MIRABEGRON 50 MG/1
50 TABLET, EXTENDED RELEASE ORAL DAILY
Qty: 90 TABLET | Refills: 3 | Status: SHIPPED | OUTPATIENT
Start: 2024-10-18

## 2024-10-18 NOTE — ASSESSMENT & PLAN NOTE
Unable to update PFTs due to lack of supplies after COVID19 pandemic; stable on advair 100/50 1 puff BID #3, 3RF and montelukast 10mg QD #90, 3R; also RX albuterol MDI #1, 0RF

## 2024-10-18 NOTE — PROGRESS NOTES
ANNUAL WELLNESS VISIT    DRUG AND ALCOHOL USE      alcohol intake:social drinker    DIET AND PHYSICAL ACTIVITY     Diet: general    Exercise: daily   Exercise Details: stretches and therapy, strength training, yoga    MOOD DISORDER AND COGNITIVE SCREENING     PHQ-2 Depression Screening - components reviewed with patient; screening is negative for active depression.    Little interest or pleasure in doing things? Not at all   Feeling down, depressed, or hopeless? Not at all   PHQ-2 Total Score 0       Anxiety Screening Tool Used YES     AUDIT screening 1     Mini-Cog Performed   Yes    1. Tell Patient 3 Words 5 words in SLUMS    2. Administer Clock Test normal    3. Recall 3 words  4/5 words    4. Number Correct Items 4    FUNCTIONAL ABILITY AND LEVEL OF SAFETY   Hearing moderately severe hearing loss     Wears Hearing Aids Yes       Current Activities Independent      Some balance issues  - see Funct/Cog Status Intake     Fall Risk Assessment       Has difficulty with walking or balance  Yes - will use cane with walking further        Timed Up and Go (TUG) Test  8 sec.           ADVANCED DIRECTIVE  Advance Care Planning   ACP discussion was held with the patient during this visit. Patient has an advance directive in EMR which is still valid.   Advance Care Planning Discussion:  Patient has advanced directive and living will.  Reviewed desires for end of life care, which is to have comfort care.  Patient does not want extraordinary life-sustaining measures, including no prolonged artificial life support. Encouraged patient to ensure family is aware of desires/preferences. Confirmed friend Moncho is her healthcare surrogate.    PAIN SCREENING Do you have pain right now? no      If so, 1-10 scale: 0          Do you have pain every day? No      Probable chronic pain: No     Recent Hospitalizations:  No hospitalization(s) within the last year..     MEDICATION REVIEW   - updated and reviewed (see Medication List).   -  reviewed for potentially harmful drug-disease interactions in the elderly.   - reviewed for high risk medications in the elderly.   - aspirin use: No, takes clopidogrel 75mg QD    BMI  Body mass index is 28.31 kg/m².     ______________________________________  Chief Complaint   Patient presents with    Medicare Wellness-subsequent    Hyperlipidemia    Hypothyroidism       History of Present Illness  79 y.o.  female presents for updated wellness visit as well as f/u on cholesterol , thyroid, and BP.     Reports urinary sxs that started earlier this week; reports increased frequency and urgency with little urine output. No pain with urination but increased leakage over baseline. Already on myrbetriq, added Azo this week but still with frequency and urgency above baseline.    Notes overall increased stress; family or friends have been sick. She remains very busy helping others, not enough time for herself. Reports increased insomnia, resolved with Zzzquil.     Reports very rare use of albuterol.     Review of Systems  Denies headaches, visual changes, CP, palpitations, SOB, cough, abd pain, n/v/d,  numbness/tingling, falls, mood changes (except increased stress), lightheadedness, hearing changes, rashes.  ROS (+) for increased frequency and urgency with exacerbation of incontinence but no dysuria or hematuria. No abd pain or back pain. No fevers.   Denies vaginal discharge or bleeding (no periods) or breast concerns.    All other ROS reviewed and negative.    Current Outpatient Medications:     albuterol sulfate HFA (Ventolin HFA) 108 (90 Base) MCG/ACT inhaler prn    Biotin 10 MG QD    calcium carbonate-cholecalciferol 500-400 MG-UNIT QD    clopidogrel 75 MG QD    denosumab (Prolia) 60 MG/ML Q6 mos    fluticasone nasal spray AD    Fluticasone-Salmeterol (ADVAIR) 100-50 MCG/ACT 1 puff BID    levothyroxine 50 MCG QD    memantine 5 MG BID    Mirabegron ER (Myrbetriq) 50 MG QD    montelukast 10 MG QD    NeuRX-TF  "BID    Omega-3 Fatty Acids (fish oil) 1000 MG QD    omeprazole 40 MG QD    PreviDent 5000 Dry Mouth 1.1 % gel AD    TURMERIC CURCUMIN QD    OPIOID SCREENING:  The patient does not have opioid prescription on her medication list. Medication list has been reviewed with the patient regarding potentially high risk medications and harmful drug interactions in patients over age 65.    VITALS:  /74   Pulse 76   Ht 163.2 cm (64.25\")   Wt 75.4 kg (166 lb 3.2 oz)   SpO2 97%   BMI 28.31 kg/m²     Physical Exam  Vitals and nursing note reviewed.   Constitutional:       General: She is not in acute distress.     Appearance: Normal appearance. She is well-developed. She is not ill-appearing.   HENT:      Head: Normocephalic.      Right Ear: Tympanic membrane, ear canal and external ear normal.      Left Ear: Tympanic membrane, ear canal and external ear normal.      Ears:      Comments: Qwearing bilat hearing aids; exam performed without hearing aids; minimal cerumen debris R auditory canal; absent L auditory canal     Nose: Nose normal.   Eyes:      Extraocular Movements: Extraocular movements intact.      Conjunctiva/sclera: Conjunctivae normal.      Pupils: Pupils are equal, round, and reactive to light.      Comments: Wearing glasses   Neck:      Vascular: No carotid bruit (bilaterally).   Cardiovascular:      Rate and Rhythm: Normal rate and regular rhythm.      Heart sounds: Normal heart sounds.   Pulmonary:      Effort: Pulmonary effort is normal. No respiratory distress.      Breath sounds: Normal breath sounds. No wheezing, rhonchi or rales.   Abdominal:      General: Bowel sounds are normal. There is no distension.      Palpations: Abdomen is soft. There is no mass.      Tenderness: There is no abdominal tenderness.   Genitourinary:     Comments: Chaperone declined by patient.    Breast exam unremarkable without masses, skin changes, nipple discharge, or axillary adenopathy.        Musculoskeletal:      " Cervical back: Normal range of motion and neck supple.      Right lower leg: No edema.      Left lower leg: No edema.   Lymphadenopathy:      Cervical: No cervical adenopathy.   Skin:     General: Skin is warm and dry.      Findings: No rash.   Neurological:      Mental Status: She is alert and oriented to person, place, and time.      Gait: Gait abnormal (mildly unsteady getting on/off exam today; no assistive device today).   Psychiatric:         Mood and Affect: Mood normal.         Behavior: Behavior normal.         LABS  Results for orders placed or performed in visit on 10/10/24   Comprehensive Metabolic Panel    Collection Time: 10/10/24 11:10 AM    Specimen: Blood   Result Value Ref Range    Glucose 75 65 - 99 mg/dL    BUN 22 8 - 23 mg/dL    Creatinine 0.84 0.57 - 1.00 mg/dL    Sodium 139 136 - 145 mmol/L    Potassium 4.2 3.5 - 5.2 mmol/L    Chloride 105 98 - 107 mmol/L    CO2 21.0 (L) 22.0 - 29.0 mmol/L    Calcium 10.2 8.6 - 10.5 mg/dL    Total Protein 6.9 6.0 - 8.5 g/dL    Albumin 4.0 3.5 - 5.2 g/dL    ALT (SGPT) 14 1 - 33 U/L    AST (SGOT) 18 1 - 32 U/L    Alkaline Phosphatase 97 39 - 117 U/L    Total Bilirubin 0.4 0.0 - 1.2 mg/dL    Globulin 2.9 gm/dL    A/G Ratio 1.4 g/dL    BUN/Creatinine Ratio 26.2 (H) 7.0 - 25.0    Anion Gap 13.0 5.0 - 15.0 mmol/L    eGFR 70.4 >60.0 mL/min/1.73   Lipid Panel    Collection Time: 10/10/24 11:10 AM    Specimen: Blood   Result Value Ref Range    Total Cholesterol 130 0 - 200 mg/dL    Triglycerides 91 0 - 150 mg/dL    HDL Cholesterol 39 (L) 40 - 60 mg/dL    LDL Cholesterol  74 0 - 100 mg/dL    VLDL Cholesterol 17 5 - 40 mg/dL    LDL/HDL Ratio 1.87    TSH    Collection Time: 10/10/24 11:10 AM    Specimen: Blood   Result Value Ref Range    TSH 2.290 0.270 - 4.200 uIU/mL   T4, Free    Collection Time: 10/10/24 11:10 AM    Specimen: Blood   Result Value Ref Range    Free T4 1.46 0.92 - 1.68 ng/dL   Vitamin D,25-Hydroxy    Collection Time: 10/10/24 11:10 AM    Specimen: Blood    Result Value Ref Range    25 Hydroxy, Vitamin D 38.4 30.0 - 100.0 ng/ml   CK    Collection Time: 10/10/24 11:10 AM    Specimen: Blood   Result Value Ref Range    Creatine Kinase 56 20 - 180 U/L   CBC Auto Differential    Collection Time: 10/10/24 11:10 AM    Specimen: Blood   Result Value Ref Range    WBC 6.49 3.40 - 10.80 10*3/mm3    RBC 4.54 3.77 - 5.28 10*6/mm3    Hemoglobin 14.2 12.0 - 15.9 g/dL    Hematocrit 43.3 34.0 - 46.6 %    MCV 95.4 79.0 - 97.0 fL    MCH 31.3 26.6 - 33.0 pg    MCHC 32.8 31.5 - 35.7 g/dL    RDW 12.8 12.3 - 15.4 %    RDW-SD 45.0 37.0 - 54.0 fl    MPV 10.3 6.0 - 12.0 fL    Platelets 298 140 - 450 10*3/mm3    Neutrophil % 66.5 42.7 - 76.0 %    Lymphocyte % 21.7 19.6 - 45.3 %    Monocyte % 8.8 5.0 - 12.0 %    Eosinophil % 1.8 0.3 - 6.2 %    Basophil % 0.9 0.0 - 1.5 %    Immature Grans % 0.3 0.0 - 0.5 %    Neutrophils, Absolute 4.31 1.70 - 7.00 10*3/mm3    Lymphocytes, Absolute 1.41 0.70 - 3.10 10*3/mm3    Monocytes, Absolute 0.57 0.10 - 0.90 10*3/mm3    Eosinophils, Absolute 0.12 0.00 - 0.40 10*3/mm3    Basophils, Absolute 0.06 0.00 - 0.20 10*3/mm3    Immature Grans, Absolute 0.02 0.00 - 0.05 10*3/mm3    nRBC 0.0 0.0 - 0.2 /100 WBC   Urinalysis without microscopic (no culture) - Urine, Clean Catch    Collection Time: 10/10/24 11:10 AM    Specimen: Urine, Clean Catch   Result Value Ref Range    Color, UA Yellow Yellow, Straw    Appearance, UA Clear Clear    pH, UA 6.5 5.0 - 8.0    Specific Gravity, UA 1.010 1.005 - 1.030    Glucose, UA Negative Negative    Ketones, UA Negative Negative    Bilirubin, UA Negative Negative    Blood, UA Negative Negative    Protein, UA Negative Negative    Leuk Esterase, UA Large (3+) (A) Negative    Nitrite, UA Negative Negative    Urobilinogen, UA 0.2 E.U./dL 0.2 - 1.0 E.U./dL   Urinalysis, Microscopic Only - Urine, Clean Catch    Collection Time: 10/10/24 11:10 AM    Specimen: Urine, Clean Catch   Result Value Ref Range    RBC, UA 0-2 None Seen, 0-2 /HPF     WBC, UA 21-50 (A) None Seen, 0-2 /HPF    Bacteria, UA None Seen None Seen /HPF    Squamous Epithelial Cells, UA 0-2 None Seen, 0-2 /HPF    Hyaline Casts, UA None Seen None Seen /LPF    Methodology Automated Microscopy      10/9/23 LDL 53      ECG 12 Lead    Date/Time: 10/18/2024 1:18 PM  Performed by: Lorene Isaac MD    Authorized by: Lorene Isaac MD  Comparison: compared with previous ECG from 10/16/2023  Similar to previous ECG  Rhythm: sinus rhythm  Rate: normal  BPM: 75  Conduction: incomplete right bundle branch block  ST Segments: ST segments normal  T Waves: T waves normal  QRS axis: left  Clinical impression comment: stable EKG        Today's rhythm strip - sinus rhythm, IRBBB    ASSESSMENT/PLAN    Diagnoses and all orders for this visit:    1. Medicare annual wellness visit, subsequent (Primary)  Assessment & Plan:  Health maintenance - rec flu vacc and COVID19 vacc (both given today); RSV 10/23; Prevnar 5/15; PVX 4/14; Td 8/18 (next Tdap due 2028); HAV and Shingrix done; mammo 1/10/24, L. breast MRI 7/20; no further GYN/Paps unless abnlities (nl Pap 11/23); DEXA 1/24, repeat 2026; colonosc due (last 11/19, repeat 2024 per Dr. Kartik Gold) - referral made per pt; eye exam w/ Dr. Tate 8/24 per pt (glasses); dental exam q6 mos, just done last week; (+) seat belt use    Consultants:  Patient Care Team:  Lorene Isaac MD as PCP - General  Lorene Isaac MD as PCP - Internal Medicine  Gigi Esparza MD as Consulting Physician (Gastroenterology)  Kartik Gold MD as Consulting Physician (General Surgery)  Tyrone Tate MD as Consulting Physician (Ophthalmology)  Arik Keane MD as Consulting Physician (Otolaryngology)  Anastacio Dickinson MD as Consulting Physician (Otolaryngology)  Mario Ding MD as Consulting Physician (Neurosurgery)  Travis Beckham MD as Consulting Physician (General Surgery)  Mingo Cerrato MD as Consulting Physician (Gastroenterology)  Bartolome Smith  Moncho LARA IV, MD as Consulting Physician (Interventional Cardiology)  Vicky Del Cid APRN as Nurse Practitioner (Interventional Cardiology)  Dawood Carmona MD as Consulting Physician (Orthopedic Surgery)        2. Hyperlipidemia LDL goal <70  Assessment & Plan:   Lipids bord/worsened with LDL 74; pt reports she has been eating out more; goal LDL < 70; cont atorvastatin 40mg QD #90, 3RF; consider f/u lipids in 6 mos, 12 mos at the latest    Orders:  -     ECG 12 Lead    3. Primary hypertension  Assessment & Plan:  BP remains elevated, improved but still elevated on repeat; rec low Na diet, increased aerobic exercise; pt notes role of stress and wants to check home BPs; reviewed correct way to check BPs with goal BP < 130/80; f/u in 1 month      4. Hypothyroidism  Assessment & Plan:  Euthyroid; cont levothyroxine 50mcg QD#90, 3RF    Orders:  -     levothyroxine (SYNTHROID, LEVOTHROID) 50 MCG tablet; Take 1 tablet by mouth Every Morning.  Dispense: 90 tablet; Refill: 3    5. Osteoporosis  Assessment & Plan:  Calcium and vitamin D supplementation with weight-bearing exercise; last Prolia injection 5/2024; DEXA 1/24, repeat 2026        6. Mild cognitive impairment  Assessment & Plan:  SLUMS 29/30; cont memantine 5mg BID per Dr. Bautista, next appt pending 1/10/25      7. Mild intermittent asthma without complication  Assessment & Plan:  Unable to update PFTs due to lack of supplies after COVID19 pandemic; stable on advair 100/50 1 puff BID #3, 3RF and montelukast 10mg QD #90, 3R; also RX albuterol MDI #1, 0RF      Orders:  -     Fluticasone-Salmeterol (ADVAIR/WIXELA) 100-50 MCG/ACT DISKUS; Inhale 1 puff 2 (Two) Times a Day. Gargle/spit after puffs  Dispense: 180 each; Refill: 3  -     montelukast (SINGULAIR) 10 MG tablet; Take 1 tablet by mouth Every Night.  Dispense: 90 tablet; Refill: 3  -     albuterol sulfate HFA (Ventolin HFA) 108 (90 Base) MCG/ACT inhaler; Inhale 2 puffs Every 6 (Six) Hours As Needed  for Wheezing or Shortness of Air.  Dispense: 18 g; Refill: 0    8. Mixed stress and urge urinary incontinence  Assessment & Plan:  Stable on myrbetriq 50mg QD #90, 3RF but note acutely worsened frequency/urgency; with neg UA (no bacteria), advised sxs due to bladder spasm; RX pyridium 200mg tid prn #20, 0RF    Orders:  -     Mirabegron ER (Myrbetriq) 50 MG tablet sustained-release 24 hour 24 hr tablet; Take 50 mg by mouth Daily.  Dispense: 90 tablet; Refill: 3    9. Gastroesophageal reflux disease   Assessment & Plan:  Stable on omeprazole 40mg QD but will change to pantropazole 40mg QD #90, 3RF due to interaction with clopidogrel    Orders:  -     pantoprazole (PROTONIX) 40 MG EC tablet; Take 1 tablet by mouth Daily.  Dispense: 90 tablet; Refill: 3    10. Screening for colon cancer  -     Ambulatory Referral to General Surgery    11. Urinary frequency  Comments:  x 5 days frequency/urgency; neg UA; RX pyridium 200mg tid prn #20, 0RF; f/u prn  Orders:  -     phenazopyridine (PYRIDIUM) 200 MG tablet; Take 1 tablet by mouth 3 (Three) Times a Day As Needed for Bladder Spasms for up to 7 days.  Dispense: 20 tablet; Refill: 0    12. Urinary urgency  Comments:  x 5 days frequency/urgency; neg UA; RX pyridium 200mg tid prn #20, 0RF; f/u prn  Orders:  -     phenazopyridine (PYRIDIUM) 200 MG tablet; Take 1 tablet by mouth 3 (Three) Times a Day As Needed for Bladder Spasms for up to 7 days.  Dispense: 20 tablet; Refill: 0    13. Insomnia  Assessment & Plan:  Secondary to increased stress; reports efficacy of Zzzquil      14. Need for influenza vaccination  -     Fluzone High-Dose 65+yrs (1525-1977)    15. Need for COVID-19 vaccine  -     COVID-19 (Pfizer) 12yrs+ (COMIRNATY)        FOLLOW-UP  RTC 1 month for BP check; advised patient to bring readings and BP cuff with her to verify accuracy  RTC 6 mos for BP check and SLUMS    Electronically signed by:    Lorene Isaac MD, FACP  10/18/2024

## 2024-10-18 NOTE — ASSESSMENT & PLAN NOTE
Stable on myrbetriq 50mg QD #90, 3RF but note acutely worsened frequency/urgency; with neg UA (no bacteria), advised sxs due to bladder spasm; RX pyridium 200mg tid prn #20, 0RF

## 2024-10-18 NOTE — ASSESSMENT & PLAN NOTE
Health maintenance - rec flu vacc and COVID19 vacc (both given today); RSV 10/23; Prevnar 5/15; PVX 4/14; Td 8/18 (next Tdap due 2028); HAV and Shingrix done; mammo 1/10/24, L. breast MRI 7/20; no further GYN/Paps unless abnlities (nl Pap 11/23); DEXA 1/24, repeat 2026; colonosc due (last 11/19, repeat 2024 per Dr. Kartik Gold) - referral made per pt; eye exam w/ Dr. Tate 8/24 per pt (glasses); dental exam q6 mos, just done last week; (+) seat belt use    Consultants:  Patient Care Team:  Lorene Isaac MD as PCP - General  Lorene Isaac MD as PCP - Internal Medicine  Gigi Esparza MD as Consulting Physician (Gastroenterology)  Kartik Gold MD as Consulting Physician (General Surgery)  Tyrone Tate MD as Consulting Physician (Ophthalmology)  Arik Keane MD as Consulting Physician (Otolaryngology)  Anastacio Dickinson MD as Consulting Physician (Otolaryngology)  Mario Ding MD as Consulting Physician (Neurosurgery)  Travis Beckham MD as Consulting Physician (General Surgery)  Mingo Cerrato MD as Consulting Physician (Gastroenterology)  Bartolome Smith IV, MD as Consulting Physician (Interventional Cardiology)  Vicky Del Cid APRN as Nurse Practitioner (Interventional Cardiology)  Dawood Carmona MD as Consulting Physician (Orthopedic Surgery)

## 2024-10-18 NOTE — ASSESSMENT & PLAN NOTE
Calcium and vitamin D supplementation with weight-bearing exercise; last Prolia injection 5/2024; DEXA 1/24, repeat 2026

## 2024-10-18 NOTE — Clinical Note
HI - saw our mutual patient today. SLUMS 29/30; doing well on memantine 5mg BID; next appt with you is 1/20/25. Thanks, Lorene

## 2024-10-18 NOTE — ASSESSMENT & PLAN NOTE
Stable on omeprazole 40mg QD but will change to pantropazole 40mg QD #90, 3RF due to interaction with clopidogrel

## 2024-10-18 NOTE — TELEPHONE ENCOUNTER
Lab Results   Component Value Date    GLUCOSE 75 10/10/2024    BUN 22 10/10/2024    CREATININE 0.84 10/10/2024     10/10/2024    K 4.2 10/10/2024     10/10/2024    CALCIUM 10.2 10/10/2024    PROTEINTOT 6.9 10/10/2024    ALBUMIN 4.0 10/10/2024    ALT 14 10/10/2024    AST 18 10/10/2024    ALKPHOS 97 10/10/2024    BILITOT 0.4 10/10/2024    GLOB 2.9 10/10/2024    AGRATIO 1.4 10/10/2024    BCR 26.2 (H) 10/10/2024    ANIONGAP 13.0 10/10/2024    EGFR 70.4 10/10/2024      Lab Results   Component Value Date    CHOL 130 10/10/2024    CHLPL 151 06/07/2016    TRIG 91 10/10/2024    HDL 39 (L) 10/10/2024    LDL 74 10/10/2024

## 2024-10-18 NOTE — ASSESSMENT & PLAN NOTE
Lipids bord/worsened with LDL 74; pt reports she has been eating out more; goal LDL < 70; cont atorvastatin 40mg QD #90, 3RF; consider f/u lipids in 6 mos, 12 mos at the latest

## 2024-10-18 NOTE — ASSESSMENT & PLAN NOTE
BP remains elevated, improved but still elevated on repeat; rec low Na diet, increased aerobic exercise; pt notes role of stress and wants to check home BPs; reviewed correct way to check BPs with goal BP < 130/80; f/u in 1 month

## 2024-10-24 ENCOUNTER — CLINICAL SUPPORT (OUTPATIENT)
Dept: ORTHOPEDIC SURGERY | Facility: CLINIC | Age: 79
End: 2024-10-24
Payer: MEDICARE

## 2024-10-24 DIAGNOSIS — M17.0 PRIMARY OSTEOARTHRITIS OF BOTH KNEES: Primary | ICD-10-CM

## 2024-10-24 DIAGNOSIS — M25.561 PAIN IN BOTH KNEES, UNSPECIFIED CHRONICITY: ICD-10-CM

## 2024-10-24 DIAGNOSIS — M25.562 PAIN IN BOTH KNEES, UNSPECIFIED CHRONICITY: ICD-10-CM

## 2024-10-24 RX ORDER — LIDOCAINE HYDROCHLORIDE 10 MG/ML
3 INJECTION, SOLUTION EPIDURAL; INFILTRATION; INTRACAUDAL; PERINEURAL
Status: COMPLETED | OUTPATIENT
Start: 2024-10-24 | End: 2024-10-24

## 2024-10-24 RX ORDER — HYALURONATE SODIUM 10 MG/ML
20 SYRINGE (ML) INTRAARTICULAR
Status: COMPLETED | OUTPATIENT
Start: 2024-10-24 | End: 2024-10-24

## 2024-10-24 RX ADMIN — Medication 20 MG: at 11:30

## 2024-10-24 RX ADMIN — LIDOCAINE HYDROCHLORIDE 3 ML: 10 INJECTION, SOLUTION EPIDURAL; INFILTRATION; INTRACAUDAL; PERINEURAL at 11:30

## 2024-10-24 NOTE — PROGRESS NOTES
Procedure   - Large Joint Arthrocentesis: bilateral knee on 10/24/2024 11:30 AM  Indications: pain  Details: (23g) needle, superolateral approach  Medications (Right): 3 mL lidocaine PF 1% 1 %; 20 mg Sodium Hyaluronate (Viscosup) 20 MG/2ML  Medications (Left): 3 mL lidocaine PF 1% 1 %; 20 mg Sodium Hyaluronate (Viscosup) 20 MG/2ML  Outcome: tolerated well, no immediate complications  Procedure, treatment alternatives, risks and benefits explained, specific risks discussed. Consent was given by the patient. Immediately prior to procedure a time out was called to verify the correct patient, procedure, equipment, support staff and site/side marked as required. Patient was prepped and draped in the usual sterile fashion.

## 2024-10-28 NOTE — PROGRESS NOTES
Cardiology Outpatient Visit      Identification: Rebecca Sharma is a 79 y.o. female who resides in [place], [place].     Reason for visit:  CVA of left MCA  Loop recorder      Subjective      Rebecca returns to the office today.  She has been doing well with no recurrent TIA or stroke symptoms.  Loop recorder has not detected atrial fibrillation since implant.  The device has reached end-of-life.  Patient is not bothered by the device and her left breast area and does not request to have it removed.    ROS    Allergies   Allergen Reactions    Aspirin Swelling     Lip and hand swelling    Donepezil Other (See Comments)     Nightmares, cramps, irritability    Naproxen Other (See Comments)     ulcer    Codeine Rash    Hydrocodone Rash         Current Outpatient Medications   Medication Instructions    albuterol sulfate HFA (Ventolin HFA) 108 (90 Base) MCG/ACT inhaler 2 puffs, Inhalation, Every 6 Hours PRN    atorvastatin (LIPITOR) 80 mg, Oral, Daily    Biotin 10 MG capsule Take  by mouth.    calcium carbonate-cholecalciferol 500-400 MG-UNIT tablet tablet 1 tablet, Daily    clopidogrel (PLAVIX) 75 mg, Oral, Daily    diphenhydrAMINE HCl, Sleep, (ZzzQuil) 50 MG/30ML liquid Nightly    fish oil 1,000 mg, Daily With Breakfast    fluticasone (FLONASE) 50 MCG/ACT nasal spray 1 spray, Daily    Fluticasone-Salmeterol (ADVAIR/WIXELA) 100-50 MCG/ACT DISKUS 1 puff, Inhalation, 2 Times Daily - RT, Gargle/spit after puffs    levothyroxine (SYNTHROID, LEVOTHROID) 50 mcg, Oral, Every Morning    memantine (NAMENDA) 5 mg, Oral, 2 Times Daily    Mirabegron ER (MYRBETRIQ) 50 mg, Oral, Daily    montelukast (SINGULAIR) 10 mg, Oral, Nightly    NON FORMULARY 2 Times Daily    pantoprazole (PROTONIX) 40 mg, Oral, Daily    PreviDent 5000 Dry Mouth 1.1 % gel     Prolia 60 mg, Subcutaneous, Once    TURMERIC CURCUMIN PO 1 tablet, Daily         Objective     /80 (BP Location: Left arm, Patient Position: Sitting, Cuff Size: Adult)   Pulse  "81   Ht 162.6 cm (64\")   Wt 76.7 kg (169 lb)   SpO2 92%   BMI 29.01 kg/m²       Constitutional:       Appearance: Healthy appearance.   Eyes:      General: No scleral icterus.  Neck:      Thyroid: No thyroid mass.      Vascular: No carotid bruit or JVD. JVD normal.   Pulmonary:      Effort: Pulmonary effort is normal.      Breath sounds: Normal breath sounds.   Cardiovascular:      Normal rate. Regular rhythm.      Murmurs: There is no murmur.      No gallop.    Edema:     Peripheral edema absent.   Skin:     General: Skin is warm. There is no cyanosis.   Neurological:      General: No focal deficit present.      Mental Status: Alert.   Psychiatric:         Attention and Perception: Attention normal.         Result Review  (reviewed with patient):            Lab Results   Component Value Date    GLUCOSE 75 10/10/2024    BUN 22 10/10/2024    CREATININE 0.84 10/10/2024     10/10/2024    K 4.2 10/10/2024     10/10/2024    CALCIUM 10.2 10/10/2024    PROTEINTOT 6.9 10/10/2024    ALBUMIN 4.0 10/10/2024    ALT 14 10/10/2024    AST 18 10/10/2024    ALKPHOS 97 10/10/2024    BILITOT 0.4 10/10/2024    GLOB 2.9 10/10/2024    AGRATIO 1.4 10/10/2024    BCR 26.2 (H) 10/10/2024    ANIONGAP 13.0 10/10/2024    EGFR 70.4 10/10/2024     Lab Results   Component Value Date    WBC 6.49 10/10/2024    HGB 14.2 10/10/2024    HCT 43.3 10/10/2024    MCV 95.4 10/10/2024     10/10/2024     Lab Results   Component Value Date    CHOL 130 10/10/2024    CHLPL 151 06/07/2016    TRIG 91 10/10/2024    HDL 39 (L) 10/10/2024    LDL 74 10/10/2024     Lab Results   Component Value Date    HGBA1C 5.80 (H) 07/14/2021           Assessment     Diagnoses and all orders for this visit:    1. Cerebrovascular accident (CVA) due to embolism of left middle cerebral artery (Primary)  Overview:  Transferred from [facility] to [facility] ED for stroke status post TPA on 7/13/2021.  Symptoms were expressive aphasia and dysarthria. "   Imaging revealed left MCA CVA. Normal brain perfusion CT (7/13/21)  CT angio head/neck (7/13/21, hospitalized): No definite lg vessel occlusion, high-grade stenosis or dissection; indeterminate narrowing/occlusion of small P3 branch of the patient's left PCA; small outpouching at origin of left P-com may represent a small aneurysm versus infundibulum.    Echo (7/14/2021): LVEF 56 - 60%.  Negative bubble study.   MRI brain (7/14/21): acute infarct left parietal and posterior temporal lobe w/ adjacent area c/w left posterior MCA CVA  Loop recorder (BSC) implanted 7/15/2021  7/15/21 passed FEES with dysphagia, no pharyngeal impairment, rec reg textures and thin liquids, aspiration precautions  7/16/21 discharge on atorvastatin 80mg and plavix 75mg QD, f/u neuro 6-8 wks  3/7/22 neuro/Dr. Bautista - likely cardioembolic but loop recorder only showing SVT, no afib; cont clopidogrel and atorvastatin RTC 1 yr;   9/6/22 cards/Dr. Smith - no afib on loop recorder, cont clopidogrel per Dr. Bautista; RTC 1 yr    Assessment & Plan:  No recurrent TIA or stroke symptoms  No atrial fibrillation detected on loop recorder  Continue clopidogrel, statin    Orders:  -     clopidogrel (PLAVIX) 75 MG tablet; Take 1 tablet by mouth Daily.  Dispense: 90 tablet; Refill: 3    2. Hyperlipidemia LDL goal <70  Overview:  9/20/22 , TG 85, HDL 37, LDL 59; cont atorvastatin 80mg QD  High intensity statin therapy indicated given previous CVA; goal LDL < 70  10/9/23  lipids at goal , TG 79, HDL 40, LDL 53, cont atorvastatin 40mg QD  10/10/24 bord lipids , TG 91, HDL 39, LDL 74    Assessment & Plan:  Increase atorvastatin 80 mg daily  Direct LDL and CMP in 6 weeks    Orders:  -     Discontinue: atorvastatin (LIPITOR) 40 MG tablet; Take 1 tablet by mouth Daily.  Dispense: 90 tablet; Refill: 3  -     atorvastatin (LIPITOR) 80 MG tablet; Take 1 tablet by mouth Daily.  Dispense: 90 tablet; Refill: 3  -     Comprehensive Metabolic Panel;  Future  -     LDL Cholesterol, Direct; Future    3. History of loop recorder  Assessment & Plan:  Loop recorder has reached end-of-life  No atrial fibrillation detected since implantation  Patient does not desire to have it removed            Plan   Increase atorvastatin 80 mg daily  Obtain direct LDL and CMP in 6 weeks  Continue clopidogrel for history of CVA and aspirin intolerance      Follow-up   Return in about 1 year (around 10/29/2025).        Geronimo Smith MD, FACC, [facility]  10/29/2024

## 2024-10-29 ENCOUNTER — OFFICE VISIT (OUTPATIENT)
Dept: CARDIOLOGY | Facility: CLINIC | Age: 79
End: 2024-10-29
Payer: MEDICARE

## 2024-10-29 VITALS
BODY MASS INDEX: 28.85 KG/M2 | OXYGEN SATURATION: 92 % | HEART RATE: 81 BPM | WEIGHT: 169 LBS | DIASTOLIC BLOOD PRESSURE: 80 MMHG | HEIGHT: 64 IN | SYSTOLIC BLOOD PRESSURE: 146 MMHG

## 2024-10-29 DIAGNOSIS — Z98.890 HISTORY OF LOOP RECORDER: ICD-10-CM

## 2024-10-29 DIAGNOSIS — I63.412 CEREBROVASCULAR ACCIDENT (CVA) DUE TO EMBOLISM OF LEFT MIDDLE CEREBRAL ARTERY: Primary | ICD-10-CM

## 2024-10-29 DIAGNOSIS — E78.5 HYPERLIPIDEMIA LDL GOAL <70: Chronic | ICD-10-CM

## 2024-10-29 RX ORDER — CLOPIDOGREL BISULFATE 75 MG/1
75 TABLET ORAL DAILY
Qty: 90 TABLET | Refills: 3 | Status: SHIPPED | OUTPATIENT
Start: 2024-10-29

## 2024-10-29 RX ORDER — ATORVASTATIN CALCIUM 40 MG/1
40 TABLET, FILM COATED ORAL DAILY
Qty: 90 TABLET | Refills: 3 | Status: SHIPPED | OUTPATIENT
Start: 2024-10-29 | End: 2024-10-29 | Stop reason: SDUPTHER

## 2024-10-29 RX ORDER — ATORVASTATIN CALCIUM 80 MG/1
80 TABLET, FILM COATED ORAL DAILY
Qty: 90 TABLET | Refills: 3 | Status: SHIPPED | OUTPATIENT
Start: 2024-10-29

## 2024-10-29 NOTE — ASSESSMENT & PLAN NOTE
NEW osteoporosis with worst T-score R. radius -2.8; Calcium and vitamin D supplementation with weight-bearing exercise; rec Ca 1000mg/day between diet and supplements; reviewed med options, goals, side effects, risks; patient interesting in proceeding with [id number] SQ q6 mos (but amenable to taking bisphosphonate if insurance refuses Prolia); repeat DEXA 10/2021     
Nightly improved but symptomatic after 12 weeks of PT; reports that Dr. Valderrama, Dr. Ding, and Dr. Valdes have all advised against surgery; cont daily exercises but also consider strengthening for stability exercises such as with Pilates and/or yoga  
31yo  @ 39w5d MARLA 10/31 here with contraction pain, not in labor with VE 1-2/80/-3, overall unchanged from prior, lola irregularly on toco. Pt does not want an epidural and would prefer to go home and return for scheduled IOL later today. Fetal status reassuring with reactive tracing. Maternal vitals stable. OK to d/c home.    Patient to be discharged home.   Labor precautions discussed with pt, advised to return if LOF, ctxs, VB, decreased FM.  Follow-up tonight for scheduled IOL    d/w Dr. Prather

## 2024-10-29 NOTE — ASSESSMENT & PLAN NOTE
No recurrent TIA or stroke symptoms  No atrial fibrillation detected on loop recorder  Continue clopidogrel, statin

## 2024-10-29 NOTE — ASSESSMENT & PLAN NOTE
Loop recorder has reached end-of-life  No atrial fibrillation detected since implantation  Patient does not desire to have it removed

## 2024-10-31 ENCOUNTER — CLINICAL SUPPORT (OUTPATIENT)
Dept: ORTHOPEDIC SURGERY | Facility: CLINIC | Age: 79
End: 2024-10-31
Payer: MEDICARE

## 2024-10-31 DIAGNOSIS — M17.0 PRIMARY OSTEOARTHRITIS OF BOTH KNEES: Primary | ICD-10-CM

## 2024-10-31 RX ORDER — LIDOCAINE HYDROCHLORIDE 10 MG/ML
3 INJECTION, SOLUTION EPIDURAL; INFILTRATION; INTRACAUDAL; PERINEURAL
Status: COMPLETED | OUTPATIENT
Start: 2024-10-31 | End: 2024-10-31

## 2024-10-31 RX ORDER — HYALURONATE SODIUM 10 MG/ML
20 SYRINGE (ML) INTRAARTICULAR
Status: COMPLETED | OUTPATIENT
Start: 2024-10-31 | End: 2024-10-31

## 2024-10-31 RX ADMIN — Medication 20 MG: at 11:19

## 2024-10-31 RX ADMIN — LIDOCAINE HYDROCHLORIDE 3 ML: 10 INJECTION, SOLUTION EPIDURAL; INFILTRATION; INTRACAUDAL; PERINEURAL at 11:19

## 2024-10-31 NOTE — PROGRESS NOTES
Procedure   - Large Joint Arthrocentesis: bilateral knee on 10/31/2024 11:19 AM  Indications: pain  Details: (23g) needle, anterolateral approach  Medications (Right): 3 mL lidocaine PF 1% 1 %; 20 mg Sodium Hyaluronate (Viscosup) 20 MG/2ML  Medications (Left): 3 mL lidocaine PF 1% 1 %; 20 mg Sodium Hyaluronate (Viscosup) 20 MG/2ML  Outcome: tolerated well, no immediate complications  Procedure, treatment alternatives, risks and benefits explained, specific risks discussed. Consent was given by the patient. Immediately prior to procedure a time out was called to verify the correct patient, procedure, equipment, support staff and site/side marked as required. Patient was prepped and draped in the usual sterile fashion.

## 2024-11-05 ENCOUNTER — TELEPHONE (OUTPATIENT)
Dept: INTERNAL MEDICINE | Facility: CLINIC | Age: 79
End: 2024-11-05
Payer: MEDICARE

## 2024-11-05 DIAGNOSIS — M81.0 AGE-RELATED OSTEOPOROSIS WITHOUT CURRENT PATHOLOGICAL FRACTURE: Chronic | ICD-10-CM

## 2024-11-05 RX ORDER — DENOSUMAB 60 MG/ML
60 INJECTION SUBCUTANEOUS ONCE
Start: 2024-11-05 | End: 2024-11-05

## 2024-11-05 NOTE — TELEPHONE ENCOUNTER
----- Message from Candace GARCIA sent at 11/5/2024  1:11 PM EST -----  Regarding: order  Hello,  WE need a Prolia order for Rebecca Sharma.    Thank you,  Candace

## 2024-11-06 NOTE — PROGRESS NOTES
Procedure Note:    I discussed with the patient the potential benefits of performing a therapeutic injections as well as potential risks including but not limited to infection, swelling, pain, bleeding, bruising, nerve/vessel damage, skin color changes, transient elevation in blood glucose levels, and fat atrophy. After informed consent and after the areas were prepped with chlorhexadine soap, ethyl chloride was used to numb the skin. Via the superiorlateral approach, 3mL of 1% lidocaine followed by Euflexxa No. 3 were each injected into the right and left knee joint. The patient tolerated the procedure well. There were no complications. A sterile dressing was placed over the injection sites.      Follow-up: 6 months

## 2024-11-07 ENCOUNTER — CLINICAL SUPPORT (OUTPATIENT)
Dept: ORTHOPEDIC SURGERY | Facility: CLINIC | Age: 79
End: 2024-11-07
Payer: MEDICARE

## 2024-11-07 DIAGNOSIS — M17.0 PRIMARY OSTEOARTHRITIS OF BOTH KNEES: Primary | ICD-10-CM

## 2024-11-07 RX ORDER — HYALURONATE SODIUM 10 MG/ML
20 SYRINGE (ML) INTRAARTICULAR
Status: COMPLETED | OUTPATIENT
Start: 2024-11-07 | End: 2024-11-07

## 2024-11-07 RX ORDER — SODIUM, POTASSIUM,MAG SULFATES 17.5-3.13G
177 SOLUTION, RECONSTITUTED, ORAL ORAL
COMMUNITY
Start: 2024-12-26 | End: 2024-12-27

## 2024-11-07 RX ORDER — LIDOCAINE HYDROCHLORIDE 10 MG/ML
3 INJECTION, SOLUTION EPIDURAL; INFILTRATION; INTRACAUDAL; PERINEURAL
Status: COMPLETED | OUTPATIENT
Start: 2024-11-07 | End: 2024-11-07

## 2024-11-07 RX ADMIN — Medication 20 MG: at 11:40

## 2024-11-07 RX ADMIN — LIDOCAINE HYDROCHLORIDE 3 ML: 10 INJECTION, SOLUTION EPIDURAL; INFILTRATION; INTRACAUDAL; PERINEURAL at 11:40

## 2024-11-07 NOTE — PROGRESS NOTES
Procedure   - Large Joint Arthrocentesis: bilateral knee on 11/7/2024 11:40 AM  Indications: pain  Details: (23g) needle, superolateral approach  Medications (Right): 3 mL lidocaine PF 1% 1 %; 20 mg Sodium Hyaluronate (Viscosup) 20 MG/2ML  Medications (Left): 3 mL lidocaine PF 1% 1 %; 20 mg Sodium Hyaluronate (Viscosup) 20 MG/2ML  Outcome: tolerated well, no immediate complications  Procedure, treatment alternatives, risks and benefits explained, specific risks discussed. Consent was given by the patient. Immediately prior to procedure a time out was called to verify the correct patient, procedure, equipment, support staff and site/side marked as required. Patient was prepped and draped in the usual sterile fashion.

## 2024-11-11 ENCOUNTER — INFUSION (OUTPATIENT)
Dept: ONCOLOGY | Facility: HOSPITAL | Age: 79
End: 2024-11-11
Payer: MEDICARE

## 2024-11-11 VITALS
RESPIRATION RATE: 16 BRPM | BODY MASS INDEX: 28.99 KG/M2 | SYSTOLIC BLOOD PRESSURE: 149 MMHG | DIASTOLIC BLOOD PRESSURE: 81 MMHG | TEMPERATURE: 98.1 F | HEART RATE: 85 BPM | HEIGHT: 64 IN

## 2024-11-11 DIAGNOSIS — M81.0 AGE-RELATED OSTEOPOROSIS WITHOUT CURRENT PATHOLOGICAL FRACTURE: Primary | ICD-10-CM

## 2024-11-11 PROCEDURE — 96372 THER/PROPH/DIAG INJ SC/IM: CPT

## 2024-11-11 PROCEDURE — 25010000002 DENOSUMAB 60 MG/ML SOLUTION PREFILLED SYRINGE: Performed by: INTERNAL MEDICINE

## 2024-11-11 RX ADMIN — DENOSUMAB 60 MG: 60 INJECTION SUBCUTANEOUS at 13:09

## 2024-11-18 PROBLEM — I10 PRIMARY HYPERTENSION: Chronic | Status: ACTIVE | Noted: 2023-10-16

## 2024-11-19 ENCOUNTER — OFFICE VISIT (OUTPATIENT)
Dept: INTERNAL MEDICINE | Facility: CLINIC | Age: 79
End: 2024-11-19
Payer: MEDICARE

## 2024-11-19 VITALS
BODY MASS INDEX: 28.41 KG/M2 | OXYGEN SATURATION: 96 % | DIASTOLIC BLOOD PRESSURE: 76 MMHG | SYSTOLIC BLOOD PRESSURE: 130 MMHG | HEART RATE: 79 BPM | WEIGHT: 166.4 LBS | HEIGHT: 64 IN

## 2024-11-19 DIAGNOSIS — I10 PRIMARY HYPERTENSION: Primary | ICD-10-CM

## 2024-11-19 DIAGNOSIS — E78.5 HYPERLIPIDEMIA LDL GOAL <70: Chronic | ICD-10-CM

## 2024-11-19 PROCEDURE — 1160F RVW MEDS BY RX/DR IN RCRD: CPT | Performed by: INTERNAL MEDICINE

## 2024-11-19 PROCEDURE — 3078F DIAST BP <80 MM HG: CPT | Performed by: INTERNAL MEDICINE

## 2024-11-19 PROCEDURE — 99214 OFFICE O/P EST MOD 30 MIN: CPT | Performed by: INTERNAL MEDICINE

## 2024-11-19 PROCEDURE — 1126F AMNT PAIN NOTED NONE PRSNT: CPT | Performed by: INTERNAL MEDICINE

## 2024-11-19 PROCEDURE — 3075F SYST BP GE 130 - 139MM HG: CPT | Performed by: INTERNAL MEDICINE

## 2024-11-19 PROCEDURE — G2211 COMPLEX E/M VISIT ADD ON: HCPCS | Performed by: INTERNAL MEDICINE

## 2024-11-19 PROCEDURE — 1159F MED LIST DOCD IN RCRD: CPT | Performed by: INTERNAL MEDICINE

## 2024-11-19 NOTE — PROGRESS NOTES
"Chief Complaint   Patient presents with    Hypertension       History of Present Illness  79 y.o.  female presents for BP follow-up. Home BPs have been 110-130s/70-80s, mostly 120s/70s. Brings BP machine to check accuracy.    Saw Dr. Smith a few weeks ago; loop recorder has not shown any afib. Atorvastatin dose was increased. Wants to know when to repeat labs.    Review of Systems  Denies CP, SOB, palpitations, falls. All other ROS reviewed and negative.      Current Outpatient Medications:     albuterol sulfate HFA (Ventolin HFA) inhaler prn    atorvastatin  80 MG QD    Biotin 10 MG QD    calcium carbonate-cholecalciferol 500-400 MG-UNIT QD    clopidogrel 75 MG QD    denosumab (Prolia) 60 MG/ML Q6 mos    diphenhydrAMINE  (ZzzQuil) 50 MG/30ML prn    fluticasone (FLONASE) 50 MCG/ACT nasal spray AD    Fluticasone-Salmeterol (ADVAIR) 100-50 MCG/ACT BID    levothyroxine 50 MCG QD    memantine 5 MG BID    Mirabegron ER 50 MG QD    montelukast  10 MG QD    NeuRX-TF BID    Omega-3 Fatty Acids (fish oil) 1000 MG QD    pantoprazole 40 MG QD    PreviDent 5000 Dry Mouth 1.1 % gel AD    TURMERIC CURCUMIN QD      VITALS:  /76   Pulse 79   Ht 162.6 cm (64\")   Wt 75.5 kg (166 lb 6.4 oz)   SpO2 96%   BMI 28.56 kg/m²  - her BP machine 135/79    Repeat BP her  machine left arm 132/78, pulse 76, manual left arm 126/62    Physical Exam  Vitals and nursing note reviewed.   Constitutional:       General: She is not in acute distress.     Appearance: Normal appearance. She is not ill-appearing.   Eyes:      Extraocular Movements: Extraocular movements intact.      Conjunctiva/sclera: Conjunctivae normal.   Cardiovascular:      Rate and Rhythm: Normal rate and regular rhythm.   Pulmonary:      Effort: Pulmonary effort is normal. No respiratory distress.      Breath sounds: Normal breath sounds. No wheezing or rales.   Neurological:      Mental Status: She is alert and oriented to person, place, and time. Mental " status is at baseline.      Gait: Gait normal.   Psychiatric:         Mood and Affect: Mood normal.         Behavior: Behavior normal.         LABS  Results for orders placed or performed in visit on 10/10/24   Comprehensive Metabolic Panel    Collection Time: 10/10/24 11:10 AM    Specimen: Blood   Result Value Ref Range    Glucose 75 65 - 99 mg/dL    BUN 22 8 - 23 mg/dL    Creatinine 0.84 0.57 - 1.00 mg/dL    Sodium 139 136 - 145 mmol/L    Potassium 4.2 3.5 - 5.2 mmol/L    Chloride 105 98 - 107 mmol/L    CO2 21.0 (L) 22.0 - 29.0 mmol/L    Calcium 10.2 8.6 - 10.5 mg/dL    Total Protein 6.9 6.0 - 8.5 g/dL    Albumin 4.0 3.5 - 5.2 g/dL    ALT (SGPT) 14 1 - 33 U/L    AST (SGOT) 18 1 - 32 U/L    Alkaline Phosphatase 97 39 - 117 U/L    Total Bilirubin 0.4 0.0 - 1.2 mg/dL    Globulin 2.9 gm/dL    A/G Ratio 1.4 g/dL    BUN/Creatinine Ratio 26.2 (H) 7.0 - 25.0    Anion Gap 13.0 5.0 - 15.0 mmol/L    eGFR 70.4 >60.0 mL/min/1.73   Lipid Panel    Collection Time: 10/10/24 11:10 AM    Specimen: Blood   Result Value Ref Range    Total Cholesterol 130 0 - 200 mg/dL    Triglycerides 91 0 - 150 mg/dL    HDL Cholesterol 39 (L) 40 - 60 mg/dL    LDL Cholesterol  74 0 - 100 mg/dL    VLDL Cholesterol 17 5 - 40 mg/dL    LDL/HDL Ratio 1.87    TSH    Collection Time: 10/10/24 11:10 AM    Specimen: Blood   Result Value Ref Range    TSH 2.290 0.270 - 4.200 uIU/mL   T4, Free    Collection Time: 10/10/24 11:10 AM    Specimen: Blood   Result Value Ref Range    Free T4 1.46 0.92 - 1.68 ng/dL   Vitamin D,25-Hydroxy    Collection Time: 10/10/24 11:10 AM    Specimen: Blood   Result Value Ref Range    25 Hydroxy, Vitamin D 38.4 30.0 - 100.0 ng/ml   CK    Collection Time: 10/10/24 11:10 AM    Specimen: Blood   Result Value Ref Range    Creatine Kinase 56 20 - 180 U/L   CBC Auto Differential    Collection Time: 10/10/24 11:10 AM    Specimen: Blood   Result Value Ref Range    WBC 6.49 3.40 - 10.80 10*3/mm3    RBC 4.54 3.77 - 5.28 10*6/mm3    Hemoglobin  14.2 12.0 - 15.9 g/dL    Hematocrit 43.3 34.0 - 46.6 %    MCV 95.4 79.0 - 97.0 fL    MCH 31.3 26.6 - 33.0 pg    MCHC 32.8 31.5 - 35.7 g/dL    RDW 12.8 12.3 - 15.4 %    RDW-SD 45.0 37.0 - 54.0 fl    MPV 10.3 6.0 - 12.0 fL    Platelets 298 140 - 450 10*3/mm3    Neutrophil % 66.5 42.7 - 76.0 %    Lymphocyte % 21.7 19.6 - 45.3 %    Monocyte % 8.8 5.0 - 12.0 %    Eosinophil % 1.8 0.3 - 6.2 %    Basophil % 0.9 0.0 - 1.5 %    Immature Grans % 0.3 0.0 - 0.5 %    Neutrophils, Absolute 4.31 1.70 - 7.00 10*3/mm3    Lymphocytes, Absolute 1.41 0.70 - 3.10 10*3/mm3    Monocytes, Absolute 0.57 0.10 - 0.90 10*3/mm3    Eosinophils, Absolute 0.12 0.00 - 0.40 10*3/mm3    Basophils, Absolute 0.06 0.00 - 0.20 10*3/mm3    Immature Grans, Absolute 0.02 0.00 - 0.05 10*3/mm3    nRBC 0.0 0.0 - 0.2 /100 WBC   Urinalysis without microscopic (no culture) - Urine, Clean Catch    Collection Time: 10/10/24 11:10 AM    Specimen: Urine, Clean Catch   Result Value Ref Range    Color, UA Yellow Yellow, Straw    Appearance, UA Clear Clear    pH, UA 6.5 5.0 - 8.0    Specific Gravity, UA 1.010 1.005 - 1.030    Glucose, UA Negative Negative    Ketones, UA Negative Negative    Bilirubin, UA Negative Negative    Blood, UA Negative Negative    Protein, UA Negative Negative    Leuk Esterase, UA Large (3+) (A) Negative    Nitrite, UA Negative Negative    Urobilinogen, UA 0.2 E.U./dL 0.2 - 1.0 E.U./dL   Urinalysis, Microscopic Only - Urine, Clean Catch    Collection Time: 10/10/24 11:10 AM    Specimen: Urine, Clean Catch   Result Value Ref Range    RBC, UA 0-2 None Seen, 0-2 /HPF    WBC, UA 21-50 (A) None Seen, 0-2 /HPF    Bacteria, UA None Seen None Seen /HPF    Squamous Epithelial Cells, UA 0-2 None Seen, 0-2 /HPF    Hyaline Casts, UA None Seen None Seen /LPF    Methodology Automated Microscopy          ASSESSMENT/PLAN    Diagnoses and all orders for this visit:    1. Hypertension (Primary)  Assessment & Plan:  BP machine verified - reviewed placement of arm  cuff and note that SBP reads up 5 points too high); BP stable 110-130s/70-80s; cont home BP monitoring 2x/week; no meds currently; advised reg phys activity and low Na diet; f/u in 5 mos as scheduled      2. Hyperlipidemia LDL goal <70  Assessment & Plan:   Atorvastatin increased back to 80mg per Dr. Smith a few weeks ago; recommended to repeat lipids 6 wks thereafter, which would be mid-Dec (LDL and CMP already escribed per Dr. Smith); cont atorvastatin 80mg QD and rec goal LDL < 70          FOLLOW-UP  Health maintenance - flu vacc and COVID19 vacc 10/24; RSV vacc completed; colonosc pending 12/27/24 with Dr. Kartik Gold  RTC 4/18/25 as scheduled for BP f/u and SLUMS    Electronically signed by:    Lorene Isaac MD, FACP  11/19/2024

## 2024-11-20 NOTE — ASSESSMENT & PLAN NOTE
Atorvastatin increased back to 80mg per Dr. Smith a few weeks ago; recommended to repeat lipids 6 wks thereafter, which would be mid-Dec (LDL and CMP already escribed per Dr. Smith); cont atorvastatin 80mg QD and rec goal LDL < 70

## 2024-11-20 NOTE — ASSESSMENT & PLAN NOTE
BP machine verified - reviewed placement of arm cuff and note that SBP reads up 5 points too high); BP stable 110-130s/70-80s; cont home BP monitoring 2x/week; no meds currently; advised reg phys activity and low Na diet; f/u in 5 mos as scheduled

## 2024-12-03 ENCOUNTER — LAB (OUTPATIENT)
Dept: LAB | Facility: HOSPITAL | Age: 79
End: 2024-12-03
Payer: MEDICARE

## 2024-12-03 DIAGNOSIS — E78.5 HYPERLIPIDEMIA LDL GOAL <70: Chronic | ICD-10-CM

## 2024-12-03 LAB
ALBUMIN SERPL-MCNC: 4 G/DL (ref 3.5–5.2)
ALBUMIN/GLOB SERPL: 1.4 G/DL
ALP SERPL-CCNC: 123 U/L (ref 39–117)
ALT SERPL W P-5'-P-CCNC: 19 U/L (ref 1–33)
ANION GAP SERPL CALCULATED.3IONS-SCNC: 10.2 MMOL/L (ref 5–15)
ARTICHOKE IGE QN: 62 MG/DL (ref 0–100)
AST SERPL-CCNC: 26 U/L (ref 1–32)
BILIRUB SERPL-MCNC: 0.4 MG/DL (ref 0–1.2)
BUN SERPL-MCNC: 20 MG/DL (ref 8–23)
BUN/CREAT SERPL: 23 (ref 7–25)
CALCIUM SPEC-SCNC: 9.1 MG/DL (ref 8.6–10.5)
CHLORIDE SERPL-SCNC: 111 MMOL/L (ref 98–107)
CO2 SERPL-SCNC: 20.8 MMOL/L (ref 22–29)
CREAT SERPL-MCNC: 0.87 MG/DL (ref 0.57–1)
EGFRCR SERPLBLD CKD-EPI 2021: 67.4 ML/MIN/1.73
GLOBULIN UR ELPH-MCNC: 2.9 GM/DL
GLUCOSE SERPL-MCNC: 71 MG/DL (ref 65–99)
POTASSIUM SERPL-SCNC: 5.1 MMOL/L (ref 3.5–5.2)
PROT SERPL-MCNC: 6.9 G/DL (ref 6–8.5)
SODIUM SERPL-SCNC: 142 MMOL/L (ref 136–145)

## 2024-12-03 PROCEDURE — 36415 COLL VENOUS BLD VENIPUNCTURE: CPT

## 2024-12-03 PROCEDURE — 80053 COMPREHEN METABOLIC PANEL: CPT

## 2024-12-03 PROCEDURE — 83721 ASSAY OF BLOOD LIPOPROTEIN: CPT

## 2025-01-20 ENCOUNTER — OFFICE VISIT (OUTPATIENT)
Dept: NEUROLOGY | Facility: CLINIC | Age: 80
End: 2025-01-20
Payer: MEDICARE

## 2025-01-20 VITALS
BODY MASS INDEX: 28.34 KG/M2 | DIASTOLIC BLOOD PRESSURE: 102 MMHG | OXYGEN SATURATION: 96 % | WEIGHT: 166 LBS | SYSTOLIC BLOOD PRESSURE: 132 MMHG | HEIGHT: 64 IN | HEART RATE: 89 BPM

## 2025-01-20 DIAGNOSIS — Z86.73 HISTORY OF STROKE: Primary | ICD-10-CM

## 2025-01-20 DIAGNOSIS — G31.84 MCI (MILD COGNITIVE IMPAIRMENT): ICD-10-CM

## 2025-01-20 DIAGNOSIS — G60.9 IDIOPATHIC PERIPHERAL NEUROPATHY: ICD-10-CM

## 2025-01-20 PROCEDURE — 3075F SYST BP GE 130 - 139MM HG: CPT | Performed by: PSYCHIATRY & NEUROLOGY

## 2025-01-20 PROCEDURE — 3080F DIAST BP >= 90 MM HG: CPT | Performed by: PSYCHIATRY & NEUROLOGY

## 2025-01-20 PROCEDURE — 1160F RVW MEDS BY RX/DR IN RCRD: CPT | Performed by: PSYCHIATRY & NEUROLOGY

## 2025-01-20 PROCEDURE — 1159F MED LIST DOCD IN RCRD: CPT | Performed by: PSYCHIATRY & NEUROLOGY

## 2025-01-20 PROCEDURE — 99214 OFFICE O/P EST MOD 30 MIN: CPT | Performed by: PSYCHIATRY & NEUROLOGY

## 2025-01-20 RX ORDER — MEMANTINE HYDROCHLORIDE 5 MG/1
5 TABLET ORAL 2 TIMES DAILY
Qty: 180 TABLET | Refills: 3 | Status: SHIPPED | OUTPATIENT
Start: 2025-01-20

## 2025-01-20 NOTE — PROGRESS NOTES
Subjective:    CC: Rebecca Sharma is seen today for History of stroke       Current visit-patient states that her memory has been staying stable or perhaps a little improved since starting Namenda 5 mg twice daily that she is tolerating well without any side effects.  Is able to carry out her ADLs.  Her neuropathy symptoms are also well-controlled with B12 supplements.  She denies having any new strokelike symptoms.  Continues to take Plavix and Lipitor 80 mg daily (her cardiologist increased her dose from 40-80 has her cholesterol had deteriorated however her last LDL was 62).  Her last loop recorder readings did not show any A-fib.  It has stopped working now.    Last visit-patient stopped taking donepezil since her last visit because even after reducing the dose she continues to have agitation.  Since stopping the medicine 3 months ago her symptoms have improved.  She still has short-term memory problems marked by forgetfulness that started after her stroke although she lives at home by herself and carry out all of her ADLs including taking her medications, cooking and driving.  Does have a male friend who is 88 whom she interacts with frequently.  She is concerned about having Parkinson's as her mother had it when she was 88.  Her neuropathy symptoms including numbness are well-controlled with supplements.  She takes a combination of B12/B6/alpha lipoic acid twice a day.  She also denies any new strokelike symptoms.  Continues to take Plavix and Lipitor 40 mg daily.     Last visit-patient denies having any new strokelike symptoms since she last saw me in June.  She continues to have occasional word finding difficulties.  Lives at home by herself and is able to carry out all of her ADLs including driving without any difficulties.  Is compliant with Plavix 75 mg and Lipitor 40 mg daily.  Her last lipid panel was as follows-, TG 79, LDL 53, HDL 40.  Her loop recorder has not shown any A-fib till date.  She  was started on donepezil gradually increasing to 10 mg daily by her PCP after she had a hearing test which showed hearing loss as well as delayed comprehension time (as per patient).  Since starting donepezil she has noted an improvement in her memory however she feels that it causes her to have sleep disturbances at night as well as mild mood problems where she gets agitated easily.  She also complains of some numbness in the balls of her feet.  Saw chiropractor who ordered an EMG/NCS that showed sensorimotor/demyelinating peripheral neuropathy.  Denies having any pain or tingling.  Also has mild balance problems for which she is currently undergoing PT.  Has just started taking B12/B6/alpha lipoic acid supplement    Last visit-patient continues to have mild word finding difficulties but no new symptoms.  Her loop recorder thus far has shown only SVT but no atrial fibrillation.  She continues to be on Plavix 75 mg and Lipitor 80 mg.  Her last lipid panel was as follows-, TG 85, LDL 59, HDL 37.  Her muscle enzyme level was normal.  Her blood pressure today is elevated but at home when she checks it it is usually between 1 20-1 30 systolic.     Last visit-patient denies having any new strokelike symptoms since she last saw me.  She continues to have occasional word finding difficulties.  She is still taking Plavix and Lipitor 40 mg daily.  She had a repeat lipid panel done today.  Her loop recorder thus far has failed to show any atrial fibrillation.  It did show one episode of SVT.  She occasionally gets palpitations after eating dinner but denies getting any chest pain or shortness of breath.     Initial otohe-59-vccp-old female with a past medical history of hypertension, hyperlipidemia, hypothyroidism, ADEN on CPAP, venous insufficiency of legs presents as a hospital follow-up for stroke.  As per patient she had symptoms of right hand weakness, right facial droop and difficulty speaking on 7/12.  She  initially went to [facility] where she had a CT scan of the head that was unremarkable.  Was given IV TPA and subsequently transferred to [facility].  She had a CT perfusion here that was normal, CT angiogram of the head and neck showed possible narrowing of the left P3 and mild dilatation of the left P-comm (aneurysm versus infundibulum) but no significant carotid stenosis.  MRI brain showed scattered infarcts in the left frontal, parietal and posterior temporal region.  Echocardiogram showed an EF of 56 to 60% with moderate tricuspid valve regurg but normal left atrial size and no PFO.  Patient was started on Plavix (allergic to aspirin) and Lipitor 80 mg.  Her most recent lipid panel was as follows-, TG 69, LDL 60, HDL 31.  Last A1c was 5.8.  She had a loop recorder placed in the hospital that has been interrogated a few times by Dr. Smith and does not show any atrial fibrillation till date.  After discharge patient received PT and speech therapy and right arm strength as well as speech have improved although she is still hesitant to talk at times.  Also has mild difficulties recalling names when passwords.  Of note-I personally reviewed her MRI brain and the hospital notes    The following portions of the patient's history were reviewed today and updated as of 10/06/2021  : allergies, current medications, past family history, past medical history, past social history, past surgical history and problem list  These document will be scanned to patient's chart.      Current Outpatient Medications:     albuterol sulfate HFA (Ventolin HFA) 108 (90 Base) MCG/ACT inhaler, Inhale 2 puffs Every 6 (Six) Hours As Needed for Wheezing or Shortness of Air., Disp: 18 g, Rfl: 0    atorvastatin (LIPITOR) 80 MG tablet, Take 1 tablet by mouth Daily., Disp: 90 tablet, Rfl: 3    Biotin 10 MG capsule, Take  by mouth., Disp: , Rfl:     calcium carbonate-cholecalciferol 500-400 MG-UNIT tablet tablet, Take 1 tablet by  mouth Daily., Disp: , Rfl:     clopidogrel (PLAVIX) 75 MG tablet, Take 1 tablet by mouth Daily., Disp: 90 tablet, Rfl: 3    diphenhydrAMINE HCl, Sleep, (ZzzQuil) 50 MG/30ML liquid, Take  by mouth Every Night., Disp: , Rfl:     fluticasone (FLONASE) 50 MCG/ACT nasal spray, Administer 1 spray into the nostril(s) as directed by provider Daily., Disp: , Rfl:     Fluticasone-Salmeterol (ADVAIR/WIXELA) 100-50 MCG/ACT DISKUS, Inhale 1 puff 2 (Two) Times a Day. Gargle/spit after puffs, Disp: 180 each, Rfl: 3    levothyroxine (SYNTHROID, LEVOTHROID) 50 MCG tablet, Take 1 tablet by mouth Every Morning., Disp: 90 tablet, Rfl: 3    memantine (NAMENDA) 5 MG tablet, Take 1 tablet by mouth 2 (Two) Times a Day., Disp: 180 tablet, Rfl: 3    Mirabegron ER (Myrbetriq) 50 MG tablet sustained-release 24 hour 24 hr tablet, Take 50 mg by mouth Daily., Disp: 90 tablet, Rfl: 3    montelukast (SINGULAIR) 10 MG tablet, Take 1 tablet by mouth Every Night., Disp: 90 tablet, Rfl: 3    Omega-3 Fatty Acids (fish oil) 1000 MG capsule capsule, Take 1 capsule by mouth Daily With Breakfast., Disp: , Rfl:     pantoprazole (PROTONIX) 40 MG EC tablet, Take 1 tablet by mouth Daily., Disp: 90 tablet, Rfl: 3    PreviDent 5000 Dry Mouth 1.1 % gel, , Disp: , Rfl:     TURMERIC CURCUMIN PO, Take 1 tablet by mouth Daily., Disp: , Rfl:     denosumab (Prolia) 60 MG/ML solution prefilled syringe syringe, Inject 1 mL under the skin into the appropriate area as directed 1 (One) Time for 1 dose., Disp: , Rfl:    Past Medical History:   Diagnosis Date    Abnormal MRI, breast 01/03/2020    breast MRI (1/3/20): 1.1cm R. nipple mass sugg of nipple adenoma, indeterminate 0.5cm L. breast mass 12:00, rec surg excision for punch bx R. nipple mass and u/s for further eval L. breast mass    Abnormal MRI, breast 07/10/2020    breast MRI (7/10/20): postsurg bx R. nipple with dx benign adenoma, post-bx clip left breast adjacent to 0.4cm not worrisome mass, rec L. breast MRI in 12  mos (reg mammo due 12/20)    Adverse reaction to drug 05/10/2016    Impression: 06/09/2014 - GI side effects to aricept; plan as above to change timing and dose of med;     Anesthesia     SOB upon awakening from surgery.  one occurrence.    Breast pain, right 06/14/2016 6/14/16 Notes f/u with Dr. Beckham with neg bx (evaluated at [facility]); bx site healing per patient; 5/10/16 Pain resolved but has residual R. nipple abnlity with erythema and nodular change, therefore dx mammo ordered for further eval; last reg mammo done 7/15; R. nipple mass - s/p bx to r/o Paget's disease (6/16); surg - Dr. Beckham    Contact dermatitis due to poison ivy 07/30/2014    Impression: 07/30/2014 - Depo-medrol 40 mg IM given in office. Pt will start prednisone taper as ordered. Call or RTC if rash is not improving.;     Fracture, foot 2021    HL (hearing loss) 2018    Hearing Aids 2023    Hx of bone density study 07/2013    DEXA (7/13) H -1.3    Hx of bone density study 07/14/2016    DEXA (7/14/16) L 0.4, H -1.6 repeat 2-3 yrs    Hx of bone density study 10/08/2019    DEXA (10/8/19): L 1.3, H -1.8, A -2.8, worsened, NEW osteoporosis, repeat 2 yrs    Hx of bone density study 12/17/2021    DEXA (12/17/21): L 3.6,H -1.6, A -3.0    Hx of bone density study 01/10/2024    DEXA (1/10/24) L 5.4, H -1.3, A -2.9    Hx of colonoscopy 12/2015    surg - Dr. Kartik Gold    Hx of colonoscopy 11/08/2019    colonosc (11/8/19): nl except diverticulosis, repeat 5 yrs due to h/o polyps; surg - Dr. Kartik Gold    Hx of CT angio head/neck 07/13/2021    CT angio head/neck (7/13/21, hospitalized): No definite lg vessel occlusion, high-grade stenosis or dissection; indeterminate narrowing/occlusion of small P3 branch of the patient's left PCA; small outpouching at origin of left P-com may represent a small aneurysm versus infundibulum    Hx of CT brain perfusion 07/13/2021    Normal brain perfusion CT (7/13/21, hospitalized)    Hx of CT scan of head  07/13/2021    nl CT head (7/13/21): except mild atrophy with mild chronic small vessel ischemic disease    Hx of CT scan of head 07/14/2021    CT head (7/14/21): acute subacute infarct left posterior temporal and parietal region, no hemorrhage    Hx of echocardiogram 07/13/2021    ECHO (7/13/21, hosp): EF 56-60%, mild-mod TR, neg saline test    Hx of EMG/NCV 12/20/2023    EMG/NCV (12/20/23): BLE sensorimotor axonal and demyelinating polyneuropathy    Hx of esophagogastroduodenoscopy 05/05/2017    EGD (5/5/17): mod chronic gastritis, (+)H.pylori, no hiatal hernia; GI - Dr. Esparza    Hx of esophagogastroduodenoscopy 04/28/2021    EGD (4/8/21): cricopharyngeal spasm, Schatzki's s/p dilation, nonerosive GERD with mod esophageal dysmotility, 2xm hiatal hernia, bile reflux gastropathy, mod chronic gastritis and (+) H. pylori - treated with abx; GI - Dr. Cerrato    Hx of LLE venous duplex 11/13/2020    LLE venous duplex (11/13/20): no DVT; (+) valvular venous insufficiency deep and superficial with severe reflux    Hx of loop recorder 09/12/2023    no afib per cards [facility]    Hx of MRI brain 07/14/2021    MRI brain (7/14/21): acute infarct left parietal and posterior temporal lobe w/ adjacent area c/w left posterior MCA CVA    Hx of swallowing study (FEES) 07/15/2021    s/p nl swallow study/FEES (7/15/21): aspiration precautions; regular textures, thin liquids    Hyperlipidemia     Lip swelling 08/05/2015    Impression: 08/05/2015 - L. lower lip lesion significantly improved; complete course of abx as prescribed; if area does not resolve completely, call to make earlier derm f/u appt  Impression: 07/28/2015 - L. lower lip swelling already improving but concern for residual ongoing infection; ddx includes infected cold sore, infection from cryotherapy for AK, and less likely shingles; finishing course     Peripheral neuropathy 2024    Motor Neuropathy    Scoliosis 2000    33 - 34 degrees    Sleep apnea     Stroke  "    Tear of meniscus of knee ?    Wears glasses       Past Surgical History:   Procedure Laterality Date    BILATERAL SALPINGO OOPHORECTOMY Bilateral 2017    GYN - Dr. Bauer; for benign L. ovarian cyst    BREAST BIOPSY Right 2020    s/p R. breast excisional bx R. subsreaolar mass (20); path - nipple adenoma; surg - Dr. Beckham    CARDIAC ELECTROPHYSIOLOGY PROCEDURE N/A 07/15/2021    Procedure: loop implant;  Surgeon: Bartolome Smith IV, MD;  Location:  [facility] INVASIVE LOCATION;  Service: Cardiovascular;  Laterality: N/A;    CATARACT EXTRACTION, BILATERAL      L.  (complicated by nerve injury; R. 6/10; L. corrected 6/15; ophtho - Dr. Hollis, Dr. Lange; neuro-ophtho Dr. Ferris    CHOLECYSTECTOMY      secondary to \"crystalized GB\" (''02); surg - Dr. Kartik Gold    MAMMO CORE NEEDLE BIOPSY UNILATERAL Left 2020    L. breast u/s core needle bx (20): path - Focal proliferative fibrocystic changes including sclerosing adenosis    OOPHORECTOMY Bilateral       Family History   Problem Relation Age of Onset    Heart failure Mother         Congestive Heart Failure    Lung cancer Mother     Breast cancer Mother 86         88    Parkinsonism Mother              Cancer Mother         Breast    Lung cancer Father         tob use    Cancer Father         Lung    Depression Sister     OCD Sister     Hyperlipidemia Sister         No longer    Hypertension Sister     Cancer Sister         Brain Stem    Stroke Maternal Grandmother     Diabetes Paternal Grandmother     Ovarian cancer Maternal Aunt     Diabetes Paternal Aunt     Stroke Paternal Aunt     Heart disease Maternal Grandfather     Heart attack Paternal Grandfather     Heart attack Paternal Uncle         Twins    Diabetes Paternal Uncle       Social History     Socioeconomic History    Marital status: Single   Tobacco Use    Smoking status: Never     Passive exposure: Never    Smokeless tobacco: Never " "  Vaping Use    Vaping status: Never Used   Substance and Sexual Activity    Alcohol use: Yes     Alcohol/week: 2.0 standard drinks of alcohol     Types: 2 Glasses of wine per week     Comment: occas    Drug use: Never    Sexual activity: Not Currently     Partners: Male     Birth control/protection: None     Review of Systems   Neurological:  Positive for memory problem.   All other systems reviewed and are negative.      Objective:    BP (!) 132/102   Pulse 89   Ht 162.6 cm (64\")   Wt 75.3 kg (166 lb)   SpO2 96%   BMI 28.49 kg/m²     Neurology Exam:    General apperance: NAD.     Mental status: Alert, awake and oriented to time place and person.  Could tell me the month, year and her ZIP Code    Recent and Remote memory: Intact.  Immediate and delayed recall of 3/3 today    Attention span and Concentration: Normal.     Language and Speech: Intact- No dysarthria.  Slightly hesitant at times    Fluency, Naming , Repitition and Comprehension:  Intact    Cranial Nerves:   CN II: Visual fields are full. Intact. Fundi - Normal, No papillederma, Pupils - KHUSHBU  CN III, IV and VI: Extraocular movements are intact. Normal saccades.   CN V: Facial sensation is intact.   CN VII: Muscles of facial expression reveal no asymmetry. Intact.   CN VIII: Hearing is intact. Whispered voice intact.   CN IX and X: Palate elevates symmetrically. Intact  CN XI: Shoulder shrug is intact.   CN XII: Tongue is midline without evidence of atrophy or fasciculation.     Ophthalmoscopic exam of optic disc-normal    Motor:  Right UE muscle strength 5/5. Normal tone.     Left UE muscle strength 5/5. Normal tone.      Right LE muscle strength5/5. Normal tone.     Left LE muscle strength 5/5. Normal tone.      Sensory: Reduced to pinprick in feet to the midfoot level as well as markedly reduced vibration/temperature sensation in lower extremities    DTRs: 2+ bilaterally in upper and lower extremities.    Babinski: Negative " bilaterally.    Co-ordination: Normal finger-to-nose, heel to shin B/L.    Rhomberg: Negative.    Gait: Slightly cautious while walking but otherwise normal gait.  (has spinal stenosis and scoliosis).    Cardiovascular: Regular rate and rhythm without murmur, gallop or rub.    Assessment and Plan:  1. History of stroke  Patient had scattered infarcts in the left hemisphere most likely cardioembolic in nature.  CT angiogram did not show any significant carotid stenosis  Loop recorder thus far has shown SVT but no atrial fibrillation  I have asked her to continue Plavix 75 mg for now and Lipitor 80 mg.  Last LDL was 62  Goal blood pressure less than 130/90.      2.  Idiopathic peripheral neuropathy  She does have symptoms and signs of mild neuropathy  Denies having any pain  She should continue B12 and can start alpha lipoic acid supplements    3.  Mild cognitive problems  Could be due to her stroke and hearing loss  She had stopped donepezil due to side effects of agitation  She is should continue Namenda 5 mg twice daily  Counseling also given to carry out mental exercises such as reading, solving crossword puzzles etc.  She does do exercises at home    Of note-her blood pressure was elevated today but on repeat check it was 130/90.  However I have still told her to monitor her blood pressure regularly at home       Return in about 1 year (around 1/20/2026).       I spent 25 minutes out of it 20 minutes were spent face-to-face  Raquel Bautista MD

## 2025-04-18 ENCOUNTER — OFFICE VISIT (OUTPATIENT)
Dept: INTERNAL MEDICINE | Facility: CLINIC | Age: 80
End: 2025-04-18
Payer: MEDICARE

## 2025-04-18 VITALS
OXYGEN SATURATION: 98 % | HEART RATE: 77 BPM | HEIGHT: 64 IN | SYSTOLIC BLOOD PRESSURE: 128 MMHG | WEIGHT: 167 LBS | DIASTOLIC BLOOD PRESSURE: 60 MMHG | BODY MASS INDEX: 28.51 KG/M2

## 2025-04-18 DIAGNOSIS — G62.9 PERIPHERAL POLYNEUROPATHY: Chronic | ICD-10-CM

## 2025-04-18 DIAGNOSIS — R26.89 IMBALANCE: ICD-10-CM

## 2025-04-18 DIAGNOSIS — G31.84 MILD COGNITIVE IMPAIRMENT: Chronic | ICD-10-CM

## 2025-04-18 DIAGNOSIS — I10 PRIMARY HYPERTENSION: Primary | Chronic | ICD-10-CM

## 2025-04-18 DIAGNOSIS — R26.81 GAIT INSTABILITY: ICD-10-CM

## 2025-04-18 PROBLEM — F51.04 PSYCHOPHYSIOLOGICAL INSOMNIA: Chronic | Status: ACTIVE | Noted: 2024-04-18

## 2025-04-18 PROBLEM — M17.0 PRIMARY OSTEOARTHRITIS OF BOTH KNEES: Chronic | Status: ACTIVE | Noted: 2024-10-01

## 2025-04-18 PROBLEM — J39.2 CRICOPHARYNGEAL SPASM: Chronic | Status: ACTIVE | Noted: 2021-07-11

## 2025-04-18 RX ORDER — LANOLIN ALCOHOL/MO/W.PET/CERES
1000 CREAM (GRAM) TOPICAL DAILY
COMMUNITY

## 2025-04-18 NOTE — ASSESSMENT & PLAN NOTE
BP elevated today, improved on repeat; no meds currently; BP machine prev verified (reading a little high); cont home BP monitoring 2x/week

## 2025-04-18 NOTE — PROGRESS NOTES
"Chief Complaint   Patient presents with    Hypertension       History of Present Illness  79 y.o.  female presents for BP follow-up. Home BPs have been 150s/90s but then goes down to 120s/70s after rechecking it several times.    Only taking memantine 5mg QD. Notes only sl increased agitation with memantine as compared to donepezil. Reports increased agitation but also states it could be due to increased stressful events in her life.    Requesting PT referral for neuropathy and to help with balance. Has not fallen. Had success with PT previously. Questioning whether she needs to take alpha-lipoic acid. Having trouble to take it TID; in addition, denies pain from neuropathy. Has a fullness sensation in a section of the ball of the foot; denies pins/needles sensation. Also wants to know whether she needs to take B vitamin.     Review of Systems  Denies CP, SOB, palpitations. All other ROS reviewed and negative.    Current Outpatient Medications:     albuterol sulfate HFA (Ventolin HFA) 108 (90 Base) MCG/ACT inhaler prn    atorvastatin 80 MG QD    Biotin 10 MG QD    calcium carbonate-cholecalciferol 500-400 MG-UNIT QD    clopidogrel 75 MG tQD    denosumab (Prolia) 60 MG/ML q6 mos    diphenhydrAMINE HCl (ZzzQuil) 50 MG/30ML liquid QHS    fluticasone (FLONASE) 50 MCG/ACT nasal spray AD    Fluticasone-Salmeterol (ADVAIR) 100-50 MCG/ACT 1 puff BID    levothyroxine 50 MCG QD    memantine 5 MG QD    Mirabegron ER 50 MG QD    montelukast 10 MG QD    Omega-3 Fatty Acids (fish oil) 1000 MG QD    pantoprazole 40 MG QD    PreviDent 5000 Dry Mouth 1.1 % gel AD     TURMERIC CURCUMIN QD    VITALS:  /60   Pulse 77   Ht 162.6 cm (64\")   Wt 75.8 kg (167 lb)   SpO2 98%   BMI 28.67 kg/m²     Physical Exam  Vitals and nursing note reviewed.   Constitutional:       General: She is not in acute distress.     Appearance: Normal appearance. She is not ill-appearing.   Eyes:      Extraocular Movements: Extraocular movements " intact.      Conjunctiva/sclera: Conjunctivae normal.   Pulmonary:      Effort: Pulmonary effort is normal. No respiratory distress.   Neurological:      Mental Status: She is alert. Mental status is at baseline.   Psychiatric:         Mood and Affect: Mood normal.         Behavior: Behavior normal.         LABS  Results for orders placed or performed in visit on 12/03/24   Comprehensive Metabolic Panel    Collection Time: 12/03/24 12:09 PM    Specimen: Blood   Result Value Ref Range    Glucose 71 65 - 99 mg/dL    BUN 20 8 - 23 mg/dL    Creatinine 0.87 0.57 - 1.00 mg/dL    Sodium 142 136 - 145 mmol/L    Potassium 5.1 3.5 - 5.2 mmol/L    Chloride 111 (H) 98 - 107 mmol/L    CO2 20.8 (L) 22.0 - 29.0 mmol/L    Calcium 9.1 8.6 - 10.5 mg/dL    Total Protein 6.9 6.0 - 8.5 g/dL    Albumin 4.0 3.5 - 5.2 g/dL    ALT (SGPT) 19 1 - 33 U/L    AST (SGOT) 26 1 - 32 U/L    Alkaline Phosphatase 123 (H) 39 - 117 U/L    Total Bilirubin 0.4 0.0 - 1.2 mg/dL    Globulin 2.9 gm/dL    A/G Ratio 1.4 g/dL    BUN/Creatinine Ratio 23.0 7.0 - 25.0    Anion Gap 10.2 5.0 - 15.0 mmol/L    eGFR 67.4 >60.0 mL/min/1.73   LDL Cholesterol, Direct    Collection Time: 12/03/24 12:09 PM    Specimen: Blood   Result Value Ref Range    LDL Cholesterol  62 0 - 100 mg/dL     10/10/24 LDL 74    ASSESSMENT/PLAN    Diagnoses and all orders for this visit:    1. Hypertension (Primary)  Assessment & Plan:  BP elevated today, improved on repeat; no meds currently; BP machine prev verified (reading a little high); cont home BP monitoring 2x/week      2. Mild cognitive impairment  Assessment & Plan:  SLUMS 29/30; pt agreeable to incr memantine 5mg BID but notes sl increase in anxiety/agitation (nothing like donepezil); f/u Dr. Bautista as scheduled      3. Peripheral polyneuropathy  Assessment & Plan:  Having difficulty with TID dosing of alpha-lipoic acid and rec ok to d/c since she denies neuropathic pain; rec going ahead with B12 supplement as recommended; however,  she can stop the med 1 month prior to her PE labs and we can see whether she really needs supplementation or not; will check B12 and folate; feet safety precautions reviewed with patient, should not go barefooted    Orders:  -     Ambulatory Referral to Physical Therapy for Evaluation & Treatment    4. Imbalance  Assessment & Plan:  Reviewed fall precautions; PT referral given to patient as requested    Orders:  -     Ambulatory Referral to Physical Therapy for Evaluation & Treatment    5. Gait instability  -     Ambulatory Referral to Physical Therapy for Evaluation & Treatment        FOLLOW-UP  Health maintenance - flu vacc and COVID19 vacc done for this season; RSV vacc prev completed  RTC for next wellness 10/20/25; fasting labs prior to appt (CBC, CMP, TSH, lipids, UA/micr, FT4, vit D, CPK, B12, folate) + SLUMS and BP check    Electronically signed by:    Lorene Isaac MD, FACP  04/18/2025       no

## 2025-04-18 NOTE — ASSESSMENT & PLAN NOTE
SLUMS 29/30; pt agreeable to incr memantine 5mg BID but notes sl increase in anxiety/agitation (nothing like donepezil); f/u Dr. Bautista as scheduled

## 2025-04-19 NOTE — ASSESSMENT & PLAN NOTE
Having difficulty with TID dosing of alpha-lipoic acid and rec ok to d/c since she denies neuropathic pain; rec going ahead with B12 supplement as recommended; however, she can stop the med 1 month prior to her PE labs and we can see whether she really needs supplementation or not; will check B12 and folate; feet safety precautions reviewed with patient, should not go barefooted

## 2025-04-30 ENCOUNTER — TELEPHONE (OUTPATIENT)
Dept: INTERNAL MEDICINE | Facility: CLINIC | Age: 80
End: 2025-04-30
Payer: MEDICARE

## 2025-04-30 DIAGNOSIS — N39.46 MIXED STRESS AND URGE URINARY INCONTINENCE: Primary | Chronic | ICD-10-CM

## 2025-04-30 NOTE — TELEPHONE ENCOUNTER
Name: Rebecca Sharma    Relationship: Self    Best Callback Number: [phone]     HUB PROVIDED THE RELAY MESSAGE FROM THE OFFICE   PATIENT VOICED UNDERSTANDING AND HAS NO FURTHER QUESTIONS AT THIS TIME    ADDITIONAL INFORMATION: PATIENT WOULD LIKE FOR THE ORDER TO BE MAILED TO HER MAILING ADDRESS ON FILE     Renata Canseco, [facility] Medical Assistant Signed [id number]       HUB to relay: Called pt, VM didn't connect. Let pt know order placed can  in office, or we can mail

## 2025-04-30 NOTE — TELEPHONE ENCOUNTER
HUB to relay: Called pt, VM didn't connect. Let pt know order placed can  in office, or we can mail

## 2025-04-30 NOTE — TELEPHONE ENCOUNTER
Caller: Rebecca Sharma    Relationship: Self    Best call back number:   Telephone Information:   Mobile [phone]       What orders are you requesting (i.e. lab or imaging): ORDER FOR PELVIC FLOOR THERAPY    Where will you receive your lab/imaging services: ANYWHERE IN [place] THE PROVIDER RECOMMENDS, OR PATIENT HAS A LIST OF PLACES SHE CAN GO    Additional notes: PATIENT IS REQUESTING AN ORDER FOR PELVIC FLOOR THERAPY TO HELP WITH URINARY INCONTINENCE. PATIENT STATES SHE WOULD LIKE IT PRINTED FOR  THIS AFTERNOON OR TUESDAY 5/6 IF POSSIBLE. PLEASE CALL TO DISCUSS IF NEEDED. PATIENT WOULD ALSO LIKE TO DISCUSS THE OTHER ORDER SHE RECEIVED FOR PHYSICAL THERAPY AND SHE WOULD LIKE TO HOLD OFF ON THE PREVIOUS ORDER TO ADDRESS PELVIC FLOOR THERAPY FIRST.     Right ventricle lead inserted into generator.

## 2025-05-01 ENCOUNTER — PRIOR AUTHORIZATION (OUTPATIENT)
Dept: INTERNAL MEDICINE | Facility: CLINIC | Age: 80
End: 2025-05-01
Payer: MEDICARE

## 2025-05-02 ENCOUNTER — TELEPHONE (OUTPATIENT)
Dept: INTERNAL MEDICINE | Facility: CLINIC | Age: 80
End: 2025-05-02
Payer: MEDICARE

## 2025-05-02 DIAGNOSIS — J45.20 MILD INTERMITTENT ASTHMA WITHOUT COMPLICATION: Primary | Chronic | ICD-10-CM

## 2025-05-02 RX ORDER — BUDESONIDE AND FORMOTEROL FUMARATE DIHYDRATE 80; 4.5 UG/1; UG/1
AEROSOL RESPIRATORY (INHALATION)
Qty: 3 EACH | Refills: 1 | Status: SHIPPED | OUTPATIENT
Start: 2025-05-02

## 2025-05-02 NOTE — TELEPHONE ENCOUNTER
Please advise patient that insurance is requiring her to use Symbicort instead of Advair.    Symbicort inhaler will be 2 puffs twice per day, gargle and spit after puffs -will send Rx for #3 inhalers with 1 refill

## 2025-05-02 NOTE — TELEPHONE ENCOUNTER
Patient was returning call. She verified she would like it sent to [facility] which is part of [facility] as per patient.     She did not request a call back.

## 2025-05-02 NOTE — TELEPHONE ENCOUNTER
Caller: Rebecca Sharma    Relationship: Self    Best call back number:[phone]     Which medication are you concerned about: ADVAIR    Who prescribed you this medication: MD MEDRANO    What are your concerns: INSURANCE DENIED ADVAIR, THEY ARE REQUESTING EITHER SYMBICORT BE PRESCRIBED AS AN ALTERNATE OR A MEDICAL REASON AS TO WHY PATIENT NEEDS TO TAKE ADVAIR

## 2025-05-06 ENCOUNTER — TELEPHONE (OUTPATIENT)
Dept: INTERNAL MEDICINE | Facility: CLINIC | Age: 80
End: 2025-05-06
Payer: MEDICARE

## 2025-05-06 DIAGNOSIS — M81.0 AGE-RELATED OSTEOPOROSIS WITHOUT CURRENT PATHOLOGICAL FRACTURE: Chronic | ICD-10-CM

## 2025-05-06 RX ORDER — DENOSUMAB 60 MG/ML
60 INJECTION SUBCUTANEOUS ONCE
Start: 2025-05-06 | End: 2025-05-06

## 2025-05-06 NOTE — TELEPHONE ENCOUNTER
----- Message from Candace GARCIA sent at 2025  9:56 AM EDT -----  Regarding: order  Good morning,Can you put a Prolia order in the computer for Rebecca Sharma? Her order has .Thank you,Candace

## 2025-05-08 ENCOUNTER — OFFICE VISIT (OUTPATIENT)
Dept: ORTHOPEDIC SURGERY | Facility: CLINIC | Age: 80
End: 2025-05-08
Payer: MEDICARE

## 2025-05-08 VITALS
WEIGHT: 167 LBS | BODY MASS INDEX: 28.51 KG/M2 | HEIGHT: 64 IN | SYSTOLIC BLOOD PRESSURE: 124 MMHG | DIASTOLIC BLOOD PRESSURE: 62 MMHG

## 2025-05-08 DIAGNOSIS — M17.0 PRIMARY OSTEOARTHRITIS OF BOTH KNEES: Primary | ICD-10-CM

## 2025-05-08 RX ORDER — LIDOCAINE HYDROCHLORIDE 10 MG/ML
3 INJECTION, SOLUTION EPIDURAL; INFILTRATION; INTRACAUDAL; PERINEURAL
Status: COMPLETED | OUTPATIENT
Start: 2025-05-08 | End: 2025-05-08

## 2025-05-08 RX ADMIN — LIDOCAINE HYDROCHLORIDE 3 ML: 10 INJECTION, SOLUTION EPIDURAL; INFILTRATION; INTRACAUDAL; PERINEURAL at 11:41

## 2025-05-08 NOTE — PROGRESS NOTES
[facility] Note        Subjective     CC: Follow-up (7 month follow up--Primary osteoarthritis of both knees)      ABIDA Sharma is a 79 y.o. female.  Patient is here today for follow-up of her bilateral knee arthritis.  Completed a Euflexxa series on 11/7/2024 and has gotten good relief until just a few weeks ago.  Right side more bothersome than the left.    Overall, patient's symptoms are as above    ROS:    Constiutional:Pt denies fever, chills, nausea, or vomiting.  MSK:as above        Objective      Past Medical History  Past Medical History:   Diagnosis Date    Abnormal MRI, breast 01/03/2020    breast MRI (1/3/20): 1.1cm R. nipple mass sugg of nipple adenoma, indeterminate 0.5cm L. breast mass 12:00, rec surg excision for punch bx R. nipple mass and u/s for further eval L. breast mass    Abnormal MRI, breast 07/10/2020    breast MRI (7/10/20): postsurg bx R. nipple with dx benign adenoma, post-bx clip left breast adjacent to 0.4cm not worrisome mass, rec L. breast MRI in 12 mos (reg mammo due 12/20)    Adverse reaction to drug 05/10/2016    Impression: 06/09/2014 - GI side effects to aricept; plan as above to change timing and dose of med;     Anesthesia     SOB upon awakening from surgery.  one occurrence.    Breast pain, right 06/14/2016 6/14/16 Notes f/u with Dr. Beckham with neg bx (evaluated at [facility]); bx site healing per patient; 5/10/16 Pain resolved but has residual R. nipple abnlity with erythema and nodular change, therefore dx mammo ordered for further eval; last reg mammo done 7/15; R. nipple mass - s/p bx to r/o Paget's disease (6/16); surg - Dr. Beckham    Contact dermatitis due to poison ivy 07/30/2014    Impression: 07/30/2014 - Depo-medrol 40 mg IM given in office. Pt will start prednisone taper as ordered. Call or RTC if rash is not improving.;     Fracture, foot 2021    Hx of bone density study 07/2013    DEXA (7/13) H -1.3    Hx of bone density  study 07/14/2016    DEXA (7/14/16) L 0.4, H -1.6 repeat 2-3 yrs    Hx of bone density study 10/08/2019    DEXA (10/8/19): L 1.3, H -1.8, A -2.8, worsened, NEW osteoporosis, repeat 2 yrs    Hx of bone density study 12/17/2021    DEXA (12/17/21): L 3.6,H -1.6, A -3.0    Hx of bone density study 01/10/2024    DEXA (1/10/24) L 5.4, H -1.3, A -2.9    Hx of colonoscopy 12/2015    surg - Dr. Kartik Gold    Hx of colonoscopy 11/08/2019    colonosc (11/8/19): nl except diverticulosis, repeat 5 yrs due to h/o polyps; surg Mariela Gold    Hx of colonoscopy 12/27/2024    colonosc (12/27/24): sigmoid diverticulosis, repeat 7 yrs or prn; surg - Dr. Kartik Gold    Hx of CT angio head/neck 07/13/2021    CT angio head/neck (7/13/21, hospitalized): No definite lg vessel occlusion, high-grade stenosis or dissection; indeterminate narrowing/occlusion of small P3 branch of the patient's left PCA; small outpouching at origin of left P-com may represent a small aneurysm versus infundibulum    Hx of CT brain perfusion 07/13/2021    Normal brain perfusion CT (7/13/21, hospitalized)    Hx of CT scan of head 07/13/2021    nl CT head (7/13/21): except mild atrophy with mild chronic small vessel ischemic disease    Hx of CT scan of head 07/14/2021    CT head (7/14/21): acute subacute infarct left posterior temporal and parietal region, no hemorrhage    Hx of echocardiogram 07/13/2021    ECHO (7/13/21, hosp): EF 56-60%, mild-mod TR, neg saline test    Hx of EMG/NCV 12/20/2023    EMG/NCV (12/20/23): BLE sensorimotor axonal and demyelinating polyneuropathy    Hx of esophagogastroduodenoscopy 05/05/2017    EGD (5/5/17): mod chronic gastritis, (+)H.pylori, no hiatal hernia; GI - Dr. Vilma Moore of esophagogastroduodenoscopy 04/28/2021    EGD (4/8/21): cricopharyngeal spasm, Schatzki's s/p dilation, nonerosive GERD with mod esophageal dysmotility, 2xm hiatal hernia, bile reflux gastropathy, mod chronic gastritis and (+) H. pylori - treated with  abx; GI - Dr. 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 lower lip lesion significantly improved; complete course of abx as prescribed; if area does not resolve completely, call to make earlier derm f/u appt  Impression: 07/28/2015 - L. lower lip swelling already improving but concern for residual ongoing infection; ddx includes infected cold sore, infection from cryotherapy for AK, and less likely shingles; finishing course     Scoliosis 2000    33 - 34 degrees    Wears glasses      Social History     Socioeconomic History    Marital status: Single   Tobacco Use    Smoking status: Never     Passive exposure: Never    Smokeless tobacco: Never   Vaping Use    Vaping status: Never Used   Substance and Sexual Activity    Alcohol use: Yes     Alcohol/week: 2.0 standard drinks of alcohol     Types: 2 Glasses of wine per week     Comment: occas    Drug use: Never    Sexual activity: Not Currently     Partners: Male     Birth control/protection: None          Assessment    Assessment:  1. Primary osteoarthritis of both knees        Plan:  Recommend over the counter anti-inflammatories for pain and/or swelling  Bilateral knee arthritis--repeat Euflexxa series will be initiated today.  Follow-up in a week for injection #2      Procedure Note:    I discussed with the patient the potential benefits of performing a therapeutic injections as well as potential risks including but not limited to  infection, swelling, pain, bleeding, bruising, nerve/vessel damage, skin color changes, transient elevation in blood glucose levels, elevated blood pressure and fat atrophy. After informed consent and after the areas were prepped with chlorhexadine soap, ethyl chloride was used to numb the skin. Via the superiorlateral approach, 3mL of 1% lidocaine followed by Euflexxa No. 1 were each injected into the right and left knee joint. The patient tolerated the procedure well. There were no complications. A sterile dressing was placed over the injection sites.      Follow-up: 1 week        Dawood Carmona MD  05/08/25  11:44 EDT      Dictated Utilizing Dragon Dictation.

## 2025-05-08 NOTE — PROGRESS NOTES
Procedure   - Large Joint Arthrocentesis: bilateral knee on 5/8/2025 11:41 AM  Indications: pain  Details: (23g  ) needle, anterolateral approach  Medications (Right): 3 mL lidocaine PF 1% 1 %; 20 mg Sodium Hyaluronate (Viscosup) 20 MG/2ML  Medications (Left): 3 mL lidocaine PF 1% 1 %; 20 mg Sodium Hyaluronate (Viscosup) 20 MG/2ML  Outcome: tolerated well, no immediate complications  Procedure, treatment alternatives, risks and benefits explained, specific risks discussed. Consent was given by the patient. Immediately prior to procedure a time out was called to verify the correct patient, procedure, equipment, support staff and site/side marked as required. Patient was prepped and draped in the usual sterile fashion.

## 2025-05-12 ENCOUNTER — INFUSION (OUTPATIENT)
Dept: ONCOLOGY | Facility: HOSPITAL | Age: 80
End: 2025-05-12
Payer: MEDICARE

## 2025-05-12 VITALS
TEMPERATURE: 98 F | HEART RATE: 81 BPM | SYSTOLIC BLOOD PRESSURE: 131 MMHG | RESPIRATION RATE: 16 BRPM | DIASTOLIC BLOOD PRESSURE: 79 MMHG

## 2025-05-12 DIAGNOSIS — M81.0 AGE-RELATED OSTEOPOROSIS WITHOUT CURRENT PATHOLOGICAL FRACTURE: Primary | ICD-10-CM

## 2025-05-12 PROCEDURE — 96372 THER/PROPH/DIAG INJ SC/IM: CPT

## 2025-05-12 PROCEDURE — 25010000002 DENOSUMAB 60 MG/ML SOLUTION PREFILLED SYRINGE: Performed by: INTERNAL MEDICINE

## 2025-05-12 RX ADMIN — DENOSUMAB 60 MG: 60 INJECTION SUBCUTANEOUS at 12:54

## 2025-05-15 ENCOUNTER — CLINICAL SUPPORT (OUTPATIENT)
Dept: ORTHOPEDIC SURGERY | Facility: CLINIC | Age: 80
End: 2025-05-15
Payer: MEDICARE

## 2025-05-15 DIAGNOSIS — M17.0 PRIMARY OSTEOARTHRITIS OF BOTH KNEES: Primary | ICD-10-CM

## 2025-05-15 RX ORDER — LIDOCAINE HYDROCHLORIDE 10 MG/ML
2.5 INJECTION, SOLUTION EPIDURAL; INFILTRATION; INTRACAUDAL; PERINEURAL
Status: COMPLETED | OUTPATIENT
Start: 2025-05-15 | End: 2025-05-15

## 2025-05-15 RX ADMIN — LIDOCAINE HYDROCHLORIDE 2.5 ML: 10 INJECTION, SOLUTION EPIDURAL; INFILTRATION; INTRACAUDAL; PERINEURAL at 10:41

## 2025-05-15 NOTE — PROGRESS NOTES
Procedure   - Large Joint Arthrocentesis: bilateral knee on 5/15/2025 10:41 AM  Indications: pain  Details: (23G) needle, superolateral approach  Medications (Right): 2.5 mL lidocaine PF 1% 1 %; 20 mg Sodium Hyaluronate (Viscosup) 20 MG/2ML  Medications (Left): 2.5 mL lidocaine PF 1% 1 %; 20 mg Sodium Hyaluronate (Viscosup) 20 MG/2ML  Outcome: tolerated well, no immediate complications  Procedure, treatment alternatives, risks and benefits explained, specific risks discussed. Consent was given by the patient. Immediately prior to procedure a time out was called to verify the correct patient, procedure, equipment, support staff and site/side marked as required. Patient was prepped and draped in the usual sterile fashion.

## 2025-05-15 NOTE — PROGRESS NOTES
Procedure Note:    I discussed with the patient the potential benefits of performing a therapeutic injections as well as potential risks including but not limited to infection, swelling, pain, bleeding, bruising, nerve/vessel damage, skin color changes, transient elevation in blood glucose levels, elevated blood pressure and fat atrophy. After informed consent and after the areas were prepped with chlorhexadine soap, ethyl chloride was used to numb the skin. Via the superiorlateral approach, 3mL of 1% lidocaine followed by Euflexxa No. 2 were each injected into the right and left knee joint. The patient tolerated the procedure well. There were no complications. A sterile dressing was placed over the injection sites.      Follow-up: 1 week

## 2025-05-18 NOTE — ASSESSMENT & PLAN NOTE
Cont myrbetriq 50mg QD; pelvic PT ordered per patient request - will be doing it at [facility] as she has to drive her sister there for pelvic PT for fecal incontinence

## 2025-05-18 NOTE — PROGRESS NOTES
"Chief Complaint   Patient presents with    Hypertension       History of Present Illness  79 y.o.  female presents for f/u on BP. Reports home BPs 120s/60-70s.     Will be starting pelvic PT at [facility] with Dr. Wright. Reports having to drive sister there for fecal incontinence PT.    Has not started symbicort inhaler; still using leftover Advair. Denies resp sxs.     Denies s/e with Prolia injection last week.    Review of Systems  Denies CP, SOB, falls. ROS(+) for stable urin incontinence. All other ROS reviewed and negative.      Current Outpatient Medications:     albuterol sulfate HFA (Ventolin HFA) 108 (90 Base) MCG/ACT inhaler prn    atorvastatin 80 MG QD    Biotin 10 MG QD:     Advair 100/50 1 puff BID    calcium carbonate-cholecalciferol 500-400 MG-UNIT QD    clopidogrel 75 MG QD    denosumab (Prolia) 60 MG/ML q6 mos    diphenhydrAMINE HCl, Sleep, (ZzzQuil) 50 MG/30ML liquid QHS    fluticasone (FLONASE) 50 MCG/ACT nasal spray, AD    levothyroxine 50 MCG QD    memantine 5 MG BID    Mirabegron ER 50 MG QD    montelukast 10 MG QD    Omega-3 Fatty Acids (fish oil) 1000 MG QD    pantoprazole  40 MG QD    PreviDent 5000 Dry Mouth 1.1 % gel ADfl:     TURMERIC CURCUMIN QD    vitamin B-12 (CYANOCOBALAMIN) 1000 MCG QD    VITALS:  /68   Pulse 74   Ht 162.6 cm (64\")   Wt 75.5 kg (166 lb 6.4 oz)   SpO2 95%   BMI 28.56 kg/m²     Physical Exam  Vitals and nursing note reviewed.   Constitutional:       General: She is not in acute distress.     Appearance: Normal appearance. She is not ill-appearing.   Eyes:      Extraocular Movements: Extraocular movements intact.      Conjunctiva/sclera: Conjunctivae normal.   Pulmonary:      Effort: Pulmonary effort is normal. No respiratory distress.   Neurological:      Mental Status: She is alert. Mental status is at baseline.   Psychiatric:         Mood and Affect: Mood normal.         Behavior: Behavior normal.         LABS  Results for orders " placed or performed in visit on 12/03/24   Comprehensive Metabolic Panel    Collection Time: 12/03/24 12:09 PM    Specimen: Blood   Result Value Ref Range    Glucose 71 65 - 99 mg/dL    BUN 20 8 - 23 mg/dL    Creatinine 0.87 0.57 - 1.00 mg/dL    Sodium 142 136 - 145 mmol/L    Potassium 5.1 3.5 - 5.2 mmol/L    Chloride 111 (H) 98 - 107 mmol/L    CO2 20.8 (L) 22.0 - 29.0 mmol/L    Calcium 9.1 8.6 - 10.5 mg/dL    Total Protein 6.9 6.0 - 8.5 g/dL    Albumin 4.0 3.5 - 5.2 g/dL    ALT (SGPT) 19 1 - 33 U/L    AST (SGOT) 26 1 - 32 U/L    Alkaline Phosphatase 123 (H) 39 - 117 U/L    Total Bilirubin 0.4 0.0 - 1.2 mg/dL    Globulin 2.9 gm/dL    A/G Ratio 1.4 g/dL    BUN/Creatinine Ratio 23.0 7.0 - 25.0    Anion Gap 10.2 5.0 - 15.0 mmol/L    eGFR 67.4 >60.0 mL/min/1.73   LDL Cholesterol, Direct    Collection Time: 12/03/24 12:09 PM    Specimen: Blood   Result Value Ref Range    LDL Cholesterol  62 0 - 100 mg/dL     Inhaler instruction:  Inhaler use was reviewed and demonstrated to the patient - specifically Symbicort. The patient was educated on how to correctly use the inhaler as well as what not to do. Patient expressed understanding and had no other questions.    ASSESSMENT/PLAN    Diagnoses and all orders for this visit:    1. Hypertension (Primary)  Assessment & Plan:  BP mildly elevated, improved on repeat and she reports normotensive readings at home; no meds; cont home BP monitoring 2x/week with goal < 130/80      2. Mixed stress and urge urinary incontinence  Assessment & Plan:  Cont myrbetriq 50mg QD; pelvic PT ordered per patient request - will be doing it at [facility] as she has to drive her sister there for pelvic PT for fecal incontinence      3. Osteoporosis  Assessment & Plan:  S/p Prolia injection 5/12/25; needs f/u BMP in 1 week to check Ca level    Orders:  -     Basic Metabolic Panel; Future    4. Mild intermittent asthma without complication  Assessment & Plan:  Stable/asx; she will finish her  Advair and then transition to Symbicort 80/4.5 2 puffs BID due to insurance formulary; reviewed correct use of inhaler and RX given for spacer; reviewed again to gargle/spit after puffs        Orders:  -     Spacer/Aero-Holding Chambers device; Use 1 each 2 (Two) Times a Day.  Dispense: 1 each; Refill: 0        FOLLOW-UP  Health maintenance - flu vacc and COVID19 vacc done for this season; RSV vacc prev completed  Patient advised to get BMP in 1 wk for Ca monitoring after Prolia injection on 5/12/25 (escribed - pt will get at [facility])  RTC for next wellness 10/20/25; fasting labs prior to appt (CBC, CMP, TSH, lipids, UA/micr, FT4, vit D, CPK) +SLUMS    Electronically signed by:    Lorene Isaac MD, FACP  05/19/2025

## 2025-05-18 NOTE — ASSESSMENT & PLAN NOTE
BP mildly elevated, improved on repeat and she reports normotensive readings at home; no meds; cont home BP monitoring 2x/week with goal < 130/80

## 2025-05-19 ENCOUNTER — OFFICE VISIT (OUTPATIENT)
Dept: INTERNAL MEDICINE | Facility: CLINIC | Age: 80
End: 2025-05-19
Payer: MEDICARE

## 2025-05-19 VITALS
OXYGEN SATURATION: 95 % | HEIGHT: 64 IN | BODY MASS INDEX: 28.41 KG/M2 | DIASTOLIC BLOOD PRESSURE: 68 MMHG | SYSTOLIC BLOOD PRESSURE: 132 MMHG | WEIGHT: 166.4 LBS | HEART RATE: 74 BPM

## 2025-05-19 DIAGNOSIS — I10 PRIMARY HYPERTENSION: Primary | Chronic | ICD-10-CM

## 2025-05-19 DIAGNOSIS — J45.20 MILD INTERMITTENT ASTHMA WITHOUT COMPLICATION: Chronic | ICD-10-CM

## 2025-05-19 DIAGNOSIS — N39.46 MIXED STRESS AND URGE URINARY INCONTINENCE: Chronic | ICD-10-CM

## 2025-05-19 DIAGNOSIS — M81.0 AGE-RELATED OSTEOPOROSIS WITHOUT CURRENT PATHOLOGICAL FRACTURE: Chronic | ICD-10-CM

## 2025-05-19 NOTE — ASSESSMENT & PLAN NOTE
Stable/asx; she will finish her Advair and then transition to Symbicort 80/4.5 2 puffs BID due to insurance formulary; reviewed correct use of inhaler and RX given for spacer; reviewed again to gargle/spit after puffs

## 2025-05-22 ENCOUNTER — CLINICAL SUPPORT (OUTPATIENT)
Dept: ORTHOPEDIC SURGERY | Facility: CLINIC | Age: 80
End: 2025-05-22
Payer: MEDICARE

## 2025-05-22 DIAGNOSIS — M17.0 PRIMARY OSTEOARTHRITIS OF BOTH KNEES: Primary | ICD-10-CM

## 2025-05-22 RX ORDER — LIDOCAINE HYDROCHLORIDE 10 MG/ML
3 INJECTION, SOLUTION EPIDURAL; INFILTRATION; INTRACAUDAL; PERINEURAL
Status: COMPLETED | OUTPATIENT
Start: 2025-05-22 | End: 2025-05-22

## 2025-05-22 RX ADMIN — LIDOCAINE HYDROCHLORIDE 3 ML: 10 INJECTION, SOLUTION EPIDURAL; INFILTRATION; INTRACAUDAL; PERINEURAL at 11:20

## 2025-05-22 NOTE — PROGRESS NOTES
Procedure Note:    I discussed with the patient the potential benefits of performing a therapeutic injections as well as potential risks including but not limited to infection, swelling, pain, bleeding, bruising, nerve/vessel damage, skin color changes, transient elevation in blood glucose levels, elevated blood pressure and fat atrophy. After informed consent and after the areas were prepped with chlorhexadine soap, ethyl chloride was used to numb the skin. Via the superiorlateral approach, 3mL of 1% lidocaine followed by Euflexxa #3 were each injected into the right and left knee joint. The patient tolerated the procedure well. There were no complications. A sterile dressing was placed over the injection sites.      Follow-up:6 mos

## 2025-05-22 NOTE — PROGRESS NOTES
Procedure   - Large Joint Arthrocentesis: bilateral knee on 5/22/2025 11:20 AM  Indications: pain  Details: 21 G needle, anterolateral approach  Medications (Right): 3 mL lidocaine PF 1% 1 %; 20 mg Sodium Hyaluronate (Viscosup) 20 MG/2ML  Medications (Left): 3 mL lidocaine PF 1% 1 %; 20 mg Sodium Hyaluronate (Viscosup) 20 MG/2ML  Outcome: tolerated well, no immediate complications  Procedure, treatment alternatives, risks and benefits explained, specific risks discussed. Consent was given by the patient. Immediately prior to procedure a time out was called to verify the correct patient, procedure, equipment, support staff and site/side marked as required. Patient was prepped and draped in the usual sterile fashion.

## 2025-05-30 ENCOUNTER — LAB (OUTPATIENT)
Dept: LAB | Facility: HOSPITAL | Age: 80
End: 2025-05-30
Payer: MEDICARE

## 2025-05-30 DIAGNOSIS — M81.0 AGE-RELATED OSTEOPOROSIS WITHOUT CURRENT PATHOLOGICAL FRACTURE: Chronic | ICD-10-CM

## 2025-05-30 LAB
ANION GAP SERPL CALCULATED.3IONS-SCNC: 7.3 MMOL/L (ref 5–15)
BUN SERPL-MCNC: 14 MG/DL (ref 8–23)
BUN/CREAT SERPL: 16.1 (ref 7–25)
CALCIUM SPEC-SCNC: 10.1 MG/DL (ref 8.6–10.5)
CHLORIDE SERPL-SCNC: 107 MMOL/L (ref 98–107)
CO2 SERPL-SCNC: 25.7 MMOL/L (ref 22–29)
CREAT SERPL-MCNC: 0.87 MG/DL (ref 0.57–1)
EGFRCR SERPLBLD CKD-EPI 2021: 67.4 ML/MIN/1.73
GLUCOSE SERPL-MCNC: 78 MG/DL (ref 65–99)
POTASSIUM SERPL-SCNC: 4.5 MMOL/L (ref 3.5–5.2)
SODIUM SERPL-SCNC: 140 MMOL/L (ref 136–145)

## 2025-05-30 PROCEDURE — 80048 BASIC METABOLIC PNL TOTAL CA: CPT

## 2025-05-31 ENCOUNTER — RESULTS FOLLOW-UP (OUTPATIENT)
Dept: INTERNAL MEDICINE | Facility: CLINIC | Age: 80
End: 2025-05-31
Payer: MEDICARE

## (undated) DEVICE — DEBRIDEMENT KIT: Brand: MEDLINE INDUSTRIES, INC.

## (undated) DEVICE — GLV SURG SENSICARE PI ORTHO SZ7.5 LF STRL

## (undated) DEVICE — SUT MNCRYL 4/0 PS2 18 IN

## (undated) DEVICE — COVER,LIGHT HANDLE,FLX,1/PK: Brand: MEDLINE INDUSTRIES, INC.

## (undated) DEVICE — PK MINOR SPLT 10

## (undated) DEVICE — 3M™ TEGADERM™ CHG DRESSING 25/CARTON 4 CARTONS/CASE 1660: Brand: TEGADERM™

## (undated) DEVICE — ANTIBACTERIAL UNDYED BRAIDED (POLYGLACTIN 910), SYNTHETIC ABSORBABLE SUTURE: Brand: COATED VICRYL

## (undated) DEVICE — ST INF PRI SMRTSTE 20DRP 2VLV 24ML 117

## (undated) DEVICE — SUT MNCRYL PLS ANTIB UD 4/0 PS2 18IN

## (undated) DEVICE — ELECTRD NDL EDGE/INSUL/PFTE.787MM 2.84IN